# Patient Record
Sex: FEMALE | Race: BLACK OR AFRICAN AMERICAN | NOT HISPANIC OR LATINO
[De-identification: names, ages, dates, MRNs, and addresses within clinical notes are randomized per-mention and may not be internally consistent; named-entity substitution may affect disease eponyms.]

---

## 2017-01-30 ENCOUNTER — APPOINTMENT (OUTPATIENT)
Dept: PODIATRY | Facility: CLINIC | Age: 47
End: 2017-01-30

## 2017-05-30 ENCOUNTER — APPOINTMENT (OUTPATIENT)
Dept: PODIATRY | Facility: CLINIC | Age: 47
End: 2017-05-30

## 2017-05-30 ENCOUNTER — OUTPATIENT (OUTPATIENT)
Dept: OUTPATIENT SERVICES | Facility: HOSPITAL | Age: 47
LOS: 1 days | Discharge: HOME | End: 2017-05-30

## 2017-05-30 VITALS
HEART RATE: 68 BPM | HEIGHT: 65 IN | BODY MASS INDEX: 35.82 KG/M2 | WEIGHT: 215 LBS | DIASTOLIC BLOOD PRESSURE: 82 MMHG | SYSTOLIC BLOOD PRESSURE: 110 MMHG

## 2017-06-28 DIAGNOSIS — M25.579 PAIN IN UNSPECIFIED ANKLE AND JOINTS OF UNSPECIFIED FOOT: ICD-10-CM

## 2017-06-28 DIAGNOSIS — E11.65 TYPE 2 DIABETES MELLITUS WITH HYPERGLYCEMIA: ICD-10-CM

## 2017-06-28 DIAGNOSIS — M25.571 PAIN IN RIGHT ANKLE AND JOINTS OF RIGHT FOOT: ICD-10-CM

## 2017-08-14 ENCOUNTER — APPOINTMENT (OUTPATIENT)
Dept: OPHTHALMOLOGY | Facility: CLINIC | Age: 47
End: 2017-08-14

## 2018-01-30 ENCOUNTER — APPOINTMENT (OUTPATIENT)
Dept: PODIATRY | Facility: CLINIC | Age: 48
End: 2018-01-30

## 2018-02-08 ENCOUNTER — LABORATORY RESULT (OUTPATIENT)
Age: 48
End: 2018-02-08

## 2018-02-08 ENCOUNTER — OUTPATIENT (OUTPATIENT)
Dept: OUTPATIENT SERVICES | Facility: HOSPITAL | Age: 48
LOS: 1 days | Discharge: HOME | End: 2018-02-08

## 2018-02-08 ENCOUNTER — APPOINTMENT (OUTPATIENT)
Dept: INTERNAL MEDICINE | Facility: CLINIC | Age: 48
End: 2018-02-08

## 2018-02-08 VITALS
SYSTOLIC BLOOD PRESSURE: 160 MMHG | HEIGHT: 65 IN | BODY MASS INDEX: 44.15 KG/M2 | DIASTOLIC BLOOD PRESSURE: 90 MMHG | WEIGHT: 265 LBS | HEART RATE: 70 BPM

## 2018-02-08 VITALS
BODY MASS INDEX: 43.32 KG/M2 | HEIGHT: 65 IN | DIASTOLIC BLOOD PRESSURE: 87 MMHG | WEIGHT: 260 LBS | HEART RATE: 93 BPM | SYSTOLIC BLOOD PRESSURE: 137 MMHG

## 2018-02-08 DIAGNOSIS — Z83.1 FAMILY HISTORY OF OTHER INFECTIOUS AND PARASITIC DISEASES: ICD-10-CM

## 2018-02-08 DIAGNOSIS — Z82.49 FAMILY HISTORY OF ISCHEMIC HEART DISEASE AND OTHER DISEASES OF THE CIRCULATORY SYSTEM: ICD-10-CM

## 2018-02-09 DIAGNOSIS — R73.9 HYPERGLYCEMIA, UNSPECIFIED: ICD-10-CM

## 2018-02-09 DIAGNOSIS — Z00.01 ENCOUNTER FOR GENERAL ADULT MEDICAL EXAMINATION WITH ABNORMAL FINDINGS: ICD-10-CM

## 2018-02-09 DIAGNOSIS — M25.571 PAIN IN RIGHT ANKLE AND JOINTS OF RIGHT FOOT: ICD-10-CM

## 2018-02-09 LAB
25(OH)D3 SERPL-MCNC: 14.1 NG/ML
ALBUMIN SERPL ELPH-MCNC: 4 G/DL
ALP BLD-CCNC: 75 U/L
ALT SERPL-CCNC: 18 U/L
ANION GAP SERPL CALC-SCNC: 9 MMOL/L
AST SERPL-CCNC: 19 U/L
BILIRUB SERPL-MCNC: 0.6 MG/DL
BUN SERPL-MCNC: 7 MG/DL
CALCIUM SERPL-MCNC: 9.6 MG/DL
CHLORIDE SERPL-SCNC: 104 MMOL/L
CHOLEST SERPL-MCNC: 172 MG/DL
CHOLEST/HDLC SERPL: 3.9 RATIO
CO2 SERPL-SCNC: 25 MMOL/L
CREAT SERPL-MCNC: 0.7 MG/DL
CREAT SPEC-SCNC: 131 MG/DL
GLUCOSE SERPL-MCNC: 162 MG/DL
HDLC SERPL-MCNC: 44 MG/DL
LDLC SERPL CALC-MCNC: 73 MG/DL
MICROALBUMIN 24H UR DL<=1MG/L-MCNC: 7 MG/DL
MICROALBUMIN/CREAT 24H UR-RTO: 53 MG/G
POTASSIUM SERPL-SCNC: 3.4 MMOL/L
PROT SERPL-MCNC: 7.6 G/DL
SODIUM SERPL-SCNC: 138 MMOL/L
TRIGL SERPL-MCNC: 264 MG/DL

## 2018-02-12 LAB
FOLATE SERPL-MCNC: 13.3 NG/ML
TSH SERPL-ACNC: 0.61 UIU/ML
VIT B12 SERPL-MCNC: 805 PG/ML

## 2018-02-16 ENCOUNTER — APPOINTMENT (OUTPATIENT)
Dept: INTERNAL MEDICINE | Facility: CLINIC | Age: 48
End: 2018-02-16

## 2018-02-21 ENCOUNTER — OTHER (OUTPATIENT)
Age: 48
End: 2018-02-21

## 2018-02-28 ENCOUNTER — APPOINTMENT (OUTPATIENT)
Dept: ORTHOPEDIC SURGERY | Facility: CLINIC | Age: 48
End: 2018-02-28

## 2018-03-01 ENCOUNTER — APPOINTMENT (OUTPATIENT)
Dept: INTERNAL MEDICINE | Facility: CLINIC | Age: 48
End: 2018-03-01

## 2018-03-07 ENCOUNTER — APPOINTMENT (OUTPATIENT)
Dept: INTERNAL MEDICINE | Facility: CLINIC | Age: 48
End: 2018-03-07

## 2018-03-20 ENCOUNTER — OUTPATIENT (OUTPATIENT)
Dept: OUTPATIENT SERVICES | Facility: HOSPITAL | Age: 48
LOS: 1 days | Discharge: HOME | End: 2018-03-20

## 2018-03-20 ENCOUNTER — APPOINTMENT (OUTPATIENT)
Dept: INTERNAL MEDICINE | Facility: CLINIC | Age: 48
End: 2018-03-20

## 2018-03-20 ENCOUNTER — LABORATORY RESULT (OUTPATIENT)
Age: 48
End: 2018-03-20

## 2018-03-20 VITALS
SYSTOLIC BLOOD PRESSURE: 155 MMHG | WEIGHT: 261 LBS | HEIGHT: 65 IN | HEART RATE: 80 BPM | DIASTOLIC BLOOD PRESSURE: 90 MMHG | BODY MASS INDEX: 43.49 KG/M2

## 2018-03-20 DIAGNOSIS — Z80.9 FAMILY HISTORY OF MALIGNANT NEOPLASM, UNSPECIFIED: ICD-10-CM

## 2018-03-20 DIAGNOSIS — Z80.42 FAMILY HISTORY OF MALIGNANT NEOPLASM OF PROSTATE: ICD-10-CM

## 2018-03-21 ENCOUNTER — RESULT REVIEW (OUTPATIENT)
Age: 48
End: 2018-03-21

## 2018-04-10 ENCOUNTER — APPOINTMENT (OUTPATIENT)
Dept: INTERNAL MEDICINE | Facility: CLINIC | Age: 48
End: 2018-04-10

## 2018-04-26 ENCOUNTER — APPOINTMENT (OUTPATIENT)
Dept: UROLOGY | Facility: CLINIC | Age: 48
End: 2018-04-26

## 2018-04-27 ENCOUNTER — APPOINTMENT (OUTPATIENT)
Dept: OBGYN | Facility: CLINIC | Age: 48
End: 2018-04-27

## 2018-05-11 ENCOUNTER — APPOINTMENT (OUTPATIENT)
Dept: ENDOCRINOLOGY | Facility: CLINIC | Age: 48
End: 2018-05-11

## 2018-05-11 ENCOUNTER — OTHER (OUTPATIENT)
Age: 48
End: 2018-05-11

## 2018-06-17 ENCOUNTER — EMERGENCY (EMERGENCY)
Facility: HOSPITAL | Age: 48
LOS: 0 days | Discharge: HOME | End: 2018-06-17
Admitting: EMERGENCY MEDICAL TECHNICIAN, BASIC

## 2018-06-17 VITALS
HEART RATE: 98 BPM | DIASTOLIC BLOOD PRESSURE: 75 MMHG | SYSTOLIC BLOOD PRESSURE: 133 MMHG | OXYGEN SATURATION: 100 % | RESPIRATION RATE: 19 BRPM

## 2018-06-17 VITALS
RESPIRATION RATE: 18 BRPM | SYSTOLIC BLOOD PRESSURE: 173 MMHG | OXYGEN SATURATION: 98 % | DIASTOLIC BLOOD PRESSURE: 101 MMHG | TEMPERATURE: 98 F | HEART RATE: 98 BPM

## 2018-06-17 DIAGNOSIS — M79.603 PAIN IN ARM, UNSPECIFIED: ICD-10-CM
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
## 2018-06-17 DIAGNOSIS — S69.92XA UNSPECIFIED INJURY OF LEFT WRIST, HAND AND FINGER(S), INITIAL ENCOUNTER: ICD-10-CM

## 2018-06-17 RX ORDER — ACETAMINOPHEN 500 MG
975 TABLET ORAL ONCE
Qty: 0 | Refills: 0 | Status: COMPLETED | OUTPATIENT
Start: 2018-06-17 | End: 2018-06-17

## 2018-06-17 RX ORDER — KETOROLAC TROMETHAMINE 30 MG/ML
60 SYRINGE (ML) INJECTION ONCE
Qty: 0 | Refills: 0 | Status: COMPLETED | OUTPATIENT
Start: 2018-06-17 | End: 2018-06-17

## 2018-06-17 RX ADMIN — Medication 975 MILLIGRAM(S): at 14:49

## 2018-06-17 NOTE — ED PROVIDER NOTE - OBJECTIVE STATEMENT
47 y/o y/o F without PMH presents with L wrist pain and swelling x hrs s/p hit by stick by daughter accidentally. Denies CP, palpitations, SOB, back pain, abdominal pain, n/v/d, fevers, fall, cough, recent travel, recent illness, sick contacts, leg pain/swelling, urinary symptoms, rash. denies other injuries, decreased ROM, change in color, warmth, open wounds.

## 2018-06-17 NOTE — ED PROVIDER NOTE - NS ED ROS FT
Review of Systems    Constitutional: (-) fever  Cardiovascular: (-) chest pain, (-) syncope  Respiratory: (-) cough, (-) shortness of breath  Gastrointestinal: (-) vomiting, (-) diarrhea  Musculoskeletal: (-) neck pain, (-) back pain  Integumentary: (-) rash, (-) edema  Neurological: (-) headache

## 2018-06-17 NOTE — ED PROVIDER NOTE - PHYSICAL EXAMINATION
PHYSICAL EXAM:    GENERAL: Alert, appears stated age, well appearing, non-toxic  SKIN: Warm, pink and dry. MMM.   EYE: Normal lids/conjunctiva  ENT: Normal hearing, patent oropharynx  NECK: +supple. No meningismus  Pulm: Bilateral BS, normal resp effort, no wheezes, stridor, or retractions  CV: RRR, no M/R/G, 2+ pulses throughout  Abd: soft, non-tender, non-distended  Mskel: no erythema, cyanosis. no spinal TTP. +L wrist TTP with mild swelling. no other TTP. + decreased ROM of L wrist due to pain. otherwise FROM with each joint isolated.   Neuro: AAOx3, +decreased sensation over L dorsal hand. otherwise sensation intact. 5/5 strength throughout.

## 2018-06-17 NOTE — ED PROVIDER NOTE - PROGRESS NOTE DETAILS
Counseled on red flags and to return for them. Counseled on importance of follow up. Patient repeats back instructions. Patient advised that they or their doctor may call [phone] to follow up on the specific results of the tests performed today in the emergency department.   Patient appears well on discharge.

## 2018-06-17 NOTE — ED PROCEDURE NOTE - CPROC ED POST PROC CARE GUIDE1
Elevate the injured extremity as instructed./Keep the cast/splint/dressing clean and dry./Instructed patient/caregiver regarding signs and symptoms of infection./Verbal/written post procedure instructions were given to patient/caregiver./Instructed patient/caregiver to follow-up with primary care physician.

## 2018-07-03 ENCOUNTER — APPOINTMENT (OUTPATIENT)
Dept: NEUROLOGY | Facility: CLINIC | Age: 48
End: 2018-07-03

## 2018-12-04 ENCOUNTER — APPOINTMENT (OUTPATIENT)
Dept: INTERNAL MEDICINE | Facility: CLINIC | Age: 48
End: 2018-12-04

## 2018-12-19 ENCOUNTER — CLINICAL ADVICE (OUTPATIENT)
Age: 48
End: 2018-12-19

## 2019-01-19 ENCOUNTER — EMERGENCY (EMERGENCY)
Facility: HOSPITAL | Age: 49
LOS: 0 days | Discharge: HOME | End: 2019-01-19
Attending: EMERGENCY MEDICINE | Admitting: EMERGENCY MEDICINE

## 2019-01-19 VITALS
OXYGEN SATURATION: 98 % | RESPIRATION RATE: 16 BRPM | TEMPERATURE: 98 F | DIASTOLIC BLOOD PRESSURE: 112 MMHG | HEART RATE: 98 BPM | SYSTOLIC BLOOD PRESSURE: 178 MMHG

## 2019-01-19 DIAGNOSIS — R07.89 OTHER CHEST PAIN: ICD-10-CM
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
## 2019-01-19 DIAGNOSIS — M65.20 CALCIFIC TENDINITIS, UNSPECIFIED SITE: ICD-10-CM

## 2019-01-19 DIAGNOSIS — R07.9 CHEST PAIN, UNSPECIFIED: ICD-10-CM

## 2019-01-19 DIAGNOSIS — Z79.84 LONG TERM (CURRENT) USE OF ORAL HYPOGLYCEMIC DRUGS: ICD-10-CM
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
## 2019-01-19 DIAGNOSIS — J45.909 UNSPECIFIED ASTHMA, UNCOMPLICATED: ICD-10-CM

## 2019-01-19 DIAGNOSIS — I10 ESSENTIAL (PRIMARY) HYPERTENSION: ICD-10-CM

## 2019-01-19 DIAGNOSIS — E11.9 TYPE 2 DIABETES MELLITUS WITHOUT COMPLICATIONS: ICD-10-CM

## 2019-01-19 DIAGNOSIS — S46.911A STRAIN OF UNSPECIFIED MUSCLE, FASCIA AND TENDON AT SHOULDER AND UPPER ARM LEVEL, RIGHT ARM, INITIAL ENCOUNTER: ICD-10-CM

## 2019-01-19 RX ORDER — METHOCARBAMOL 500 MG/1
1000 TABLET, FILM COATED ORAL ONCE
Qty: 0 | Refills: 0 | Status: COMPLETED | OUTPATIENT
Start: 2019-01-19 | End: 2019-01-19

## 2019-01-19 RX ORDER — AMLODIPINE BESYLATE 2.5 MG/1
1 TABLET ORAL
Qty: 30 | Refills: 0
Start: 2019-01-19 | End: 2019-02-17

## 2019-01-19 RX ORDER — IBUPROFEN 200 MG
3 TABLET ORAL
Qty: 120 | Refills: 0
Start: 2019-01-19 | End: 2019-01-28

## 2019-01-19 RX ORDER — IBUPROFEN 200 MG
600 TABLET ORAL ONCE
Qty: 0 | Refills: 0 | Status: COMPLETED | OUTPATIENT
Start: 2019-01-19 | End: 2019-01-19

## 2019-01-19 RX ORDER — METHOCARBAMOL 500 MG/1
1 TABLET, FILM COATED ORAL
Qty: 10 | Refills: 0
Start: 2019-01-19 | End: 2019-01-28

## 2019-01-19 RX ORDER — OXYCODONE AND ACETAMINOPHEN 5; 325 MG/1; MG/1
1 TABLET ORAL ONCE
Qty: 0 | Refills: 0 | Status: DISCONTINUED | OUTPATIENT
Start: 2019-01-19 | End: 2019-01-19

## 2019-01-19 RX ORDER — METFORMIN HYDROCHLORIDE 850 MG/1
1 TABLET ORAL
Qty: 30 | Refills: 0
Start: 2019-01-19 | End: 2019-02-17

## 2019-01-19 RX ADMIN — OXYCODONE AND ACETAMINOPHEN 1 TABLET(S): 5; 325 TABLET ORAL at 12:04

## 2019-01-19 RX ADMIN — METHOCARBAMOL 1000 MILLIGRAM(S): 500 TABLET, FILM COATED ORAL at 12:04

## 2019-01-19 RX ADMIN — Medication 600 MILLIGRAM(S): at 12:04

## 2019-01-19 NOTE — ED PROVIDER NOTE - OBJECTIVE STATEMENT
49 yo F with hx of HTN, DM, asthma, non compliant, presents for evaluation of right shoulder pain, onset 2 days ago, associated with pain with movement. NO fever, no chills, no headache, no nausea, no vomiting, no chest pain, no back pain, no abdominal pain. Patient states she was bathing her grandson when she felt a sharp pain in her shoulder. Pt states she has been taking advil with mild relief. Patient denies any other symptoms.

## 2019-01-19 NOTE — ED PROVIDER NOTE - NSFOLLOWUPCLINICS_GEN_ALL_ED_FT
[facility]  Medicine  [address], [place]   Phone: [phone]  Fax:   Follow Up Time: 4-6 Days    [facility]  Orthpedic  [address], [place]   Phone: [phone]  Fax:   Follow Up Time: 4-6 Days    [facility]  Rehabilitation  [address] [place] [id number]  Phone: [phone]  Fax:   Follow Up Time: 4-6 Days

## 2019-01-19 NOTE — ED PROVIDER NOTE - ATTENDING CONTRIBUTION TO CARE
48y f h/o htn, dm, asthma p/w R shoulder pain x 2d. Started while she was giving her grandson a bath, went to pick him up and felt a sharp pain in her R shoulder. Radiating around to R chest. Worse w/mvmt. Has been taking advil w/min relief. Denies f/c, sob, nvd, abd pain, diaphoresis, edema, calf erythema or pain. No recent travel/sx/immob. Hypertensive in ED - pt admits to non-compliance w/BP meds & metformin x 1y. PMD @ [facility]. PE:  middle-aged f nad, ncat, neck supple no jvd, rrr nl s1s2 no mrg, ctab no wrr, abd soft ntnd, back non-tender, ext- R shoulder atraumatic, +ttp along deltoid and up R neck, ellicited by ranging shoulder, full rom/strength intact but painful, nvid, remainder of ext exam nl.

## 2019-01-19 NOTE — ED PROVIDER NOTE - CARE PROVIDER_API CALL
Patrick Pacheco), Anesthesiology; Pain Medicine  [address] [place] [address]  Phone: [phone]  Fax: [phone]

## 2019-01-19 NOTE — ED PROVIDER NOTE - MEDICAL DECISION MAKING DETAILS
R shoulder strain - no bony fx or injury on XRs - Rx for pain meds and refills of BP/DM meds given, return precautions discussedm encouraged outpt [facility]/Ortho/Rehab Clinic f/u

## 2019-01-19 NOTE — ED PROVIDER NOTE - PROGRESS NOTE DETAILS
pt pain controlled Discussed with patient need to be compliant with her medications. Discussed need to follow up with ortho, PT, and pain management. Discussed signs and symptoms to return to the ED for. Discussed follow up with PMD.

## 2019-01-19 NOTE — ED PROVIDER NOTE - NSFOLLOWUPINSTRUCTIONS_ED_ALL_ED_FT
Calcific Tendinitis    WHAT YOU NEED TO KNOW:    What is calcific tendinitis? Calcific tendinitis is a condition that occurs when calcium collects in the tendons of the shoulder. Tendons are bands of tissue that connect muscle to bone. The calcium can make the tendons swell and cause severe pain.    What increases my risk for calcific tendinitis? There is no known cause of calcific tendinitis. The following may increase your risk:     Family history of calcific tendinitis      Age between 30 and 50 years      Female gender      Lack of physical activity      Medical conditions, such as diabetes    What are the signs and symptoms of calcific tendinitis? Signs and symptoms may be sudden and severe, lasting several weeks. Symptoms can also be mild and last several months.     Pain in your shoulder that may spread to your neck      Trouble sleeping because of pain       Stiffness or weakness in your arm or shoulder      Decreased arm movement    How is calcific tendinitis diagnosed? Your healthcare provider will check how well you can move your shoulder and arm. He may check the function of your elbow, wrist, and hand. He may compare the movement and strength of your painful shoulder against your healthy shoulder. You may also need the following tests:     An x-ray may show calcium buildup in your shoulder.      An ultrasound uses sound waves to show pictures on a monitor. An ultrasound may be done to show the cause of your pain.      An MRI takes pictures of your shoulder. An MRI may show calcium in your shoulder or another cause of your pain. You may be given dye to help the parts of your shoulder show up better in the pictures. Tell the healthcare provider if you have ever had an allergic reaction to contrast dye. Do not enter the MRI room with anything metal. Metal can cause serious injury. Tell the healthcare provider if you have any metal in or on your body.    How is calcific tendinitis treated? Calcific tendinitis may go away on its own. The body absorbs the calcium, and the tendon heals. You may need any of the following:     Medicines:   NSAIDs may decrease swelling and pain. This medicine can be bought with or without a doctor's order. This medicine can cause stomach bleeding or kidney problems in certain people. If you take blood thinner medicine, always ask your healthcare provider if NSAIDs are safe for you. Always read the medicine label and follow the directions on it before using this medicine.      Steroids help decrease inflammation. You may be given steroids as a pill or a shot in your shoulder.      Needling is a procedure used to break up the calcium or remove it. Your healthcare provider inserts one or more needles through your skin and into your shoulder.       Extracorporeal shockwave therapy (ESWT) uses sound waves aimed at the calcium to help break it up. The pieces of calcium are then absorbed back into the body. ESWT may be done if symptoms last 3 months or longer.      Surgery may be needed to remove the calcium if you have severe pain for several months and other treatments have not helped. Damaged tissue or bone may also be removed.    How can I manage my symptoms?     Go to physical therapy. A physical therapist can teach you exercises to help improve movement and strength, and to decrease pain.      Apply ice on your shoulder for 15 to 20 minutes every hour or as directed. Use an ice pack, or put crushed ice in a plastic bag. Cover it with a towel. Ice helps prevent tissue damage and decreases swelling and pain.      Apply heat on your shoulder for 20 to 30 minutes every 2 hours for as many days as directed. Heat helps decrease pain and muscle spasms.      Rest your arm. Healthcare providers may have you place an item, such as a ball, between your side and elbow while you rest. This may help decrease stiffness and pain.    When should I contact my healthcare provider?     You have worse pain and stiffness in your shoulder.      You have new or more trouble moving your arm.      You have questions or concerns about your condition or care.    When should I seek immediate care or call 911?     You cannot move your arm.      You have severe pain in your arm or shoulder.    CARE AGREEMENT:    You have the right to help plan your care. Learn about your health condition and how it may be treated. Discuss treatment options with your healthcare providers to decide what care you want to receive. You always have the right to refuse treatment.

## 2019-01-23 PROBLEM — E11.9 TYPE 2 DIABETES MELLITUS WITHOUT COMPLICATIONS: Chronic | Status: ACTIVE | Noted: 2019-01-19

## 2019-01-23 PROBLEM — I10 ESSENTIAL (PRIMARY) HYPERTENSION: Chronic | Status: ACTIVE | Noted: 2019-01-19

## 2019-01-31 ENCOUNTER — APPOINTMENT (OUTPATIENT)
Dept: INTERNAL MEDICINE | Facility: CLINIC | Age: 49
End: 2019-01-31

## 2019-02-12 ENCOUNTER — APPOINTMENT (OUTPATIENT)
Dept: INTERNAL MEDICINE | Facility: CLINIC | Age: 49
End: 2019-02-12

## 2019-02-12 ENCOUNTER — OUTPATIENT (OUTPATIENT)
Dept: OUTPATIENT SERVICES | Facility: HOSPITAL | Age: 49
LOS: 1 days | Discharge: HOME | End: 2019-02-12

## 2019-02-12 VITALS
WEIGHT: 265 LBS | HEIGHT: 65 IN | BODY MASS INDEX: 44.15 KG/M2 | SYSTOLIC BLOOD PRESSURE: 148 MMHG | HEART RATE: 99 BPM | DIASTOLIC BLOOD PRESSURE: 95 MMHG

## 2019-02-12 DIAGNOSIS — I10 ESSENTIAL (PRIMARY) HYPERTENSION: ICD-10-CM

## 2019-02-12 DIAGNOSIS — N93.9 ABNORMAL UTERINE AND VAGINAL BLEEDING, UNSPECIFIED: ICD-10-CM

## 2019-02-12 DIAGNOSIS — M25.511 PAIN IN RIGHT SHOULDER: ICD-10-CM

## 2019-02-12 DIAGNOSIS — K76.9 LIVER DISEASE, UNSPECIFIED: ICD-10-CM

## 2019-02-12 DIAGNOSIS — E11.9 TYPE 2 DIABETES MELLITUS WITHOUT COMPLICATIONS: ICD-10-CM

## 2019-02-12 DIAGNOSIS — R41.3 OTHER AMNESIA: ICD-10-CM

## 2019-02-12 RX ORDER — AMLODIPINE BESYLATE 5 MG/1
5 TABLET ORAL
Qty: 30 | Refills: 0 | Status: DISCONTINUED | COMMUNITY
Start: 2019-01-19

## 2019-02-12 RX ORDER — METHOCARBAMOL 500 MG/1
500 TABLET, FILM COATED ORAL
Qty: 10 | Refills: 0 | Status: DISCONTINUED | COMMUNITY
Start: 2019-01-19

## 2019-02-12 NOTE — PHYSICAL EXAM
[Well Nourished] : well nourished [Well Developed] : well developed [Normal Sclera/Conjunctiva] : normal sclera/conjunctiva [Supple] : supple [No Respiratory Distress] : no respiratory distress  [Clear to Auscultation] : lungs were clear to auscultation bilaterally [Regular Rhythm] : with a regular rhythm [Normal S1, S2] : normal S1 and S2 [No Edema] : there was no peripheral edema [Soft] : abdomen soft [Non Tender] : non-tender [Normal Affect] : the affect was normal [Normal Insight/Judgement] : insight and judgment were intact [de-identified] : In distress due to pain [de-identified] : Vision grossly normal.  [de-identified] : Pain on shoulder, right to touch and active and passive movment. Movement restricted due to pain. Sensation decreased to touch and pain in arm and fore arm.  [de-identified] : Walkers with limp. uses cane at home.

## 2019-02-12 NOTE — ASSESSMENT
[FreeTextEntry1] : # Frozen Shoulder\par -Xray showed Hypercalcemia around the tendon.\par -MRI shoulder ordered.\par -Use Meloxicam 15 mg daily for 15 days. \par -Orthopedic refferal provided\par -patient advised to come back if the pain worsens\par \par #Diabetes Mellitus\par -patient non complaint with medications\par -last HB a1c noted to be 9.\par -Repeat labs ordered\par -patient referred to endocrinology\par -Podiatry and opthalmology referral given\par -Patient counseled\par \par #Hypertension\par -patient non complaint.\par -Patient advised to continue with current medications and follow up in 1 month\par \par # Hepatic hemangioma\par -CT scan showed hepatic mass suspicious of hepatic hemangioma.\par -repeat CT abdomen ordered.\par \par # Memory problem\par -Patient has difficulty in memory\par -Neurology refferal given\par \par # HCM\par -Flu shot refused\par -Routine labs ordered\par -Referral to gynae/obs for pap smear and mammogram\par -Follow up in 1 month.\par \par \par

## 2019-02-12 NOTE — REVIEW OF SYSTEMS
[Itching] : Itching [Memory Loss] : memory loss [Negative] : Heme/Lymph [FreeTextEntry3] : See HPI [FreeTextEntry4] : See HPI [FreeTextEntry5] : See HPI [FreeTextEntry6] : See HPI [FreeTextEntry8] : See HPI [FreeTextEntry9] : See HPI [de-identified] : See HPI

## 2019-02-12 NOTE — HISTORY OF PRESENT ILLNESS
[FreeTextEntry1] : Follow Up [de-identified] : 49 year old female with past medical history of Diabetes and HTN comes for a follow up. She complains of right shoulder pain, that started one month ago. She complains of difficulty in movement, and was at ER last month for the similar complains. The patient was found to have calcium deposition on Shoulder xray. Of note the patient says she has been non complaint of her medications and have some blurry vision and urinary frequency. \par

## 2019-02-15 ENCOUNTER — RX RENEWAL (OUTPATIENT)
Age: 49
End: 2019-02-15

## 2019-02-19 ENCOUNTER — MEDICATION RENEWAL (OUTPATIENT)
Age: 49
End: 2019-02-19

## 2019-02-20 ENCOUNTER — APPOINTMENT (OUTPATIENT)
Dept: ORTHOPEDIC SURGERY | Facility: CLINIC | Age: 49
End: 2019-02-20

## 2019-03-13 ENCOUNTER — APPOINTMENT (OUTPATIENT)
Dept: INTERNAL MEDICINE | Facility: CLINIC | Age: 49
End: 2019-03-13

## 2019-03-28 DIAGNOSIS — Z56.0 UNEMPLOYMENT, UNSPECIFIED: ICD-10-CM

## 2019-03-28 DIAGNOSIS — Z60.2 PROBLEMS RELATED TO LIVING ALONE: ICD-10-CM

## 2019-03-28 SDOH — SOCIAL STABILITY - SOCIAL INSECURITY: PROBLEMS RELATED TO LIVING ALONE: Z60.2

## 2019-03-28 SDOH — ECONOMIC STABILITY - INCOME SECURITY: UNEMPLOYMENT, UNSPECIFIED: Z56.0

## 2019-03-29 PROBLEM — Z60.2 LIVING ALONE: Status: ACTIVE | Noted: 2019-03-29

## 2019-03-29 PROBLEM — Z56.0 UNEMPLOYED: Status: ACTIVE | Noted: 2019-03-29

## 2019-05-01 ENCOUNTER — OUTPATIENT (OUTPATIENT)
Dept: OUTPATIENT SERVICES | Facility: HOSPITAL | Age: 49
LOS: 1 days | End: 2019-05-01
Payer: MEDICAID

## 2019-05-01 PROCEDURE — G9001: CPT

## 2019-05-17 DIAGNOSIS — Z71.89 OTHER SPECIFIED COUNSELING: ICD-10-CM

## 2019-06-20 ENCOUNTER — OTHER (OUTPATIENT)
Age: 49
End: 2019-06-20

## 2019-08-15 ENCOUNTER — OTHER (OUTPATIENT)
Age: 49
End: 2019-08-15

## 2019-08-15 LAB
24R-OH-CALCIDIOL SERPL-MCNC: 66.9 PG/ML
ALBUMIN SERPL ELPH-MCNC: 4.2 G/DL
ALP BLD-CCNC: 104 U/L
ALT SERPL-CCNC: 11 U/L
ANION GAP SERPL CALC-SCNC: 15 MMOL/L
APPEARANCE: CLEAR
AST SERPL-CCNC: 11 U/L
BASOPHILS # BLD AUTO: 0.03 K/UL
BASOPHILS NFR BLD AUTO: 0.4 %
BILIRUB SERPL-MCNC: 0.8 MG/DL
BILIRUBIN URINE: NEGATIVE
BLOOD URINE: NEGATIVE
BUN SERPL-MCNC: 13 MG/DL
CALCIUM SERPL-MCNC: 9.7 MG/DL
CHLORIDE SERPL-SCNC: 101 MMOL/L
CHOLEST SERPL-MCNC: 152 MG/DL
CHOLEST/HDLC SERPL: 4.6 RATIO
CO2 SERPL-SCNC: 22 MMOL/L
COLOR: YELLOW
CREAT SERPL-MCNC: 0.8 MG/DL
CREAT SPEC-SCNC: 111 MG/DL
EOSINOPHIL # BLD AUTO: 0.42 K/UL
EOSINOPHIL NFR BLD AUTO: 5.2 %
ESTIMATED AVERAGE GLUCOSE: 255 MG/DL
GLUCOSE QUALITATIVE U: ABNORMAL
GLUCOSE SERPL-MCNC: 236 MG/DL
HBA1C MFR BLD HPLC: 10.5 %
HCT VFR BLD CALC: 41.4 %
HDLC SERPL-MCNC: 33 MG/DL
HGB BLD-MCNC: 13.3 G/DL
IMM GRANULOCYTES NFR BLD AUTO: 0.2 %
KETONES URINE: NEGATIVE
LDLC SERPL CALC-MCNC: 43 MG/DL
LEUKOCYTE ESTERASE URINE: NEGATIVE
LYMPHOCYTES # BLD AUTO: 2.64 K/UL
LYMPHOCYTES NFR BLD AUTO: 32.8 %
MAN DIFF?: NORMAL
MCHC RBC-ENTMCNC: 26.4 PG
MCHC RBC-ENTMCNC: 32.1 G/DL
MCV RBC AUTO: 82.3 FL
MICROALBUMIN 24H UR DL<=1MG/L-MCNC: 1.9 MG/DL
MICROALBUMIN/CREAT 24H UR-RTO: 17 MG/G
MONOCYTES # BLD AUTO: 0.66 K/UL
MONOCYTES NFR BLD AUTO: 8.2 %
NEUTROPHILS # BLD AUTO: 4.27 K/UL
NEUTROPHILS NFR BLD AUTO: 53.2 %
NITRITE URINE: NEGATIVE
PH URINE: 6
PLATELET # BLD AUTO: 300 K/UL
POTASSIUM SERPL-SCNC: 4.8 MMOL/L
PROT SERPL-MCNC: 7.8 G/DL
PROTEIN URINE: NORMAL
RBC # BLD: 5.03 M/UL
RBC # FLD: 12.9 %
SODIUM SERPL-SCNC: 138 MMOL/L
SPECIFIC GRAVITY URINE: 1.03
TRIGL SERPL-MCNC: 417 MG/DL
TSH SERPL-ACNC: 1.21 UIU/ML
UROBILINOGEN URINE: NORMAL
WBC # FLD AUTO: 8.04 K/UL

## 2019-09-05 ENCOUNTER — OTHER (OUTPATIENT)
Age: 49
End: 2019-09-05

## 2019-09-09 ENCOUNTER — APPOINTMENT (OUTPATIENT)
Dept: INTERNAL MEDICINE | Facility: CLINIC | Age: 49
End: 2019-09-09

## 2019-10-10 ENCOUNTER — OUTPATIENT (OUTPATIENT)
Dept: OUTPATIENT SERVICES | Facility: HOSPITAL | Age: 49
LOS: 1 days | Discharge: HOME | End: 2019-10-10

## 2019-10-10 ENCOUNTER — APPOINTMENT (OUTPATIENT)
Dept: INTERNAL MEDICINE | Facility: CLINIC | Age: 49
End: 2019-10-10
Payer: MEDICAID

## 2019-10-10 VITALS
HEIGHT: 65 IN | SYSTOLIC BLOOD PRESSURE: 156 MMHG | HEART RATE: 101 BPM | WEIGHT: 250 LBS | DIASTOLIC BLOOD PRESSURE: 97 MMHG | BODY MASS INDEX: 41.65 KG/M2

## 2019-10-10 PROCEDURE — 99214 OFFICE O/P EST MOD 30 MIN: CPT

## 2019-10-10 RX ORDER — METFORMIN HYDROCHLORIDE 1000 MG/1
1000 TABLET, COATED ORAL
Qty: 60 | Refills: 5 | Status: DISCONTINUED | COMMUNITY
Start: 2018-03-21 | End: 2019-10-10

## 2019-10-10 RX ORDER — HYDROCHLOROTHIAZIDE 25 MG/1
25 TABLET ORAL DAILY
Qty: 30 | Refills: 5 | Status: DISCONTINUED | COMMUNITY
Start: 2018-03-20 | End: 2019-10-10

## 2019-10-10 NOTE — REVIEW OF SYSTEMS
[address]  [id number]  Consent for Procedure/Sedation  Date: 07/02/2024         Time: 0815    I hereby authorize Dr. Donnelly, my physician and his/her assistants (if applicable), which may include medical students, residents, and/or fellows, to perform the following surgical operation/ procedure and administer such anesthesia as may be determined necessary by my physician: Biliary drain insertion on [place]i [place] De Oliveira  2.   I recognize that during the surgical operation/procedure, unforeseen conditions may necessitate additional or different procedures than those listed above.  I, therefore, further authorize and request that the above-named surgeon, assistants, or designees perform such procedures as are, in their judgment, necessary and desirable.    3.   My surgeon/physician has discussed prior to my surgery the potential benefits, risks and side effects of this procedure; the likelihood of achieving goals; and potential problems that might occur during recuperation.  They also discussed reasonable alternatives to the procedure, including risks, benefits, and side effects related to the alternatives and risks related to not receiving this procedure.  I have had all my questions answered and I acknowledge that no guarantee has been made as to the result that may be obtained.    4.   Should the need arise during my operation/procedure, which includes change of level of care prior to discharge, I also consent to the administration of blood and/or blood products.  Further, I understand that despite careful testing and screening of blood or blood products by collecting agencies, I may still be subject to ill effects as a result of receiving a blood transfusion and/or blood products.  The following are some, but not all, of the potential risks that can occur: fever and allergic reactions, hemolytic reactions, transmission of diseases such as Hepatitis, AIDS and Cytomegalovirus  (CMV) and fluid overload.  In the event that I wish to have an autologous transfusion of my own blood, or a directed donor transfusion, I will discuss this with my physician.   Check only if Refusing Blood or Blood Products  I understand refusal of blood or blood products as deemed necessary by my physician may have serious consequences to my condition to include possible death. I hereby assume responsibility for my refusal and release the hospital, its personnel, and my physicians from any responsibility for the consequences of my refusal.         o  Refuse         5.   I authorize the use of any specimen, organs, tissues, body parts or foreign objects that may be removed from my body during the operation/procedure for diagnosis, research or teaching purposes and their subsequent disposal by hospital authorities.  I also authorize the release of specimen test results and/or written reports to my treating physician on the hospital medical staff or other referring or consulting physicians involved in my care, at the discretion of the Pathologist or my treating physician.    6.   I consent to the photographing or videotaping of the operations or procedures to be performed, including appropriate portions of my body for medical, scientific, or educational purposes, provided my identity is not revealed by the pictures or by descriptive texts accompanying them.  If the procedure has been photographed/videotaped, the surgeon will obtain the original picture, image, videotape or CD.  The hospital will not be responsible for storage, release or maintenance of the picture, image, tape or CD.    7.   I consent to the presence of a  or observers in the operating room as deemed necessary by my physician or their designees.    8.   I recognize that in the event my procedure results in extended X-Ray/fluoroscopy time, I may develop a skin reaction.    9. If I have a Do Not Attempt Resuscitation (DNAR) order in  place, that status will be suspended while in the operating room, procedural suite, and during the recovery period unless otherwise explicitly stated by me (or a person authorized to consent on my behalf). The surgeon or my attending physician will determine when the applicable recovery period ends for purposes of reinstating the DNAR order.  10. Patients having a sterilization procedure: I understand that if the procedure is successful the results will be permanent and it will therefore be impossible for me to inseminate, conceive, or bear children.  I also understand that the procedure is intended to result in sterility, although the result has not been guaranteed.   11. I acknowledge that my physician has explained sedation/analgesia administration to me including the risk and benefits I consent to the administration of sedation/analgesia as may be necessary or desirable in the judgment of my physician.    I CERTIFY THAT I HAVE READ AND FULLY UNDERSTAND THE ABOVE CONSENT TO OPERATION and/or OTHER PROCEDURE.        ____________________________________       _________________________________      ______________________________  Signature of Patient         Signature of Responsible Person        Printed Name of Responsible Person        ____________________________________      _________________________________      ______________________________       Signature of Witness          Relationship to Patient                       Date                                       Time  Patient Name: Stephy De Oliveira  : 1981    Reviewed: 2024   Printed: 2024  Medical Record #: [id number] Page 1 of 1            [Negative] : Integumentary [FreeTextEntry6] : SOB and wheezing mostly at nights and while walking [FreeTextEntry8] : polyuria [de-identified] : neuropathic pain

## 2019-10-10 NOTE — PHYSICAL EXAM
[No Acute Distress] : no acute distress [Well Developed] : well developed [Well Nourished] : well nourished [Well-Appearing] : well-appearing [Normal Sclera/Conjunctiva] : normal sclera/conjunctiva [EOMI] : extraocular movements intact [PERRL] : pupils equal round and reactive to light [Normal Outer Ear/Nose] : the outer ears and nose were normal in appearance [Normal Oropharynx] : the oropharynx was normal [No JVD] : no jugular venous distention [No Lymphadenopathy] : no lymphadenopathy [Supple] : supple [No Respiratory Distress] : no respiratory distress  [Thyroid Normal, No Nodules] : the thyroid was normal and there were no nodules present [No Accessory Muscle Use] : no accessory muscle use [Clear to Auscultation] : lungs were clear to auscultation bilaterally [Normal Rate] : normal rate  [Regular Rhythm] : with a regular rhythm [Normal S1, S2] : normal S1 and S2 [No Abdominal Bruit] : a ~M bruit was not heard ~T in the abdomen [No Murmur] : no murmur heard [No Carotid Bruits] : no carotid bruits [No Varicosities] : no varicosities [Pedal Pulses Present] : the pedal pulses are present [No Edema] : there was no peripheral edema [No Palpable Aorta] : no palpable aorta [Soft] : abdomen soft [No Extremity Clubbing/Cyanosis] : no extremity clubbing/cyanosis [Non Tender] : non-tender [Non-distended] : non-distended [No HSM] : no HSM [No Masses] : no abdominal mass palpated [Normal Posterior Cervical Nodes] : no posterior cervical lymphadenopathy [Normal Anterior Cervical Nodes] : no anterior cervical lymphadenopathy [Normal Bowel Sounds] : normal bowel sounds [No Spinal Tenderness] : no spinal tenderness [No CVA Tenderness] : no CVA  tenderness [Grossly Normal Strength/Tone] : grossly normal strength/tone [No Joint Swelling] : no joint swelling [Coordination Grossly Intact] : coordination grossly intact [No Rash] : no rash [No Focal Deficits] : no focal deficits [Normal Gait] : normal gait [Normal Affect] : the affect was normal [Deep Tendon Reflexes (DTR)] : deep tendon reflexes were 2+ and symmetric [Normal Insight/Judgement] : insight and judgment were intact

## 2019-10-10 NOTE — HISTORY OF PRESENT ILLNESS
[FreeTextEntry1] : Follow Up [de-identified] : 49 year old female with past medical history of Diabetes and HTN comes for a follow up. She was complaining of right shoulder pain during her last visit. Xray was done and showed calcifications of the joint. MRI was ordered and she was referred to ortho but she never went. She is still complaining of shoulder pain with decrease range of motion.\par Patient is also complaining of pin and needle pain in bilateral feet mostly at night along with polyuria and polydypsia. Also she is having wheezing and SOB episodes mostly at nights which she is attributing to her asthma. She is taking her daughter's medication ( ventolin) for symptomatic relief.

## 2019-10-11 ENCOUNTER — OTHER (OUTPATIENT)
Age: 49
End: 2019-10-11

## 2019-10-11 ENCOUNTER — OUTPATIENT (OUTPATIENT)
Dept: OUTPATIENT SERVICES | Facility: HOSPITAL | Age: 49
LOS: 1 days | Discharge: HOME | End: 2019-10-11

## 2019-10-11 ENCOUNTER — APPOINTMENT (OUTPATIENT)
Dept: NUTRITION | Facility: CLINIC | Age: 49
End: 2019-10-11

## 2019-10-16 ENCOUNTER — APPOINTMENT (OUTPATIENT)
Dept: ENDOCRINOLOGY | Facility: CLINIC | Age: 49
End: 2019-10-16

## 2019-10-16 ENCOUNTER — APPOINTMENT (OUTPATIENT)
Dept: ORTHOPEDIC SURGERY | Facility: CLINIC | Age: 49
End: 2019-10-16

## 2019-10-17 ENCOUNTER — APPOINTMENT (OUTPATIENT)
Dept: INTERNAL MEDICINE | Facility: CLINIC | Age: 49
End: 2019-10-17

## 2019-10-18 ENCOUNTER — APPOINTMENT (OUTPATIENT)
Dept: ENDOCRINOLOGY | Facility: CLINIC | Age: 49
End: 2019-10-18

## 2019-10-22 DIAGNOSIS — I10 ESSENTIAL (PRIMARY) HYPERTENSION: ICD-10-CM

## 2019-10-22 DIAGNOSIS — M75.00 ADHESIVE CAPSULITIS OF UNSPECIFIED SHOULDER: ICD-10-CM

## 2019-10-22 DIAGNOSIS — E11.9 TYPE 2 DIABETES MELLITUS WITHOUT COMPLICATIONS: ICD-10-CM

## 2019-10-22 DIAGNOSIS — K76.9 LIVER DISEASE, UNSPECIFIED: ICD-10-CM

## 2019-10-23 ENCOUNTER — OTHER (OUTPATIENT)
Age: 49
End: 2019-10-23

## 2019-10-23 ENCOUNTER — APPOINTMENT (OUTPATIENT)
Dept: ENDOCRINOLOGY | Facility: CLINIC | Age: 49
End: 2019-10-23

## 2019-10-23 ENCOUNTER — APPOINTMENT (OUTPATIENT)
Dept: PODIATRY | Facility: CLINIC | Age: 49
End: 2019-10-23

## 2019-11-30 ENCOUNTER — EMERGENCY (EMERGENCY)
Facility: HOSPITAL | Age: 49
LOS: 0 days | Discharge: HOME | End: 2019-11-30
Attending: EMERGENCY MEDICINE | Admitting: EMERGENCY MEDICINE
Payer: MEDICAID

## 2019-11-30 VITALS
RESPIRATION RATE: 18 BRPM | SYSTOLIC BLOOD PRESSURE: 124 MMHG | OXYGEN SATURATION: 99 % | TEMPERATURE: 99 F | HEART RATE: 98 BPM | DIASTOLIC BLOOD PRESSURE: 78 MMHG

## 2019-11-30 DIAGNOSIS — I10 ESSENTIAL (PRIMARY) HYPERTENSION: ICD-10-CM

## 2019-11-30 DIAGNOSIS — R10.9 UNSPECIFIED ABDOMINAL PAIN: ICD-10-CM

## 2019-11-30 DIAGNOSIS — E11.9 TYPE 2 DIABETES MELLITUS WITHOUT COMPLICATIONS: ICD-10-CM

## 2019-11-30 DIAGNOSIS — R11.2 NAUSEA WITH VOMITING, UNSPECIFIED: ICD-10-CM

## 2019-11-30 DIAGNOSIS — Z88.0 ALLERGY STATUS TO PENICILLIN: ICD-10-CM

## 2019-11-30 DIAGNOSIS — K52.9 NONINFECTIVE GASTROENTERITIS AND COLITIS, UNSPECIFIED: ICD-10-CM

## 2019-11-30 LAB
ALBUMIN SERPL ELPH-MCNC: 3.9 G/DL — SIGNIFICANT CHANGE UP (ref 3.5–5.2)
ALP SERPL-CCNC: 82 U/L — SIGNIFICANT CHANGE UP (ref 30–115)
ALT FLD-CCNC: 14 U/L — SIGNIFICANT CHANGE UP (ref 0–41)
ANION GAP SERPL CALC-SCNC: 18 MMOL/L — HIGH (ref 7–14)
AST SERPL-CCNC: 43 U/L — HIGH (ref 0–41)
BASOPHILS # BLD AUTO: 0.05 K/UL — SIGNIFICANT CHANGE UP (ref 0–0.2)
BASOPHILS NFR BLD AUTO: 0.4 % — SIGNIFICANT CHANGE UP (ref 0–1)
BILIRUB SERPL-MCNC: 0.6 MG/DL — SIGNIFICANT CHANGE UP (ref 0.2–1.2)
BUN SERPL-MCNC: 12 MG/DL — SIGNIFICANT CHANGE UP (ref 10–20)
CALCIUM SERPL-MCNC: 9.4 MG/DL — SIGNIFICANT CHANGE UP (ref 8.5–10.1)
CHLORIDE SERPL-SCNC: 100 MMOL/L — SIGNIFICANT CHANGE UP (ref 98–110)
CO2 SERPL-SCNC: 17 MMOL/L — SIGNIFICANT CHANGE UP (ref 17–32)
CREAT SERPL-MCNC: 0.9 MG/DL — SIGNIFICANT CHANGE UP (ref 0.7–1.5)
EOSINOPHIL # BLD AUTO: 0.33 K/UL — SIGNIFICANT CHANGE UP (ref 0–0.7)
EOSINOPHIL NFR BLD AUTO: 2.7 % — SIGNIFICANT CHANGE UP (ref 0–8)
GAS PNL BLDV: SIGNIFICANT CHANGE UP
GLUCOSE SERPL-MCNC: 271 MG/DL — HIGH (ref 70–99)
HCG SERPL QL: NEGATIVE — SIGNIFICANT CHANGE UP
HCT VFR BLD CALC: 51.7 % — HIGH (ref 37–47)
HGB BLD-MCNC: 17 G/DL — HIGH (ref 12–16)
IMM GRANULOCYTES NFR BLD AUTO: 0.3 % — SIGNIFICANT CHANGE UP (ref 0.1–0.3)
LIDOCAIN IGE QN: 43 U/L — SIGNIFICANT CHANGE UP (ref 7–60)
LYMPHOCYTES # BLD AUTO: 2.79 K/UL — SIGNIFICANT CHANGE UP (ref 1.2–3.4)
LYMPHOCYTES # BLD AUTO: 23 % — SIGNIFICANT CHANGE UP (ref 20.5–51.1)
MCHC RBC-ENTMCNC: 26.7 PG — LOW (ref 27–31)
MCHC RBC-ENTMCNC: 32.9 G/DL — SIGNIFICANT CHANGE UP (ref 32–37)
MCV RBC AUTO: 81.2 FL — SIGNIFICANT CHANGE UP (ref 81–99)
MONOCYTES # BLD AUTO: 0.9 K/UL — HIGH (ref 0.1–0.6)
MONOCYTES NFR BLD AUTO: 7.4 % — SIGNIFICANT CHANGE UP (ref 1.7–9.3)
NEUTROPHILS # BLD AUTO: 8 K/UL — HIGH (ref 1.4–6.5)
NEUTROPHILS NFR BLD AUTO: 66.2 % — SIGNIFICANT CHANGE UP (ref 42.2–75.2)
NRBC # BLD: 0 /100 WBCS — SIGNIFICANT CHANGE UP (ref 0–0)
PLATELET # BLD AUTO: 336 K/UL — SIGNIFICANT CHANGE UP (ref 130–400)
POTASSIUM SERPL-MCNC: 5.6 MMOL/L — HIGH (ref 3.5–5)
POTASSIUM SERPL-SCNC: 5.6 MMOL/L — HIGH (ref 3.5–5)
PROT SERPL-MCNC: 7.7 G/DL — SIGNIFICANT CHANGE UP (ref 6–8)
RBC # BLD: 6.37 M/UL — HIGH (ref 4.2–5.4)
RBC # FLD: 12.8 % — SIGNIFICANT CHANGE UP (ref 11.5–14.5)
SODIUM SERPL-SCNC: 135 MMOL/L — SIGNIFICANT CHANGE UP (ref 135–146)
TROPONIN T SERPL-MCNC: <0.01 NG/ML — SIGNIFICANT CHANGE UP
WBC # BLD: 12.11 K/UL — HIGH (ref 4.8–10.8)
WBC # FLD AUTO: 12.11 K/UL — HIGH (ref 4.8–10.8)

## 2019-11-30 PROCEDURE — 93010 ELECTROCARDIOGRAM REPORT: CPT

## 2019-11-30 PROCEDURE — 99285 EMERGENCY DEPT VISIT HI MDM: CPT

## 2019-11-30 PROCEDURE — 74177 CT ABD & PELVIS W/CONTRAST: CPT | Mod: 26

## 2019-11-30 RX ORDER — ONDANSETRON 8 MG/1
1 TABLET, FILM COATED ORAL
Qty: 6 | Refills: 0
Start: 2019-11-30 | End: 2019-12-02

## 2019-11-30 RX ORDER — MORPHINE SULFATE 50 MG/1
4 CAPSULE, EXTENDED RELEASE ORAL ONCE
Refills: 0 | Status: DISCONTINUED | OUTPATIENT
Start: 2019-11-30 | End: 2019-11-30

## 2019-11-30 RX ORDER — DIPHENHYDRAMINE HYDROCHLORIDE AND LIDOCAINE HYDROCHLORIDE AND ALUMINUM HYDROXIDE AND MAGNESIUM HYDRO
30 KIT ONCE
Refills: 0 | Status: COMPLETED | OUTPATIENT
Start: 2019-11-30 | End: 2019-11-30

## 2019-11-30 RX ORDER — SODIUM CHLORIDE 9 MG/ML
1000 INJECTION, SOLUTION INTRAVENOUS ONCE
Refills: 0 | Status: COMPLETED | OUTPATIENT
Start: 2019-11-30 | End: 2019-11-30

## 2019-11-30 RX ORDER — ONDANSETRON 8 MG/1
4 TABLET, FILM COATED ORAL ONCE
Refills: 0 | Status: COMPLETED | OUTPATIENT
Start: 2019-11-30 | End: 2019-11-30

## 2019-11-30 RX ORDER — FAMOTIDINE 10 MG/ML
20 INJECTION INTRAVENOUS ONCE
Refills: 0 | Status: COMPLETED | OUTPATIENT
Start: 2019-11-30 | End: 2019-11-30

## 2019-11-30 RX ADMIN — FAMOTIDINE 104 MILLIGRAM(S): 10 INJECTION INTRAVENOUS at 15:11

## 2019-11-30 RX ADMIN — SODIUM CHLORIDE 1000 MILLILITER(S): 9 INJECTION, SOLUTION INTRAVENOUS at 15:11

## 2019-11-30 RX ADMIN — MORPHINE SULFATE 4 MILLIGRAM(S): 50 CAPSULE, EXTENDED RELEASE ORAL at 13:28

## 2019-11-30 RX ADMIN — MORPHINE SULFATE 4 MILLIGRAM(S): 50 CAPSULE, EXTENDED RELEASE ORAL at 16:19

## 2019-11-30 RX ADMIN — ONDANSETRON 4 MILLIGRAM(S): 8 TABLET, FILM COATED ORAL at 15:11

## 2019-11-30 RX ADMIN — DIPHENHYDRAMINE HYDROCHLORIDE AND LIDOCAINE HYDROCHLORIDE AND ALUMINUM HYDROXIDE AND MAGNESIUM HYDRO 30 MILLILITER(S): KIT at 16:15

## 2019-11-30 NOTE — ED PROVIDER NOTE - ATTENDING CONTRIBUTION TO CARE
48 yo female h/o PUD ( had endoscopy last year, not taking any meds for it now), HTN, DM, asthma, c/o epigastric pain which woke her up from sleep at 5 AM.  Pain sis non-radiating, nothing makes it better or worse, no associated vomiting, diarrhea, black or bloody stools, urinary complaints, cough, CP, SOB , focal weakness or paresthesias.   Middle aged obese female sitting on a stretcher, appears uncomfortable, but in NDA, PERRL, pink conjunctivae, mmm, nml work of breathing, lungs CTA b/l, RRR, no CVA or midline spine ttp, abdomen obese, soft, epigastric ttp without rebound or guarding, no focal neuro deficits.  Will treat pain, check labs, CT scan, reassess. 48 yo female h/o PUD ( had endoscopy last year, not taking any meds for it now), HTN, DM, asthma, c/o epigastric pain which woke her up from sleep at 5 AM and associated with N/V and non-bloody diarrhea.  Pain is non-radiating, nothing makes it better or worse, no associated vomiting, diarrhea, black or bloody stools, urinary complaints, cough, CP, SOB , focal weakness or paresthesias.   Middle aged obese female sitting on a stretcher, appears uncomfortable, but in NDA, PERRL, pink conjunctivae, mmm, nml work of breathing, lungs CTA b/l, RRR, no CVA or midline spine ttp, abdomen obese, soft, epigastric ttp without rebound or guarding, no focal neuro deficits.  Will treat pain, check labs, CT scan, reassess.

## 2019-11-30 NOTE — ED PROVIDER NOTE - NSFOLLOWUPINSTRUCTIONS_ED_ALL_ED_FT
Follow up with PMD and GI in 1-2 days.    Enteritis    WHAT YOU NEED TO KNOW:    Enteritis is inflammation of the small intestine. It may be caused by eating foods or drinking liquids contaminated with a virus, bacteria, or parasites. It may also be caused by certain medicines, damage from radiation, and medical conditions such as Crohn disease.     DISCHARGE INSTRUCTIONS:    Return to the emergency department if:     You cannot stop vomiting.      You have not urinated for 12 hours.     Contact your healthcare provider if:     You have a fever over 101.5.       You have blood or mucus in your bowel movements.       You continue to vomit or have diarrhea for more than 3 days, even after treatment.      You have a dry mouth and eyes, you are urinating less than usual, and you feel dizzy when you stand up.       Your mouth or eyes are dry. You are not urinating as much or as often.      You are losing weight without trying.      You have questions or concerns about your condition or care.    Medicines:     Medicines may be given to fight an infection caused by bacteria or a parasite. You may also need medicines to slow or stop your diarrhea or vomiting. Do not take these medicines unless your healthcare provider say it is okay. Other medicines may be needed to treat medical conditions that are causing enteritis.       Take your medicine as directed. Contact your healthcare provider if you think your medicine is not helping or if you have side effects. Tell him of her if you are allergic to any medicine. Keep a list of the medicines, vitamins, and herbs you take. Include the amounts, and when and why you take them. Bring the list or the pill bottles to follow-up visits. Carry your medicine list with you in case of an emergency.    Manage enteritis:     Eat foods that help to decrease symptoms. Limit or avoid foods and liquids that are high in sugar, fat, and fiber to help relieve diarrhea. It may be helpful to avoid lactose. Lactose is a type of sugar that is found in milk products. You may be able to tolerate soups, broths, well-cooked vegetables, canned fruit, and baked or broiled meats. Ask your dietitian or healthcare provider if you should follow a special diet. You may need to avoid other foods if you have certain medical conditions such as celiac disease.       Drink liquids as directed. Ask how much liquid to drink each day and which liquids are best for you. It is important to prevent or treat dehydration. Even if you have been vomiting, suck on ice chips or take small sips of clear liquids often. Slowly increase the amount of clear liquids you drink. If you become dehydrated, you may need IV liquids.      Drink an oral rehydration solution (ORS) as directed. An ORS contains water, salts, and sugar that are needed to replace lost body fluids. Ask what kind of ORS to use, how much to drink, and where to get it.    Prevent enteritis: Enteritis that is caused by bacteria, parasites, or viruses can be prevented. The following may help to prevent this type of enteritis:    Wash your hands often. Use soap and water. Wash your hands after you use the bathroom, change a child's diapers, or sneeze. Wash your hands before you prepare or eat food. Handwashing           Clean surfaces and do laundry often. Wash your clothes and towels separately from the rest of the laundry. Clean surfaces in your home with antibacterial  or bleach.      Clean food thoroughly and cook safely. Wash raw vegetables before you cook. Cook meat, fish, and eggs fully. Do not use the same dishes for raw meat as you do for other foods. Refrigerate any leftover food immediately.      Be aware when you camp or travel. Drink only clean water. Do not drink from rivers or lakes unless you purify or boil the water first. When you travel, drink bottled water and do not add ice. Do not eat fruit that has not been peeled. Do not eat raw fish or meat that is not fully cooked.     Follow up with your healthcare provider as directed: Write down your questions so you remember to ask them during your visits.

## 2019-11-30 NOTE — ED PROVIDER NOTE - PROGRESS NOTE DETAILS
Still c/o pain, will give a dose of Morphine and reassess. CT scan shows enteritis. Patient to be discharged from ED. Any available test results were discussed with patient and/or family. Verbal instructions given, including instructions to return to ED immediately for any new, worsening, or concerning symptoms. Patient endorsed understanding. Written discharge instructions additionally given, including follow-up plan.  Patient was given opportunity to ask questions.

## 2019-11-30 NOTE — ED PROVIDER NOTE - NS ED ROS FT
Constitutional: (-) fever  Eyes/ENT: (-) blurry vision, (-) epistaxis  Cardiovascular: (-) chest pain, (-) syncope  Respiratory: (-) cough, (-) shortness of breath  Gastrointestinal: (+) vomiting, (+) diarrhea  : (-) dysuria, (-) hematuria  Musculoskeletal: (-) neck pain, (-) back pain, (-) joint pain  Integumentary: (-) rash, (-) edema  Neurological: (-) headache, (-) altered mental status  Allergic/Immunologic: (-) pruritus

## 2019-11-30 NOTE — ED PROVIDER NOTE - CARE PROVIDER_API CALL
Isabela Brown)  Internal Medicine  [address] [place] [address]  Phone: [phone]  Fax: [phone]  Follow Up Time:

## 2019-11-30 NOTE — ED PROVIDER NOTE - CLINICAL SUMMARY MEDICAL DECISION MAKING FREE TEXT BOX
50 yo female with N/V/D and abdominal pain. CT scan positive for enteritis, fluids and analgesia given in ED, stable for d/ c home, strict return precautions given.,

## 2019-11-30 NOTE — ED PROVIDER NOTE - CARE PROVIDERS DIRECT ADDRESSES
[email or web address]

## 2019-11-30 NOTE — ED PROVIDER NOTE - OBJECTIVE STATEMENT
49y F pmh dm, htn, pud presents for eval of abd pain. Pt states she was awoken this morning @0500 with severe cramping generalized abd pain, no aggravating or relieving factors. Associated n/v/d. Denies fever, ha, cp, sob, weakness, numbness, bleeding

## 2019-11-30 NOTE — ED PROVIDER NOTE - PHYSICAL EXAMINATION
CONST: Pt is tearful on exam  EYES: PERRL, EOMI, Sclera and conjunctiva clear.   ENT: No nasal discharge. Oropharynx normal appearing, no erythema or exudates. No abscess or swelling. Uvula midline.   NECK: Non-tender, no meningeal signs. normal ROM. supple   CARD: S1 S2; No jvd  RESP: Equal BS B/L, No wheezes, rhonchi or rales. No distress  GI: Tenderness b/l LQ. no cva tenderness. normal BS  MS: Normal ROM in all extremities. pulses 2 +. no calf tenderness or swelling  SKIN: Warm, dry, no acute rashes. Good turgor  NEURO: A&Ox3, No focal deficits

## 2019-12-02 ENCOUNTER — INBOUND DOCUMENT (OUTPATIENT)
Age: 49
End: 2019-12-02

## 2019-12-12 ENCOUNTER — APPOINTMENT (OUTPATIENT)
Dept: ENDOCRINOLOGY | Facility: CLINIC | Age: 49
End: 2019-12-12

## 2019-12-16 ENCOUNTER — APPOINTMENT (OUTPATIENT)
Dept: GASTROENTEROLOGY | Facility: CLINIC | Age: 49
End: 2019-12-16

## 2020-02-04 ENCOUNTER — RX RENEWAL (OUTPATIENT)
Age: 50
End: 2020-02-04

## 2020-02-12 ENCOUNTER — EMERGENCY (EMERGENCY)
Facility: HOSPITAL | Age: 50
LOS: 0 days | Discharge: HOME | End: 2020-02-12
Admitting: EMERGENCY MEDICINE
Payer: MEDICAID

## 2020-02-12 VITALS
TEMPERATURE: 97 F | RESPIRATION RATE: 17 BRPM | DIASTOLIC BLOOD PRESSURE: 107 MMHG | OXYGEN SATURATION: 98 % | HEART RATE: 85 BPM | SYSTOLIC BLOOD PRESSURE: 180 MMHG

## 2020-02-12 DIAGNOSIS — J02.9 ACUTE PHARYNGITIS, UNSPECIFIED: ICD-10-CM

## 2020-02-12 DIAGNOSIS — H92.03 OTALGIA, BILATERAL: ICD-10-CM

## 2020-02-12 DIAGNOSIS — R59.0 LOCALIZED ENLARGED LYMPH NODES: ICD-10-CM

## 2020-02-12 PROCEDURE — 99283 EMERGENCY DEPT VISIT LOW MDM: CPT

## 2020-02-12 NOTE — ED PROVIDER NOTE - OBJECTIVE STATEMENT
50 y.o. female with a PMH of DM, asthma, and HTN (pt non-compliant w meds) presented to the ER c/o sore throat for the past 5 days.  (+) bilateral nasal congestion (+) bilateral ear ache.  Pt denies fever, chills, cough, nausea, vomiting, diarrhea.

## 2020-02-12 NOTE — ED PROVIDER NOTE - PATIENT PORTAL LINK FT
You can access the FollowMyHealth Patient Portal offered by [facility] by registering at the following website: [email or web address]. By joining [employer]’s FollowMyHealth portal, you will also be able to view your health information using other applications (apps) compatible with our system.

## 2020-02-12 NOTE — ED PROVIDER NOTE - ENMT, MLM
Airway patent, Nasal mucosa clear. Mouth with normal mucosa. Throat has no vesicles, no oropharyngeal exudates and uvula is midline- (+) erythema.  TM's clear bilaterally.  No sinus tenderness.

## 2020-02-12 NOTE — ED ADULT NURSE NOTE - OBJECTIVE STATEMENT
Patient is a 50 year complaining of sore throat and painful swallowing for 4 days. Denies any fever or chills.

## 2020-02-12 NOTE — ED PROVIDER NOTE - CLINICAL SUMMARY MEDICAL DECISION MAKING FREE TEXT BOX
po anbx; medication compliance discussed and understood; pt will follow up with her PCP in 1-2 days for re-check; any new or worsening symptoms, pt will return to ER.

## 2020-02-12 NOTE — ED PROVIDER NOTE - NSFOLLOWUPINSTRUCTIONS_ED_ALL_ED_FT
Sore Throat  When you have a sore throat, your throat may feel:  Tender.Burning.Irritated.Scratchy.Painful when you swallow.Painful when you talk.Many things can cause a sore throat, such as:  An infection.Allergies.Dry air.Smoke or pollution.Radiation treatment.Gastroesophageal reflux disease (GERD).A tumor.A sore throat can be the first sign of another sickness. It can happen with other problems, like:  Coughing.Sneezing.Fever.Swelling in the neck.Most sore throats go away without treatment.  Follow these instructions at home:            Take over-the-counter medicines only as told by your doctor.   If your child has a sore throat, do not give your child aspirin.Drink enough fluids to keep your pee (urine) pale yellow.Rest when you feel you need to.To help with pain:  Sip warm liquids, such as broth, herbal tea, or warm water.Eat or drink cold or frozen liquids, such as frozen ice pops.Gargle with a salt-water mixture 3–4 times a day or as needed. To make a salt-water mixture, add ½–1 tsp (3–6 g) of salt to 1 cup (237 mL) of warm water. Mix it until you cannot see the salt anymore.Suck on hard candy or throat lozenges.Put a cool-mist humidifier in your bedroom at night.Sit in the bathroom with the door closed for 5–10 minutes while you run hot water in the shower.Do not use any products that contain nicotine or tobacco, such as cigarettes, e-cigarettes, and chewing tobacco. If you need help quitting, ask your doctor.Wash your hands well and often with soap and water. If soap and water are not available, use hand .Contact a doctor if:  You have a fever for more than 2–3 days.You keep having symptoms for more than 2–3 days.Your throat does not get better in 7 days.You have a fever and your symptoms suddenly get worse.Your child who is 3 months to 3 years old has a temperature of 102.2°F (39°C) or higher.Get help right away if:  You have trouble breathing.You cannot swallow fluids, soft foods, or your saliva.You have swelling in your throat or neck that gets worse.You keep feeling sick to your stomach (nauseous).You keep throwing up (vomiting).Summary  A sore throat is pain, burning, irritation, or scratchiness in the throat. Many things can cause a sore throat.Take over-the-counter medicines only as told by your doctor. Do not give your child aspirin.Drink plenty of fluids, and rest as needed.Contact a doctor if your symptoms get worse or your sore throat does not get better within 7 days.This information is not intended to replace advice given to you by your health care provider. Make sure you discuss any questions you have with your health care provider.    Document Released: 09/26/2009 Document Revised: 05/20/2019 Document Reviewed: 05/20/2019  [employer] Interactive Patient Education © 2019 [employer]

## 2020-04-09 LAB
BASOPHILS # BLD AUTO: 0.03 K/UL
BASOPHILS NFR BLD AUTO: 0.5 %
EOSINOPHIL # BLD AUTO: 0.3 K/UL
EOSINOPHIL NFR BLD AUTO: 4.6 %
HCT VFR BLD CALC: 42.1 %
HGB BLD-MCNC: 13.6 G/DL
IMM GRANULOCYTES NFR BLD AUTO: 0.2 %
LYMPHOCYTES # BLD AUTO: 2.38 K/UL
LYMPHOCYTES NFR BLD AUTO: 36.3 %
MAN DIFF?: NORMAL
MCHC RBC-ENTMCNC: 26.6 PG
MCHC RBC-ENTMCNC: 32.3 G/DL
MCV RBC AUTO: 82.2 FL
MONOCYTES # BLD AUTO: 0.83 K/UL
MONOCYTES NFR BLD AUTO: 12.7 %
NEUTROPHILS # BLD AUTO: 3 K/UL
NEUTROPHILS NFR BLD AUTO: 45.7 %
PLATELET # BLD AUTO: 269 K/UL
RBC # BLD: 5.12 M/UL
RBC # FLD: 12.6 %
WBC # FLD AUTO: 6.55 K/UL

## 2020-05-04 ENCOUNTER — APPOINTMENT (OUTPATIENT)
Dept: INTERNAL MEDICINE | Facility: CLINIC | Age: 50
End: 2020-05-04

## 2020-06-26 ENCOUNTER — APPOINTMENT (OUTPATIENT)
Dept: PODIATRY | Facility: CLINIC | Age: 50
End: 2020-06-26
Payer: MEDICAID

## 2020-06-26 ENCOUNTER — OUTPATIENT (OUTPATIENT)
Dept: OUTPATIENT SERVICES | Facility: HOSPITAL | Age: 50
LOS: 1 days | Discharge: HOME | End: 2020-06-26

## 2020-06-26 DIAGNOSIS — E11.9 TYPE 2 DIABETES MELLITUS WITHOUT COMPLICATIONS: ICD-10-CM

## 2020-06-26 DIAGNOSIS — E11.43 TYPE 2 DIABETES MELLITUS WITH DIABETIC AUTONOMIC (POLY)NEUROPATHY: ICD-10-CM

## 2020-06-26 DIAGNOSIS — Q15.9 CONGENITAL MALFORMATION OF EYE, UNSPECIFIED: ICD-10-CM

## 2020-06-26 PROCEDURE — 99213 OFFICE O/P EST LOW 20 MIN: CPT

## 2020-06-27 NOTE — END OF VISIT
[] : Resident [FreeTextEntry2] : agree with above note.  \par Was present and instructing resident during visit. \par All Procedure performed under direct supervision.  \par ESTRELLITA Ibarra DPM\par  [Resident] : Resident [FreeTextEntry3] : agree with above note.  \par Was present and instructing resident during visit. \par All Procedure performed under direct supervision.  \par ESTRELLITA Ibarra DPM\par

## 2020-06-27 NOTE — ASSESSMENT
[FreeTextEntry1] : Pt. seen and evaluated\par Discussed treatment and condition w/ patient\par Educated pt on management of diabetes\par Rx 300mg of Gabapentin\par Referral for ophthalmology and endocrinologist\par Pt. to RTC in 1 month

## 2020-06-27 NOTE — PHYSICAL EXAM
[Skin Color & Pigmentation] : normal skin color and pigmentation [Skin Turgor] : normal skin turgor [Skin Lesions] : no skin lesions [Oriented To Time, Place, And Person] : oriented to person, place, and time [Impaired Insight] : insight and judgment were intact [Affect] : the affect was normal [General Appearance - In No Acute Distress] : in no acute distress [General Appearance - Alert] : alert [Abnormal Walk] : normal gait [Nail Clubbing] : no clubbing  or cyanosis of the fingernails [Right Foot Was Examined] : The right foot was examined [Musculoskeletal - Swelling] : no joint swelling seen [Motor Tone] : muscle strength and tone were normal [Normal Appearance] : normal in appearance [Normal in Appearance] : normal in appearance [Swelling] : swollen [Full ROM] : with full range of motion [Normal] : normal [2+] : 2+ in the dorsalis pedis [Vibration Dec.] : diminished vibratory sensation at the level of the toes [Diminished Throughout Right Foot] : diminished tactile sensation with monofilament testing throughout right foot [Position Sense Dec.] : diminished position sense at the level of the toes [Diminished Throughout Left Foot] : diminished tactile sensation with monofilament testing throughout left foot [Tenderness] : not tender [Erythema] : not erythematous [Delayed in the Right Toes] : normal in the toes [Delayed in the Left Toes] : normal in the toes [FreeTextEntry1] : Protective sensation is mildly intact.

## 2020-06-27 NOTE — HISTORY OF PRESENT ILLNESS
[FreeTextEntry1] : Pt. is a 50 year old female who presents to clinic with a chief complaint of pain in her feet, bilateral. Pt. describes her pain as burning, tingling sensation more so at the distal toes and plantar aspect of the forefoot. Pt. denies any trauma to the foot. Pt. denies any recent n/f/v/c/sob. Pt. denies any other pedal complaints at this time.

## 2020-07-24 ENCOUNTER — APPOINTMENT (OUTPATIENT)
Dept: PODIATRY | Facility: CLINIC | Age: 50
End: 2020-07-24

## 2020-08-17 ENCOUNTER — RESULT CHARGE (OUTPATIENT)
Age: 50
End: 2020-08-17

## 2020-08-18 ENCOUNTER — APPOINTMENT (OUTPATIENT)
Dept: NUTRITION | Facility: CLINIC | Age: 50
End: 2020-08-18

## 2020-08-18 ENCOUNTER — APPOINTMENT (OUTPATIENT)
Dept: INTERNAL MEDICINE | Facility: CLINIC | Age: 50
End: 2020-08-18

## 2020-08-31 ENCOUNTER — APPOINTMENT (OUTPATIENT)
Dept: NUTRITION | Facility: CLINIC | Age: 50
End: 2020-08-31

## 2020-08-31 ENCOUNTER — APPOINTMENT (OUTPATIENT)
Dept: PODIATRY | Facility: CLINIC | Age: 50
End: 2020-08-31
Payer: MEDICAID

## 2020-08-31 ENCOUNTER — OUTPATIENT (OUTPATIENT)
Dept: OUTPATIENT SERVICES | Facility: HOSPITAL | Age: 50
LOS: 1 days | Discharge: HOME | End: 2020-08-31

## 2020-08-31 PROCEDURE — 99213 OFFICE O/P EST LOW 20 MIN: CPT

## 2020-08-31 RX ORDER — DICLOFENAC SODIUM 10 MG/G
1 GEL TOPICAL DAILY
Qty: 1 | Refills: 5 | Status: ACTIVE | COMMUNITY
Start: 2020-08-31 | End: 1900-01-01

## 2020-09-03 ENCOUNTER — APPOINTMENT (OUTPATIENT)
Dept: ENDOCRINOLOGY | Facility: CLINIC | Age: 50
End: 2020-09-03

## 2020-09-03 ENCOUNTER — OUTPATIENT (OUTPATIENT)
Dept: OUTPATIENT SERVICES | Facility: HOSPITAL | Age: 50
LOS: 1 days | Discharge: HOME | End: 2020-09-03

## 2020-09-03 DIAGNOSIS — E11.65 TYPE 2 DIABETES MELLITUS WITH HYPERGLYCEMIA: ICD-10-CM

## 2020-09-03 DIAGNOSIS — M75.00 ADHESIVE CAPSULITIS OF UNSPECIFIED SHOULDER: ICD-10-CM

## 2020-09-03 DIAGNOSIS — E66.01 MORBID (SEVERE) OBESITY DUE TO EXCESS CALORIES: ICD-10-CM

## 2020-09-03 RX ORDER — FENOFIBRATE 145 MG/1
145 TABLET, COATED ORAL DAILY
Qty: 30 | Refills: 3 | Status: DISCONTINUED | COMMUNITY
Start: 2019-10-10 | End: 2020-09-03

## 2020-09-03 RX ORDER — LISINOPRIL 5 MG/1
5 TABLET ORAL DAILY
Qty: 30 | Refills: 3 | Status: DISCONTINUED | COMMUNITY
Start: 2019-10-10 | End: 2020-09-03

## 2020-09-03 RX ORDER — BLOOD-GLUCOSE METER
EACH MISCELLANEOUS
Qty: 1 | Refills: 0 | Status: DISCONTINUED | COMMUNITY
Start: 2019-02-15 | End: 2020-09-03

## 2020-09-03 RX ORDER — GLIMEPIRIDE 2 MG/1
2 TABLET ORAL DAILY
Qty: 30 | Refills: 3 | Status: DISCONTINUED | COMMUNITY
Start: 2019-10-10 | End: 2020-09-03

## 2020-09-03 RX ORDER — BLOOD SUGAR DIAGNOSTIC
STRIP MISCELLANEOUS 3 TIMES DAILY
Qty: 1 | Refills: 5 | Status: DISCONTINUED | COMMUNITY
Start: 2019-02-15 | End: 2020-09-03

## 2020-09-03 RX ORDER — GABAPENTIN 100 MG/1
100 CAPSULE ORAL 3 TIMES DAILY
Qty: 90 | Refills: 3 | Status: DISCONTINUED | COMMUNITY
Start: 2019-10-10 | End: 2020-09-03

## 2020-09-03 NOTE — PHYSICAL EXAM
[No Acute Distress] : no acute distress [Well Nourished] : well nourished [Well Developed] : well developed [Normal Sclera/Conjunctiva] : normal sclera/conjunctiva [No JVD] : no jugular venous distention [No Respiratory Distress] : no respiratory distress  [No Accessory Muscle Use] : no accessory muscle use [Supple] : supple [Regular Rhythm] : with a regular rhythm [Normal Rate] : normal rate  [Clear to Auscultation] : lungs were clear to auscultation bilaterally [Normal S1, S2] : normal S1 and S2 [Pedal Pulses Present] : the pedal pulses are present

## 2020-09-03 NOTE — PLAN
[FreeTextEntry1] : 49 year old female with past medical history of Diabetes and HTN comes for initial set up of care.\par \par #DM\par - uncontrolled \par - Hb A1c- 10.5 \par -sugars persistently elevated, 400-450\par - PReviously only taking Glimperide \par - Waiting for authorization for ozempic \par - Starting basal  insulin, and pioglitazone, and ozempic.\par - diabetes education given \par \par #Hypertensive \par - uncontrolled\par - stop lisinopril\par - start Amlodipine- Benazipril \par \par #Shoulder pain\par - referal for PT

## 2020-09-03 NOTE — HISTORY OF PRESENT ILLNESS
[FreeTextEntry1] : new patient with uncontrolled type 2 diabetes, severe obesity, and painful distal peripheral neuropathy. [de-identified] : 49 year old female with past medical history of Diabetes and HTN comes for initial set up of care. Her latest Hba1c 10.5. She currently only takes Gllimperide. She endorses her blood sugar consistently being around 400-40. She endorses pins and needles sensation in her feet. Pain with touch of her feet up to her ankle. As well as episodes of diminished sensation in her feet. She endorses frequently banging her feet and not noticing. She uses as cane to ambulate.

## 2020-09-08 NOTE — PHYSICAL EXAM
[] : no rash [Foot Ulcer] : no foot ulcer [Skin Induration] : no skin induration [General Appearance - Alert] : alert [General Appearance - In No Acute Distress] : in no acute distress [Nail Clubbing] : no clubbing  or cyanosis of the fingernails [Abnormal Walk] : normal gait [Motor Tone] : muscle strength and tone were normal [Musculoskeletal - Swelling] : no joint swelling seen [Skin Color & Pigmentation] : normal skin color and pigmentation [Skin Turgor] : normal skin turgor [Skin Lesions] : no skin lesions [Right Foot Was Examined] : The right foot was examined [Normal Appearance] : normal in appearance [Tenderness] : not tender [Erythema] : not erythematous [Delayed in the Right Toes] : normal in the toes [Normal in Appearance] : normal in appearance [Erythema] : not erythematous [Tenderness] : not tender [Swelling] : swollen [Delayed in the Left Toes] : normal in the toes [Normal] : normal [Full ROM] : with full range of motion [Vibration Dec.] : diminished vibratory sensation at the level of the toes [2+] : 2+ in the dorsalis pedis [Position Sense Dec.] : diminished position sense at the level of the toes [Diminished Throughout Right Foot] : diminished tactile sensation with monofilament testing throughout right foot [Diminished Throughout Left Foot] : diminished tactile sensation with monofilament testing throughout left foot [Oriented To Time, Place, And Person] : oriented to person, place, and time [FreeTextEntry1] : Protective sensation is mildly intact. [Affect] : the affect was normal [Impaired Insight] : insight and judgment were intact

## 2020-09-08 NOTE — END OF VISIT
[] : Fellow [FreeTextEntry3] : agree with above note.  \par Was present and instructing resident during visit. \par All Procedure performed under direct supervision.  \par ESTRELLITA Ibarra DPM\par  [FreeTextEntry2] : agree with above note.  \par Was present and instructing resident during visit. \par All Procedure performed under direct supervision.  \par ESTRELLITA Ibarra DPM\par  [Resident] : Resident

## 2020-09-08 NOTE — ASSESSMENT
[FreeTextEntry1] : Pt. seen and evaluated\par Discussed treatment and condition w/ patient\par Educated pt on management of diabetes\par Rx 300mg of Gabapentin\par Rx Diclofenac topical \par Referral for ophthalmology and endocrinologist\par Pt. to RTC in 1 month

## 2020-09-08 NOTE — PHYSICAL EXAM
[] : no rash [Skin Induration] : no skin induration [Foot Ulcer] : no foot ulcer [General Appearance - In No Acute Distress] : in no acute distress [General Appearance - Alert] : alert [Abnormal Walk] : normal gait [Nail Clubbing] : no clubbing  or cyanosis of the fingernails [Musculoskeletal - Swelling] : no joint swelling seen [Motor Tone] : muscle strength and tone were normal [Skin Turgor] : normal skin turgor [Skin Color & Pigmentation] : normal skin color and pigmentation [Skin Lesions] : no skin lesions [Right Foot Was Examined] : The right foot was examined [Normal Appearance] : normal in appearance [Tenderness] : not tender [Erythema] : not erythematous [Delayed in the Right Toes] : normal in the toes [Normal in Appearance] : normal in appearance [Swelling] : swollen [Erythema] : not erythematous [Tenderness] : not tender [Delayed in the Left Toes] : normal in the toes [Normal] : normal [Full ROM] : with full range of motion [2+] : 2+ in the dorsalis pedis [Vibration Dec.] : diminished vibratory sensation at the level of the toes [Position Sense Dec.] : diminished position sense at the level of the toes [Diminished Throughout Right Foot] : diminished tactile sensation with monofilament testing throughout right foot [Diminished Throughout Left Foot] : diminished tactile sensation with monofilament testing throughout left foot [FreeTextEntry1] : Protective sensation is mildly intact. [Oriented To Time, Place, And Person] : oriented to person, place, and time [Impaired Insight] : insight and judgment were intact [Affect] : the affect was normal

## 2020-09-08 NOTE — END OF VISIT
[] : Fellow [Resident] : Resident [FreeTextEntry2] : agree with above note.  \par Was present and instructing resident during visit. \par All Procedure performed under direct supervision.  \par ESTRELLITA Ibarra DPM\par  [FreeTextEntry3] : agree with above note.  \par Was present and instructing resident during visit. \par All Procedure performed under direct supervision.  \par ESTRELLITA Ibarra DPM\par

## 2020-09-10 ENCOUNTER — APPOINTMENT (OUTPATIENT)
Dept: INTERNAL MEDICINE | Facility: CLINIC | Age: 50
End: 2020-09-10
Payer: MEDICAID

## 2020-09-24 NOTE — ED PROCEDURE NOTE - NS ED ATTENDING NAME FT
Patient Education     Pharyngitis (Sore Throat), Report Pending    Pharyngitis (sore throat) is often due to a virus. It can also be caused by streptococcus (strep), bacteria. This is often called strep throat. Both viral and strep infections can cause throat pain that is worse when swallowing, aching all over, headache, and fever. Both types of infections are contagious. They may be spread by coughing, kissing, or touching others after touching your mouth or nose.  A test has been done to find out if you or your child have strep throat. Call this facility or your healthcare provider if you were not given your test results. If the test is positive for strep infection, you will need to take antibiotic medicines. A prescription can be called into your pharmacy at that time. If the test is negative, you probably have a viral pharyngitis. This does not need to be treated with antibiotics. Until you receive the results of the strep test, you should stay home from work. If your child is being tested, he or she should stay home from school.  Home care  · Rest at home. Drink plenty of fluids so you won't get dehydrated.  · If the test is positive for strep, you or your child should not go to work or school for the first 2 days of taking the antibiotics. After this time, you or your child will not be contagious. You or your child can then return to work or school when feeling better.   · Use the antibiotic medicine for the full 10 days. Do not stop the medicine even if you or your child feel better. This is very important to make sure the infection is fully treated. It is also important to prevent medicine-resistant germs from growing. If you or your child were given an antibiotic shot, no more antibiotics are needed.  · Use throat lozenges or numbing throat sprays to help reduce pain. Gargling with warm salt water will also help reduce throat pain. Dissolve 1/2 teaspoon of salt in 1 glass of warm water. Children can sip on  juice or a popsicle. Children 5 years and older can also suck on a lollipop or hard candy.  · Don't eat salty or spicy foods or give them to your child. These can irritate the throat.  Other medicine for a child: You can give your child acetaminophen for fever, fussiness, or discomfort. In babies over 6 months of age, you may use ibuprofen instead of acetaminophen. If your child has chronic liver or kidney disease or ever had a stomach ulcer or GI bleeding, talk with your child’s healthcare provider before giving these medicines. Aspirin should never be used by any child under 18 years of age who has a fever. It may cause severe liver damage.  Other medicine for an adult: You may use acetaminophen or ibuprofen to control pain or fever, unless another medicine was prescribed for this. If you have chronic liver or kidney disease or ever had a stomach ulcer or GI bleeding, talk with your healthcare provider before using these medicines.  Follow-up care  Follow up with your healthcare provider or our staff if you or your child don't get better over the next week.  When to seek medical advice  Call your healthcare provider right away if any of these occur:  · Fever as directed by your healthcare provider. For children, seek care if:  ? Your child is of any age and has repeated fevers above 104°F (40°C).  ? Your child is younger than 2 years of age and has a fever of 100.4°F (38°C) for more than 1 day.  ? Your child is 2 years old or older and has a fever of 100.4°F (38°C) for more than 3 days.  · New or worsening ear pain, sinus pain, or headache  · Painful lumps in the back of neck  · Stiff neck  · Lymph nodes are getting larger  · •Can’t swallow liquids, a lot of drooling, or can’t open mouth wide due to throat pain  · Signs of dehydration, such as very dark urine or no urine, sunken eyes, dizziness  · Trouble breathing or noisy breathing  · Muffled voice  · New rash  · Other symptoms getting worse  Prevention  Here  are steps you can take to help prevent an infection:  · Keep good hand washing habits.  · Don’t have close contact with people who have sore throats, colds, or other upper respiratory infections.  · Don’t smoke, and stay away from secondhand smoke.  · Stay up to date with of your vaccines.  Date Last Reviewed: 11/1/2017  © 5341-9195 [employer]. [address] [place] [address], [place] [address]. All rights reserved. This information is not intended as a substitute for professional medical care. Always follow your healthcare professional's instructions.            dr. kiran

## 2020-10-15 ENCOUNTER — APPOINTMENT (OUTPATIENT)
Dept: INTERNAL MEDICINE | Facility: CLINIC | Age: 50
End: 2020-10-15
Payer: MEDICAID

## 2020-10-15 ENCOUNTER — OUTPATIENT (OUTPATIENT)
Dept: OUTPATIENT SERVICES | Facility: HOSPITAL | Age: 50
LOS: 1 days | Discharge: HOME | End: 2020-10-15
Payer: MEDICAID

## 2020-10-15 DIAGNOSIS — E78.2 MIXED HYPERLIPIDEMIA: ICD-10-CM

## 2020-10-15 DIAGNOSIS — Q15.9 CONGENITAL MALFORMATION OF EYE, UNSPECIFIED: ICD-10-CM

## 2020-10-15 PROCEDURE — 93010 ELECTROCARDIOGRAM REPORT: CPT | Mod: 76

## 2020-10-15 PROCEDURE — 99214 OFFICE O/P EST MOD 30 MIN: CPT | Mod: GC

## 2020-10-15 NOTE — REVIEW OF SYSTEMS
[Vision Problems] : vision problems [Chest Pain] : chest pain [Constipation] : constipation [Incontinence] : incontinence [Chills] : no chills [Fever] : no fever [Fatigue] : no fatigue [Recent Change In Weight] : ~T no recent weight change [Discharge] : no discharge [Night Sweats] : no night sweats [Pain] : no pain [Dryness] : no dryness  [Redness] : no redness [Itching] : no itching [Earache] : no earache [Hearing Loss] : no hearing loss [Nosebleed] : no nosebleeds [Palpitations] : no palpitations [Sore Throat] : no sore throat [Nausea] : no nausea [Orthopnea] : no orthopnea [Abdominal Pain] : no abdominal pain [Vomiting] : no vomiting [Dysuria] : no dysuria [Heartburn] : no heartburn [Frequency] : no frequency [Hematuria] : no hematuria [Nocturia] : no nocturia [Joint Stiffness] : no joint stiffness [Vaginal Discharge] : no vaginal discharge [Joint Pain] : no joint pain [Joint Swelling] : no joint swelling [Muscle Pain] : no muscle pain [Muscle Weakness] : no muscle weakness [Skin Rash] : no skin rash [Nail Changes] : no nail changes [Back Pain] : no back pain [FreeTextEntry3] : See HPI [FreeTextEntry5] : See HPI

## 2020-10-15 NOTE — PHYSICAL EXAM
[Well-Appearing] : well-appearing [No Acute Distress] : no acute distress [Well Developed] : well developed [PERRL] : pupils equal round and reactive to light [EOMI] : extraocular movements intact [Normal Oropharynx] : the oropharynx was normal [No JVD] : no jugular venous distention [Normal Outer Ear/Nose] : the outer ears and nose were normal in appearance [No Lymphadenopathy] : no lymphadenopathy [Supple] : supple [No Accessory Muscle Use] : no accessory muscle use [Clear to Auscultation] : lungs were clear to auscultation bilaterally [No Respiratory Distress] : no respiratory distress  [No Carotid Bruits] : no carotid bruits [Regular Rhythm] : with a regular rhythm [Normal Rate] : normal rate  [No Abdominal Bruit] : a ~M bruit was not heard ~T in the abdomen [No Varicosities] : no varicosities [Soft] : abdomen soft [Non Tender] : non-tender [Non-distended] : non-distended [Normal] : affect was normal and insight and judgment were intact [de-identified] : injected conjuntivae [de-identified] : Tingling sensation

## 2020-10-15 NOTE — END OF VISIT
[] : Resident [FreeTextEntry3] : Pt. was last seen by Dr. Pinto 10/10/19, non compliant with follow up since then.  Pt. seen Dr. Badillo 9/3/20 and started on Tresiba, Actos and ozempic.  Pt. did not have any blood work done for the past year, will order EKG, blood work.  Follow up visit in 1 month.  Screening mammogram ordered, Ob/Gyn referral given.  Pt. declined flu vaccine

## 2020-10-15 NOTE — HISTORY OF PRESENT ILLNESS
[de-identified] : 49 year old female with past medical history of Diabetes and HTN comes to clinic with a chief complaint of pain in her feet, bilaterally but worse in the right side. Pt. describes her pain as burning, tingling sensation more so at the toes and plantar, medial aspect of the foot. Denies any trauma to the foot.  Patient states she cannot walk a full block. \par \par Taking meds as prescribed for blood sugar, but blood glucose 500s in the morning with fasting. She states her eyes are blurry. never followed up with eye appointment. Cannot recall when she first noticed blurry vision.\par \par She also complains of tightness in her chest with occasional dizziness describes as being feeling intoxicated, though she does not drink or smoke, which she attributes to high blood pressure, though she does not check her blood pressure at home. She states this happens maybe once a week. She takes Aleve which provides some relief. She denies pain, shortness of breath, nausea, sweating, fever, chills. \par \par /88 on my assessment. \par

## 2020-10-15 NOTE — PLAN
[FreeTextEntry1] : #DM\par - uncontrolled \par - Last Hb A1c- 10.5 8/2019\par - sugars remain elevated, 500s\par - Started on basal insulin, pioglitazone, and ozempic last month\par - refer to opthalmology for eye exam\par - Podiatrist following \par - neuropathic pain, gabapentin 100 mg 2 times daily, 300 mg at bedtime \par - Labs reordered\par \par \par #Hypertensive \par - uncontrolled\par - continue on Amlodipine- Benazipril \par \par #Shoulder pain\par # Frozen Shoulder\par -Xray showed Hypercalcemia around the tendon.\par -MRI shoulder reordered\par -Orthopedic referral provided, patient has not followed up\par \par # Asthma\par - Not using any medications\par - PFT previously ordered\par - refer to pulm\par \par # Hepatic hemangioma\par -CT scan showed hepatic mass suspicious of hepatic hemangioma.\par -reordered CT abdomen-pelvis not Obtained\par \par # Hx of Hypertriglyceridemia\par - Triglycerides of 417 on 8/2019\par - Continue atorvastatin \par \par # HCM\par -Flu shot refused\par -Routine labs ordered\par -Referral to Gyn/Ob for pap smear and mammogram\par -Follow up in 1 month and prn

## 2020-10-19 DIAGNOSIS — E78.2 MIXED HYPERLIPIDEMIA: ICD-10-CM

## 2020-10-19 DIAGNOSIS — I10 ESSENTIAL (PRIMARY) HYPERTENSION: ICD-10-CM

## 2020-10-19 DIAGNOSIS — E66.01 MORBID (SEVERE) OBESITY DUE TO EXCESS CALORIES: ICD-10-CM

## 2020-10-19 DIAGNOSIS — Z00.00 ENCOUNTER FOR GENERAL ADULT MEDICAL EXAMINATION WITHOUT ABNORMAL FINDINGS: ICD-10-CM

## 2020-10-19 DIAGNOSIS — Q15.9 CONGENITAL MALFORMATION OF EYE, UNSPECIFIED: ICD-10-CM

## 2020-10-19 DIAGNOSIS — E11.9 TYPE 2 DIABETES MELLITUS WITHOUT COMPLICATIONS: ICD-10-CM

## 2020-11-19 ENCOUNTER — RX RENEWAL (OUTPATIENT)
Age: 50
End: 2020-11-19

## 2020-11-20 ENCOUNTER — OUTPATIENT (OUTPATIENT)
Dept: OUTPATIENT SERVICES | Facility: HOSPITAL | Age: 50
LOS: 1 days | Discharge: HOME | End: 2020-11-20
Payer: MEDICAID

## 2020-11-20 ENCOUNTER — EMERGENCY (EMERGENCY)
Facility: HOSPITAL | Age: 50
LOS: 0 days | Discharge: LEFT BEFORE DISCHARGE | End: 2020-11-20
Attending: STUDENT IN AN ORGANIZED HEALTH CARE EDUCATION/TRAINING PROGRAM | Admitting: STUDENT IN AN ORGANIZED HEALTH CARE EDUCATION/TRAINING PROGRAM
Payer: MEDICAID

## 2020-11-20 ENCOUNTER — LABORATORY RESULT (OUTPATIENT)
Age: 50
End: 2020-11-20

## 2020-11-20 ENCOUNTER — RESULT REVIEW (OUTPATIENT)
Age: 50
End: 2020-11-20

## 2020-11-20 VITALS
TEMPERATURE: 98 F | HEART RATE: 101 BPM | OXYGEN SATURATION: 99 % | SYSTOLIC BLOOD PRESSURE: 141 MMHG | DIASTOLIC BLOOD PRESSURE: 101 MMHG | RESPIRATION RATE: 16 BRPM | WEIGHT: 250 LBS

## 2020-11-20 DIAGNOSIS — R20.0 ANESTHESIA OF SKIN: ICD-10-CM

## 2020-11-20 DIAGNOSIS — Z79.899 OTHER LONG TERM (CURRENT) DRUG THERAPY: ICD-10-CM

## 2020-11-20 DIAGNOSIS — E11.9 TYPE 2 DIABETES MELLITUS WITHOUT COMPLICATIONS: ICD-10-CM

## 2020-11-20 DIAGNOSIS — Z20.828 CONTACT WITH AND (SUSPECTED) EXPOSURE TO OTHER VIRAL COMMUNICABLE DISEASES: ICD-10-CM

## 2020-11-20 DIAGNOSIS — Z12.31 ENCOUNTER FOR SCREENING MAMMOGRAM FOR MALIGNANT NEOPLASM OF BREAST: ICD-10-CM

## 2020-11-20 DIAGNOSIS — Z88.0 ALLERGY STATUS TO PENICILLIN: ICD-10-CM

## 2020-11-20 DIAGNOSIS — I10 ESSENTIAL (PRIMARY) HYPERTENSION: ICD-10-CM

## 2020-11-20 DIAGNOSIS — H53.8 OTHER VISUAL DISTURBANCES: ICD-10-CM

## 2020-11-20 DIAGNOSIS — R26.81 UNSTEADINESS ON FEET: ICD-10-CM

## 2020-11-20 DIAGNOSIS — R42 DIZZINESS AND GIDDINESS: ICD-10-CM

## 2020-11-20 LAB
ALBUMIN SERPL ELPH-MCNC: 4.4 G/DL
ALP BLD-CCNC: 115 U/L
ALT SERPL-CCNC: 11 U/L
ANION GAP SERPL CALC-SCNC: 21 MMOL/L
AST SERPL-CCNC: 12 U/L
BASOPHILS # BLD AUTO: 0.02 K/UL
BASOPHILS NFR BLD AUTO: 0.3 %
BILIRUB SERPL-MCNC: 0.6 MG/DL
BUN SERPL-MCNC: 9 MG/DL
CALCIUM SERPL-MCNC: 9.7 MG/DL
CHLORIDE SERPL-SCNC: 98 MMOL/L
CO2 SERPL-SCNC: 17 MMOL/L
CREAT SERPL-MCNC: 0.7 MG/DL
EOSINOPHIL # BLD AUTO: 0.28 K/UL
EOSINOPHIL NFR BLD AUTO: 3.9 %
GLUCOSE SERPL-MCNC: 348 MG/DL
HCT VFR BLD CALC: 45.4 %
HGB BLD-MCNC: 14.7 G/DL
IMM GRANULOCYTES NFR BLD AUTO: 0.1 %
LYMPHOCYTES # BLD AUTO: 1.81 K/UL
LYMPHOCYTES NFR BLD AUTO: 25.3 %
MAN DIFF?: NORMAL
MCHC RBC-ENTMCNC: 26.8 PG
MCHC RBC-ENTMCNC: 32.4 G/DL
MCV RBC AUTO: 82.7 FL
MONOCYTES # BLD AUTO: 0.71 K/UL
MONOCYTES NFR BLD AUTO: 9.9 %
NEUTROPHILS # BLD AUTO: 4.33 K/UL
NEUTROPHILS NFR BLD AUTO: 60.5 %
PLATELET # BLD AUTO: 197 K/UL
POTASSIUM SERPL-SCNC: 4.4 MMOL/L
PROT SERPL-MCNC: 7.8 G/DL
RBC # BLD: 5.49 M/UL
RBC # FLD: 12.8 %
SODIUM SERPL-SCNC: 136 MMOL/L
WBC # FLD AUTO: 7.16 K/UL

## 2020-11-20 PROCEDURE — 70450 CT HEAD/BRAIN W/O DYE: CPT | Mod: 26

## 2020-11-20 PROCEDURE — 99285 EMERGENCY DEPT VISIT HI MDM: CPT | Mod: 25

## 2020-11-20 PROCEDURE — 76512 OPH US DX B-SCAN: CPT | Mod: 26,LT,RT

## 2020-11-20 PROCEDURE — 77067 SCR MAMMO BI INCL CAD: CPT | Mod: 26

## 2020-11-20 PROCEDURE — 77063 BREAST TOMOSYNTHESIS BI: CPT | Mod: 26

## 2020-11-20 RX ORDER — MECLIZINE HCL 12.5 MG
25 TABLET ORAL ONCE
Refills: 0 | Status: COMPLETED | OUTPATIENT
Start: 2020-11-20 | End: 2020-11-20

## 2020-11-20 RX ADMIN — Medication 25 MILLIGRAM(S): at 09:50

## 2020-11-20 RX ADMIN — Medication 25 MILLIGRAM(S): at 11:02

## 2020-11-20 NOTE — ED PROVIDER NOTE - OBJECTIVE STATEMENT
50 Y F h/o HTN, DM comes to ED for gait instability a/w progressive ascending BL upper extremity paresthesias and blurry vision for 2 days. Pt states she feels unbalanced when walking and feels like occasionally the "room is spinning".    Pt denies any LOC, weakness, trauma, eye pain, N/V/D, CP, SOB.

## 2020-11-20 NOTE — ED PROVIDER NOTE - ATTENDING CONTRIBUTION TO CARE
51 y/o F pmh DM, HTN, p/w room spinning, blurry vision, excessive lacrimation and rhinorrhea x 2d. Feels unsteady with walking. reports diplopia.    CONSTITUTIONAL: NAD  SKIN: Warm dry  HEAD: NCAT  EYES: + bl conjunctival injection, lacrimation; states vision blurry binocular/ monocular, with glasses can see fingers clearly at 1 foot.  ENT: MMM  NECK: Supple; non tender.  CARD: RRR  RESP: No resp distress  ABD: S/NT no R/G  EXT: no pedal edema  NEURO: CN grossly NL, No nystagmus on my exam, motor and sensory nL; + tremor on R hand noted (old), finger to nose on L: pt tried to touch my finger, first went to the right of my finger, then to the left, then touched my finger. She said at that moment she saw 3 fingers. No other cerebellar signs  PSYCH: Cooperative, appropriate.    IMP: vertigo, visual complaints,, consider CVA vs primary HA syndrome?  P: CTH, CTA, ivf, labs, analgesia, reassess.   Pt outside window for tPA.

## 2020-11-20 NOTE — ED PROVIDER NOTE - PROGRESS NOTE DETAILS
Preattending SC: 50F with pmh of HTN, DM, peripheral neuropathy presents with dizziness described as vertiginous sx beginning 2 days ago, associated with excessive lacrimation but no cough, ear pain, CP, SOB, n/v/d, abd pain, slurring of speech, falls. + b/l UE numbness, LE numbness is chronic. Exam: RRR, CTABL, excessive lacrimation seen, EOMs intact with b/l nystagmus, CN II-XII otherwise intact. +ambulation. Nl finger to nose. Plan: CTH, FS, bedside US (negative for ONSD widening or papilledema), meclizine, reassess Preattending SC: 50F with pmh of HTN, DM, peripheral neuropathy presents with dizziness described as vertiginous sx beginning 2 days ago, associated with gait instability and excessive lacrimation but no cough, ear pain, CP, SOB, n/v/d, abd pain, slurring of speech, falls. + b/l UE numbness, LE numbness is chronic. Exam: RRR, CTABL, excessive lacrimation seen, EOMs intact with b/l nystagmus, CN II-XII otherwise intact. +ambulation. Nl finger to nose. Plan: CTH, FS, bedside US (negative for ONSD widening or papilledema), meclizine, reassess Unable to find patient despite multiple attempts. Possible elopement.

## 2020-11-21 ENCOUNTER — NON-APPOINTMENT (OUTPATIENT)
Age: 50
End: 2020-11-21

## 2020-11-21 LAB
CREAT SPEC-SCNC: 95 MG/DL
MICROALBUMIN 24H UR DL<=1MG/L-MCNC: 10.5 MG/DL
MICROALBUMIN/CREAT 24H UR-RTO: 111 MG/G

## 2020-11-23 RX ORDER — ALBUTEROL SULFATE 90 UG/1
108 (90 BASE) AEROSOL, METERED RESPIRATORY (INHALATION)
Qty: 1 | Refills: 3 | Status: DISCONTINUED | COMMUNITY
Start: 2019-10-10 | End: 2020-11-23

## 2020-12-08 ENCOUNTER — APPOINTMENT (OUTPATIENT)
Dept: PODIATRY | Facility: CLINIC | Age: 50
End: 2020-12-08

## 2020-12-10 ENCOUNTER — APPOINTMENT (OUTPATIENT)
Dept: ENDOCRINOLOGY | Facility: CLINIC | Age: 50
End: 2020-12-10

## 2020-12-10 ENCOUNTER — OUTPATIENT (OUTPATIENT)
Dept: OUTPATIENT SERVICES | Facility: HOSPITAL | Age: 50
LOS: 1 days | Discharge: HOME | End: 2020-12-10

## 2020-12-10 VITALS
HEIGHT: 65 IN | SYSTOLIC BLOOD PRESSURE: 160 MMHG | HEART RATE: 98 BPM | WEIGHT: 250 LBS | TEMPERATURE: 96.5 F | BODY MASS INDEX: 41.65 KG/M2 | DIASTOLIC BLOOD PRESSURE: 79 MMHG

## 2020-12-10 LAB — GLUCOSE BLDC GLUCOMTR-MCNC: 330 MG/DL — HIGH (ref 70–99)

## 2020-12-10 NOTE — ASSESSMENT
[FreeTextEntry1] : MOST LIKELY NOT TAKING HER INJECTIONS OR TABLETS. badly needs diabetes education. [Diabetes Foot Care] : diabetes foot care [Long Term Vascular Complications] : long term vascular complications of diabetes [Carbohydrate Consistent Diet] : carbohydrate consistent diet [Importance of Diet and Exercise] : importance of diet and exercise to improve glycemic control, achieve weight loss and improve cardiovascular health [Retinopathy Screening] : Patient was referred to ophthalmology for retinopathy screening

## 2020-12-10 NOTE — HISTORY OF PRESENT ILLNESS
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 she does not know the dosage of her tresiba.

## 2020-12-11 ENCOUNTER — NON-APPOINTMENT (OUTPATIENT)
Age: 50
End: 2020-12-11

## 2020-12-15 ENCOUNTER — APPOINTMENT (OUTPATIENT)
Dept: NUTRITION | Facility: CLINIC | Age: 50
End: 2020-12-15

## 2020-12-15 DIAGNOSIS — E66.01 MORBID (SEVERE) OBESITY DUE TO EXCESS CALORIES: ICD-10-CM

## 2020-12-15 DIAGNOSIS — E78.00 PURE HYPERCHOLESTEROLEMIA, UNSPECIFIED: ICD-10-CM

## 2020-12-15 DIAGNOSIS — E11.65 TYPE 2 DIABETES MELLITUS WITH HYPERGLYCEMIA: ICD-10-CM

## 2020-12-18 ENCOUNTER — NON-APPOINTMENT (OUTPATIENT)
Age: 50
End: 2020-12-18

## 2020-12-18 ENCOUNTER — APPOINTMENT (OUTPATIENT)
Dept: PODIATRY | Facility: CLINIC | Age: 50
End: 2020-12-18
Payer: MEDICAID

## 2020-12-18 ENCOUNTER — OUTPATIENT (OUTPATIENT)
Dept: OUTPATIENT SERVICES | Facility: HOSPITAL | Age: 50
LOS: 1 days | Discharge: HOME | End: 2020-12-18

## 2020-12-18 DIAGNOSIS — E11.9 TYPE 2 DIABETES MELLITUS W/OUT COMPLICATIONS: ICD-10-CM

## 2020-12-18 PROCEDURE — 99213 OFFICE O/P EST LOW 20 MIN: CPT

## 2020-12-21 ENCOUNTER — APPOINTMENT (OUTPATIENT)
Dept: NUTRITION | Facility: CLINIC | Age: 50
End: 2020-12-21

## 2020-12-30 PROBLEM — E11.9 ENCOUNTER FOR DIABETIC FOOT EXAM: Status: ACTIVE | Noted: 2020-06-27

## 2020-12-30 NOTE — END OF VISIT
[] : Fellow [Resident] : Resident [FreeTextEntry3] : agree with above note.  \par Was present and instructing resident during visit. \par All Procedure performed under direct supervision.  \par ESTRELLITA Ibarra DPM\par  [FreeTextEntry2] : agree with above note.  \par Was present and instructing resident during visit. \par All Procedure performed under direct supervision.  \par ESTRELLITA Ibarra DPM\par

## 2020-12-30 NOTE — PHYSICAL EXAM
[] : no rash [General Appearance - Alert] : alert [General Appearance - In No Acute Distress] : in no acute distress [Abnormal Walk] : normal gait [Nail Clubbing] : no clubbing  or cyanosis of the fingernails [Musculoskeletal - Swelling] : no joint swelling seen [Motor Tone] : muscle strength and tone were normal [Skin Color & Pigmentation] : normal skin color and pigmentation [Skin Turgor] : normal skin turgor [Skin Lesions] : no skin lesions [Right Foot Was Examined] : The right foot was examined [Normal Appearance] : normal in appearance [Normal in Appearance] : normal in appearance [Swelling] : swollen [Normal] : normal [Full ROM] : with full range of motion [2+] : 2+ in the dorsalis pedis [Vibration Dec.] : diminished vibratory sensation at the level of the toes [Position Sense Dec.] : diminished position sense at the level of the toes [Diminished Throughout Right Foot] : diminished tactile sensation with monofilament testing throughout right foot [Diminished Throughout Left Foot] : diminished tactile sensation with monofilament testing throughout left foot [Oriented To Time, Place, And Person] : oriented to person, place, and time [Impaired Insight] : insight and judgment were intact [Affect] : the affect was normal [Foot Ulcer] : no foot ulcer [Skin Induration] : no skin induration [Tenderness] : not tender [Erythema] : not erythematous [Delayed in the Right Toes] : normal in the toes [Delayed in the Left Toes] : normal in the toes [FreeTextEntry1] : Protective sensation is mildly intact.

## 2021-01-11 ENCOUNTER — APPOINTMENT (OUTPATIENT)
Dept: OBGYN | Facility: CLINIC | Age: 51
End: 2021-01-11

## 2021-01-25 ENCOUNTER — OUTPATIENT (OUTPATIENT)
Dept: OUTPATIENT SERVICES | Facility: HOSPITAL | Age: 51
LOS: 1 days | Discharge: HOME | End: 2021-01-25

## 2021-01-25 ENCOUNTER — APPOINTMENT (OUTPATIENT)
Dept: INTERNAL MEDICINE | Facility: CLINIC | Age: 51
End: 2021-01-25
Payer: MEDICAID

## 2021-01-25 VITALS
HEART RATE: 108 BPM | DIASTOLIC BLOOD PRESSURE: 96 MMHG | TEMPERATURE: 98.3 F | OXYGEN SATURATION: 97 % | HEIGHT: 65 IN | WEIGHT: 249 LBS | SYSTOLIC BLOOD PRESSURE: 150 MMHG | BODY MASS INDEX: 41.48 KG/M2

## 2021-01-25 PROCEDURE — 99214 OFFICE O/P EST MOD 30 MIN: CPT | Mod: GC

## 2021-01-25 RX ORDER — GABAPENTIN 300 MG/1
300 CAPSULE ORAL 3 TIMES DAILY
Qty: 100 | Refills: 3 | Status: DISCONTINUED | COMMUNITY
Start: 2020-06-26 | End: 2021-01-25

## 2021-01-25 RX ORDER — MELOXICAM 15 MG/1
15 TABLET ORAL DAILY
Qty: 15 | Refills: 0 | Status: DISCONTINUED | COMMUNITY
Start: 2019-02-12 | End: 2021-01-25

## 2021-01-25 NOTE — HISTORY OF PRESENT ILLNESS
[FreeTextEntry1] : follow up [de-identified] : 49 year old female with past medical history of hyperlipidemia, Diabetes and HTN comes to clinic for a follow up appointment. Patient denies any active complaints at this time. Patient states she is compliant with her medication and she takes all of her medication everyday. however her HgA1C today is 12, up from 10 last visit. also her triglyceride is 717. patient is suspected to be non-compliant. extensive patient education was given.

## 2021-01-25 NOTE — ASSESSMENT
[FreeTextEntry1] : 49 year old female with past medical history of Diabetes and HTN comes to clinic for follow up\par \par #DM/neuropathy\par - uncontrolled \par - Last Hb A1c- 12.2 in November, up from 10 in august\par - sugars remain elevated, 300\par - c/w basal insulin, pioglitazone, and ezetimibe. follows up with Dr Badillo.\par - refer to opthalmology for eye exam\par - Podiatrist following \par - neuropathic pain, gabapentin 100 mg 2 times daily, 300 mg at bedtime \par - Labs reordered\par \par #Hypertensive \par - uncontrolled\par -150/96 today\par - continue on Amlodipine- Benazipril \par -educated on diet and exercise\par \par \par # Asthma\par - Not using any medications\par - PFT previously ordered\par - refer to pulm\par \par # Hepatic hemangioma\par -CT scan showed hepatic mass suspicious of hepatic hemangioma.\par -reordered CT abdomen-pelvis not Obtained\par \par # Hx of Hypertriglyceridemia\par - Triglycerides of 717\par - Continue atorvastatin \par -add fenofibrate\par -ACS risk 35% in the next 10 years\par patient was educated on importance of adherent to medication\par \par # HCM\par -Flu shot refused\par -Routine labs ordered\par -mammogram normal\par -Follow up in 1 month and prn.

## 2021-01-25 NOTE — END OF VISIT
[] : Resident [FreeTextEntry3] : Pt. reports that she has been compliant with her medication treatment.  Pt. did not follow orthopedic for her right hip pain, and Ob/Gyn.  Had blood work done 11/20/20, A1C 12.2, LDL 55, triglyceride 717 on atorvastatin and zetia.  Pt. seen endo, currently on ozempic, actos, tresiba.  On neurontin for diabetic neuropathy.  Advised pt. to reschedule ortho and Ob/Gyn appointment.  Encourage pt. to lose weight, and comply with diet (low salt, low fat/chol. high fiber, ADA weight reduction diet).  Follow endo.  RTC 3 months with blood work 1 week priro to next visit

## 2021-01-28 DIAGNOSIS — E78.1 PURE HYPERGLYCERIDEMIA: ICD-10-CM

## 2021-01-28 DIAGNOSIS — I10 ESSENTIAL (PRIMARY) HYPERTENSION: ICD-10-CM

## 2021-01-28 DIAGNOSIS — Z00.00 ENCOUNTER FOR GENERAL ADULT MEDICAL EXAMINATION WITHOUT ABNORMAL FINDINGS: ICD-10-CM

## 2021-01-28 DIAGNOSIS — E11.9 TYPE 2 DIABETES MELLITUS WITHOUT COMPLICATIONS: ICD-10-CM

## 2021-01-28 DIAGNOSIS — E66.01 MORBID (SEVERE) OBESITY DUE TO EXCESS CALORIES: ICD-10-CM

## 2021-01-28 DIAGNOSIS — E78.00 PURE HYPERCHOLESTEROLEMIA, UNSPECIFIED: ICD-10-CM

## 2021-04-26 ENCOUNTER — APPOINTMENT (OUTPATIENT)
Dept: INTERNAL MEDICINE | Facility: CLINIC | Age: 51
End: 2021-04-26

## 2021-04-26 ENCOUNTER — APPOINTMENT (OUTPATIENT)
Dept: NUTRITION | Facility: CLINIC | Age: 51
End: 2021-04-26

## 2021-05-19 ENCOUNTER — NON-APPOINTMENT (OUTPATIENT)
Age: 51
End: 2021-05-19

## 2021-06-09 ENCOUNTER — INPATIENT (INPATIENT)
Facility: HOSPITAL | Age: 51
LOS: 1 days | Discharge: HOME | End: 2021-06-11
Attending: INTERNAL MEDICINE | Admitting: INTERNAL MEDICINE
Payer: MEDICAID

## 2021-06-09 VITALS
TEMPERATURE: 98 F | OXYGEN SATURATION: 98 % | SYSTOLIC BLOOD PRESSURE: 177 MMHG | RESPIRATION RATE: 12 BRPM | HEART RATE: 130 BPM | DIASTOLIC BLOOD PRESSURE: 80 MMHG

## 2021-06-09 DIAGNOSIS — Z88.0 ALLERGY STATUS TO PENICILLIN: ICD-10-CM

## 2021-06-09 DIAGNOSIS — R19.6 HALITOSIS: ICD-10-CM

## 2021-06-09 DIAGNOSIS — E11.42 TYPE 2 DIABETES MELLITUS WITH DIABETIC POLYNEUROPATHY: ICD-10-CM

## 2021-06-09 DIAGNOSIS — R10.9 UNSPECIFIED ABDOMINAL PAIN: ICD-10-CM

## 2021-06-09 DIAGNOSIS — E11.10 TYPE 2 DIABETES MELLITUS WITH KETOACIDOSIS WITHOUT COMA: ICD-10-CM

## 2021-06-09 DIAGNOSIS — Z79.84 LONG TERM (CURRENT) USE OF ORAL HYPOGLYCEMIC DRUGS: ICD-10-CM

## 2021-06-09 DIAGNOSIS — K05.30 CHRONIC PERIODONTITIS, UNSPECIFIED: ICD-10-CM

## 2021-06-09 DIAGNOSIS — R41.82 ALTERED MENTAL STATUS, UNSPECIFIED: ICD-10-CM

## 2021-06-09 DIAGNOSIS — Z91.14 PATIENT'S OTHER NONCOMPLIANCE WITH MEDICATION REGIMEN: ICD-10-CM

## 2021-06-09 DIAGNOSIS — E78.1 PURE HYPERGLYCERIDEMIA: ICD-10-CM

## 2021-06-09 DIAGNOSIS — G93.41 METABOLIC ENCEPHALOPATHY: ICD-10-CM

## 2021-06-09 DIAGNOSIS — I10 ESSENTIAL (PRIMARY) HYPERTENSION: ICD-10-CM

## 2021-06-09 LAB
ALBUMIN SERPL ELPH-MCNC: 4.6 G/DL — SIGNIFICANT CHANGE UP (ref 3.5–5.2)
ALP SERPL-CCNC: 111 U/L — SIGNIFICANT CHANGE UP (ref 30–115)
ALT FLD-CCNC: 17 U/L — SIGNIFICANT CHANGE UP (ref 0–41)
AMMONIA BLD-MCNC: 20 UMOL/L — SIGNIFICANT CHANGE UP (ref 11–55)
ANION GAP SERPL CALC-SCNC: 17 MMOL/L — HIGH (ref 7–14)
APAP SERPL-MCNC: <5 UG/ML — LOW (ref 10–30)
APPEARANCE UR: CLEAR — SIGNIFICANT CHANGE UP
AST SERPL-CCNC: 25 U/L — SIGNIFICANT CHANGE UP (ref 0–41)
B-OH-BUTYR SERPL-SCNC: 0.5 MMOL/L — HIGH
BACTERIA # UR AUTO: NEGATIVE — SIGNIFICANT CHANGE UP
BASE EXCESS BLDV CALC-SCNC: -1 MMOL/L — SIGNIFICANT CHANGE UP (ref -2–2)
BASOPHILS # BLD AUTO: 0.04 K/UL — SIGNIFICANT CHANGE UP (ref 0–0.2)
BASOPHILS NFR BLD AUTO: 0.5 % — SIGNIFICANT CHANGE UP (ref 0–1)
BILIRUB SERPL-MCNC: 0.7 MG/DL — SIGNIFICANT CHANGE UP (ref 0.2–1.2)
BILIRUB UR-MCNC: NEGATIVE — SIGNIFICANT CHANGE UP
BUN SERPL-MCNC: 7 MG/DL — LOW (ref 10–20)
CA-I SERPL-SCNC: 1.13 MMOL/L — SIGNIFICANT CHANGE UP (ref 1.12–1.3)
CALCIUM SERPL-MCNC: 9.6 MG/DL — SIGNIFICANT CHANGE UP (ref 8.5–10.1)
CHLORIDE SERPL-SCNC: 97 MMOL/L — LOW (ref 98–110)
CO2 SERPL-SCNC: 20 MMOL/L — SIGNIFICANT CHANGE UP (ref 17–32)
COLOR SPEC: SIGNIFICANT CHANGE UP
CREAT SERPL-MCNC: 0.8 MG/DL — SIGNIFICANT CHANGE UP (ref 0.7–1.5)
DIFF PNL FLD: NEGATIVE — SIGNIFICANT CHANGE UP
EOSINOPHIL # BLD AUTO: 0.37 K/UL — SIGNIFICANT CHANGE UP (ref 0–0.7)
EOSINOPHIL NFR BLD AUTO: 4.4 % — SIGNIFICANT CHANGE UP (ref 0–8)
EPI CELLS # UR: 0 /HPF — SIGNIFICANT CHANGE UP (ref 0–5)
ETHANOL SERPL-MCNC: <10 MG/DL — SIGNIFICANT CHANGE UP
GAS PNL BLDV: 137 MMOL/L — SIGNIFICANT CHANGE UP (ref 136–145)
GAS PNL BLDV: SIGNIFICANT CHANGE UP
GLUCOSE BLDC GLUCOMTR-MCNC: 283 MG/DL — HIGH (ref 70–99)
GLUCOSE SERPL-MCNC: 347 MG/DL — HIGH (ref 70–99)
GLUCOSE UR QL: ABNORMAL
HCO3 BLDV-SCNC: 22 MMOL/L — SIGNIFICANT CHANGE UP (ref 22–29)
HCT VFR BLD CALC: 44.2 % — SIGNIFICANT CHANGE UP (ref 37–47)
HCT VFR BLDA CALC: 46.4 % — HIGH (ref 34–44)
HGB BLD CALC-MCNC: 15.2 G/DL — SIGNIFICANT CHANGE UP (ref 14–18)
HGB BLD-MCNC: 14.5 G/DL — SIGNIFICANT CHANGE UP (ref 12–16)
HYALINE CASTS # UR AUTO: 0 /LPF — SIGNIFICANT CHANGE UP (ref 0–7)
IMM GRANULOCYTES NFR BLD AUTO: 0.2 % — SIGNIFICANT CHANGE UP (ref 0.1–0.3)
KETONES UR-MCNC: SIGNIFICANT CHANGE UP
LACTATE BLDV-MCNC: 3.8 MMOL/L — HIGH (ref 0.5–1.6)
LEUKOCYTE ESTERASE UR-ACNC: NEGATIVE — SIGNIFICANT CHANGE UP
LYMPHOCYTES # BLD AUTO: 3.2 K/UL — SIGNIFICANT CHANGE UP (ref 1.2–3.4)
LYMPHOCYTES # BLD AUTO: 38.1 % — SIGNIFICANT CHANGE UP (ref 20.5–51.1)
MCHC RBC-ENTMCNC: 25.9 PG — LOW (ref 27–31)
MCHC RBC-ENTMCNC: 32.8 G/DL — SIGNIFICANT CHANGE UP (ref 32–37)
MCV RBC AUTO: 79.1 FL — LOW (ref 81–99)
MONOCYTES # BLD AUTO: 1.09 K/UL — HIGH (ref 0.1–0.6)
MONOCYTES NFR BLD AUTO: 13 % — HIGH (ref 1.7–9.3)
NEUTROPHILS # BLD AUTO: 3.68 K/UL — SIGNIFICANT CHANGE UP (ref 1.4–6.5)
NEUTROPHILS NFR BLD AUTO: 43.8 % — SIGNIFICANT CHANGE UP (ref 42.2–75.2)
NITRITE UR-MCNC: NEGATIVE — SIGNIFICANT CHANGE UP
NRBC # BLD: 0 /100 WBCS — SIGNIFICANT CHANGE UP (ref 0–0)
PCO2 BLDV: 33 MMHG — LOW (ref 41–51)
PH BLDV: 7.44 — HIGH (ref 7.26–7.43)
PH UR: 6.5 — SIGNIFICANT CHANGE UP (ref 5–8)
PLATELET # BLD AUTO: 283 K/UL — SIGNIFICANT CHANGE UP (ref 130–400)
PO2 BLDV: 52 MMHG — HIGH (ref 20–40)
POTASSIUM BLDV-SCNC: 3.9 MMOL/L — SIGNIFICANT CHANGE UP (ref 3.3–5.6)
POTASSIUM SERPL-MCNC: 4 MMOL/L — SIGNIFICANT CHANGE UP (ref 3.5–5)
POTASSIUM SERPL-SCNC: 4 MMOL/L — SIGNIFICANT CHANGE UP (ref 3.5–5)
PROT SERPL-MCNC: 8 G/DL — SIGNIFICANT CHANGE UP (ref 6–8)
PROT UR-MCNC: ABNORMAL
RBC # BLD: 5.59 M/UL — HIGH (ref 4.2–5.4)
RBC # FLD: 12.3 % — SIGNIFICANT CHANGE UP (ref 11.5–14.5)
RBC CASTS # UR COMP ASSIST: 2 /HPF — SIGNIFICANT CHANGE UP (ref 0–4)
SALICYLATES SERPL-MCNC: <0.3 MG/DL — LOW (ref 4–30)
SAO2 % BLDV: 89 % — SIGNIFICANT CHANGE UP
SARS-COV-2 RNA SPEC QL NAA+PROBE: SIGNIFICANT CHANGE UP
SODIUM SERPL-SCNC: 134 MMOL/L — LOW (ref 135–146)
SP GR SPEC: 1.05 — HIGH (ref 1.01–1.03)
TROPONIN T SERPL-MCNC: <0.01 NG/ML — SIGNIFICANT CHANGE UP
UROBILINOGEN FLD QL: SIGNIFICANT CHANGE UP
WBC # BLD: 8.4 K/UL — SIGNIFICANT CHANGE UP (ref 4.8–10.8)
WBC # FLD AUTO: 8.4 K/UL — SIGNIFICANT CHANGE UP (ref 4.8–10.8)
WBC UR QL: 1 /HPF — SIGNIFICANT CHANGE UP (ref 0–5)

## 2021-06-09 PROCEDURE — 70498 CT ANGIOGRAPHY NECK: CPT | Mod: 26,MA

## 2021-06-09 PROCEDURE — 0042T: CPT

## 2021-06-09 PROCEDURE — 99285 EMERGENCY DEPT VISIT HI MDM: CPT

## 2021-06-09 PROCEDURE — 71045 X-RAY EXAM CHEST 1 VIEW: CPT | Mod: 26

## 2021-06-09 PROCEDURE — 70450 CT HEAD/BRAIN W/O DYE: CPT | Mod: 26,59,MA

## 2021-06-09 PROCEDURE — 93010 ELECTROCARDIOGRAM REPORT: CPT

## 2021-06-09 PROCEDURE — 70496 CT ANGIOGRAPHY HEAD: CPT | Mod: 26,MA

## 2021-06-09 RX ORDER — INSULIN LISPRO 100/ML
VIAL (ML) SUBCUTANEOUS
Refills: 0 | Status: DISCONTINUED | OUTPATIENT
Start: 2021-06-09 | End: 2021-06-11

## 2021-06-09 RX ORDER — LISINOPRIL 2.5 MG/1
40 TABLET ORAL DAILY
Refills: 0 | Status: DISCONTINUED | OUTPATIENT
Start: 2021-06-09 | End: 2021-06-11

## 2021-06-09 RX ORDER — DEXTROSE 50 % IN WATER 50 %
15 SYRINGE (ML) INTRAVENOUS ONCE
Refills: 0 | Status: DISCONTINUED | OUTPATIENT
Start: 2021-06-09 | End: 2021-06-11

## 2021-06-09 RX ORDER — ENOXAPARIN SODIUM 100 MG/ML
40 INJECTION SUBCUTANEOUS DAILY
Refills: 0 | Status: DISCONTINUED | OUTPATIENT
Start: 2021-06-09 | End: 2021-06-11

## 2021-06-09 RX ORDER — SODIUM CHLORIDE 9 MG/ML
1000 INJECTION INTRAMUSCULAR; INTRAVENOUS; SUBCUTANEOUS ONCE
Refills: 0 | Status: COMPLETED | OUTPATIENT
Start: 2021-06-09 | End: 2021-06-09

## 2021-06-09 RX ORDER — INSULIN LISPRO 100/ML
7 VIAL (ML) SUBCUTANEOUS
Refills: 0 | Status: DISCONTINUED | OUTPATIENT
Start: 2021-06-09 | End: 2021-06-11

## 2021-06-09 RX ORDER — DEXTROSE 50 % IN WATER 50 %
25 SYRINGE (ML) INTRAVENOUS ONCE
Refills: 0 | Status: DISCONTINUED | OUTPATIENT
Start: 2021-06-09 | End: 2021-06-11

## 2021-06-09 RX ORDER — SODIUM CHLORIDE 9 MG/ML
1000 INJECTION, SOLUTION INTRAVENOUS ONCE
Refills: 0 | Status: COMPLETED | OUTPATIENT
Start: 2021-06-09 | End: 2021-06-09

## 2021-06-09 RX ORDER — SODIUM CHLORIDE 9 MG/ML
1000 INJECTION, SOLUTION INTRAVENOUS
Refills: 0 | Status: DISCONTINUED | OUTPATIENT
Start: 2021-06-09 | End: 2021-06-11

## 2021-06-09 RX ORDER — ALBUTEROL 90 UG/1
2 AEROSOL, METERED ORAL EVERY 6 HOURS
Refills: 0 | Status: DISCONTINUED | OUTPATIENT
Start: 2021-06-09 | End: 2021-06-11

## 2021-06-09 RX ORDER — INSULIN GLARGINE 100 [IU]/ML
20 INJECTION, SOLUTION SUBCUTANEOUS AT BEDTIME
Refills: 0 | Status: DISCONTINUED | OUTPATIENT
Start: 2021-06-09 | End: 2021-06-11

## 2021-06-09 RX ORDER — AMLODIPINE BESYLATE 2.5 MG/1
10 TABLET ORAL DAILY
Refills: 0 | Status: DISCONTINUED | OUTPATIENT
Start: 2021-06-09 | End: 2021-06-11

## 2021-06-09 RX ORDER — PANTOPRAZOLE SODIUM 20 MG/1
40 TABLET, DELAYED RELEASE ORAL
Refills: 0 | Status: DISCONTINUED | OUTPATIENT
Start: 2021-06-09 | End: 2021-06-11

## 2021-06-09 RX ORDER — FAMOTIDINE 10 MG/ML
20 INJECTION INTRAVENOUS ONCE
Refills: 0 | Status: COMPLETED | OUTPATIENT
Start: 2021-06-09 | End: 2021-06-09

## 2021-06-09 RX ORDER — DEXTROSE 50 % IN WATER 50 %
12.5 SYRINGE (ML) INTRAVENOUS ONCE
Refills: 0 | Status: DISCONTINUED | OUTPATIENT
Start: 2021-06-09 | End: 2021-06-11

## 2021-06-09 RX ORDER — ATORVASTATIN CALCIUM 80 MG/1
40 TABLET, FILM COATED ORAL AT BEDTIME
Refills: 0 | Status: DISCONTINUED | OUTPATIENT
Start: 2021-06-09 | End: 2021-06-11

## 2021-06-09 RX ORDER — ACETAMINOPHEN 500 MG
650 TABLET ORAL EVERY 6 HOURS
Refills: 0 | Status: DISCONTINUED | OUTPATIENT
Start: 2021-06-09 | End: 2021-06-11

## 2021-06-09 RX ORDER — GLUCAGON INJECTION, SOLUTION 0.5 MG/.1ML
1 INJECTION, SOLUTION SUBCUTANEOUS ONCE
Refills: 0 | Status: DISCONTINUED | OUTPATIENT
Start: 2021-06-09 | End: 2021-06-11

## 2021-06-09 RX ADMIN — FAMOTIDINE 20 MILLIGRAM(S): 10 INJECTION INTRAVENOUS at 21:02

## 2021-06-09 RX ADMIN — SODIUM CHLORIDE 1000 MILLILITER(S): 9 INJECTION, SOLUTION INTRAVENOUS at 21:03

## 2021-06-09 RX ADMIN — SODIUM CHLORIDE 1000 MILLILITER(S): 9 INJECTION INTRAMUSCULAR; INTRAVENOUS; SUBCUTANEOUS at 18:16

## 2021-06-09 RX ADMIN — INSULIN GLARGINE 20 UNIT(S): 100 INJECTION, SOLUTION SUBCUTANEOUS at 23:36

## 2021-06-09 NOTE — H&P ADULT - ATTENDING COMMENTS
HPI:  51 year old woman with a Past Medical History of HTN, DM (Last HbA1c 12), neuropathy of the b/l lower extremities, ?Hx of throat cancer s/p chemo (now in remission), and asthma non compliant with medications presents for evaluation of AMS. During my interview she is slightly more awake but still moderately lethargic and is slow to answer questions, she is not a particularly reliable historian at this time. She reports waking in her normal states of health other than experiencing left lower tooth pain, and b/l feet pain. Her sister reported her has in her usual state of health at 1pm.   The patient reports that she was feeling excessive neuropathic pain and took medications (doesn't recall which) and passed out in the bathroom. Her sister states she took 2-3 pills but doesn't know what the medications was. It was picked up by her  at [facility] today.   Her sister does recall that she frequently gets tired/lethargic when she takes gabapentin and often needs to sleep shortly after taking it. She also says the patient never takes any illicit substances, drinks alcohol (doesn't even like it near her), nor smokes.   CTH and CTA was negative, neuro following. Requesting stroke workup.       REVIEW OF SYSTEMS: unable to fully review with the patient but she does endorse abdominal pain, tooth pain, and headache.   The majority of ROS she responds as "I don't remember"    PHYSICAL EXAM:  GENERAL: mildly distressed, morbidly obese,  Non-toxic, stated age   HEAD:  Atraumatic, Normocephalic  EYES: EOMI, conjunctival injections    NECK/MOUTH: Supple, no nuchal rigidity or tenderness, no mastoid tenderness. halitosis noted, tenderness on left lower jaw, brief dental exam shows tenderness to the Left lower molars and some build up on the last molar. No clear abscess, drainage, or excessive erythema.   CHEST/LUNG: Clear to auscultation bilaterally; No wheezing, rhonchi, or crackles  HEART: Regular rate and rhythm; s1, s2, No murmurs, rubs, or gallops  ABDOMEN: Soft, abdominal pain, predominantly in the RUQ, flinches even with light touch, Nondistended; Bowel sounds present, No rebound or guarding noted   EXTREMITIES:  No lower extremity edema or calf tenderness to palpation.  No clubbing or cyanosis  PSYCH: AAOx1 (name and birthday correct, date, and location were wrong), slow to answer, cooperative after coaxing, not anxious but seems withdrawn  NEUROLOGY: non-focal other than hyperalgesia on b/l feet.  SKIN: No rashes or lesions      ASSESSMENT AND PLAN:  Metabolic Encephalopathy: etiology unclear (toxic from medications vs possible infection vs concussion vs psychogenic)  -Improving as per sister at bedside.   -Neuro following, MRI H non-contrast,  ASA,  high dose statin, check 2D Echo with Bubble study, PT eval, Speech and swallow eval.   -Follow up blood and urine cultures, Utox and serum tox.   -Check REEG  -hold gabapentin  -Further clarify medications with [facility] and her .   -Check orthostatics     Abdominal pain, hx of PUD and she reports frequent heartburn: symptoms are unclear, may be gastritis   -history of hemangioma (on 2017 CT a/p, not reported on 2019 Ct a/p)   -Check CT A/P to further clarify given her poor history and nebulous symptoms.   -Start protonix     Tooth pain + halitosis:   -May have an underlying dental infection  -Dental eval, she doesn't recall the last time she has dental work   -Tylenol for the pain. Avoid NSAIDS or sedating meds    Diabetes: Basal bolus insulin, keep fingerstick glucose <180.     ?history of throat cancer: No clear documentation of it in [facility] PMD notes  -Sister reported she underwent chemotherapy and was in remission but no recent follow up  -Oncologist is unknown.   -Slight dysphagia reported, speech and swallow eval    Neuropathy: hold off on gabapentin for now  -unclear if that is the trigger for her symptoms.   -Tylenol for pain control     HTN: con home medications     DVT ppx: Lovenox/Heparin  GI ppx: protonix   GOC: Full code.    My note supersedes the residents in the event of a discrepancy.

## 2021-06-09 NOTE — H&P ADULT - NSHPLABSRESULTS_GEN_ALL_CORE
14.5   8.40  )-----------( 283      ( 2021 17:25 )             44.2       -    134<L>  |  97<L>  |  7<L>  ----------------------------<  347<H>  4.0   |  20  |  0.8    Ca    9.6      2021 17:25    TPro  8.0  /  Alb  4.6  /  TBili  0.7  /  DBili  x   /  AST  25  /  ALT  17  /  AlkPhos  111  06-09              Urinalysis Basic - ( 2021 19:58 )    Color: Light Yellow / Appearance: Clear / S.046 / pH: x  Gluc: x / Ketone: Trace  / Bili: Negative / Urobili: <2 mg/dL   Blood: x / Protein: 30 mg/dL / Nitrite: Negative   Leuk Esterase: Negative / RBC: 2 /HPF / WBC 1 /HPF   Sq Epi: x / Non Sq Epi: 0 /HPF / Bacteria: Negative            CARDIAC MARKERS ( 2021 17:25 )  x     / <0.01 ng/mL / x     / x     / x            CAPILLARY BLOOD GLUCOSE  345 (2021 18:30)      POCT Blood Glucose.: 328 mg/dL (2021 17:18)

## 2021-06-09 NOTE — ED PROVIDER NOTE - OBJECTIVE STATEMENT
52 y/o female with a PMH of HTN, DM, neuropathy of the b/l lower extremities, and asthma presents to the ED for evaluation of AMS. as per pt family, pt was last seen normal aroudn 2PM today. pt was found to be altered and lying down by sister. pt is not responding to questions or commands. 50 y/o female with a PMH of HTN, DM, neuropathy of the b/l lower extremities, and asthma presents to the ED for evaluation of AMS. as per pt family, pt was last seen normal dhiraj 2PM today. as per family pt was newly started on gabapentin. pt was found to be altered and lying down by sister. pt is not responding to questions or commands.

## 2021-06-09 NOTE — CONSULT NOTE ADULT - SUBJECTIVE AND OBJECTIVE BOX
Neurology Consult    Patient is a 51y old  Female who presents with a chief complaint of altered mental status    HPI:  51 year old woman past medical history of diabetes and HTN.   Stroke code on arrival for altered mental status. Last known well not clear, also non localized symptoms therefor not a candidate for IV thrombolytics. On exam patient moves all extremities strong and localizes.        PAST MEDICAL & SURGICAL HISTORY:  Diabetes    Hypertension        FAMILY HISTORY:      Social History: (-) x 3    Allergies    penicillin (Rash)    Intolerances        MEDICATIONS  (STANDING):    MEDICATIONS  (PRN):      Vital Signs Last 24 Hrs  T(C): 36.7 (09 Jun 2021 18:06), Max: 36.7 (09 Jun 2021 17:05)  T(F): 98 (09 Jun 2021 18:06), Max: 98 (09 Jun 2021 17:05)  HR: 111 (09 Jun 2021 18:14) (111 - 130)  BP: 188/82 (09 Jun 2021 18:14) (177/80 - 188/82)  BP(mean): --  RR: 17 (09 Jun 2021 18:14) (12 - 17)  SpO2: 99% (09 Jun 2021 18:14) (98% - 99%)    Examination:  Awake crying but not speaking.   When given central noxious stimuli patient awakens  and localizes with moving all four extremities strongly.   Reacts in all four extremities to peripheral noxious stimuli.   Not following commands        Labs:   CBC Full  -  ( 09 Jun 2021 17:25 )  WBC Count : 8.40 K/uL  RBC Count : 5.59 M/uL  Hemoglobin : 14.5 g/dL  Hematocrit : 44.2 %  Platelet Count - Automated : 283 K/uL  Mean Cell Volume : 79.1 fL  Mean Cell Hemoglobin : 25.9 pg  Mean Cell Hemoglobin Concentration : 32.8 g/dL  Auto Neutrophil # : 3.68 K/uL  Auto Lymphocyte # : 3.20 K/uL  Auto Monocyte # : 1.09 K/uL  Auto Eosinophil # : 0.37 K/uL  Auto Basophil # : 0.04 K/uL  Auto Neutrophil % : 43.8 %  Auto Lymphocyte % : 38.1 %  Auto Monocyte % : 13.0 %  Auto Eosinophil % : 4.4 %  Auto Basophil % : 0.5 %    06-09    134<L>  |  97<L>  |  7<L>  ----------------------------<  347<H>  4.0   |  20  |  0.8    Ca    9.6      09 Jun 2021 17:25    TPro  8.0  /  Alb  4.6  /  TBili  0.7  /  DBili  x   /  AST  25  /  ALT  17  /  AlkPhos  111  06-09    LIVER FUNCTIONS - ( 09 Jun 2021 17:25 )  Alb: 4.6 g/dL / Pro: 8.0 g/dL / ALK PHOS: 111 U/L / ALT: 17 U/L / AST: 25 U/L / GGT: x

## 2021-06-09 NOTE — ED ADULT NURSE NOTE - NS TRANSFER PATIENT BELONGINGS
Necklace/bracelet/Jewelry/Money (specify)/Clothing Necklace/bracelet given to family/Jewelry/Money (specify)/Clothing

## 2021-06-09 NOTE — H&P ADULT - NSHPPHYSICALEXAM_GEN_ALL_CORE
GENERAL: NAD, lying in bed comfortably  HEAD:  Atraumatic, Normocephalic  CHEST/LUNG: Clear to auscultation bilaterally  HEART: Regular rate and rhythm  ABDOMEN: Bowel sounds present; Soft, generalized tenderness to palpation  EXTREMITIES: No peripheral edema  NERVOUS SYSTEM:  Alert & Oriented X1 to name but not to place or person. confused, no recall,    MSK: FROM all 4 extremities, full and equal strength  SKIN: No rashes or lesions

## 2021-06-09 NOTE — H&P ADULT - ASSESSMENT
51 year old female with a Past Medical History of HTN, DM (Last HbA1c 12), neuropathy of the b/l lower extremities, and asthma non compliant with medications presents for evaluation of AMS.    Patient alert to name but not to place or time. Her affect suspicious for possible underlying psychiatric issue. Will get MR Head to rule out any neurologic event and can get a Psych consult in case no organic cause of the altered status is found.    # AMS likely Delirium vs rule out Stroke  - Possible neurologic vs psychiatric vs drug induced  - Since evening altered consciousness, poor memory, multiple somatic complaints.  - CT Head and CTA negative for any acute stroke  - UA negative, WBC 8k, Glucose 347 with beta hydroxy 0.5  - Will get MR Head non contrast  - Will hold all sedatives  - Can get Psych eval in the AM if no organic cause  - Follow with Neurology    # DM II - uncontrolled   # Peripheral neuropathy  - Poor compliance  - Last Hb A1c- 12.2  - sugars remain elevated in patient  - Follows with Dr. Badillo outpatient  - On statin and Ezetimibe. Will continue with statin  - TG were 700 in the past  - Hold Gabapentin    # HTN  - uncontrolled  - Likely poor compliance  - continue Amlodipine 10 and Lisinopril 40    # Asthma  - Not using any medications    # H/O Hepatic hemangioma  # Severe Abdominal pain  # H/O Peptic ulcer disease  - In the past CT scan showed hepatic mass suspicious of hepatic hemangioma.  - Will order CT Abdomen with IV contrast due to severe abdominal pain complaint  - Will give Protonix    # Hx of Hypertriglyceridemia  - Triglycerides of 717 on last blood work  - Continue atorvastatin  - Can add fibrate on discharge    DVT: Lovenox  GI: Protonix  Dispo: Pending work up 51 year old female with a Past Medical History of HTN, DM (Last HbA1c 12), neuropathy of the b/l lower extremities, and asthma non compliant with medications presents for evaluation of AMS.    Patient alert to name but not to place or time. Her affect suspicious for possible underlying psychiatric issue. Will get MR Head to rule out any neurologic event and can get a Psych consult in case no organic cause of the altered status is found.    # AMS likely Delirium vs rule out Stroke  - Possible neurologic vs psychiatric vs drug induced  - Since evening altered consciousness, poor memory, multiple somatic complaints.  - CT Head and CTA negative for any acute stroke  - UA negative, WBC 8k, Glucose 347 with beta hydroxy 0.5  - Will get MR Head non contrast  - Will hold all sedatives  - Can get Psych eval in the AM if no organic cause  - Follow with Neurology    # DM II - uncontrolled   # Peripheral neuropathy  - Poor compliance  - Last Hb A1c- 12.2  - sugars remain elevated in patient  - Will give Insulin 20 at bedtime and 7 with meals  - Follows with Dr. Badillo outpatient  - On statin and Ezetimibe. Will continue with statin  - TG were 700 in the past  - Hold Gabapentin    # HTN  - uncontrolled  - Likely poor compliance  - continue Amlodipine 10 and Lisinopril 40    # Asthma  - Not using any medications    # H/O Hepatic hemangioma  # Severe Abdominal pain  # H/O Peptic ulcer disease  - In the past CT scan showed hepatic mass suspicious of hepatic hemangioma.  - Will order CT Abdomen with IV contrast due to severe abdominal pain complaint  - Will give Protonix    # Hx of Hypertriglyceridemia  - Triglycerides of 717 on last blood work  - Continue atorvastatin  - Can add fibrate on discharge    DVT: Lovenox  GI: Protonix  Dispo: Pending work up 51 year old female with a Past Medical History of HTN, DM (Last HbA1c 12), neuropathy of the b/l lower extremities, and asthma non compliant with medications presents for evaluation of AMS.    Patient alert to name but not to place or time. Her affect suspicious for possible underlying psychiatric issue. Will get MR Head to rule out any neurologic event and can get a Psych consult in case no organic cause of the altered status is found.    # AMS likely Delirium vs rule out Stroke  - Possible neurologic vs psychiatric vs drug induced (less likely)  - Since evening altered consciousness, poor memory, multiple somatic complaints.  - CT Head and CTA negative for any acute stroke  - UA negative, WBC 8k, Glucose 347 with beta hydroxy 0.5  - Will get MR Head non contrast  - Will hold all sedatives  - Follow up Utox  - Can get Psych eval in the AM if no organic cause  - Follow with Neurology    # DM II - uncontrolled   # Peripheral neuropathy  - Poor compliance  - Last Hb A1c- 12.2  - sugars remain elevated in patient  - Will give Insulin 20 at bedtime and 7 with meals  - Follows with Dr. Badillo outpatient  - On statin and Ezetimibe. Will continue with statin  - TG were 700 in the past  - Hold Gabapentin    # HTN  - uncontrolled  - Likely poor compliance  - continue Amlodipine 10 and Lisinopril 40    # Asthma  - Not using any medications    # H/O Hepatic hemangioma  # Severe Abdominal pain  # H/O Peptic ulcer disease  - In the past CT scan showed hepatic mass suspicious of hepatic hemangioma.  - Will order CT Abdomen with IV contrast due to severe abdominal pain complaint  - Will give Protonix    # Hx of Hypertriglyceridemia  - Triglycerides of 717 on last blood work  - Continue atorvastatin  - Can add fibrate on discharge    DVT: Lovenox  GI: Protonix  Dispo: Pending work up 51 year old female with a Past Medical History of HTN, DM (Last HbA1c 12), neuropathy of the b/l lower extremities, and asthma non compliant with medications presents for evaluation of AMS.    Patient alert to name but not to place or time. Her affect suspicious for possible underlying psychiatric issue. Will get MR Head to rule out any neurologic event and can get a Psych consult in case no organic cause of the altered status is found.    # AMS likely Delirium vs rule out Stroke  - Possible neurologic vs psychiatric vs drug induced (less likely)  - Since evening altered consciousness, poor memory, multiple somatic complaints.  - CT Head and CTA negative for any acute stroke  - UA negative, WBC 8k, Glucose 347 with beta hydroxy 0.5  - Will get MR Head non contrast  - Will hold all sedatives  - Will get EEG  - Follow up Utox  - Can get Psych eval in the AM if no organic cause  - Follow with Neurology    # DM II - uncontrolled   # Peripheral neuropathy  - Poor compliance  - Last Hb A1c- 12.2  - sugars remain elevated in patient  - Will give Insulin 20 at bedtime and 7 with meals  - Follows with Dr. Badillo outpatient  - On statin and Ezetimibe. Will continue with statin  - TG were 700 in the past  - Hold Gabapentin    # HTN  - uncontrolled  - Likely poor compliance  - continue Amlodipine 10 and Lisinopril 40    # Asthma  - Not using any medications    # H/O Hepatic hemangioma  # Severe Abdominal pain  # H/O Peptic ulcer disease  - In the past CT scan showed hepatic mass suspicious of hepatic hemangioma.  - Will order CT Abdomen with IV contrast due to severe abdominal pain complaint  - Will give Protonix    # Hx of Hypertriglyceridemia  - Triglycerides of 717 on last blood work  - Continue atorvastatin  - Can add fibrate on discharge    DVT: Lovenox  GI: Protonix  Dispo: Pending work up 51 year old female with a Past Medical History of HTN, DM (Last HbA1c 12), neuropathy of the b/l lower extremities, and asthma non compliant with medications presents for evaluation of AMS.    Patient alert to name but not to place or time. Her affect suspicious for possible underlying psychiatric issue. Will get MR Head to rule out any neurologic event and can get a Psych consult in case no organic cause of the altered status is found.    # AMS likely Delirium vs rule out Stroke  - Possible neurologic vs psychiatric vs drug induced (less likely)  - Since evening altered consciousness, poor memory, multiple somatic complaints.  - CT Head and CTA negative for any acute stroke  - UA negative, WBC 8k, Glucose 347 with beta hydroxy 0.5  - Will get MR Head non contrast  - Will hold all sedatives  - Will get EEG  - Follow up Utox  - Can get Psych eval in the AM if no organic cause  - Follow with Neurology    # H/O memory loss  - Documentation from PCP EMR  - Was referred to Neurologist but never followed    # DM II - uncontrolled   # Peripheral neuropathy  - Poor compliance  - Last Hb A1c- 12.2  - sugars remain elevated in patient  - Will give Insulin 20 at bedtime and 7 with meals  - Follows with Dr. Badillo outpatient  - On statin and Ezetimibe. Will continue with statin  - TG were 700 in the past  - Hold Gabapentin    # HTN  - uncontrolled  - Likely poor compliance  - continue Amlodipine 10 and Lisinopril 40    # Asthma  - Not using any medications    # H/O Hepatic hemangioma  # Severe Abdominal pain  # H/O Peptic ulcer disease  - In the past CT scan showed hepatic mass suspicious of hepatic hemangioma.  - Will order CT Abdomen with IV contrast due to severe abdominal pain complaint  - Will give Protonix    # Hx of Hypertriglyceridemia  - Triglycerides of 717 on last blood work  - Continue atorvastatin  - Can add fibrate on discharge    DVT: Lovenox  GI: Protonix  Dispo: Pending work up

## 2021-06-09 NOTE — ED ADULT NURSE NOTE - OBJECTIVE STATEMENT
Name band; Pt presented to ED c/o AMS. Pt BIBA from home c/o AMS and lethargy. Pt nonverbal, able to CHENG. Airway intact. Pt reactive to pain. No s/s of n/v/d/fevers/chills. As per pt family, pt denies recent fall/trauma/drug use. As per pt family, sudden onset ~3hrs PTA. Pt presented to ED c/o AMS. Pt BIBA from home c/o AMS and lethargy. Pt nonverbal, but able to CHENG. Airway intact. Pt reactive to pain. No s/s of n/v/d/fevers/chills. As per pt family,  denies any recent fall/trauma/drug use. As per pt family, sudden onset ~3hrs PTA. Stroke Code activated. Pt presented to ED c/o AMS. Pt BIBA from home c/o AMS and lethargy. Pt nonverbal, but able to CHENG. Airway intact. Pt reactive to pain. No s/s of n/v/d/fevers/chills. As per pt family,  denies any recent fall/trauma/drug use. As per pt family, sudden onset ~3hrs PTA.     Stroke Code activated in ED. Fall precautions maintained, BA in place. Pt on Tele/O2 monitor.

## 2021-06-09 NOTE — ED ADULT NURSE NOTE - NS ED NURSE RECORD ANOTHER VITAL SIGN
[facility], [place], Emergency Department    [address]    [place] [facility] [address]    Phone:  [phone]    Fax:  [phone]                                       Isaiah Hurst   MRN: [id number]    Department:  [facility], Emergency Department   Date of Visit:  9/7/2018           After Visit Summary Signature Page     I have received my discharge instructions, and my questions have been answered. I have discussed any challenges I see with this plan with the nurse or doctor.    ..........................................................................................................................................  Patient/Patient Representative Signature      ..........................................................................................................................................  Patient Representative Print Name and Relationship to Patient    ..................................................               ................................................  Date                                            Time    ..........................................................................................................................................  Reviewed by Signature/Title    ...................................................              ..............................................  Date                                                            Time          22EPIC Rev 08/18         Yes

## 2021-06-09 NOTE — ED ADULT NURSE NOTE - CHIEF COMPLAINT QUOTE
patient brought in from home for AMS and not responsive, family denies recent fall, trauma, use of drugs, patient not responsive to sternal rub, but is responsive to foot stimuli, GCs of 6 at this time.

## 2021-06-09 NOTE — ED ADULT TRIAGE NOTE - CHIEF COMPLAINT QUOTE
patient brought in from home for AMS and not responsive, family denies recent fall, trauma, use of drugs, patient not responsive to sternal rub, but is responsive to foot stimuli, GCs of 6 at this time. Ulcer of sacral region, stage 4

## 2021-06-09 NOTE — ED PROVIDER NOTE - PROGRESS NOTE DETAILS
stroke code called pt taken to ct ATTENDING NOTE: 50 y/o F PMH HTN, DM, and neuropathy in the legs here for AMS. Pt’s sister last saw the Pt normal at 1400 today. PTA, she found the Pt to be altered and lying down. Stroke code called upon arrival. Here, Pt is poorly responsive. Responds minimally to sternal rub and pressing on her nose as she starts to move her extremities. Pt is tearful but non-verbal. Pupils are active b/l. S1S2. CTAB. ABD soft NTND. Impression is AMS. Stroke code called. CT imaging, labs, UA and reevaluate AMS. pt back from ct, spoke with neurology will observe pt crying at bedside, sister states this is how the patient acts when she gets in pain. pt not verbalizing anything here Progress, Authored by Dr. Taveras: Pt appears to be listening and understanding what is going on around her. She is still not verbalizing. Pt was seen by neurology. Pending completion of work up. Anticipate admission to med floor for further evaluation. FF: pt is awake trying to open her eyes with her own hands? FF: pt is fully awake and alert her eyes are opening and she is talking to family stating she wants to go home and doesn't want to be here Dr. Ward Taveras: Pt is now talking, complains of ABD pain. Further evaluation to be done by admission team. Dr. Ward Taveras: Pt here with acute AMS, afebrile. Stroke code called upon arrival. Symptoms are non-localizable. Pt moves all extremities to stimuli. Pt observed in the ED, is now grabbing at her eyes trying to open them, listening but non-verbal. Case discussed with neurology. Will admit for AMS work up. Dr. Ward Taveras wrap up note: Pt here with acute AMS, afebrile. Stroke code called upon arrival. Symptoms are non-localizable. Pt moves all extremities to stimuli. Pt observed in the ED, is now grabbing at her eyes trying to open them, listening but non-verbal. Case discussed with neurology. Will admit for AMS work up.

## 2021-06-09 NOTE — CONSULT NOTE ADULT - ATTENDING COMMENTS
I have personally seen and examined this patient.  I have fully participated in the care of this patient.  I have reviewed all pertinent clinical information, including history, physical exam, plan and note. I visited the patient on 6/10. She is alert and oriented and exam shows no focal deficit. Brain MRI showed no acute infarct. CTA showed mild ICA stenosis but no LVO. Possible AMS due to hyperglycemia. Recommend to continue medical work up. Start the patient on ASA 81 mg and continue Lipitor 40. Please call neurology for any further questions.  have reviewed all pertinent clinical information and reviewed all relevant imaging and diagnostic studies personally.  Recommendations as above.  Agree with above assessment except as noted.

## 2021-06-09 NOTE — H&P ADULT - HISTORY OF PRESENT ILLNESS
51 year old female with a Past Medical History of HTN, DM (Last HbA1c 12), neuropathy of the b/l lower extremities, and asthma non compliant with medications presents for evaluation of AMS.    As per family at the bedside patient was last seen normal around 1-2pm when she was cleaning the house. Later prior to admission patient was found altered and laying down. Patient was brought to ED, no fever, chills, rigors, recent infection, drug use, alcohol intake reported by the family.    As per ED patient was recently started on Gabapentin but documentation from EMR shows prescribed gabapentin from past. Patient has been in ER with complaints of abdominal pain, blurry vision, paresthesia and unbalanced gait.    In ED patient hypertensive to 180 systolic and tachycardic to 130, poorly alert on exam and when pressed on the nose moved her extremities, was tearful but refused to speak. Stroke code called and CT Head and CTA were negative for any acute stroke. At time of interview patient verbal but repeats that she wants to go home as she lives in [place] (lives in ) and her mother is waiting for her (mother  6 years ago). She also endorses significant abdominal pain and headache.

## 2021-06-09 NOTE — H&P ADULT - NSHPSOCIALHISTORY_GEN_ALL_CORE
Family Denies smoking, alcohol or illicit drug use Family Denies smoking, alcohol or illicit drug use. Has several social stressors, has an autistic kid that she has to take care at home

## 2021-06-09 NOTE — ED PROVIDER NOTE - PHYSICAL EXAMINATION
Physical Exam    Vital Signs: I have reviewed the initial vital signs.  Constitutional: pt is obese, appears stated age, no acute distress  Eyes: Conjunctiva pink, Sclera clear, PERRLA. pupils are 4mm b/l.   Cardiovascular: S1 and S2, regular rate, regular rhythm, well-perfused extremities, radial pulses equal and 2+ b/l.   Respiratory: unlabored respiratory effort, clear to auscultation bilaterally no wheezing, rales and rhonchi. no accessory muscle use.   Gastrointestinal: soft, non-tender, nondistended abdomen, no pulsatile mass, normal bowl sounds, no rebound, no guarding, no organomegaly.   Musculoskeletal: supple neck, no lower extremity edema, no calf tenderness, no midline tenderness, no palpable spinal step offs  Integumentary: warm, dry, no rash  Neurologic: pt is poorly responsive. minimally responsive to sternal rub. when attempting to place nasal trumpet in pt nose she begins to thrash her head, arms, and legs b/l. pt is tearful but nonverbal. when palpable pt lowering extremities she kicks her legs away.   Psychiatric: appropriate mood, appropriate affect

## 2021-06-09 NOTE — ED ADULT NURSE NOTE - NSIMPLEMENTINTERV_GEN_ALL_ED
Implemented All Fall with Harm Risk Interventions:  [place] to call system. Call bell, personal items and telephone within reach. Instruct patient to call for assistance. Room bathroom lighting operational. Non-slip footwear when patient is off stretcher. Physically safe environment: no spills, clutter or unnecessary equipment. Stretcher in lowest position, wheels locked, appropriate side rails in place. Provide visual cue, wrist band, yellow gown, etc. Monitor gait and stability. Monitor for mental status changes and reorient to person, place, and time. Review medications for side effects contributing to fall risk. Reinforce activity limits and safety measures with patient and family. Provide visual clues: red socks.

## 2021-06-09 NOTE — CONSULT NOTE ADULT - ASSESSMENT
Impression;  51 year old woman past medical history of diabetes and HTN.   Stroke code on arrival for altered mental status. Last known well not clear, also non localized symptoms therefor not a candidate for IV thrombolytics. On exam patient moves all extremities strong and localizes to noxious stimuli.   CTH without acute intracranial pathology.     Suggestion:  follow up CTA CTP  MRI brain without claudine.   Routine EEG  Medical workup for encephalopathy.       Shankar Walton NP  [phone]

## 2021-06-09 NOTE — ED ADULT TRIAGE NOTE - ESI TRIAGE ACUITY LEVEL, MLM
Pt. of Dr. Fulton.     I spoke with Promise - pts' dtr.  Pt is a resident at [facility].  Pt. is having swelling and pain in left leg for past 2 days. She is able to bear weight and walk, no SOB, she is able to get a shoe on.  Hx: intermittent left leg swelling, has had US leg in the past and this has been negative for DVT.     Promise is requesting medication for swelling & pain.  Medication orders need to be specific and faxed to [facility] at Fax:  [phone]. [facility] is only open until 2:00pm today.  Please address this ASAP.   2

## 2021-06-09 NOTE — ED ADULT NURSE REASSESSMENT NOTE - NS ED NURSE REASSESS COMMENT FT1
Pt able to be aroused, able to follow commands. Family at bedside. No s/s of emergent distress noted. Will f/u.

## 2021-06-10 ENCOUNTER — NON-APPOINTMENT (OUTPATIENT)
Age: 51
End: 2021-06-10

## 2021-06-10 LAB
ALBUMIN SERPL ELPH-MCNC: 3.9 G/DL — SIGNIFICANT CHANGE UP (ref 3.5–5.2)
ALBUMIN SERPL ELPH-MCNC: 3.9 G/DL — SIGNIFICANT CHANGE UP (ref 3.5–5.2)
ALP SERPL-CCNC: 102 U/L — SIGNIFICANT CHANGE UP (ref 30–115)
ALP SERPL-CCNC: 102 U/L — SIGNIFICANT CHANGE UP (ref 30–115)
ALT FLD-CCNC: 12 U/L — SIGNIFICANT CHANGE UP (ref 0–41)
ALT FLD-CCNC: 12 U/L — SIGNIFICANT CHANGE UP (ref 0–41)
AMPHET UR-MCNC: NEGATIVE — SIGNIFICANT CHANGE UP
ANION GAP SERPL CALC-SCNC: 12 MMOL/L — SIGNIFICANT CHANGE UP (ref 7–14)
ANION GAP SERPL CALC-SCNC: 9 MMOL/L — SIGNIFICANT CHANGE UP (ref 7–14)
AST SERPL-CCNC: 11 U/L — SIGNIFICANT CHANGE UP (ref 0–41)
AST SERPL-CCNC: 12 U/L — SIGNIFICANT CHANGE UP (ref 0–41)
BARBITURATES UR SCN-MCNC: NEGATIVE — SIGNIFICANT CHANGE UP
BASOPHILS # BLD AUTO: 0.02 K/UL — SIGNIFICANT CHANGE UP (ref 0–0.2)
BASOPHILS NFR BLD AUTO: 0.3 % — SIGNIFICANT CHANGE UP (ref 0–1)
BENZODIAZ UR-MCNC: NEGATIVE — SIGNIFICANT CHANGE UP
BILIRUB SERPL-MCNC: 0.6 MG/DL — SIGNIFICANT CHANGE UP (ref 0.2–1.2)
BILIRUB SERPL-MCNC: 0.8 MG/DL — SIGNIFICANT CHANGE UP (ref 0.2–1.2)
BUN SERPL-MCNC: 5 MG/DL — LOW (ref 10–20)
BUN SERPL-MCNC: 6 MG/DL — LOW (ref 10–20)
CALCIUM SERPL-MCNC: 9.2 MG/DL — SIGNIFICANT CHANGE UP (ref 8.5–10.1)
CALCIUM SERPL-MCNC: 9.2 MG/DL — SIGNIFICANT CHANGE UP (ref 8.5–10.1)
CHLORIDE SERPL-SCNC: 104 MMOL/L — SIGNIFICANT CHANGE UP (ref 98–110)
CHLORIDE SERPL-SCNC: 104 MMOL/L — SIGNIFICANT CHANGE UP (ref 98–110)
CO2 SERPL-SCNC: 23 MMOL/L — SIGNIFICANT CHANGE UP (ref 17–32)
CO2 SERPL-SCNC: 26 MMOL/L — SIGNIFICANT CHANGE UP (ref 17–32)
COCAINE METAB.OTHER UR-MCNC: NEGATIVE — SIGNIFICANT CHANGE UP
CREAT SERPL-MCNC: 0.6 MG/DL — LOW (ref 0.7–1.5)
CREAT SERPL-MCNC: 0.7 MG/DL — SIGNIFICANT CHANGE UP (ref 0.7–1.5)
DRUG SCREEN 1, URINE RESULT: SIGNIFICANT CHANGE UP
EOSINOPHIL # BLD AUTO: 0.29 K/UL — SIGNIFICANT CHANGE UP (ref 0–0.7)
EOSINOPHIL NFR BLD AUTO: 3.8 % — SIGNIFICANT CHANGE UP (ref 0–8)
GLUCOSE BLDC GLUCOMTR-MCNC: 217 MG/DL — HIGH (ref 70–99)
GLUCOSE BLDC GLUCOMTR-MCNC: 258 MG/DL — HIGH (ref 70–99)
GLUCOSE BLDC GLUCOMTR-MCNC: 292 MG/DL — HIGH (ref 70–99)
GLUCOSE BLDC GLUCOMTR-MCNC: 306 MG/DL — HIGH (ref 70–99)
GLUCOSE BLDC GLUCOMTR-MCNC: 372 MG/DL — HIGH (ref 70–99)
GLUCOSE BLDC GLUCOMTR-MCNC: <10 MG/DL — CRITICAL LOW (ref 70–99)
GLUCOSE SERPL-MCNC: 243 MG/DL — HIGH (ref 70–99)
GLUCOSE SERPL-MCNC: 310 MG/DL — HIGH (ref 70–99)
HCG UR QL: NEGATIVE — SIGNIFICANT CHANGE UP
HCT VFR BLD CALC: 41.7 % — SIGNIFICANT CHANGE UP (ref 37–47)
HGB BLD-MCNC: 13.7 G/DL — SIGNIFICANT CHANGE UP (ref 12–16)
IMM GRANULOCYTES NFR BLD AUTO: 0.3 % — SIGNIFICANT CHANGE UP (ref 0.1–0.3)
LIDOCAIN IGE QN: 34 U/L — SIGNIFICANT CHANGE UP (ref 7–60)
LYMPHOCYTES # BLD AUTO: 2.17 K/UL — SIGNIFICANT CHANGE UP (ref 1.2–3.4)
LYMPHOCYTES # BLD AUTO: 28.6 % — SIGNIFICANT CHANGE UP (ref 20.5–51.1)
MAGNESIUM SERPL-MCNC: 1.7 MG/DL — LOW (ref 1.8–2.4)
MCHC RBC-ENTMCNC: 26.1 PG — LOW (ref 27–31)
MCHC RBC-ENTMCNC: 32.9 G/DL — SIGNIFICANT CHANGE UP (ref 32–37)
MCV RBC AUTO: 79.4 FL — LOW (ref 81–99)
METHADONE UR-MCNC: NEGATIVE — SIGNIFICANT CHANGE UP
MONOCYTES # BLD AUTO: 0.72 K/UL — HIGH (ref 0.1–0.6)
MONOCYTES NFR BLD AUTO: 9.5 % — HIGH (ref 1.7–9.3)
NEUTROPHILS # BLD AUTO: 4.38 K/UL — SIGNIFICANT CHANGE UP (ref 1.4–6.5)
NEUTROPHILS NFR BLD AUTO: 57.5 % — SIGNIFICANT CHANGE UP (ref 42.2–75.2)
NRBC # BLD: 0 /100 WBCS — SIGNIFICANT CHANGE UP (ref 0–0)
OPIATES UR-MCNC: NEGATIVE — SIGNIFICANT CHANGE UP
PCP UR-MCNC: NEGATIVE — SIGNIFICANT CHANGE UP
PLATELET # BLD AUTO: 234 K/UL — SIGNIFICANT CHANGE UP (ref 130–400)
POTASSIUM SERPL-MCNC: 4.1 MMOL/L — SIGNIFICANT CHANGE UP (ref 3.5–5)
POTASSIUM SERPL-MCNC: 4.2 MMOL/L — SIGNIFICANT CHANGE UP (ref 3.5–5)
POTASSIUM SERPL-SCNC: 4.1 MMOL/L — SIGNIFICANT CHANGE UP (ref 3.5–5)
POTASSIUM SERPL-SCNC: 4.2 MMOL/L — SIGNIFICANT CHANGE UP (ref 3.5–5)
PROPOXYPHENE QUALITATIVE URINE RESULT: NEGATIVE — SIGNIFICANT CHANGE UP
PROT SERPL-MCNC: 6.8 G/DL — SIGNIFICANT CHANGE UP (ref 6–8)
PROT SERPL-MCNC: 7.1 G/DL — SIGNIFICANT CHANGE UP (ref 6–8)
RBC # BLD: 5.25 M/UL — SIGNIFICANT CHANGE UP (ref 4.2–5.4)
RBC # FLD: 12.3 % — SIGNIFICANT CHANGE UP (ref 11.5–14.5)
SODIUM SERPL-SCNC: 139 MMOL/L — SIGNIFICANT CHANGE UP (ref 135–146)
SODIUM SERPL-SCNC: 139 MMOL/L — SIGNIFICANT CHANGE UP (ref 135–146)
THC UR QL: NEGATIVE — SIGNIFICANT CHANGE UP
WBC # BLD: 7.6 K/UL — SIGNIFICANT CHANGE UP (ref 4.8–10.8)
WBC # FLD AUTO: 7.6 K/UL — SIGNIFICANT CHANGE UP (ref 4.8–10.8)

## 2021-06-10 PROCEDURE — 70551 MRI BRAIN STEM W/O DYE: CPT | Mod: 26

## 2021-06-10 PROCEDURE — 99223 1ST HOSP IP/OBS HIGH 75: CPT

## 2021-06-10 PROCEDURE — 74177 CT ABD & PELVIS W/CONTRAST: CPT | Mod: 26

## 2021-06-10 PROCEDURE — 99221 1ST HOSP IP/OBS SF/LOW 40: CPT

## 2021-06-10 RX ORDER — MAGNESIUM SULFATE 500 MG/ML
2 VIAL (ML) INJECTION ONCE
Refills: 0 | Status: COMPLETED | OUTPATIENT
Start: 2021-06-10 | End: 2021-06-11

## 2021-06-10 RX ORDER — KETOROLAC TROMETHAMINE 30 MG/ML
30 SYRINGE (ML) INJECTION ONCE
Refills: 0 | Status: DISCONTINUED | OUTPATIENT
Start: 2021-06-10 | End: 2021-06-10

## 2021-06-10 RX ORDER — GABAPENTIN 400 MG/1
300 CAPSULE ORAL ONCE
Refills: 0 | Status: COMPLETED | OUTPATIENT
Start: 2021-06-10 | End: 2021-06-10

## 2021-06-10 RX ADMIN — ENOXAPARIN SODIUM 40 MILLIGRAM(S): 100 INJECTION SUBCUTANEOUS at 11:52

## 2021-06-10 RX ADMIN — Medication 2: at 16:40

## 2021-06-10 RX ADMIN — Medication 5: at 07:53

## 2021-06-10 RX ADMIN — LISINOPRIL 40 MILLIGRAM(S): 2.5 TABLET ORAL at 05:09

## 2021-06-10 RX ADMIN — PANTOPRAZOLE SODIUM 40 MILLIGRAM(S): 20 TABLET, DELAYED RELEASE ORAL at 05:09

## 2021-06-10 RX ADMIN — Medication 7 UNIT(S): at 16:40

## 2021-06-10 RX ADMIN — Medication 650 MILLIGRAM(S): at 02:25

## 2021-06-10 RX ADMIN — Medication 150 MILLIGRAM(S): at 21:44

## 2021-06-10 RX ADMIN — AMLODIPINE BESYLATE 10 MILLIGRAM(S): 2.5 TABLET ORAL at 05:09

## 2021-06-10 RX ADMIN — Medication 30 MILLIGRAM(S): at 13:51

## 2021-06-10 RX ADMIN — GABAPENTIN 300 MILLIGRAM(S): 400 CAPSULE ORAL at 21:44

## 2021-06-10 RX ADMIN — ATORVASTATIN CALCIUM 40 MILLIGRAM(S): 80 TABLET, FILM COATED ORAL at 21:44

## 2021-06-10 RX ADMIN — INSULIN GLARGINE 20 UNIT(S): 100 INJECTION, SOLUTION SUBCUTANEOUS at 21:44

## 2021-06-10 RX ADMIN — Medication 7 UNIT(S): at 07:53

## 2021-06-10 RX ADMIN — Medication 3: at 11:50

## 2021-06-10 RX ADMIN — Medication 7 UNIT(S): at 11:51

## 2021-06-10 NOTE — CHART NOTE - NSCHARTNOTEFT_GEN_A_CORE
Patient was nonresponsive at home-- MRI was negative  EEG  is pending results.  Patient was talking today-- in ER she was tearful but had no neuro deficits.   Patient behaves like this when she has pain as per her sister.  PT eval AM and Dc planning.

## 2021-06-10 NOTE — CONSULT NOTE ADULT - SUBJECTIVE AND OBJECTIVE BOX
Patient is a 51y old  Female who presents with a chief complaint of Altered Mental status (10 Hayden 2021 10:46)    HPI:  51 year old female with a Past Medical History of HTN, DM (Last HbA1c 12), neuropathy of the b/l lower extremities, and asthma non compliant with medications presents for evaluation of AMS.    As per family at the bedside patient was last seen normal around 1-2pm when she was cleaning the house. Later prior to admission patient was found altered and laying down. Patient was brought to ED, no fever, chills, rigors, recent infection, drug use, alcohol intake reported by the family.    As per ED patient was recently started on Gabapentin but documentation from EMR shows prescribed gabapentin from past. Patient has been in ER with complaints of abdominal pain, blurry vision, paresthesia and unbalanced gait.    In ED patient hypertensive to 180 systolic and tachycardic to 130, poorly alert on exam and when pressed on the nose moved her extremities, was tearful but refused to speak. Stroke code called and CT Head and CTA were negative for any acute stroke. At time of interview patient verbal but repeats that she wants to go home as she lives in [place] (lives in ) and her mother is waiting for her (mother  6 years ago). She also endorses significant abdominal pain and headache. (2021 22:03)    ***DENTAL CONSULT for lower left molar pain, concern for infection.     PAST MEDICAL & SURGICAL HISTORY:  Diabetes  Hypertension    MEDICATIONS  (STANDING):  amLODIPine   Tablet 10 milliGRAM(s) Oral daily  atorvastatin 40 milliGRAM(s) Oral at bedtime  dextrose 40% Gel 15 Gram(s) Oral once  dextrose 5%. 1000 milliLiter(s) (50 mL/Hr) IV Continuous <Continuous>  dextrose 5%. 1000 milliLiter(s) (100 mL/Hr) IV Continuous <Continuous>  dextrose 50% Injectable 25 Gram(s) IV Push once  dextrose 50% Injectable 12.5 Gram(s) IV Push once  dextrose 50% Injectable 25 Gram(s) IV Push once  enoxaparin Injectable 40 milliGRAM(s) SubCutaneous daily  glucagon  Injectable 1 milliGRAM(s) IntraMuscular once  insulin glargine Injectable (LANTUS) 20 Unit(s) SubCutaneous at bedtime  insulin lispro (ADMELOG) corrective regimen sliding scale   SubCutaneous three times a day before meals  insulin lispro Injectable (ADMELOG) 7 Unit(s) SubCutaneous three times a day before meals  lisinopril 40 milliGRAM(s) Oral daily  pantoprazole    Tablet 40 milliGRAM(s) Oral before breakfast    MEDICATIONS  (PRN):  acetaminophen   Tablet .. 650 milliGRAM(s) Oral every 6 hours PRN Mild Pain (1 - 3)  ALBUTerol    90 MICROgram(s) HFA Inhaler 2 Puff(s) Inhalation every 6 hours PRN Shortness of Breath and/or Wheezing    Allergies  penicillin (Rash)    Vital Signs Last 24 Hrs  T(C): 36.3 (10 Hyaden 2021 16:12), Max: 36.8 (2021 23:40)  T(F): 97.3 (10 Hayden 2021 16:12), Max: 98.3 (2021 23:40)  HR: 91 (10 Hayden 2021 16:12) (90 - 130)  BP: 146/78 (10 Hayden 2021 16:12) (132/65 - 188/82)  BP(mean): --  RR: 18 (10 Hayden 2021 16:12) (12 - 20)  SpO2: 99% (2021 20:59) (98% - 99%)    LABS:                        13.7   7.60  )-----------( 234      ( 10 Hayden 2021 09:55 )             41.7     06-10    139  |  104  |  5<L>  ----------------------------<  243<H>  4.1   |  26  |  0.7    Ca    9.2      10 Hayden 2021 09:55  Mg     1.7     06-10    TPro  7.1  /  Alb  3.9  /  TBili  0.8  /  DBili  x   /  AST  12  /  ALT  12  /  AlkPhos  102  10    WBC Count: 7.60 K/uL [4.80 - 10.80] (06-10 @ 09:55)  Platelet Count - Automated: 234 K/uL [130 - 400] (06-10 @ 09:55)  Platelet Count - Automated: 283 K/uL [130 - 400] ( @ 17:25)  WBC Count: 8.40 K/uL [4.80 - 10.80] ( @ 17:25)    Urinalysis Basic - ( 2021 19:58 )    Color: Light Yellow / Appearance: Clear / S.046 / pH: x  Gluc: x / Ketone: Trace  / Bili: Negative / Urobili: <2 mg/dL   Blood: x / Protein: 30 mg/dL / Nitrite: Negative   Leuk Esterase: Negative / RBC: 2 /HPF / WBC 1 /HPF   Sq Epi: x / Non Sq Epi: 0 /HPF / Bacteria: Negative    EOE:  TMJ ( -  ) clicks                     ( -  ) pops                     ( -  ) crepitus             Mandible <<FROM>>             Facial bones and MOM <<grossly intact>>             ( -  ) trismus             ( -  ) lymphadenopathy             ( -  ) swelling             ( -  ) asymmetry             ( -  ) palpation             ( -  ) dyspnea             ( -  ) dysphagia             ( -  ) loss of consciousness    IOE:  <<permanent>> dentition: <<multiple missing teeth>>           hard/soft palate:  ( -  ) palatal torus, <<No pathology noted>>           tongue/FOM <<No pathology noted>>           labial/buccal mucosa <<No pathology noted>>           ( +  ) percussion #19            ( +  ) palpation #19            ( -  ) swelling            ( -  ) abscess           ( -  ) sinus tract    *DENTAL RADIOGRAPHS: 1 periapical     *ASSESSMENT: #19, chronic severe periodontosis, severe bone loss, radiographic calculus, periapical pathology, grade 2 mobility - non restorable     *PLAN: Treatment risks, benefits and alternative options discussed with patient. Recommended extraction #19. Patient says she will be discharged on Friday and wants extraction after discharge, does not want to proceed with treatment today.     RECOMMENDATIONS:  1) << Clindamycin 150mg Disp 28 Sig 1 tab q6hrs x 7 days, Ibuprofen 600mg Disp 20 Sig 1 tab q6hrs >>  2) Dental F/U with outpatient dentist for comprehensive dental care, extraction #19.     Resident Name, pager #  Jerry Sandoval, DDS   [phone]  Patient is a 51y old  Female who presents with a chief complaint of Altered Mental status (10 Hayden 2021 10:46)    HPI:  51 year old female with a Past Medical History of HTN, DM (Last HbA1c 12), neuropathy of the b/l lower extremities, and asthma non compliant with medications presents for evaluation of AMS.    As per family at the bedside patient was last seen normal around 1-2pm when she was cleaning the house. Later prior to admission patient was found altered and laying down. Patient was brought to ED, no fever, chills, rigors, recent infection, drug use, alcohol intake reported by the family.    As per ED patient was recently started on Gabapentin but documentation from EMR shows prescribed gabapentin from past. Patient has been in ER with complaints of abdominal pain, blurry vision, paresthesia and unbalanced gait.    In ED patient hypertensive to 180 systolic and tachycardic to 130, poorly alert on exam and when pressed on the nose moved her extremities, was tearful but refused to speak. Stroke code called and CT Head and CTA were negative for any acute stroke. At time of interview patient verbal but repeats that she wants to go home as she lives in [place] (lives in ) and her mother is waiting for her (mother  6 years ago). She also endorses significant abdominal pain and headache. (2021 22:03)    ***DENTAL CONSULT for lower left molar pain, concern for infection.     PAST MEDICAL & SURGICAL HISTORY:  Diabetes  Hypertension    MEDICATIONS  (STANDING):  amLODIPine   Tablet 10 milliGRAM(s) Oral daily  atorvastatin 40 milliGRAM(s) Oral at bedtime  dextrose 40% Gel 15 Gram(s) Oral once  dextrose 5%. 1000 milliLiter(s) (50 mL/Hr) IV Continuous <Continuous>  dextrose 5%. 1000 milliLiter(s) (100 mL/Hr) IV Continuous <Continuous>  dextrose 50% Injectable 25 Gram(s) IV Push once  dextrose 50% Injectable 12.5 Gram(s) IV Push once  dextrose 50% Injectable 25 Gram(s) IV Push once  enoxaparin Injectable 40 milliGRAM(s) SubCutaneous daily  glucagon  Injectable 1 milliGRAM(s) IntraMuscular once  insulin glargine Injectable (LANTUS) 20 Unit(s) SubCutaneous at bedtime  insulin lispro (ADMELOG) corrective regimen sliding scale   SubCutaneous three times a day before meals  insulin lispro Injectable (ADMELOG) 7 Unit(s) SubCutaneous three times a day before meals  lisinopril 40 milliGRAM(s) Oral daily  pantoprazole    Tablet 40 milliGRAM(s) Oral before breakfast    MEDICATIONS  (PRN):  acetaminophen   Tablet .. 650 milliGRAM(s) Oral every 6 hours PRN Mild Pain (1 - 3)  ALBUTerol    90 MICROgram(s) HFA Inhaler 2 Puff(s) Inhalation every 6 hours PRN Shortness of Breath and/or Wheezing    Allergies  penicillin (Rash)    Vital Signs Last 24 Hrs  T(C): 36.3 (10 Hayden 2021 16:12), Max: 36.8 (2021 23:40)  T(F): 97.3 (10 Hayden 2021 16:12), Max: 98.3 (2021 23:40)  HR: 91 (10 Hayden 2021 16:12) (90 - 130)  BP: 146/78 (10 Hayden 2021 16:12) (132/65 - 188/82)  BP(mean): --  RR: 18 (10 Hayden 2021 16:12) (12 - 20)  SpO2: 99% (2021 20:59) (98% - 99%)    LABS:                        13.7   7.60  )-----------( 234      ( 10 Hayden 2021 09:55 )             41.7     06-10    139  |  104  |  5<L>  ----------------------------<  243<H>  4.1   |  26  |  0.7    Ca    9.2      10 Hayden 2021 09:55  Mg     1.7     06-10    TPro  7.1  /  Alb  3.9  /  TBili  0.8  /  DBili  x   /  AST  12  /  ALT  12  /  AlkPhos  102  10    WBC Count: 7.60 K/uL [4.80 - 10.80] (06-10 @ 09:55)  Platelet Count - Automated: 234 K/uL [130 - 400] (06-10 @ 09:55)  Platelet Count - Automated: 283 K/uL [130 - 400] ( @ 17:25)  WBC Count: 8.40 K/uL [4.80 - 10.80] ( @ 17:25)    Urinalysis Basic - ( 2021 19:58 )    Color: Light Yellow / Appearance: Clear / S.046 / pH: x  Gluc: x / Ketone: Trace  / Bili: Negative / Urobili: <2 mg/dL   Blood: x / Protein: 30 mg/dL / Nitrite: Negative   Leuk Esterase: Negative / RBC: 2 /HPF / WBC 1 /HPF   Sq Epi: x / Non Sq Epi: 0 /HPF / Bacteria: Negative    EOE:  TMJ ( -  ) clicks                     ( -  ) pops                     ( -  ) crepitus             Mandible <<FROM>>             Facial bones and MOM <<grossly intact>>             ( -  ) trismus             ( -  ) lymphadenopathy             ( -  ) swelling             ( -  ) asymmetry             ( -  ) palpation             ( -  ) dyspnea             ( -  ) dysphagia             ( -  ) loss of consciousness    IOE:  <<permanent>> dentition: <<multiple missing teeth>>           hard/soft palate:  ( -  ) palatal torus, <<No pathology noted>>           tongue/FOM <<No pathology noted>>           labial/buccal mucosa <<No pathology noted>>           ( +  ) percussion #19            ( +  ) palpation #19            ( -  ) swelling            ( -  ) abscess           ( -  ) sinus tract    *DENTAL RADIOGRAPHS: 1 periapical     *ASSESSMENT: #19, chronic severe periodontitis, severe bone loss, radiographic calculus, periapical pathology, grade 2 mobility - non restorable     *PLAN: Treatment risks, benefits and alternative options discussed with patient. Recommended extraction #19. Patient says she will be discharged on Friday and wants extraction after discharge, does not want to proceed with treatment today.     RECOMMENDATIONS:  1) << Clindamycin 150mg Disp 28 Sig 1 tab q6hrs x 7 days, Ibuprofen 600mg Disp 20 Sig 1 tab q6hrs >>  2) Dental F/U with outpatient dentist for comprehensive dental care, extraction #19.     Resident Name, pager #  Jerry Sandoval, DDS   [phone]

## 2021-06-11 ENCOUNTER — TRANSCRIPTION ENCOUNTER (OUTPATIENT)
Age: 51
End: 2021-06-11

## 2021-06-11 VITALS
SYSTOLIC BLOOD PRESSURE: 136 MMHG | HEART RATE: 82 BPM | DIASTOLIC BLOOD PRESSURE: 77 MMHG | RESPIRATION RATE: 18 BRPM | TEMPERATURE: 98 F

## 2021-06-11 LAB
ALBUMIN SERPL ELPH-MCNC: 3.6 G/DL — SIGNIFICANT CHANGE UP (ref 3.5–5.2)
ALP SERPL-CCNC: 94 U/L — SIGNIFICANT CHANGE UP (ref 30–115)
ALT FLD-CCNC: 11 U/L — SIGNIFICANT CHANGE UP (ref 0–41)
ANION GAP SERPL CALC-SCNC: 9 MMOL/L — SIGNIFICANT CHANGE UP (ref 7–14)
AST SERPL-CCNC: 11 U/L — SIGNIFICANT CHANGE UP (ref 0–41)
BASOPHILS # BLD AUTO: 0.01 K/UL — SIGNIFICANT CHANGE UP (ref 0–0.2)
BASOPHILS NFR BLD AUTO: 0.2 % — SIGNIFICANT CHANGE UP (ref 0–1)
BILIRUB SERPL-MCNC: 0.9 MG/DL — SIGNIFICANT CHANGE UP (ref 0.2–1.2)
BUN SERPL-MCNC: 7 MG/DL — LOW (ref 10–20)
CALCIUM SERPL-MCNC: 9.1 MG/DL — SIGNIFICANT CHANGE UP (ref 8.5–10.1)
CHLORIDE SERPL-SCNC: 103 MMOL/L — SIGNIFICANT CHANGE UP (ref 98–110)
CO2 SERPL-SCNC: 24 MMOL/L — SIGNIFICANT CHANGE UP (ref 17–32)
CREAT SERPL-MCNC: 0.7 MG/DL — SIGNIFICANT CHANGE UP (ref 0.7–1.5)
EOSINOPHIL # BLD AUTO: 0.43 K/UL — SIGNIFICANT CHANGE UP (ref 0–0.7)
EOSINOPHIL NFR BLD AUTO: 7.8 % — SIGNIFICANT CHANGE UP (ref 0–8)
GLUCOSE BLDC GLUCOMTR-MCNC: 248 MG/DL — HIGH (ref 70–99)
GLUCOSE BLDC GLUCOMTR-MCNC: 310 MG/DL — HIGH (ref 70–99)
GLUCOSE BLDC GLUCOMTR-MCNC: 396 MG/DL — HIGH (ref 70–99)
GLUCOSE SERPL-MCNC: 308 MG/DL — HIGH (ref 70–99)
HCT VFR BLD CALC: 41.4 % — SIGNIFICANT CHANGE UP (ref 37–47)
HGB BLD-MCNC: 13.5 G/DL — SIGNIFICANT CHANGE UP (ref 12–16)
IMM GRANULOCYTES NFR BLD AUTO: 0 % — LOW (ref 0.1–0.3)
LYMPHOCYTES # BLD AUTO: 2.02 K/UL — SIGNIFICANT CHANGE UP (ref 1.2–3.4)
LYMPHOCYTES # BLD AUTO: 36.8 % — SIGNIFICANT CHANGE UP (ref 20.5–51.1)
MCHC RBC-ENTMCNC: 26 PG — LOW (ref 27–31)
MCHC RBC-ENTMCNC: 32.6 G/DL — SIGNIFICANT CHANGE UP (ref 32–37)
MCV RBC AUTO: 79.6 FL — LOW (ref 81–99)
MONOCYTES # BLD AUTO: 0.6 K/UL — SIGNIFICANT CHANGE UP (ref 0.1–0.6)
MONOCYTES NFR BLD AUTO: 10.9 % — HIGH (ref 1.7–9.3)
NEUTROPHILS # BLD AUTO: 2.43 K/UL — SIGNIFICANT CHANGE UP (ref 1.4–6.5)
NEUTROPHILS NFR BLD AUTO: 44.3 % — SIGNIFICANT CHANGE UP (ref 42.2–75.2)
NRBC # BLD: 0 /100 WBCS — SIGNIFICANT CHANGE UP (ref 0–0)
PLATELET # BLD AUTO: 234 K/UL — SIGNIFICANT CHANGE UP (ref 130–400)
POTASSIUM SERPL-MCNC: 4.1 MMOL/L — SIGNIFICANT CHANGE UP (ref 3.5–5)
POTASSIUM SERPL-SCNC: 4.1 MMOL/L — SIGNIFICANT CHANGE UP (ref 3.5–5)
PROT SERPL-MCNC: 6.4 G/DL — SIGNIFICANT CHANGE UP (ref 6–8)
RBC # BLD: 5.2 M/UL — SIGNIFICANT CHANGE UP (ref 4.2–5.4)
RBC # FLD: 12.1 % — SIGNIFICANT CHANGE UP (ref 11.5–14.5)
SODIUM SERPL-SCNC: 136 MMOL/L — SIGNIFICANT CHANGE UP (ref 135–146)
WBC # BLD: 5.49 K/UL — SIGNIFICANT CHANGE UP (ref 4.8–10.8)
WBC # FLD AUTO: 5.49 K/UL — SIGNIFICANT CHANGE UP (ref 4.8–10.8)

## 2021-06-11 PROCEDURE — 99239 HOSP IP/OBS DSCHRG MGMT >30: CPT

## 2021-06-11 PROCEDURE — 95816 EEG AWAKE AND DROWSY: CPT | Mod: 26

## 2021-06-11 RX ORDER — GABAPENTIN 400 MG/1
300 CAPSULE ORAL EVERY 8 HOURS
Refills: 0 | Status: DISCONTINUED | OUTPATIENT
Start: 2021-06-11 | End: 2021-06-11

## 2021-06-11 RX ADMIN — Medication 50 GRAM(S): at 05:41

## 2021-06-11 RX ADMIN — Medication 2: at 16:46

## 2021-06-11 RX ADMIN — PANTOPRAZOLE SODIUM 40 MILLIGRAM(S): 20 TABLET, DELAYED RELEASE ORAL at 05:44

## 2021-06-11 RX ADMIN — AMLODIPINE BESYLATE 10 MILLIGRAM(S): 2.5 TABLET ORAL at 05:44

## 2021-06-11 RX ADMIN — Medication 5: at 11:56

## 2021-06-11 RX ADMIN — Medication 150 MILLIGRAM(S): at 03:51

## 2021-06-11 RX ADMIN — GABAPENTIN 300 MILLIGRAM(S): 400 CAPSULE ORAL at 13:07

## 2021-06-11 RX ADMIN — Medication 150 MILLIGRAM(S): at 07:59

## 2021-06-11 RX ADMIN — Medication 7 UNIT(S): at 11:56

## 2021-06-11 RX ADMIN — Medication 7 UNIT(S): at 07:58

## 2021-06-11 RX ADMIN — LISINOPRIL 40 MILLIGRAM(S): 2.5 TABLET ORAL at 05:44

## 2021-06-11 RX ADMIN — Medication 150 MILLIGRAM(S): at 14:56

## 2021-06-11 RX ADMIN — Medication 7 UNIT(S): at 16:45

## 2021-06-11 RX ADMIN — Medication 4: at 07:59

## 2021-06-11 RX ADMIN — ENOXAPARIN SODIUM 40 MILLIGRAM(S): 100 INJECTION SUBCUTANEOUS at 11:55

## 2021-06-11 NOTE — DISCHARGE NOTE PROVIDER - CARE PROVIDER_API CALL
Mira Rolle)  Geriatric Medicine; Internal Medicine  [address], [place] [id number]  Phone: [phone]  Fax: [phone]  Follow Up Time: 2 weeks    Kirsty Badillo  INTERNAL MEDICINE  [address] [place] [address]  Phone: [phone]  Fax: [phone]  Follow Up Time: 1 week    Lester Bajwa)  Neurology  [address] [place] [address]  Phone: [phone]  Fax: [phone]  Follow Up Time: 2 weeks

## 2021-06-11 NOTE — DISCHARGE NOTE PROVIDER - NSDCCPCAREPLAN_GEN_ALL_CORE_FT
PRINCIPAL DISCHARGE DIAGNOSIS  Diagnosis: Altered mental status  Assessment and Plan of Treatment: Delirium is a serious disturbance in mental abilities that results in confused thinking and reduced awareness of the environment. The start of delirium is usually rapid — within hours or a few days.   Delirium can often be traced to one or more contributing factors, such as a severe or chronic illness, changes in metabolic balance (such as low sodium), medication, infection, surgery, or alcohol or drug intoxication or withdrawal.   Signs and symptoms of delirium usually begin over a few hours or a few days. They often fluctuate throughout the day, and there may be periods of no symptoms. Symptoms tend to be worse during the night when it's dark and things look less familiar. Primary signs and symptoms include those below:  Confusion  Being withdrawn, with little or no activity or little response to the environment  Poor memory, particularly of recent events  Disorientation — for example, not knowing where you are or who you are  Difficulty speaking or recalling words  Delirium may have a single cause or more than one cause. Sometimes no cause can be identified. Possible causes include:  Certain medications or drug toxicity  Alcohol or drug intoxication or withdrawal  A medical condition, such as a stroke, heart attack, worsening lung or liver disease, or an injury from a fall  Metabolic imbalances, such as low sodium or low calcium  Severe, chronic or terminal illness  Fever and acute infection, particularly in children  Urinary tract infection, pneumonia or the flu, especially in older adults   Your delirium was likely secondary to high blood sugar. You were treated with insulin and your symptoms improved.   Call 911 and return to the emergency room if you have chest pain, difficulty breathing, high fevers, worsening of your symptoms, feel unwell or have nausea and vomiting.      SECONDARY DISCHARGE DIAGNOSES  Diagnosis: Diabetes mellitus  Assessment and Plan of Treatment: Diabetes is a chronic condition caused by high blood sugar levels. Your blood sugar levels become high because your body does not have enough insulin. Insulin helps move sugar out of the blood so it can be used for energy. Increased sugar in your blood can cause problems in several organs of your body including but not limited to your blood vessels, your kidneys, your brain, and your eyes. The lack of insulin forces your body to use fat instead of sugar for energy. This can be dangerous if not controlled.  Seek Medical Attention If:  You have a seizure.  You begin to breathe fast, or are short of breath.  You become weak and confused.  You are more drowsy than usual.  You have fruity, sweet breath.  You have severe, new stomach pain and are vomiting.  Your blood sugar level is lower or higher than your healthcare provider says it should be.  You have ketones in your blood or urine.  You have a fever or chills.  You are more thirsty than usual.  You are urinating more often than usual.  You have questions or concerns about your condition or care.  Insulin and diabetes medicine decreases the amount of sugar in your blood.  The best way to prevent problems from Diabetes is to control your blood sugars.  Monitor your blood sugar levels closely. Administer Insulin as directed by your physician.   Speak with your doctor and/or a nutritionist about the best way to change your lifestyle and dietary habits to avoid any problems from Diabetes in the future.

## 2021-06-11 NOTE — DISCHARGE NOTE PROVIDER - HOSPITAL COURSE
51 year old female with a Past Medical History of HTN, DM (Last HbA1c 12), neuropathy of the b/l lower extremities, and asthma non compliant with medications presents for evaluation of AMS.    As per family at the bedside patient was last seen normal around 1-2pm when she was cleaning the house. Later prior to admission patient was found altered and laying down. Patient was brought to ED, no fever, chills, rigors, recent infection, drug use, alcohol intake reported by the family.    As per ED patient was recently started on Gabapentin but documentation from EMR shows prescribed gabapentin from past. Patient has been in ER with complaints of abdominal pain, blurry vision, paresthesia and unbalanced gait.    In ED patient hypertensive to 180 systolic and tachycardic to 130, poorly alert on exam and when pressed on the nose moved her extremities, was tearful but refused to speak. Stroke code called and CT Head and CTA were negative for any acute stroke. At time of interview patient verbal but repeats that she wants to go home as she lives in [place] (lives in ) and her mother is waiting for her (mother  6 years ago). She also endorses significant abdominal pain and headache.    Pt was admitted for delirium vs stroke rule out.  CT head, MRI, and EEG were all normal.  Blood glucose was noted to be elevated > 300 and beta hydroxybutyrate was elevated at 0.5. Pt is known noncomplaint with diabetic medications and poorly follows up with Endocrinologist. She was started on Lantus 20U and Lispro 7 with meals.    Abdominal pain was evaluated with CT abd/pel, which was normal.    Pt's mental status returned to baseline and she is stable for discharge with follow up with Endocrinology.      AMS likely Delirium vs rule out Stroke (less likely)  - Delirium likely secondary to hyperglycemia described below  - On admission had altered consciousness, poor memory, multiple somatic complaints.  - CT Head and CTA negative for any acute stroke  - UA negative, WBC 8k, Glucose 347 with beta hydroxy 0.5  - MR Head non contrast negative for acute intracranial abnormality  - EEG normal  - Utox negative  - Follow with Neurology      DM II - uncontrolled / Possible DKA / Peripheral neuropathy  - Poor compliance  - Last Hb A1c - 12.2  - Beta hydroxy 0.5 on admission  - Sugars remain elevated > 300  - Pt received Lantus 20 at bedtime and 7 with meals  - Follows with Dr. Badillo outpatient  - On statin and Ezetimibe. Will continue with statin  - TG were 700 in the past  - Hold Gabapentin      H/O memory loss  - Documentation from PCP EMR  - Was referred to Neurologist but never followed      HTN  - uncontrolled  - Likely poor compliance  - continue Amlodipine 10 and Lisinopril 40      Asthma  - Not using any medications      Hx of Hepatic hemangioma / Severe Abdominal pain / Hx of Peptic ulcer disease  - In the past CT scan showed hepatic mass suspicious of hepatic hemangioma.  - F/u CT Abdomen with IV contrast due to severe abdominal pain complaint  - Will give Protonix      Hx of Hypertriglyceridemia  - Triglycerides of 717 on last blood work  - Continue atorvastatin  - Can add fibrate on discharge      Misc  - DVT: Lovenox  - GI: Protonix  - Dispo: Pending work up

## 2021-06-11 NOTE — DISCHARGE NOTE PROVIDER - PROVIDER TOKENS
PROVIDER:[TOKEN:[[id number]],FOLLOWUP:[2 weeks]],PROVIDER:[TOKEN:[[id number]],FOLLOWUP:[1 week]],PROVIDER:[TOKEN:[[id number]],FOLLOWUP:[2 weeks]]

## 2021-06-11 NOTE — PHYSICAL THERAPY INITIAL EVALUATION ADULT - GAIT DEVIATIONS NOTED, PT EVAL
decr feet clearance, NBOS/decreased cora/increased time in double stance/decreased velocity of limb motion/decreased step length/decreased stride length/decreased weight-shifting ability

## 2021-06-11 NOTE — PHYSICAL THERAPY INITIAL EVALUATION ADULT - ADDITIONAL COMMENTS
As per pt, she lives with sister and 4 sons. 4 steps with BHR (too wide), reports she has been experiencing worsening ambulation capacity lately, needing to reach for walls/objects for balance due to b/l feet pain and increased sensitivity.

## 2021-06-11 NOTE — SWALLOW BEDSIDE ASSESSMENT ADULT - SWALLOW EVAL: DIAGNOSIS
+toleration for thin liquids and regular solids w/o overt s/s penetration/aspiration. Pt c/o esophageal discomfort when swallowing.

## 2021-06-11 NOTE — PHYSICAL THERAPY INITIAL EVALUATION ADULT - RANGE OF MOTION EXAMINATION, REHAB EVAL
b/l UE & LE WFL's except b/l ankles 0 AROM, unable to test PROM due to incr. sensitivity to touch./deficits as listed below

## 2021-06-11 NOTE — PROGRESS NOTE ADULT - ASSESSMENT
51 year old female with a Past Medical History of HTN, DM (Last HbA1c 12), neuropathy of the b/l lower extremities, and asthma non compliant with medications presents for evaluation of AMS.      AMS likely Delirium vs rule out Stroke  - Possible neurologic vs psychiatric vs drug induced (less likely)  - On admission had altered consciousness, poor memory, multiple somatic complaints.  - CT Head and CTA negative for any acute stroke  - UA negative, WBC 8k, Glucose 347 with beta hydroxy 0.5  - F/u MR Head non contrast  - Will hold all sedatives  - F/u EEG  - Follow up Utox  - Can get Psych eval in the AM if no organic cause  - Follow with Neurology      H/O memory loss  - Documentation from PCP EMR  - Was referred to Neurologist but never followed      DM II - uncontrolled / Possible DKA / Peripheral neuropathy  - Poor compliance  - Last Hb A1c- 12.2  - Beta hydroxy 0.5 on admission  - Sugars remain elevated > 300  - Pt received Lantus 20 at bedtime and 7 with meals  - Follows with Dr. Badillo outpatient  - On statin and Ezetimibe. Will continue with statin  - TG were 700 in the past  - Hold Gabapentin      HTN  - uncontrolled  - Likely poor compliance  - continue Amlodipine 10 and Lisinopril 40      Asthma  - Not using any medications      Hx of Hepatic hemangioma / Severe Abdominal pain / Hx of Peptic ulcer disease  - In the past CT scan showed hepatic mass suspicious of hepatic hemangioma.  - F/u CT Abdomen with IV contrast due to severe abdominal pain complaint  - Will give Protonix  - F/u Lipase      Hx of Hypertriglyceridemia  - Triglycerides of 717 on last blood work  - Continue atorvastatin  - Can add fibrate on discharge      Misc  - DVT: Lovenox  - GI: Protonix  - Dispo: Pending work up  
51 year old female with a Past Medical History of HTN, DM (Last HbA1c 12), neuropathy of the b/l lower extremities, and asthma non compliant with medications presents for evaluation of AMS.      AMS likely Delirium vs rule out Stroke  - Possible neurologic vs psychiatric vs drug induced (less likely)  - On admission had altered consciousness, poor memory, multiple somatic complaints.  - CT Head and CTA negative for any acute stroke  - UA negative, WBC 8k, Glucose 347 with beta hydroxy 0.5  - MR Head non contrast negative for acute intracranial abnormality  - EEG normal  - Utox negative  - Follow with Neurology      H/O memory loss  - Documentation from PCP EMR  - Was referred to Neurologist but never followed      DM II - uncontrolled / Possible DKA / Peripheral neuropathy  - Poor compliance  - Last Hb A1c - 12.2  - Beta hydroxy 0.5 on admission  - Sugars remain elevated > 300  - Pt received Lantus 20 at bedtime and 7 with meals  - Follows with Dr. Badillo outpatient  - On statin and Ezetimibe. Will continue with statin  - TG were 700 in the past  - Hold Gabapentin      HTN  - uncontrolled  - Likely poor compliance  - continue Amlodipine 10 and Lisinopril 40      Asthma  - Not using any medications      Hx of Hepatic hemangioma / Severe Abdominal pain / Hx of Peptic ulcer disease  - In the past CT scan showed hepatic mass suspicious of hepatic hemangioma.  - F/u CT Abdomen with IV contrast due to severe abdominal pain complaint  - Will give Protonix      Hx of Hypertriglyceridemia  - Triglycerides of 717 on last blood work  - Continue atorvastatin  - Can add fibrate on discharge      Misc  - DVT: Lovenox  - GI: Protonix  - Dispo: Pending work up  
51 year old woman with a Past Medical History of HTN, DM (Last HbA1c 12), neuropathy of the b/l lower extremities, ?Hx of throat cancer s/p chemo (now in remission), and asthma non compliant with medications presents for evaluation of AMS. During my interview she is slightly more awake but still moderately lethargic and is slow to answer questions, she is not a particularly reliable historian at this time. She reports waking in her normal states of health other than experiencing left lower tooth pain, and b/l feet pain. Her sister reported her has in her usual state of health at 1pm.   The patient reports that she was feeling excessive neuropathic pain and took medications (doesn't recall which) and passed out in the bathroom. Her sister states she took 2-3 pills but doesn't know what the medications was. It was picked up by her  at [facility] today.   Her sister does recall that she frequently gets tired/lethargic when she takes gabapentin and often needs to sleep shortly after taking it. She also says the patient never takes any illicit substances, drinks alcohol (doesn't even like it near her), nor smokes.   CTH and CTA was negative, neuro following. Requesting stroke workup.     # R/o stroke--mr< from: MR Head No Cont (06.10.21 @ 10:34) >  No acute intracranial pathology.    Mild chronic microvascular ischemic changes.      # Metabolic Encephalopathy: etiology unclear likely psychogenic)  -Improved completely   -Neuro following, ASA,  high dose statin,  PT eval, Speech and swallow eval.   -- REEG normal   gabapentin to continue  -   #Abdominal pain, hx of PUD and she reports frequent heartburn: symptoms are unclear, may be gastritis   -history of hemangioma (on 2017 CT a/p, not reported on 2019 Ct a/p)   - CT Abd and pelvis--< from: CT Abdomen and Pelvis w/ IV Cont (06.10.21 @ 11:20) >  Since 11/30/2019:    No evidence of acute abdominopelvic pathology.      -protonix to continue    #Tooth pain + halitosis:   -Dental eval, clindamycin po  and follow up for tooth extraction  -Tylenol for the pain. Avoid NSAIDS or sedating meds    #Diabetes:uncontrolled-- takes poiglitazone. tresiba 40units and ozempic.  hba1c was 12    ?history of throat cancer: No clear documentation of it in [facility] PMD notes  -Sister reported she underwent chemotherapy and was in remission but no recent follow up  -Oncologist is unknown.   - speech and swallow eval is normal     Diabetic neuropathy--continue gabapentin for now  -unclear if that is the trigger for her symptoms.   -Tylenol for pain control     HTN: con home medications     dc home today--spent more than 30mins.

## 2021-06-11 NOTE — DISCHARGE NOTE PROVIDER - NSDCMRMEDTOKEN_GEN_ALL_CORE_FT
albuterol 90 mcg/inh inhalation aerosol with adapter:   amlodipine-benazepril 10 mg-40 mg oral capsule: 1 cap(s) orally once a day  atorvastatin 40 mg oral tablet: 1 tab(s) orally once a day  ezetimibe 10 mg oral tablet: 1 tab(s) orally once a day  gabapentin 300 mg oral capsule: 1 cap(s) orally 3 times a day  Ozempic (1 mg dose) 4 mg/3 mL subcutaneous solution:   pioglitazone 30 mg oral tablet: 1 tab(s) orally once a day  Tresiba 100 units/mL subcutaneous solution: 40 unit(s) subcutaneous once a day (at bedtime)   albuterol 90 mcg/inh inhalation aerosol with adapter:   amlodipine-benazepril 10 mg-40 mg oral capsule: 1 cap(s) orally once a day  atorvastatin 40 mg oral tablet: 1 tab(s) orally once a day  clindamycin 150 mg oral capsule: 1 cap(s) orally every 6 hours  ezetimibe 10 mg oral tablet: 1 tab(s) orally once a day  gabapentin 300 mg oral capsule: 1 cap(s) orally 3 times a day  Ozempic (1 mg dose) 4 mg/3 mL subcutaneous solution:   pioglitazone 30 mg oral tablet: 1 tab(s) orally once a day  Tresiba 100 units/mL subcutaneous solution: 40 unit(s) subcutaneous once a day (at bedtime)

## 2021-06-11 NOTE — PROGRESS NOTE ADULT - SUBJECTIVE AND OBJECTIVE BOX
Patient is a 51y old Female who presents with a chief complaint of Altered Mental status (2021 22:03)    No acute events overnight. Patient has no complaints this morning. Denies chest pain, SOB, N/V/D,    PAST MEDICAL & SURGICAL HISTORY:  Diabetes    Hypertension        PHYSICAL EXAM  Vital Signs Last 24 Hrs  T(C): 36.3 (10 Hayden 2021 08:04), Max: 36.8 (2021 23:40)  T(F): 97.4 (10 Hayden 2021 08:04), Max: 98.3 (2021 23:40)  HR: 95 (10 Hayden 2021 08:04) (90 - 130)  BP: 132/65 (10 Hayden 2021 08:04) (132/65 - 188/82)  BP(mean): --  RR: 20 (10 Hayden 2021 08:04) (12 - 20)  SpO2: 99% (2021 20:59) (98% - 99%)    I&O's Summary    2021 07:01  -  10 Hayden 2021 07:00  --------------------------------------------------------  IN: 0 mL / OUT: 2000 mL / NET: -2000 mL      GENERAL: NAD, lying in bed comfortably  HEAD:  Atraumatic, Normocephalic  CHEST/LUNG: Clear to auscultation bilaterally  HEART: Regular rate and rhythm  ABDOMEN: Bowel sounds present; Soft, generalized tenderness to palpation  EXTREMITIES: No peripheral edema  NERVOUS SYSTEM:  Alert & Oriented x3  MSK: FROM all 4 extremities, full and equal strength  SKIN: No rashes or lesions      LABS                        13.7   7.60  )-----------( 234      ( 10 Hayden 2021 09:55 )             41.7     Hemoglobin: 13.7 g/dL (06-10 @ 09:55)  Hemoglobin: 14.5 g/dL ( @ 17:25)    CBC Full  -  ( 10 Hayden 2021 09:55 )  WBC Count : 7.60 K/uL  RBC Count : 5.25 M/uL  Hemoglobin : 13.7 g/dL  Hematocrit : 41.7 %  Platelet Count - Automated : 234 K/uL  Mean Cell Volume : 79.4 fL  Mean Cell Hemoglobin : 26.1 pg  Mean Cell Hemoglobin Concentration : 32.9 g/dL  Auto Neutrophil # : 4.38 K/uL  Auto Lymphocyte # : 2.17 K/uL  Auto Monocyte # : 0.72 K/uL  Auto Eosinophil # : 0.29 K/uL  Auto Basophil # : 0.02 K/uL  Auto Neutrophil % : 57.5 %  Auto Lymphocyte % : 28.6 %  Auto Monocyte % : 9.5 %  Auto Eosinophil % : 3.8 %  Auto Basophil % : 0.3 %    06-10    139  |  104  |  5<L>  ----------------------------<  243<H>  4.1   |  26  |  0.7    Ca    9.2      10 Hayden 2021 09:55  Mg     1.7     06-10    TPro  7.1  /  Alb  3.9  /  TBili  0.8  /  DBili  x   /  AST  12  /  ALT  12  /  AlkPhos  102  06-10    Creatinine Trend: 0.7<--, 0.6<--, 0.8<--  LIVER FUNCTIONS - ( 10 Hayden 2021 09:55 )  Alb: 3.9 g/dL / Pro: 7.1 g/dL / ALK PHOS: 102 U/L / ALT: 12 U/L / AST: 12 U/L / GGT: x             CARDIAC MARKERS ( 2021 17:25 )  x     / <0.01 ng/mL / x     / x     / x              17:41 - VBG - pH: 7.44  | pCO2: 33    | pO2: 52    | Lactate: 3.8        Urinalysis Basic - ( 2021 19:58 )    Color: Light Yellow / Appearance: Clear / S.046 / pH: x  Gluc: x / Ketone: Trace  / Bili: Negative / Urobili: <2 mg/dL   Blood: x / Protein: 30 mg/dL / Nitrite: Negative   Leuk Esterase: Negative / RBC: 2 /HPF / WBC 1 /HPF   Sq Epi: x / Non Sq Epi: 0 /HPF / Bacteria: Negative  
Patient is a 51y old Female who presents with a chief complaint of Altered Mental status (10 Hayden 2021 16:53)    No acute events overnight. Patient has no complaints this morning. Denies chest pain, SOB, N/V/D,    PAST MEDICAL & SURGICAL HISTORY:  Diabetes    Hypertension        PHYSICAL EXAM  Vital Signs Last 24 Hrs  T(C): 36.6 (2021 07:39), Max: 36.6 (2021 07:39)  T(F): 97.8 (2021 07:39), Max: 97.8 (2021 07:39)  HR: 98 (2021 07:39) (87 - 98)  BP: 143/73 (2021 07:39) (143/73 - 176/79)  BP(mean): --  RR: 18 (2021 07:39) (16 - 18)  SpO2: --    I&O's Summary    10 Hayden 2021 07:01  -  2021 07:00  --------------------------------------------------------  IN: 0 mL / OUT: 700 mL / NET: -700 mL      GENERAL: NAD, lying in bed comfortably  HEAD:  Atraumatic, Normocephalic  CHEST/LUNG: Clear to auscultation bilaterally  HEART: Regular rate and rhythm  ABDOMEN: Bowel sounds present; Soft, generalized tenderness to palpation  EXTREMITIES: No peripheral edema  NERVOUS SYSTEM:  Alert & Oriented x3  MSK: FROM all 4 extremities, full and equal strength  SKIN: No rashes or lesions      LABS                        13.5   5.49  )-----------( 234      ( 2021 05:48 )             41.4     Hemoglobin: 13.5 g/dL ( @ 05:48)  Hemoglobin: 13.7 g/dL (06-10 @ 09:55)  Hemoglobin: 14.5 g/dL ( @ 17:25)    CBC Full  -  ( 2021 05:48 )  WBC Count : 5.49 K/uL  RBC Count : 5.20 M/uL  Hemoglobin : 13.5 g/dL  Hematocrit : 41.4 %  Platelet Count - Automated : 234 K/uL  Mean Cell Volume : 79.6 fL  Mean Cell Hemoglobin : 26.0 pg  Mean Cell Hemoglobin Concentration : 32.6 g/dL  Auto Neutrophil # : 2.43 K/uL  Auto Lymphocyte # : 2.02 K/uL  Auto Monocyte # : 0.60 K/uL  Auto Eosinophil # : 0.43 K/uL  Auto Basophil # : 0.01 K/uL  Auto Neutrophil % : 44.3 %  Auto Lymphocyte % : 36.8 %  Auto Monocyte % : 10.9 %  Auto Eosinophil % : 7.8 %  Auto Basophil % : 0.2 %        136  |  103  |  7<L>  ----------------------------<  308<H>  4.1   |  24  |  0.7    Ca    9.1      2021 05:48  Mg     1.7     06-10    TPro  6.4  /  Alb  3.6  /  TBili  0.9  /  DBili  x   /  AST  11  /  ALT  11  /  AlkPhos  94  0611    Creatinine Trend: 0.7<--, 0.7<--, 0.6<--, 0.8<--  LIVER FUNCTIONS - ( 2021 05:48 )  Alb: 3.6 g/dL / Pro: 6.4 g/dL / ALK PHOS: 94 U/L / ALT: 11 U/L / AST: 11 U/L / GGT: x             CARDIAC MARKERS ( 2021 17:25 )  x     / <0.01 ng/mL / x     / x     / x                  Urinalysis Basic - ( 2021 19:58 )    Color: Light Yellow / Appearance: Clear / S.046 / pH: x  Gluc: x / Ketone: Trace  / Bili: Negative / Urobili: <2 mg/dL   Blood: x / Protein: 30 mg/dL / Nitrite: Negative   Leuk Esterase: Negative / RBC: 2 /HPF / WBC 1 /HPF   Sq Epi: x / Non Sq Epi: 0 /HPF / Bacteria: Negative    
SUBJECTIVE:    Patient is a 51y old Female who presents with a chief complaint of Altered Mental status (2021 10:36)    Currently admitted to medicine with the primary diagnosis of Altered mental status       Today is hospital day 2d.     PAST MEDICAL & SURGICAL HISTORY  Diabetes    Hypertension      ALLERGIES:  penicillin (Rash)    MEDICATIONS:  STANDING MEDICATIONS  amLODIPine   Tablet 10 milliGRAM(s) Oral daily  atorvastatin 40 milliGRAM(s) Oral at bedtime  clindamycin   Capsule 150 milliGRAM(s) Oral every 6 hours  dextrose 40% Gel 15 Gram(s) Oral once  dextrose 5%. 1000 milliLiter(s) IV Continuous <Continuous>  dextrose 5%. 1000 milliLiter(s) IV Continuous <Continuous>  dextrose 50% Injectable 25 Gram(s) IV Push once  dextrose 50% Injectable 12.5 Gram(s) IV Push once  dextrose 50% Injectable 25 Gram(s) IV Push once  enoxaparin Injectable 40 milliGRAM(s) SubCutaneous daily  gabapentin 300 milliGRAM(s) Oral every 8 hours  glucagon  Injectable 1 milliGRAM(s) IntraMuscular once  insulin glargine Injectable (LANTUS) 20 Unit(s) SubCutaneous at bedtime  insulin lispro (ADMELOG) corrective regimen sliding scale   SubCutaneous three times a day before meals  insulin lispro Injectable (ADMELOG) 7 Unit(s) SubCutaneous three times a day before meals  lisinopril 40 milliGRAM(s) Oral daily  pantoprazole    Tablet 40 milliGRAM(s) Oral before breakfast    PRN MEDICATIONS  acetaminophen   Tablet .. 650 milliGRAM(s) Oral every 6 hours PRN  ALBUTerol    90 MICROgram(s) HFA Inhaler 2 Puff(s) Inhalation every 6 hours PRN    VITALS:   T(F): 97.8  HR: 98  BP: 143/73  RR: 18  SpO2: --    LABS:                        13.5   5.49  )-----------( 234      ( 2021 05:48 )             41.4     06-11    136  |  103  |  7<L>  ----------------------------<  308<H>  4.1   |  24  |  0.7    Ca    9.1      2021 05:48  Mg     1.7     06-10    TPro  6.4  /  Alb  3.6  /  TBili  0.9  /  DBili  x   /  AST  11  /  ALT  11  /  AlkPhos  94  06-11      Urinalysis Basic - ( 2021 19:58 )    Color: Light Yellow / Appearance: Clear / S.046 / pH: x  Gluc: x / Ketone: Trace  / Bili: Negative / Urobili: <2 mg/dL   Blood: x / Protein: 30 mg/dL / Nitrite: Negative   Leuk Esterase: Negative / RBC: 2 /HPF / WBC 1 /HPF   Sq Epi: x / Non Sq Epi: 0 /HPF / Bacteria: Negative            CARDIAC MARKERS ( 2021 17:25 )  x     / <0.01 ng/mL / x     / x     / x          RADIOLOGY:    PHYSICAL EXAM:  GEN: No acute distress  LUNGS: Clear to auscultation bilaterally   HEART: S1/S2 present. RRR.   ABD/ GI: Soft, non-tender, non-distended. Bowel sounds present  EXT: NC/NC/NE/2+PP/CHENG  NEURO: AAOX3

## 2021-06-11 NOTE — SWALLOW BEDSIDE ASSESSMENT ADULT - SLP PERTINENT HISTORY OF CURRENT PROBLEM
52 y/o F presented w/ AMS, hypertensive in ED. Stroke code, NIHSS: 18. No TPA, pt out of window. CXR (-), CTH (-), MRI (-). ?underlying psych issue. PMHx: diabetes, HTN, h/o hepatic hemangioma. Pt reporting history of EGD 1 year ago, but cannot remember what the results were but "they put me on medication," which she no longer takes.

## 2021-06-11 NOTE — DISCHARGE NOTE NURSING/CASE MANAGEMENT/SOCIAL WORK - PATIENT PORTAL LINK FT
You can access the FollowMyHealth Patient Portal offered by [facility] by registering at the following website: [email or web address]. By joining [facility]’s FollowMyHealth portal, you will also be able to view your health information using other applications (apps) compatible with our system.

## 2021-06-11 NOTE — PHYSICAL THERAPY INITIAL EVALUATION ADULT - PERTINENT HX OF CURRENT PROBLEM, REHAB EVAL
51 year old female with a Past Medical History of HTN, DM (Last HbA1c 12), neuropathy of the b/l lower extremities, and asthma non compliant with medications presents for evaluation of AMS.  CT Head and CTA negative for any acute stroke

## 2021-06-11 NOTE — DISCHARGE NOTE PROVIDER - CARE PROVIDERS DIRECT ADDRESSES
[email or web address]

## 2021-06-11 NOTE — SWALLOW BEDSIDE ASSESSMENT ADULT - SWALLOW EVAL: RECOMMENDED FEEDING/EATING TECHNIQUES
reflux precautions/alternate food with liquid/oral hygiene/position upright (90 degrees)/small sips/bites

## 2021-06-28 ENCOUNTER — APPOINTMENT (OUTPATIENT)
Dept: INTERNAL MEDICINE | Facility: CLINIC | Age: 51
End: 2021-06-28

## 2021-07-06 ENCOUNTER — NON-APPOINTMENT (OUTPATIENT)
Age: 51
End: 2021-07-06

## 2021-09-01 ENCOUNTER — OUTPATIENT (OUTPATIENT)
Dept: OUTPATIENT SERVICES | Facility: HOSPITAL | Age: 51
LOS: 1 days | End: 2021-09-01
Payer: MEDICAID

## 2021-09-22 NOTE — STROKE CODE NOTE - CT READING
[facility] -   Infectious diseases evaluation  admission date 9/14/2021    reason for consultation:   Hand infection    Impression :   Current:  · DKA  · DM on insulin  · SLE  · Depressed  · Right 2nd finger infected soft tissues  ·  and open ulcer w drain age x 2 weeks pTA  · Right hand cellultiis    Other:  ·   Discussion / summary of stay / plan of care   ·   Recommendations   · Avoid antibiotics that would affect the kidneys and the antibiotic was DKA  · Stop zosyn -amoxicillin, keep 21 days at DC  · fluconazole 9/16 - 9/17  · 9/17 switch to Vfend and ask for preauth  · 9/20 back to fluconazole 400 high dose daily 21 days for tendinitis R hand and soft tissue infection  · FU LFT 2 x per week  · Finger at risk for amputation - disc w pt    reconsiled   FU office 3 weeks    Infection Control Recommendations   · [place] Precautions  · Contact Isolation       Antimicrobial Stewardship Recommendations   · Simplification of therapy  · Targeted therapy  · Per Kg dosing      Coordination ofOutpatient Care:   · Estimated Length of IV antimicrobials:  · Patient will need Midline / picc Catheter Insertion:   · Patient will need SNF:  · Patient will need outpatient wound care:     History of Present Illness:   Initial history:  Melissa Ga is a 48y.o.-year-old female   Presented with right second finger pain and progressive discoloration found to have an abscess over the right second finger associated with some redness over the palm in the same hand, increased pain, patient presented with DKA, area was lanced and packed, patient seen for evaluation of antibiotics  She is emotional, depressed, lost her son her daughter and her  to the short period of time.                 Interval changes  9/22/2021   Patient Vitals for the past 8 hrs:   BP Temp Temp src Pulse Resp SpO2   09/22/21 0800 124/76 97.6 °F (36.4 °C) Oral 71 18 --   09/22/21 0400 (!) 131/91 97.9 °F (36.6 °C) Axillary 86 20 94 %     No fever - no chills  Right hand pain ++ and pt re considering possible early surg to thew right index  Cx w candida glabrata -from the finger    Chest x-ray negative, WBC 8  Drug screen positive for cocaine and benzodiazepine  Creatinine 1.09    9/22 - cx from the hand now  Normal randi and C glabrata  DC in  the plans        Summary of relevant labs:  Labs:  WBC 7  Creat normal    Micro:  BC  Wound cx GPR normal randi  OR cx  canddia glabrata  Imaging:  R hand xray 9/14/21  Marked soft tissue swelling involving the 2nd finger and dorsum of the hand. No acute osseous abnormality evident       I have personally reviewed the past medical history, past surgical history, medications, social history, and family history, and I haveupdated the database accordingly. Allergies:   Bactrim [sulfamethoxazole-trimethoprim] and Adhesive tape     Review of Systems:     Review of Systems   Constitutional: Negative for activity change, fatigue and fever. HENT: Negative for congestion. Eyes: Negative for photophobia, pain, redness and visual disturbance. Respiratory: Negative for apnea, cough and shortness of breath. Cardiovascular: Negative for chest pain. Gastrointestinal: Negative for abdominal distention. Endocrine: Negative for polyphagia and polyuria. Genitourinary: Negative for dysuria, flank pain, frequency and urgency. Musculoskeletal: Positive for arthralgias. Skin: Positive for color change and wound. Allergic/Immunologic: Negative for immunocompromised state. Neurological: Negative for dizziness, tremors, seizures, syncope, light-headedness and numbness. Hematological: Negative for adenopathy. Psychiatric/Behavioral: Negative for agitation. Physical Examination :       Physical Exam  Constitutional:       General: She is not in acute distress. Appearance: Normal appearance. HENT:      Head: Normocephalic and atraumatic.       Nose: Nose normal. Mouth/Throat:      Mouth: Mucous membranes are moist.   Eyes:      General: No scleral icterus. Conjunctiva/sclera: Conjunctivae normal.      Pupils: Pupils are equal, round, and reactive to light. Cardiovascular:      Rate and Rhythm: Normal rate and regular rhythm. Heart sounds: Normal heart sounds. No murmur heard. Pulmonary:      Effort: No respiratory distress. Breath sounds: Normal breath sounds. No rhonchi. Abdominal:      General: There is no distension. Palpations: Abdomen is soft. Tenderness: There is no abdominal tenderness. Genitourinary:     Comments: No cullen  Musculoskeletal:         General: Deformity present. No swelling, tenderness or signs of injury. Cervical back: Neck supple. No rigidity or tenderness. Right lower leg: No edema. Left lower leg: No edema. Skin:     General: Skin is dry. Coloration: Skin is not jaundiced. Findings: Erythema and lesion present. Neurological:      Mental Status: She is alert and oriented to person, place, and time. Cranial Nerves: No cranial nerve deficit. Psychiatric:         Mood and Affect: Mood normal.         Thought Content:  Thought content normal.         Past Medical History:     Past Medical History:   Diagnosis Date    Acid reflux 5/29/2017    Acute cystitis with hematuria 10/11/2016    Acute non-recurrent maxillary sinusitis 11/28/2017    Asthma     Bipolar 1 disorder ([address] 1/4/2018    Bipolar disorder, mixed ([address]     BMI 34.0-34.9,adult 11/28/2017    Cannabis use disorder, severe, dependence ([address] 12/19/2017    Cerebrovascular accident (CVA) ([address] 6/14/2017    Chest pain 11/5/2016    Chronic renal insufficiency     Cocaine abuse ([address] 1/5/2018    COVID-19 virus RNA test result unknown     DDD (degenerative disc disease), cervical     Diabetes mellitus ([address]     Dizziness 6/13/2017    Fibromyalgia     History of stroke 9/9/2017    Homicidal ideation 11/6/2017    Hyperglycemia     Hypertension     Hypotension 1/18/2019    IDDM (insulin dependent diabetes mellitus) 12/21/2015    Lupus ([address]     Migraine     Neuropathy     Neuropathy     Polysubstance abuse ([address] 9/17/2017    Post traumatic stress disorder (PTSD)     Posttraumatic stress disorder 5/29/2017    Recurrent depression ([address] 5/29/2017    Recurrent major depressive disorder, in partial remission ([address] 11/28/2017    Screening mammogram, encounter for 11/28/2017    Severe recurrent major depression with psychotic features ([address] 12/19/2017    Severe recurrent major depression without psychotic features ([address] 12/19/2017    Stroke (cerebrum) ([address] 6/14/2017    Stroke [facility]     per chart notes    Suicidal ideation     Suicidal intent 3/10/2017    Vitamin D deficiency 11/28/2017    White matter changes 6/13/2017       Past Surgical  History:     Past Surgical History:   Procedure Laterality Date    ABDOMEN SURGERY      drain tube    ABSCESS DRAINAGE      right buttock    CATARACT REMOVAL WITH IMPLANT Bilateral     CHEST TUBE INSERTION      FINGER AMPUTATION Left 03/13/2021    LEFT RING FINER AMPUTATION performed by Meir Marin MD at [address] Right 09/15/2021     I&D INDEX FINGER     HAND SURGERY Right 09/14/2021    I&D INDEX FINGER performed by Meir Marin MD at [address]  Right 9/15/2021    I&D INDEX FINGER performed by Meir Marin MD at [facility] LAS Bilateral        Medications:      baclofen  10 mg Oral TID    fluconazole  400 mg Oral Daily    insulin glargine  20 Units SubCUTAneous BID    enoxaparin  40 mg SubCUTAneous Daily    amoxicillin  500 mg Oral 3 times per day    insulin lispro  0-12 Units SubCUTAneous TID WC    insulin lispro  0-6 Units SubCUTAneous Nightly    sodium chloride  1,000 mL IntraVENous Once    budesonide-formoterol  2 puff Inhalation BID    pantoprazole  20 mg Oral BID AC    traZODone  150 mg Oral Nightly  pregabalin  75 mg Oral BID    venlafaxine  150 mg Oral Daily with breakfast    sodium chloride flush  5-40 mL IntraVENous 2 times per day       Social History:     Social History     Socioeconomic History    Marital status: Legally      Spouse name: Not on file    Number of children: Not on file    Years of education: Not on file    Highest education level: Not on file   Occupational History    Not on file   Tobacco Use    Smoking status: Never Smoker    Smokeless tobacco: Never Used   Vaping Use    Vaping Use: Never used   Substance and Sexual Activity    Alcohol use: Not Currently    Drug use: Yes     Types: Marijuana, Cocaine     Comment: + Cocaine 2/2021 also, see Care Everywhere UDS    Sexual activity: Not Currently     Partners: Male   Other Topics Concern    Not on file   Social History Narrative    Not on file     Social Determinants of Health     Financial Resource Strain: High Risk    Difficulty of Paying Living Expenses: Hard   Food Insecurity: Food Insecurity Present    Worried About Running Out of Food in the Last Year: Often true    Kelin of Food in the Last Year: Often true   Transportation Needs: Unmet Transportation Needs    Lack of Transportation (Medical):  Yes    Lack of Transportation (Non-Medical): Yes   Physical Activity: Inactive    Days of Exercise per Week: 0 days    Minutes of Exercise per Session: 0 min   Stress: Stress Concern Present    Feeling of Stress : Rather much   Social Connections:     Frequency of Communication with Friends and Family:     Frequency of Social Gatherings with Friends and Family:     Attends Baptism Services:     Active Member of Clubs or Organizations:     Attends Club or Organization Meetings:     Marital Status:    Intimate Partner Violence:     Fear of Current or Ex-Partner:     Emotionally Abused:     Physically Abused:     Sexually Abused:        Family History:     Family History   Problem Relation Age of Onset    Diabetes Mother     Stroke Mother     Diabetes Father     Diabetes Sister     Diabetes Brother     Other Son         GSW    No Known Problems Sister       Medical Decision Making:   I have independently reviewed/ordered the following labs:    CBC with Differential:   Recent Labs     09/21/21  1344 09/22/21  0630   WBC 7.3 8.0   HGB 8.1* 8.7*   HCT 27.2* 30.6*    478*   LYMPHOPCT 30 42   MONOPCT 11 12     BMP:  Recent Labs     09/21/21  1344 09/22/21  0630    143   K 4.1 4.1    105   CO2 25 24   BUN 8 6   CREATININE 0.88 0.79     Hepatic Function Panel:   Recent Labs     09/20/21  1029   PROT 6.8   LABALBU 2.7*   BILIDIR <0.08   IBILI CANNOT BE CALCULATED   BILITOT 0.18*   ALKPHOS 103   ALT 5   AST 12     No results for input(s): RPR in the last 72 hours. No results for input(s): HIV in the last 72 hours. No results for input(s): BC in the last 72 hours. Lab Results   Component Value Date    CREATININE 0.79 09/22/2021    GLUCOSE 83 09/22/2021       Detailed results: Thank you for allowing us to participate in the care of this patient. Please call with questions. This note is created with the assistance of a speech recognition program.  While intending to generate adocument that actually reflects the content of the visit, the document can still have some errors including those of syntax and sound a like substitutions which may escape proof reading. It such instances, actual meaningcan be extrapolated by contextual diversion.     Meli Yao MD  Office: [phone]  Perfect serve / office [phone] No acute findings

## 2021-09-24 ENCOUNTER — APPOINTMENT (OUTPATIENT)
Dept: INTERNAL MEDICINE | Facility: CLINIC | Age: 51
End: 2021-09-24
Payer: MEDICAID

## 2021-09-24 ENCOUNTER — OUTPATIENT (OUTPATIENT)
Dept: OUTPATIENT SERVICES | Facility: HOSPITAL | Age: 51
LOS: 1 days | Discharge: HOME | End: 2021-09-24

## 2021-09-24 VITALS
SYSTOLIC BLOOD PRESSURE: 153 MMHG | BODY MASS INDEX: 40.48 KG/M2 | HEIGHT: 65 IN | WEIGHT: 243 LBS | DIASTOLIC BLOOD PRESSURE: 90 MMHG | OXYGEN SATURATION: 98 % | HEART RATE: 95 BPM | TEMPERATURE: 98.1 F

## 2021-09-24 DIAGNOSIS — Z00.00 ENCOUNTER FOR GENERAL ADULT MEDICAL EXAMINATION WITHOUT ABNORMAL FINDINGS: ICD-10-CM

## 2021-09-24 DIAGNOSIS — I10 ESSENTIAL (PRIMARY) HYPERTENSION: ICD-10-CM

## 2021-09-24 DIAGNOSIS — E78.1 PURE HYPERGLYCERIDEMIA: ICD-10-CM

## 2021-09-24 DIAGNOSIS — E11.9 TYPE 2 DIABETES MELLITUS WITHOUT COMPLICATIONS: ICD-10-CM

## 2021-09-24 DIAGNOSIS — E66.01 MORBID (SEVERE) OBESITY DUE TO EXCESS CALORIES: ICD-10-CM

## 2021-09-24 DIAGNOSIS — E11.43 TYPE 2 DIABETES MELLITUS WITH DIABETIC AUTONOMIC (POLY)NEUROPATHY: ICD-10-CM

## 2021-09-24 PROCEDURE — 99213 OFFICE O/P EST LOW 20 MIN: CPT | Mod: GC

## 2021-09-24 RX ORDER — ISOPROPYL ALCOHOL 70 ML/100ML
SWAB TOPICAL
Qty: 1 | Refills: 5 | Status: ACTIVE | COMMUNITY
Start: 2019-02-15 | End: 1900-01-01

## 2021-09-24 RX ORDER — LANCETS 33 GAUGE
EACH MISCELLANEOUS
Qty: 1 | Refills: 5 | Status: ACTIVE | COMMUNITY
Start: 2019-02-15 | End: 1900-01-01

## 2021-09-24 NOTE — PHYSICAL EXAM
[No Acute Distress] : no acute distress [Well Nourished] : well nourished [Normal Sclera/Conjunctiva] : normal sclera/conjunctiva [Normal Outer Ear/Nose] : the outer ears and nose were normal in appearance [No Edema] : there was no peripheral edema [Soft] : abdomen soft [Non Tender] : non-tender [Non-distended] : non-distended [Normal] : no rash [No Focal Deficits] : no focal deficits [Normal Affect] : the affect was normal [de-identified] : obese

## 2021-09-24 NOTE — HISTORY OF PRESENT ILLNESS
[FreeTextEntry1] : post hospitalization visit.  [de-identified] : 51 year old woman with a Past Medical History of HTN, morbid obesity, hypertriglyceridemia, DM 2 (Last HbA1c 12),\par neuropathy of the b/l lower extremities, hx of PUD on protonix, ?Hx of throat cancer s/p chemo (now in\par remission), and asthma non compliant with medications , recently admitted for AMS likely psychogenic, stroke was ruled out. Patient was treated with clindamycin for toothache pending dental evaluation. Patient presented to clinic for evaluation and also mentions she was tested positive for COVID two weeks ago and would like the vaccine now

## 2021-09-24 NOTE — ASSESSMENT
[FreeTextEntry1] : 51 year old woman with a Past Medical History of HTN, morbid obesity, hypertriglyceridemia, DM (Last HbA1c 12),\par neuropathy of the b/l lower extremities, hx of PUD on protonix, ?Hx of throat cancer s/p chemo (now in\par remission), and asthma non compliant with medications , recently admitted for AMS likely psychogenic, stroke was ruled out. Patient was treated with clindamycin for toothache pending dental evaluation. Patient presented to clinic \par \par #Recent hospitalization for AMS\par - stroke ruled out, likely psychogenic\par - neuro workup was negative\par noncompliant\par #recent COVID 19\par - asymptomatic\par \par #Tooth pain + halitosis:\par - s/p Dental eval, clindamycin po and follow up for tooth extraction\par \par #Diabetes:uncontrolled, patient non compliant \par - mentions she has been seeing spots\par - takes poiglitazone. tresiba 40units and ozempic, renewed.\par - hba1c was 12\par - follows , did not follow up with him since 1 year, has appointment next month\par - optho and podiatry follow was last year, optho referral given\par \par # Diabetic neuropathy\par - continue gabapentin for now, renewed\par \par ?history of throat cancer: No clear documentation of it in [facility] PMD notes\par -Sister reported she underwent chemotherapy and was in remission but no recent\par follow up\par -Oncologist is unknown.\par - speech and swallow eval is normal\par \par #hx of pud\par -protonix to continue\par \par \par #HTN: patient ran out of medications although she has refills, no compliant\par - uncontrolled\par -150/96 today\par - continue on Amlodipine- Benazipril \par - educated on diet and exercise\par \par # Asthma\par - Not using any medications\par - PFT previously ordered\par - refer to pulm\par \par # Hepatic hemangioma\par - CT scan showed hepatic mass suspicious of hepatic hemangioma.\par - reordered CT abdomen-pelvis not Obtained\par \par # Hx of Hypertriglyceridemia\par - Triglycerides of 717\par - Continue atorvastatin \par - on ezemtimibe 10mg \par -ACS risk 35% in the next 10 years\par patient was educated on importance of adherent to medication\par \par # HCM\par -Flu shot refused\par -Routine labs ordered\par -mammogram ,220: BIRADS2, \par - GI referral for colonoscopy\par - mentions she had PAP in a different clinic, mentioned to bring records\par - DEXA scan\par -Follow up in 1 month and prn.

## 2021-10-01 ENCOUNTER — APPOINTMENT (OUTPATIENT)
Dept: GASTROENTEROLOGY | Facility: CLINIC | Age: 51
End: 2021-10-01

## 2021-10-04 DIAGNOSIS — Z71.89 OTHER SPECIFIED COUNSELING: ICD-10-CM

## 2021-11-03 ENCOUNTER — APPOINTMENT (OUTPATIENT)
Dept: INTERNAL MEDICINE | Facility: CLINIC | Age: 51
End: 2021-11-03

## 2021-11-11 ENCOUNTER — INPATIENT (INPATIENT)
Facility: HOSPITAL | Age: 51
LOS: 10 days | Discharge: HOME | End: 2021-11-22
Attending: INTERNAL MEDICINE | Admitting: INTERNAL MEDICINE
Payer: MEDICAID

## 2021-11-11 VITALS
HEART RATE: 122 BPM | RESPIRATION RATE: 20 BRPM | SYSTOLIC BLOOD PRESSURE: 144 MMHG | DIASTOLIC BLOOD PRESSURE: 98 MMHG | OXYGEN SATURATION: 99 %

## 2021-11-11 LAB
ALBUMIN SERPL ELPH-MCNC: 4.4 G/DL — SIGNIFICANT CHANGE UP (ref 3.5–5.2)
ALP SERPL-CCNC: 113 U/L — SIGNIFICANT CHANGE UP (ref 30–115)
ALT FLD-CCNC: 15 U/L — SIGNIFICANT CHANGE UP (ref 0–41)
ANION GAP SERPL CALC-SCNC: 20 MMOL/L — HIGH (ref 7–14)
APPEARANCE UR: CLEAR — SIGNIFICANT CHANGE UP
AST SERPL-CCNC: 35 U/L — SIGNIFICANT CHANGE UP (ref 0–41)
B-OH-BUTYR SERPL-SCNC: 0.3 MMOL/L — SIGNIFICANT CHANGE UP
B-OH-BUTYR SERPL-SCNC: 0.4 MMOL/L — SIGNIFICANT CHANGE UP
BACTERIA # UR AUTO: NEGATIVE — SIGNIFICANT CHANGE UP
BASE EXCESS BLDV CALC-SCNC: 2.2 MMOL/L — SIGNIFICANT CHANGE UP (ref -2–3)
BASOPHILS # BLD AUTO: 0.03 K/UL — SIGNIFICANT CHANGE UP (ref 0–0.2)
BASOPHILS NFR BLD AUTO: 0.4 % — SIGNIFICANT CHANGE UP (ref 0–1)
BILIRUB SERPL-MCNC: 0.6 MG/DL — SIGNIFICANT CHANGE UP (ref 0.2–1.2)
BILIRUB UR-MCNC: NEGATIVE — SIGNIFICANT CHANGE UP
BUN SERPL-MCNC: 13 MG/DL — SIGNIFICANT CHANGE UP (ref 10–20)
CA-I SERPL-SCNC: 1.24 MMOL/L — SIGNIFICANT CHANGE UP (ref 1.15–1.33)
CALCIUM SERPL-MCNC: 9.4 MG/DL — SIGNIFICANT CHANGE UP (ref 8.5–10.1)
CHLORIDE SERPL-SCNC: 100 MMOL/L — SIGNIFICANT CHANGE UP (ref 98–110)
CO2 SERPL-SCNC: 16 MMOL/L — LOW (ref 17–32)
COLOR SPEC: SIGNIFICANT CHANGE UP
CREAT SERPL-MCNC: 0.8 MG/DL — SIGNIFICANT CHANGE UP (ref 0.7–1.5)
DIFF PNL FLD: NEGATIVE — SIGNIFICANT CHANGE UP
EOSINOPHIL # BLD AUTO: 0.45 K/UL — SIGNIFICANT CHANGE UP (ref 0–0.7)
EOSINOPHIL NFR BLD AUTO: 6.1 % — SIGNIFICANT CHANGE UP (ref 0–8)
EPI CELLS # UR: 1 /HPF — SIGNIFICANT CHANGE UP (ref 0–5)
GAS PNL BLDV: 135 MMOL/L — LOW (ref 136–145)
GAS PNL BLDV: SIGNIFICANT CHANGE UP
GLUCOSE SERPL-MCNC: 233 MG/DL — HIGH (ref 70–99)
GLUCOSE UR QL: ABNORMAL
HCO3 BLDV-SCNC: 27 MMOL/L — SIGNIFICANT CHANGE UP (ref 22–29)
HCT VFR BLD CALC: 44.7 % — SIGNIFICANT CHANGE UP (ref 37–47)
HCT VFR BLDA CALC: 42 % — SIGNIFICANT CHANGE UP (ref 34.5–46.5)
HGB BLD CALC-MCNC: 13.9 G/DL — SIGNIFICANT CHANGE UP (ref 11.7–16.1)
HGB BLD-MCNC: 14.6 G/DL — SIGNIFICANT CHANGE UP (ref 12–16)
HYALINE CASTS # UR AUTO: 0 /LPF — SIGNIFICANT CHANGE UP (ref 0–7)
IMM GRANULOCYTES NFR BLD AUTO: 0.3 % — SIGNIFICANT CHANGE UP (ref 0.1–0.3)
KETONES UR-MCNC: NEGATIVE — SIGNIFICANT CHANGE UP
LACTATE BLDV-MCNC: 1.7 MMOL/L — SIGNIFICANT CHANGE UP (ref 0.5–2)
LACTATE SERPL-SCNC: 1.7 MMOL/L — SIGNIFICANT CHANGE UP (ref 0.7–2)
LEUKOCYTE ESTERASE UR-ACNC: NEGATIVE — SIGNIFICANT CHANGE UP
LYMPHOCYTES # BLD AUTO: 2.37 K/UL — SIGNIFICANT CHANGE UP (ref 1.2–3.4)
LYMPHOCYTES # BLD AUTO: 32.3 % — SIGNIFICANT CHANGE UP (ref 20.5–51.1)
MAGNESIUM SERPL-MCNC: 1.8 MG/DL — SIGNIFICANT CHANGE UP (ref 1.8–2.4)
MCHC RBC-ENTMCNC: 25.5 PG — LOW (ref 27–31)
MCHC RBC-ENTMCNC: 32.7 G/DL — SIGNIFICANT CHANGE UP (ref 32–37)
MCV RBC AUTO: 78.1 FL — LOW (ref 81–99)
MONOCYTES # BLD AUTO: 0.82 K/UL — HIGH (ref 0.1–0.6)
MONOCYTES NFR BLD AUTO: 11.2 % — HIGH (ref 1.7–9.3)
NEUTROPHILS # BLD AUTO: 3.64 K/UL — SIGNIFICANT CHANGE UP (ref 1.4–6.5)
NEUTROPHILS NFR BLD AUTO: 49.7 % — SIGNIFICANT CHANGE UP (ref 42.2–75.2)
NITRITE UR-MCNC: NEGATIVE — SIGNIFICANT CHANGE UP
NRBC # BLD: 0 /100 WBCS — SIGNIFICANT CHANGE UP (ref 0–0)
PCO2 BLDV: 43 MMHG — HIGH (ref 39–42)
PH BLDV: 7.41 — SIGNIFICANT CHANGE UP (ref 7.32–7.43)
PH UR: 6.5 — SIGNIFICANT CHANGE UP (ref 5–8)
PHOSPHATE SERPL-MCNC: 3.6 MG/DL — SIGNIFICANT CHANGE UP (ref 2.1–4.9)
PLATELET # BLD AUTO: 309 K/UL — SIGNIFICANT CHANGE UP (ref 130–400)
PO2 BLDV: 39 MMHG — SIGNIFICANT CHANGE UP
POTASSIUM BLDV-SCNC: 4.2 MMOL/L — SIGNIFICANT CHANGE UP (ref 3.5–5.1)
POTASSIUM SERPL-MCNC: 6.2 MMOL/L — CRITICAL HIGH (ref 3.5–5)
POTASSIUM SERPL-SCNC: 6.2 MMOL/L — CRITICAL HIGH (ref 3.5–5)
PROT SERPL-MCNC: 8.4 G/DL — HIGH (ref 6–8)
PROT UR-MCNC: ABNORMAL
RBC # BLD: 5.72 M/UL — HIGH (ref 4.2–5.4)
RBC # FLD: 12.6 % — SIGNIFICANT CHANGE UP (ref 11.5–14.5)
RBC CASTS # UR COMP ASSIST: 1 /HPF — SIGNIFICANT CHANGE UP (ref 0–4)
SAO2 % BLDV: 68.4 % — SIGNIFICANT CHANGE UP
SARS-COV-2 RNA SPEC QL NAA+PROBE: SIGNIFICANT CHANGE UP
SODIUM SERPL-SCNC: 136 MMOL/L — SIGNIFICANT CHANGE UP (ref 135–146)
SP GR SPEC: 1.02 — SIGNIFICANT CHANGE UP (ref 1.01–1.03)
TROPONIN T SERPL-MCNC: <0.01 NG/ML — SIGNIFICANT CHANGE UP
UROBILINOGEN FLD QL: SIGNIFICANT CHANGE UP
WBC # BLD: 7.33 K/UL — SIGNIFICANT CHANGE UP (ref 4.8–10.8)
WBC # FLD AUTO: 7.33 K/UL — SIGNIFICANT CHANGE UP (ref 4.8–10.8)
WBC UR QL: 1 /HPF — SIGNIFICANT CHANGE UP (ref 0–5)

## 2021-11-11 PROCEDURE — 71045 X-RAY EXAM CHEST 1 VIEW: CPT | Mod: 26

## 2021-11-11 PROCEDURE — 93010 ELECTROCARDIOGRAM REPORT: CPT

## 2021-11-11 PROCEDURE — 70450 CT HEAD/BRAIN W/O DYE: CPT | Mod: 26,MA

## 2021-11-11 PROCEDURE — 99285 EMERGENCY DEPT VISIT HI MDM: CPT

## 2021-11-11 PROCEDURE — 99223 1ST HOSP IP/OBS HIGH 75: CPT

## 2021-11-11 RX ORDER — INSULIN HUMAN 100 [IU]/ML
10 INJECTION, SOLUTION SUBCUTANEOUS ONCE
Refills: 0 | Status: COMPLETED | OUTPATIENT
Start: 2021-11-11 | End: 2021-11-11

## 2021-11-11 RX ORDER — INSULIN LISPRO 100/ML
8 VIAL (ML) SUBCUTANEOUS
Refills: 0 | Status: DISCONTINUED | OUTPATIENT
Start: 2021-11-11 | End: 2021-11-22

## 2021-11-11 RX ORDER — INSULIN LISPRO 100/ML
VIAL (ML) SUBCUTANEOUS
Refills: 0 | Status: DISCONTINUED | OUTPATIENT
Start: 2021-11-11 | End: 2021-11-22

## 2021-11-11 RX ORDER — DEXTROSE 50 % IN WATER 50 %
25 SYRINGE (ML) INTRAVENOUS ONCE
Refills: 0 | Status: DISCONTINUED | OUTPATIENT
Start: 2021-11-11 | End: 2021-11-17

## 2021-11-11 RX ORDER — SODIUM CHLORIDE 9 MG/ML
1000 INJECTION, SOLUTION INTRAVENOUS ONCE
Refills: 0 | Status: COMPLETED | OUTPATIENT
Start: 2021-11-11 | End: 2021-11-11

## 2021-11-11 RX ORDER — SODIUM ZIRCONIUM CYCLOSILICATE 10 G/10G
10 POWDER, FOR SUSPENSION ORAL ONCE
Refills: 0 | Status: COMPLETED | OUTPATIENT
Start: 2021-11-11 | End: 2021-11-11

## 2021-11-11 RX ORDER — ALBUTEROL 90 UG/1
2 AEROSOL, METERED ORAL EVERY 6 HOURS
Refills: 0 | Status: DISCONTINUED | OUTPATIENT
Start: 2021-11-11 | End: 2021-11-22

## 2021-11-11 RX ORDER — DEXTROSE 50 % IN WATER 50 %
15 SYRINGE (ML) INTRAVENOUS ONCE
Refills: 0 | Status: DISCONTINUED | OUTPATIENT
Start: 2021-11-11 | End: 2021-11-17

## 2021-11-11 RX ORDER — AMLODIPINE BESYLATE 2.5 MG/1
10 TABLET ORAL DAILY
Refills: 0 | Status: DISCONTINUED | OUTPATIENT
Start: 2021-11-11 | End: 2021-11-22

## 2021-11-11 RX ORDER — SODIUM CHLORIDE 9 MG/ML
1000 INJECTION, SOLUTION INTRAVENOUS
Refills: 0 | Status: DISCONTINUED | OUTPATIENT
Start: 2021-11-11 | End: 2021-11-17

## 2021-11-11 RX ORDER — ENOXAPARIN SODIUM 100 MG/ML
40 INJECTION SUBCUTANEOUS DAILY
Refills: 0 | Status: DISCONTINUED | OUTPATIENT
Start: 2021-11-11 | End: 2021-11-22

## 2021-11-11 RX ORDER — MIDAZOLAM HYDROCHLORIDE 1 MG/ML
1 INJECTION, SOLUTION INTRAMUSCULAR; INTRAVENOUS ONCE
Refills: 0 | Status: DISCONTINUED | OUTPATIENT
Start: 2021-11-11 | End: 2021-11-11

## 2021-11-11 RX ORDER — DEXTROSE 50 % IN WATER 50 %
12.5 SYRINGE (ML) INTRAVENOUS ONCE
Refills: 0 | Status: DISCONTINUED | OUTPATIENT
Start: 2021-11-11 | End: 2021-11-17

## 2021-11-11 RX ORDER — LISINOPRIL 2.5 MG/1
40 TABLET ORAL DAILY
Refills: 0 | Status: DISCONTINUED | OUTPATIENT
Start: 2021-11-11 | End: 2021-11-22

## 2021-11-11 RX ORDER — GLUCAGON INJECTION, SOLUTION 0.5 MG/.1ML
1 INJECTION, SOLUTION SUBCUTANEOUS ONCE
Refills: 0 | Status: DISCONTINUED | OUTPATIENT
Start: 2021-11-11 | End: 2021-11-17

## 2021-11-11 RX ORDER — INSULIN GLARGINE 100 [IU]/ML
25 INJECTION, SOLUTION SUBCUTANEOUS AT BEDTIME
Refills: 0 | Status: DISCONTINUED | OUTPATIENT
Start: 2021-11-11 | End: 2021-11-22

## 2021-11-11 RX ADMIN — Medication 2 MILLIGRAM(S): at 13:50

## 2021-11-11 RX ADMIN — INSULIN GLARGINE 25 UNIT(S): 100 INJECTION, SOLUTION SUBCUTANEOUS at 23:33

## 2021-11-11 RX ADMIN — SODIUM CHLORIDE 1000 MILLILITER(S): 9 INJECTION, SOLUTION INTRAVENOUS at 13:50

## 2021-11-11 RX ADMIN — AMLODIPINE BESYLATE 10 MILLIGRAM(S): 2.5 TABLET ORAL at 21:00

## 2021-11-11 RX ADMIN — MIDAZOLAM HYDROCHLORIDE 1 MILLIGRAM(S): 1 INJECTION, SOLUTION INTRAMUSCULAR; INTRAVENOUS at 15:12

## 2021-11-11 RX ADMIN — SODIUM CHLORIDE 1000 MILLILITER(S): 9 INJECTION, SOLUTION INTRAVENOUS at 15:30

## 2021-11-11 NOTE — H&P ADULT - NSHPLABSRESULTS_GEN_ALL_CORE
Labs:  CAPILLARY BLOOD GLUCOSE      POCT Blood Glucose.: 199 mg/dL (2021 17:13)  POCT Blood Glucose.: 263 mg/dL (2021 12:39)                          14.6   7.33  )-----------( 309      ( 2021 14:10 )             44.7       Auto Neutrophil %: 49.7 % (21 @ 14:10)  Auto Immature Granulocyte %: 0.3 % (21 @ 14:10)        136  |  100  |  13  ----------------------------<  233<H>  6.2<HH>   |  16<L>  |  0.8      Calcium, Total Serum: 9.4 mg/dL (21 @ 14:10)      LFTs:             8.4  | 0.6  | 35       ------------------[113     ( 2021 14:10 )  4.4  | x    | 15          Lipase:x      Amylase:x         Lactate, Blood: 1.7 mmol/L (21 @ 17:58)  Blood Gas Venous - Lactate: 1.70 mmol/L (21 @ 15:33)      Coags:    CARDIAC MARKERS ( 2021 14:10 )  x     / <0.01 ng/mL / x     / x     / x              Urinalysis Basic - ( 2021 17:15 )    Color: Light Yellow / Appearance: Clear / S.017 / pH: x  Gluc: x / Ketone: Negative  / Bili: Negative / Urobili: <2 mg/dL   Blood: x / Protein: 30 mg/dL / Nitrite: Negative   Leuk Esterase: Negative / RBC: 1 /HPF / WBC 1 /HPF   Sq Epi: x / Non Sq Epi: 1 /HPF / Bacteria: Negative

## 2021-11-11 NOTE — ED PROVIDER NOTE - PHYSICAL EXAMINATION
CONSTITUTIONAL:  anxious appearing,   SKIN:  warm, dry, intact, no icterus or pallor  HEAD:  NCAT  EYES:  NL inspection, PERRLA  ENT:  MMM  NECK:  supple; non tender  CARD:  tachycardic, regular rhythm  RESP:  CTAB, intermittently tachypneic when anxious appearing, otherwise breathing comfortably  ABD:  S/NT, no R/G  MSK:  no pedal edema  NEURO:  Not verbally responding generally when in anxious state, moving all 4 extremities, CN2-12 grossly intact  PSYCH:  anxious affect

## 2021-11-11 NOTE — ED ADULT NURSE NOTE - CHIEF COMPLAINT QUOTE
pts family states she came in from work and was lying on the couch when she suddenly began shaking and crying but not verbalizing reason - pt lethargic at this time.  no hx of seizures - hx of htn,dm uncontrolled - was previously on insulin now taking po meds for DM ( in field)

## 2021-11-11 NOTE — CONSULT NOTE ADULT - ATTENDING COMMENTS
Patient examined in ED in presence of family members.  Patient has hesitating speech and recognizes son at bedside.  Family notes she has been under a lot of stress lately.  I went over MRI results unchanged microvascular disease, no acute stroke, no tumor.  When I explained that I thought patient's symptoms were likely related to psychological stressors and anxiety they said they were thinking that too.  I strongly encouraged followup with psychologist or psychiatrist to help with management of anxiety and any depression.  They verbalized understanding.  If speech continues to be limited she may benefit from speech therapy.

## 2021-11-11 NOTE — ED ADULT NURSE NOTE - INTERVENTIONS DEFINITIONS
Call bell, personal items and telephone within reach/Instruct patient to call for assistance/Room bathroom lighting operational/Non-slip footwear when patient is off stretcher/Provide visual clues: red socks

## 2021-11-11 NOTE — ED PROVIDER NOTE - CLINICAL SUMMARY MEDICAL DECISION MAKING FREE TEXT BOX
Patient presents with episode of altered mental status. labs, xray, ct done. Patient improved with benzos in the ED. Neurology consulted. Patient admitted for further management.

## 2021-11-11 NOTE — H&P ADULT - ATTENDING COMMENTS
**Hx and physical limited due to AMS. Supplemental information obtained from family, house staff, and EMR.     52 YO F with a PMH of HTN, obese, and DM2 who was BIBEMS for eval of AMS that occurred suddenly this afternoon. As per family, pt was in normal state of health and was found unresponsive and agitated on the couch this PM after coming home. As per family, this happened in the past and self-resolved. The work-up was negative at that time. Family denies any drug use, EtOH abuse, but she is on new DM medications. Unable to obtain ROS.     In the ED, CTH was negative for acute process. Chest X-Ray was negative for acute process. UA negative. beta-hydroxy WNLs. Neuro consulted and asked for EEG, MRI, echo, Nh4, Utox, and TSH/vitamin levels.     FMHx: Reviewed, not relevant    Physical exam shows obese, obtunded pt. VSS, afebrile, not hypoxic on RA. A&Ox0. Does not follow commands, will open eyes to painful stimuli, PERRL, no nuchal rigidity. CTA B/L with no W/C/R. RRR, no M/G/R. ABD is soft, obese, and non-tender, normoactive BSs. LEs without swelling. No rashes. Labs and radiology as above.     Acute encephalopathy, metabolic vs seizure vs infectious? (less likely). Neuro is following. MRI. EEG. Echo. TSH. B12/Folate. ABG. UTox. NH4. Send urine electrolyte levels and calculate urine AG. S&S eval. Fall precautions.     Diabetes mellitus with hyperglycemia. A1c. FSs. Insulin standing/correctional dosing    HAGMA (High Anion Gap Metabolic Acidosis) from unknown etiology. Send EtOH, ASA/APAP, and lactate. IVFs (LR). Repeat BMP in the AM.     Hx of HTN and obese. Restart home meds, except as stated above. DVT PPX. Inform PCP of pt's admission to hospital. My note supersedes the residents note.    Date seen by [email or web address]

## 2021-11-11 NOTE — H&P ADULT - HISTORY OF PRESENT ILLNESS
Pt is a 51F with a pmhx of HTN and DM2 presenting with AMS. patient was non verbal non responsive at the time of my examine, collateral information obtained from ED chart and sister. Per sister at bedside, pt was in usual state of health, A&Ox3, walking/talking this morning then went back to bed. Her sister briefly left the house and when she returned, pt was found not responding to verbal stimuli or moving but in no distress, which lasted until EMS arrived when she became anxious appearing, flapping hands and breathing quickly, crying out but still not speaking. No history of seizures. Those symptoms have continued until arrival in ED. Sister denies drug or alcohol use, change in medications, any trauma, or any hx of psychiatric disease. She does note that pt had an episode in which she appeared exactly the same which self resolved approximately 1d into an admission.    at the time of the examination, patient is obtunded, not withdrawing to pain, not following commands, no nuchal rigidity. patient is protecting her airway.      Pt is a 51F with a pmhx of HTN and DM2 presenting with AMS. patient was non verbal non responsive at the time of my examine, collateral information obtained from ED chart and sister. Per sister at bedside, pt was in usual state of health, A&Ox3, walking/talking this morning then went back to bed. Her sister briefly left the house and when she returned, pt was found not responding to verbal stimuli or moving but in no distress, which lasted until EMS arrived when she became anxious appearing, flapping hands and breathing quickly, crying out but still not speaking. No history of seizures. Those symptoms have continued until arrival in ED. Sister denies drug or alcohol use, change in medications, any trauma, or any hx of psychiatric disease. She does note that pt had an episode in which she appeared exactly the same which self resolved approximately 1d into an admission.    at the time of the examination, patient is obtunded, not withdrawing to pain, not following commands, no nuchal rigidity.

## 2021-11-11 NOTE — CONSULT NOTE ADULT - ASSESSMENT
52 YO  Female w/ pmhx of HTN and DM2 , is compliant with medication regimen, fully functional at baseline, works as a  p/w acute AMS. As per sister at bedside who lives with the patient ; patient woke up at her baseline, walked her dog and was doing routine activities and then around 11 am she was found to be unresponsive to repeated verbal stimulus but in no distress. On arrival to ed patient was obtunded. She continues to have altered mentation at this time but able to move all extremities follows few simple commands, still not speaking in words or sentences. Family Reports similar episode in the past and was asked by her PCP to get an MRI of the brain and see a neurologist but patient did not do that. Sister denies drug or alcohol use, change in medications, any trauma, fever chills, sick contacts or any hx of psychiatric disease. Patient is currently Afebrile, no obvious focality or CN deficits, CTH is negative for acute findings    #Acute Onset AMS    DDX:  -Stroke  -Seizure with prolonged postictal state  -Toxic metabolic encephalopathy  -Drug induced  -Psychosis (if all the above is ruled out)    Plan  -N/C MRI BRAIN  -REEG  -Lipid profile, hbaic, TSH, ESR, vitamin b12 levels  -Check ABGs  -REEG  -ECHO  -Check ammonia levels  -Urine drug screen  -Aspiration precautions  -Speech and swallow evaluation  -Neurology attending note will follow

## 2021-11-11 NOTE — ED ADULT TRIAGE NOTE - CHIEF COMPLAINT QUOTE
pts family states she came in from work and was lying on the couch when she suddenly began shaking and crying but not verbalizing reason - no hx of seizures - hx of htn,dm uncontrolled - was previously on insulin now taking po meds for DM ( in field) pts family states she came in from work and was lying on the couch when she suddenly began shaking and crying but not verbalizing reason - pt lethargic at this time.  no hx of seizures - hx of htn,dm uncontrolled - was previously on insulin now taking po meds for DM ( in field)

## 2021-11-11 NOTE — ED PROVIDER NOTE - OBJECTIVE STATEMENT
Pt is a 51F with a pmhx of HTN and DM2 on oral meds with previous episodes of DKA presenting with AMS. Per sister at bedside, pt was in usual state of health, A&Ox3, walking/talking this morning then went back to bed. Her sister briefly left the house and when she returned, pt was found not responding to verbal stimuli or moving but in no distress, which lasted until EMS arrived when she became anxious appearing, flapping hands and breathing quickly, crying out but still not speaking. Those symptoms have continued until arrival in ED. Sister denies drug or alcohol use, change in medications, any trauma, or any hx of psychiatric disease. She does note that pt had an episode in which she appeared exactly the same which self resolved approx. 1d into an admission. On that admission she additionally had DKA.

## 2021-11-11 NOTE — CONSULT NOTE ADULT - SUBJECTIVE AND OBJECTIVE BOX
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 As per sister at bedside who lives with the patient ; patient woke up at her baseline, walked her dog and was doing routine activities and then around 11 am she was found to be unresponsive to repeated verbal stimulus but in no distress. The unresponsiveness lasted until EMS arrived when she became somewhat responsive anxious appearing, flapping hands and breathing quickly, crying out but still not speaking. Reports similar episode in the past and was asked by her PCP to get an MRI of the brain and see a neurologist but patient did not do that. Symptoms of AMS continued while in ed. Sister denies drug or alcohol use, change in medications, any trauma, fever chills, sick contacts or any hx of psychiatric disease.           Home Medications:  Albuterol 90 mcg/inh inhalation aerosol with adapter:  (09 Jun 2021 22:13)  Amlodipine-benazepril 10 mg-40 mg oral capsule: 1 cap(s) orally once a day (09 Jun 2021 22:13)  Atorvastatin 40 mg oral tablet: 1 tab(s) orally once a day (09 Jun 2021 22:13)  Ezetimibe 10 mg oral tablet: 1 tab(s) orally once a day (09 Jun 2021 22:13)  Gabapentin 300 mg oral capsule: 1 cap(s) orally 3 times a day (09 Jun 2021 22:13)  Ozempic (1 mg dose) 4 mg/3 mL subcutaneous solution:  (09 Jun 2021 22:13)  Pioglitazone 30 mg oral tablet: 1 tab(s) orally once a day (09 Jun 2021 22:13)  Tresiba 100 units/mL subcutaneous solution: 40 unit(s) subcutaneous once a day (at bedtime) (09 Jun 2021 22:13)      Social History  Denies smoking alcohol or drug use      Neuro Exam:  Orientation: Lethargic, needs repeated verbal stimulus to arouse, unable to answer specific questions, follows few simple commands. Frequently moans.  Cranial Nerves: Pupils 2mm sluggish, eyes midline at primary position, responds to visual threat, no obvious facial asymmetry, mumbles not speaking words or sentences at this time.   Motor: Moving all 4 extremities minimally, in extreme pain when passively tried moving her legs             She has generalized pain more so in the legs  Sensory exam: intact (hyperesthesia in the lower extremities)  Coordination:. SID  Gait: unable        NIHSS:     Allergies    penicillin (Rash)    Intolerances      MEDICATIONS  (STANDING):  amLODIPine   Tablet 10 milliGRAM(s) Oral daily  dextrose 40% Gel 15 Gram(s) Oral once  dextrose 5%. 1000 milliLiter(s) (50 mL/Hr) IV Continuous <Continuous>  dextrose 5%. 1000 milliLiter(s) (100 mL/Hr) IV Continuous <Continuous>  dextrose 50% Injectable 25 Gram(s) IV Push once  dextrose 50% Injectable 12.5 Gram(s) IV Push once  dextrose 50% Injectable 25 Gram(s) IV Push once  enoxaparin Injectable 40 milliGRAM(s) SubCutaneous daily  glucagon  Injectable 1 milliGRAM(s) IntraMuscular once  insulin glargine Injectable (LANTUS) 25 Unit(s) SubCutaneous at bedtime  insulin lispro (ADMELOG) corrective regimen sliding scale   SubCutaneous three times a day before meals  insulin lispro Injectable (ADMELOG) 8 Unit(s) SubCutaneous three times a day before meals  lisinopril 40 milliGRAM(s) Oral daily        MEDICATIONS  (PRN):  ALBUTerol    90 MICROgram(s) HFA Inhaler 2 Puff(s) Inhalation every 6 hours PRN Bronchospasm      LABS:                        14.6   7.33  )-----------( 309      ( 11 Nov 2021 14:10 )             44.7     11-11    136  |  100  |  13  ----------------------------<  233<H>  6.2<HH>   |  16<L>  |  0.8    Ca    9.4      11 Nov 2021 14:10  Phos  3.6     11-11  Mg     1.8     11-11    TPro  8.4<H>  /  Alb  4.4  /  TBili  0.6  /  DBili  x   /  AST  35  /  ALT  15  /  AlkPhos  113  11-11    Urine Microscopic-Add On (NC) (11.11.21 @ 17:15)   Epithelial Cells: 1 /HPF   Red Blood Cell - Urine: 1 /HPF   White Blood Cell - Urine: 1 /HPF   Hyaline Casts: 0 /LPF   Bacteria: Negative     Urinalysis (11.11.21 @ 17:15)   pH Urine: 6.5   Glucose Qualitative, Urine: 500 mg/dL   Blood, Urine: Negative   Color: Light Yellow   Urine Appearance: Clear   Bilirubin: Negative   Ketone - Urine: Negative   Specific Gravity: 1.017   Protein, Urine: 30 mg/dL   Urobilinogen: <2 mg/dL   Nitrite: Negative   Leukocyte Esterase Concentration: Negative       COVID-19 PCR (11.11.21 @ 16:00)   COVID-19 PCR: NotDetec: Methodology:         Neuro Imaging:  < from: CT Head No Cont (11.11.21 @ 16:14) >  FINDINGS:    There is no evidence of acute territorial infarct or intracranial hemorrhage. There is no space-occupying lesion or midline shift.    There is no evidence of hydrocephalus. There are no extra-axial fluid collections.    The visualized intraorbital contents are normal. There are mild scattered inflammatory mucosal thickening in the bilateral ethmoid and maxillary sinuses.The mastoid air cells are aerated. The visualized soft tissues and osseous structures appear normal. Partially visualized parotid glands are normal.    IMPRESSION:    No acute intracranial pathology, stable exam since 6/9/2021.    --- End of Report ---        SUKUMAR GARCIA MD; Attending Radiologist  This document has been electronically signed. Nov 11 2021  4:18PM    < end of copied text >    EEG:     Echo:   Carotid Doppler: N/A  Telemetry:

## 2021-11-11 NOTE — H&P ADULT - ASSESSMENT
Pt is a 51F with a pmhx of HTN and DM2 presenting with AMS.    #AMS  - Possible neurologic vs psychiatric vs drug induced (less likely)  -patient is obtunded, not withdrawing to pain, not following commands, no nuchal rigidity  -CT head: No acute intracranial pathology, stable exam since 6/9/2021.  -UA negative  -check routine EEG  -MR Head non contrast  -Check routine labs, LFTs,  ammonia level, TSH, B12, Magnesium  -check Utox  -Speech and swallow eval  -Neurology eval    # DM II - uncontrolled   # Peripheral neuropathy  - Will give Insulin weight based per protocol, check hb a1c  - Follows with Dr. Badillo outpatient  - On statin and Ezetimibe. Will continue with statin  - Hold Gabapentin    # HTN  - continue Amlodipine 10 and Lisinopril 40    # Asthma  - albuterol inhaler prn    DVT: Lovenox  GI: Protonix  Diet: advance as tolerated   Dispo: Pending work up

## 2021-11-11 NOTE — H&P ADULT - ATTENDING SUPERVISION STATEMENT
MONITOR BP/HR DAILY BRING LOG TO OFFICE    START ATORVASTATIN DAILY    REPEAT LIPIDS IN 6 WEEKS AFTER STARTING MEDICATION    MONITOR WILL BE MAILED TO YOUR HOME, CALL NUMBER ON BOX TO ACTIVATE IT    FOLLOW UP WITH NEUROLOGY - IF RECURRENT SYMPTOMS PRIOR TO FOLLOW UP PLEASE RETURN TO ER FOR MORE URGENT EVALUATION        What Is a TIA?  A TIA (transient ischemic attack) is an early warning that a stroke (also called a brain attack) is coming. A TIA is a temporary stroke. It causes no lasting damage. But the effects of a stroke, if it happens, can be very serious and lasting. If you think you are having symptoms of a TIA or stroke--even if they don’t last--get medical help right away.    Symptoms of TIA and stroke  Symptoms may come on suddenly and last for a few seconds or a few hours. You may have symptoms only once. Or they may come and go for days. If you notice any of the following symptoms, don’t wait. Call 911 or emergency services right away.  · Weakness, numbness, tingling, or loss of feeling in your face, arm, or leg  · Trouble seeing in one or both eyes; double vision  · Slurred speech, trouble talking, or problems understanding others when they speak  · Sudden, severe headache  · Dizziness or a feeling of spinning  · Loss of balance or falling  · Blackouts  F.A.S.T. is an easy way to remember the signs of a stroke. When you see the signs, you will know what you need to call 911 fast.   F.A.S.T. stands for:   · F is for face drooping. One side of the face is drooping or numb. When the person smiles, the smile is uneven.  · A is for arm weakness. One arm is weak or numb. When the person lifts both arms and the same time, one arm may drift downward.  · S is for speech difficulty. You may notice slurred speech or trouble speaking. The person can't repeat a simple sentence correctly when asked.  · T is for time to call 911. If someone shows any of these symptoms, even if they go away, call 911 right away. Make  note of the time the symptoms first appeared.  [employer] last reviewed this educational content on 3/1/2020    © 4846-4054 [employer]. All rights reserved. This information is not intended as a substitute for professional medical care. Always follow your healthcare professional's instructions.        Patient Education       What Is a TIA?  A TIA (transient ischemic attack) is an early warning that a stroke (also called a brain attack) is coming. A TIA is a temporary stroke. It causes no lasting damage. But the effects of a stroke, if it happens, can be very serious and lasting. If you think you are having symptoms of a TIA or stroke--even if they don’t last--get medical help right away.    Symptoms of TIA and stroke  Symptoms may come on suddenly and last for a few seconds or a few hours. You may have symptoms only once. Or they may come and go for days. If you notice any of the following symptoms, don’t wait. Call 911 or emergency services right away.  · Weakness, numbness, tingling, or loss of feeling in your face, arm, or leg  · Trouble seeing in one or both eyes; double vision  · Slurred speech, trouble talking, or problems understanding others when they speak  · Sudden, severe headache  · Dizziness or a feeling of spinning  · Loss of balance or falling  · Blackouts  F.A.S.T. is an easy way to remember the signs of a stroke. When you see the signs, you will know what you need to call 911 fast.   F.A.S.T. stands for:   · F is for face drooping. One side of the face is drooping or numb. When the person smiles, the smile is uneven.  · A is for arm weakness. One arm is weak or numb. When the person lifts both arms and the same time, one arm may drift downward.  · S is for speech difficulty. You may notice slurred speech or trouble speaking. The person can't repeat a simple sentence correctly when asked.  · T is for time to call 911. If someone shows any of these symptoms, even if they go away, call 911  right away. Make note of the time the symptoms first appeared.  [employer] last reviewed this educational content on 3/1/2020    © 0053-1052 [employer]. All rights reserved. This information is not intended as a substitute for professional medical care. Always follow your healthcare professional's instructions.         Your Event Monitor will be mailed to your home via [employer] within 7-10 business days after we obtain authorization from your insurance company, if prior-authorization is required.   You will have detailed instructions on how to set up your Event Monitor and a telephone number will be included in the box if you have questions.                                                After completing your Event Monitor you will mail monitor back to the company with the enclosed return label.              We will contact you with results; if you don't hear from us regarding your test results within 10-14 days after completion please call our office at [phone].   Resident

## 2021-11-11 NOTE — H&P ADULT - NSHPPHYSICALEXAM_GEN_ALL_CORE
GENERAL: obtuned  HEAD:  Atraumatic, Normocephalic  EYES: EOMI, PERRLA, conjunctiva and sclera clear  ENT: Moist mucous membranes  NECK: Supple, No JVD  CHEST/LUNG: Clear to auscultation bilaterally; No rales, rhonchi, wheezing, or rubs. Unlabored respirations  HEART: Regular rate and rhythm; No murmurs, rubs, or gallops  ABDOMEN: Bowel sounds present; Soft, Nontender, Nondistended.   EXTREMITIES:  2+ Peripheral Pulses, brisk capillary refill.   NERVOUS SYSTEM:  obtunded, not withdrawing to pain, not following commands, no nuchal rigidity

## 2021-11-11 NOTE — ED PROVIDER NOTE - PROGRESS NOTE DETAILS
Received sign out from Dr. Ireland pending urine and neurology. TD: CT head negative, routine consult placed for neurology to follow pt as inpatient. Pt endorsed to JOSE C Solano for admission. When calm, pt now resting comfortably in bed in sinus rhythm at normal rate. Pt becomes intermittently agitated and tachypneic/sinus tachycardic when stimulated, especially by medical personel. Speech clear when pt does speak, commenting on  relatives.

## 2021-11-11 NOTE — ED PROVIDER NOTE - ATTENDING CONTRIBUTION TO CARE
patient evaluated for ams, found by sister sleeping, but not waking up. prior to this was at baseline. ems was called. when ems arrived patient had non rhythmic jerking movements of upper ext and shaking. she continues to touch her lips, patient can not provide history otherwise. on exam she will withdraw all ext to pain and move spontensously, her eyes are open. however she just makes moaning sounds. abd soft, lungs cta, mm dry. Plan is to give benzo for movements, check labs, evaluate for dka and head ct.

## 2021-11-12 LAB
-  STREPTOCOCCUS SP. (NOT GRP A, B OR S PNEUMONIAE): SIGNIFICANT CHANGE UP
A1C WITH ESTIMATED AVERAGE GLUCOSE RESULT: 9.9 % — HIGH (ref 4–5.6)
ALBUMIN SERPL ELPH-MCNC: 4.2 G/DL — SIGNIFICANT CHANGE UP (ref 3.5–5.2)
ALP SERPL-CCNC: 112 U/L — SIGNIFICANT CHANGE UP (ref 30–115)
ALT FLD-CCNC: 11 U/L — SIGNIFICANT CHANGE UP (ref 0–41)
AMMONIA BLD-MCNC: 41 UMOL/L — SIGNIFICANT CHANGE UP (ref 11–55)
ANION GAP SERPL CALC-SCNC: 18 MMOL/L — HIGH (ref 7–14)
AST SERPL-CCNC: 14 U/L — SIGNIFICANT CHANGE UP (ref 0–41)
BASOPHILS # BLD AUTO: 0.02 K/UL — SIGNIFICANT CHANGE UP (ref 0–0.2)
BASOPHILS NFR BLD AUTO: 0.3 % — SIGNIFICANT CHANGE UP (ref 0–1)
BILIRUB SERPL-MCNC: 0.8 MG/DL — SIGNIFICANT CHANGE UP (ref 0.2–1.2)
BUN SERPL-MCNC: 7 MG/DL — LOW (ref 10–20)
CALCIUM SERPL-MCNC: 9.6 MG/DL — SIGNIFICANT CHANGE UP (ref 8.5–10.1)
CHLORIDE SERPL-SCNC: 101 MMOL/L — SIGNIFICANT CHANGE UP (ref 98–110)
CO2 SERPL-SCNC: 17 MMOL/L — SIGNIFICANT CHANGE UP (ref 17–32)
CREAT SERPL-MCNC: 0.6 MG/DL — LOW (ref 0.7–1.5)
CULTURE RESULTS: NO GROWTH — SIGNIFICANT CHANGE UP
EOSINOPHIL # BLD AUTO: 0.26 K/UL — SIGNIFICANT CHANGE UP (ref 0–0.7)
EOSINOPHIL NFR BLD AUTO: 3.9 % — SIGNIFICANT CHANGE UP (ref 0–8)
ESTIMATED AVERAGE GLUCOSE: 237 MG/DL — HIGH (ref 68–114)
GLUCOSE SERPL-MCNC: 203 MG/DL — HIGH (ref 70–99)
GRAM STN FLD: SIGNIFICANT CHANGE UP
HCT VFR BLD CALC: 42.8 % — SIGNIFICANT CHANGE UP (ref 37–47)
HGB BLD-MCNC: 14 G/DL — SIGNIFICANT CHANGE UP (ref 12–16)
IMM GRANULOCYTES NFR BLD AUTO: 0.1 % — SIGNIFICANT CHANGE UP (ref 0.1–0.3)
LYMPHOCYTES # BLD AUTO: 1.88 K/UL — SIGNIFICANT CHANGE UP (ref 1.2–3.4)
LYMPHOCYTES # BLD AUTO: 28.1 % — SIGNIFICANT CHANGE UP (ref 20.5–51.1)
MAGNESIUM SERPL-MCNC: 1.7 MG/DL — LOW (ref 1.8–2.4)
MCHC RBC-ENTMCNC: 26 PG — LOW (ref 27–31)
MCHC RBC-ENTMCNC: 32.7 G/DL — SIGNIFICANT CHANGE UP (ref 32–37)
MCV RBC AUTO: 79.4 FL — LOW (ref 81–99)
METHOD TYPE: SIGNIFICANT CHANGE UP
MONOCYTES # BLD AUTO: 0.64 K/UL — HIGH (ref 0.1–0.6)
MONOCYTES NFR BLD AUTO: 9.6 % — HIGH (ref 1.7–9.3)
NEUTROPHILS # BLD AUTO: 3.88 K/UL — SIGNIFICANT CHANGE UP (ref 1.4–6.5)
NEUTROPHILS NFR BLD AUTO: 58 % — SIGNIFICANT CHANGE UP (ref 42.2–75.2)
NRBC # BLD: 0 /100 WBCS — SIGNIFICANT CHANGE UP (ref 0–0)
PCP SPEC-MCNC: SIGNIFICANT CHANGE UP
PHOSPHATE SERPL-MCNC: 3.5 MG/DL — SIGNIFICANT CHANGE UP (ref 2.1–4.9)
PLATELET # BLD AUTO: 284 K/UL — SIGNIFICANT CHANGE UP (ref 130–400)
POTASSIUM SERPL-MCNC: 4.2 MMOL/L — SIGNIFICANT CHANGE UP (ref 3.5–5)
POTASSIUM SERPL-SCNC: 4.2 MMOL/L — SIGNIFICANT CHANGE UP (ref 3.5–5)
PROT SERPL-MCNC: 7.7 G/DL — SIGNIFICANT CHANGE UP (ref 6–8)
RBC # BLD: 5.39 M/UL — SIGNIFICANT CHANGE UP (ref 4.2–5.4)
RBC # FLD: 12.7 % — SIGNIFICANT CHANGE UP (ref 11.5–14.5)
SODIUM SERPL-SCNC: 136 MMOL/L — SIGNIFICANT CHANGE UP (ref 135–146)
SPECIMEN SOURCE: SIGNIFICANT CHANGE UP
SPECIMEN SOURCE: SIGNIFICANT CHANGE UP
TSH SERPL-MCNC: <0 UIU/ML — LOW (ref 0.27–4.2)
WBC # BLD: 6.69 K/UL — SIGNIFICANT CHANGE UP (ref 4.8–10.8)
WBC # FLD AUTO: 6.69 K/UL — SIGNIFICANT CHANGE UP (ref 4.8–10.8)

## 2021-11-12 PROCEDURE — 95819 EEG AWAKE AND ASLEEP: CPT | Mod: 26

## 2021-11-12 PROCEDURE — 99233 SBSQ HOSP IP/OBS HIGH 50: CPT | Mod: 25

## 2021-11-12 PROCEDURE — 70551 MRI BRAIN STEM W/O DYE: CPT | Mod: 26

## 2021-11-12 PROCEDURE — 93010 ELECTROCARDIOGRAM REPORT: CPT

## 2021-11-12 PROCEDURE — 99222 1ST HOSP IP/OBS MODERATE 55: CPT

## 2021-11-12 RX ORDER — VANCOMYCIN HCL 1 G
1500 VIAL (EA) INTRAVENOUS EVERY 12 HOURS
Refills: 0 | Status: DISCONTINUED | OUTPATIENT
Start: 2021-11-12 | End: 2021-11-13

## 2021-11-12 RX ORDER — MAGNESIUM SULFATE 500 MG/ML
2 VIAL (ML) INJECTION ONCE
Refills: 0 | Status: COMPLETED | OUTPATIENT
Start: 2021-11-12 | End: 2021-11-12

## 2021-11-12 RX ADMIN — Medication 300 MILLIGRAM(S): at 18:33

## 2021-11-12 RX ADMIN — INSULIN GLARGINE 25 UNIT(S): 100 INJECTION, SOLUTION SUBCUTANEOUS at 22:50

## 2021-11-12 RX ADMIN — Medication 25 GRAM(S): at 18:33

## 2021-11-12 RX ADMIN — LISINOPRIL 40 MILLIGRAM(S): 2.5 TABLET ORAL at 06:59

## 2021-11-12 RX ADMIN — ENOXAPARIN SODIUM 40 MILLIGRAM(S): 100 INJECTION SUBCUTANEOUS at 12:29

## 2021-11-12 RX ADMIN — Medication 1: at 12:29

## 2021-11-12 RX ADMIN — AMLODIPINE BESYLATE 10 MILLIGRAM(S): 2.5 TABLET ORAL at 06:59

## 2021-11-12 RX ADMIN — Medication 8 UNIT(S): at 12:28

## 2021-11-12 NOTE — PROGRESS NOTE ADULT - ASSESSMENT
Pt is a 51F with a pmhx of HTN and DM2 presenting with AMS.    #AMS  - Possible neurologic vs psychiatric vs drug induced  - patient is stuporous and withdrawing to pain, not following commands, no nuchal rigidity  - CT head: No acute intracranial pathology, stable exam since 6/9/2021.  - UA negative  - EEG done. Generalized slowing. But no epileptiform activity.   - MR Head non contrast:   1.  No acute intracranial abnormality. Stable exam since 6/10/2021.  2.  Stable mild chronic microvascular changes.  3.  Prominence of the posterior nasopharyngeal soft tissues, recommend correlation with direct visualization.  - Check routine labs, LFTs,  ammonia level, TSH, B12, Magnesium  - Urine tox only positive for benzodiazepine. She received ativan 2mg IV once.   - Speech and swallow eval  - Neurology eval    # DM II - uncontrolled   # Peripheral neuropathy  - Will give Insulin weight based per protocol, check hb a1c  - Follows with Dr. Badillo outpatient  - On statin and Ezetimibe. Will continue with statin  - Hold Gabapentin    #HTN  - continue Amlodipine 10 and Lisinopril 40    #Asthma  - albuterol inhaler prn    DVT: Lovenox  GI: Protonix  Diet: advance as tolerated   Dispo: Pending work up   Pt is a 51F with a pmhx of HTN and DM2 presenting with AMS.    #AMS  - Possible neurologic vs psychiatric vs drug induced  - patient is stuporous and withdrawing to pain, not following commands, no nuchal rigidity  - CT head: No acute intracranial pathology, stable exam since 6/9/2021.  - UA negative  - EEG done. Generalized slowing. But no epileptiform activity.   - MR Head non contrast:   1.  No acute intracranial abnormality. Stable exam since 6/10/2021.  2.  Stable mild chronic microvascular changes.  3.  Prominence of the posterior nasopharyngeal soft tissues, recommend correlation with direct visualization.  - TSH and B12 specimen received, pending.   - Ammonia: 41. Normal.   - Urine tox only positive for benzodiazepine. She received ativan 2mg IV once.   - Speech and swallow eval  - Neurology eval    # DM II - uncontrolled   # Peripheral neuropathy  - Will give Insulin weight based per protocol, check hb a1c  - Follows with Dr. Badillo outpatient  - On statin and Ezetimibe. Will continue with statin  - Hold Gabapentin    #HTN  - continue Amlodipine 10 and Lisinopril 40    #Asthma  - albuterol inhaler prn    #Hypomagnesemia:   - Mg++: 1.7   - Replete given. Follow up levels.     DVT: Lovenox  GI: Protonix  Diet: advance as tolerated   Dispo: Pending work up   Pt is a 51F with a pmhx of HTN and DM2 presenting with AMS.    #Metabolic encephalopathy   - Possible neurologic vs psychiatric vs drug induced  - patient is stuporous and withdrawing to pain, not following commands, no nuchal rigidity  - CT head: No acute intracranial pathology, stable exam since 6/9/2021.  - UA negative  - EEG done. Generalized slowing. But no epileptiform activity.   - MR Head non contrast:   1.  No acute intracranial abnormality. Stable exam since 6/10/2021.  2.  Stable mild chronic microvascular changes.  3.  Prominence of the posterior nasopharyngeal soft tissues, recommend correlation with direct visualization.  - TSH and B12 specimen received, pending.   - Ammonia: 41. Normal.   - Urine tox only positive for benzodiazepine. She received ativan 2mg IV once.   - Speech and swallow eval  - Neurology eval  - If above work up is neg, would get psych evaluation     # DM II - uncontrolled   # Peripheral neuropathy  - Will give Insulin weight based per protocol, check hb a1c  - Follows with Dr. Badillo outpatient  - On statin and Ezetimibe. Will continue with statin  - Hold Gabapentin    #HTN  - continue Amlodipine 10 and Lisinopril 40    #Asthma  - albuterol inhaler prn    #Hypomagnesemia:   - Mg++: 1.7   - Replete given. Follow up levels.     DVT: Lovenox  GI: Protonix  Diet: advance as tolerated   Dispo: Pending work up

## 2021-11-12 NOTE — PROGRESS NOTE ADULT - SUBJECTIVE AND OBJECTIVE BOX
SUBJECTIVE:    Patient is a 51y old Female who presents with a chief complaint of ams (2021 21:39). Currently admitted to medicine with the primary diagnosis of Altered mental status.  Today is hospital day 1d.       PAST MEDICAL & SURGICAL HISTORY  Diabetes    Hypertension    No significant past surgical history      SOCIAL HISTORY:  Negative for smoking/alcohol/drug use.     ALLERGIES:  penicillin (Rash)    MEDICATIONS:  STANDING MEDICATIONS  amLODIPine   Tablet 10 milliGRAM(s) Oral daily  dextrose 40% Gel 15 Gram(s) Oral once  dextrose 5%. 1000 milliLiter(s) IV Continuous <Continuous>  dextrose 5%. 1000 milliLiter(s) IV Continuous <Continuous>  dextrose 50% Injectable 25 Gram(s) IV Push once  dextrose 50% Injectable 12.5 Gram(s) IV Push once  dextrose 50% Injectable 25 Gram(s) IV Push once  enoxaparin Injectable 40 milliGRAM(s) SubCutaneous daily  glucagon  Injectable 1 milliGRAM(s) IntraMuscular once  insulin glargine Injectable (LANTUS) 25 Unit(s) SubCutaneous at bedtime  insulin lispro (ADMELOG) corrective regimen sliding scale   SubCutaneous three times a day before meals  insulin lispro Injectable (ADMELOG) 8 Unit(s) SubCutaneous three times a day before meals  lisinopril 40 milliGRAM(s) Oral daily    PRN MEDICATIONS  ALBUTerol    90 MICROgram(s) HFA Inhaler 2 Puff(s) Inhalation every 6 hours PRN    VITALS:   T(F): 98  HR: 103  BP: 131/80  RR: 18  SpO2: 99%    LABS:                        14.6   7.33  )-----------( 309      ( 2021 14:10 )             44.7         136  |  100  |  13  ----------------------------<  233<H>  6.2<HH>   |  16<L>  |  0.8    Ca    9.4      2021 14:10  Phos  3.6     -  Mg     1.8         TPro  8.4<H>  /  Alb  4.4  /  TBili  0.6  /  DBili  x   /  AST  35  /  ALT  15  /  AlkPhos  113  11-11      Urinalysis Basic - ( 2021 17:15 )    Color: Light Yellow / Appearance: Clear / S.017 / pH: x  Gluc: x / Ketone: Negative  / Bili: Negative / Urobili: <2 mg/dL   Blood: x / Protein: 30 mg/dL / Nitrite: Negative   Leuk Esterase: Negative / RBC: 1 /HPF / WBC 1 /HPF   Sq Epi: x / Non Sq Epi: 1 /HPF / Bacteria: Negative    Lactate, Blood: 1.7 mmol/L (21 @ 17:58)  Troponin T, Serum: <0.01 ng/mL (21 @ 14:10)    CARDIAC MARKERS ( 2021 14:10 )  x     / <0.01 ng/mL / x     / x     / x        RADIOLOGY:    < from: Xray Chest 1 View- PORTABLE-Urgent (21 @ 14:53) >  Impression:    No radiographic evidence of acute cardiopulmonary disease.    < end of copied text >    M< from: MR Head No Cont (21 @ 08:56) >  IMPRESSION:    1.  No acute intracranial abnormality. Stable exam since 6/10/2021.    2.  Stable mild chronic microvascular changes.    3.  Prominence of the posterior nasopharyngeal soft tissues, recommend correlation with direct visualization.    < end of copied text >    < from: EEG (21 @ 22:30) >    IMPRESSION:  This is a borderline abnormal EEG due to the lack of optimal organization and borderline generalized slowing      CLINICAL CORRELATION  Consistent with borderline diffuse cerebral electrophysiological dysfunction perhaps secondary to toxic metabolic causes clinical correlation is recommended    < end of copied text >        PHYSICAL EXAM:  GEN: Stuporous  LUNGS: Clear to auscultation bilaterally   HEART: S1/S2 present. RRR.   ABD: Soft, non-tender, non-distended. Bowel sounds present  EXT: NC/NC/NE/2+PP/CHENG/Skin Intact.   NEURO: Stuporous           SUBJECTIVE:    Patient is a 51y old Female who presents with a chief complaint of ams (2021 21:39). Currently admitted to medicine with the primary diagnosis of Altered mental status.  Today is hospital day 1d.       Attending note: Pt seen and examined at bedside. Pt unable to participate in ROS as pt is stuporous     PAST MEDICAL & SURGICAL HISTORY  Diabetes    Hypertension    No significant past surgical history      SOCIAL HISTORY:  Negative for smoking/alcohol/drug use.     ALLERGIES:  penicillin (Rash)    MEDICATIONS:  STANDING MEDICATIONS  amLODIPine   Tablet 10 milliGRAM(s) Oral daily  dextrose 40% Gel 15 Gram(s) Oral once  dextrose 5%. 1000 milliLiter(s) IV Continuous <Continuous>  dextrose 5%. 1000 milliLiter(s) IV Continuous <Continuous>  dextrose 50% Injectable 25 Gram(s) IV Push once  dextrose 50% Injectable 12.5 Gram(s) IV Push once  dextrose 50% Injectable 25 Gram(s) IV Push once  enoxaparin Injectable 40 milliGRAM(s) SubCutaneous daily  glucagon  Injectable 1 milliGRAM(s) IntraMuscular once  insulin glargine Injectable (LANTUS) 25 Unit(s) SubCutaneous at bedtime  insulin lispro (ADMELOG) corrective regimen sliding scale   SubCutaneous three times a day before meals  insulin lispro Injectable (ADMELOG) 8 Unit(s) SubCutaneous three times a day before meals  lisinopril 40 milliGRAM(s) Oral daily    PRN MEDICATIONS  ALBUTerol    90 MICROgram(s) HFA Inhaler 2 Puff(s) Inhalation every 6 hours PRN    VITALS:   T(F): 98  HR: 103  BP: 131/80  RR: 18  SpO2: 99%    LABS:                        14.6   7.33  )-----------( 309      ( 2021 14:10 )             44.7         136  |  100  |  13  ----------------------------<  233<H>  6.2<HH>   |  16<L>  |  0.8    Ca    9.4      2021 14:10  Phos  3.6       Mg     1.8         TPro  8.4<H>  /  Alb  4.4  /  TBili  0.6  /  DBili  x   /  AST  35  /  ALT  15  /  AlkPhos  113  -      Urinalysis Basic - ( 2021 17:15 )    Color: Light Yellow / Appearance: Clear / S.017 / pH: x  Gluc: x / Ketone: Negative  / Bili: Negative / Urobili: <2 mg/dL   Blood: x / Protein: 30 mg/dL / Nitrite: Negative   Leuk Esterase: Negative / RBC: 1 /HPF / WBC 1 /HPF   Sq Epi: x / Non Sq Epi: 1 /HPF / Bacteria: Negative    Lactate, Blood: 1.7 mmol/L (21 @ 17:58)  Troponin T, Serum: <0.01 ng/mL (21 @ 14:10)    CARDIAC MARKERS ( 2021 14:10 )  x     / <0.01 ng/mL / x     / x     / x        RADIOLOGY:    < from: Xray Chest 1 View- PORTABLE-Urgent (21 @ 14:53) >  Impression:    No radiographic evidence of acute cardiopulmonary disease.    < end of copied text >    M< from: MR Head No Cont (21 @ 08:56) >  IMPRESSION:    1.  No acute intracranial abnormality. Stable exam since 6/10/2021.    2.  Stable mild chronic microvascular changes.    3.  Prominence of the posterior nasopharyngeal soft tissues, recommend correlation with direct visualization.    < end of copied text >    < from: EEG (21 @ 22:30) >    IMPRESSION:  This is a borderline abnormal EEG due to the lack of optimal organization and borderline generalized slowing      CLINICAL CORRELATION  Consistent with borderline diffuse cerebral electrophysiological dysfunction perhaps secondary to toxic metabolic causes clinical correlation is recommended    < end of copied text >        PHYSICAL EXAM:  GEN: Stuporous  LUNGS: Clear to auscultation bilaterally   HEART: S1/S2 present. RRR.   ABD: Soft, non-tender, non-distended. Bowel sounds present  EXT: NC/NC/NE/2+PP/CHENG/Skin Intact.   NEURO: Stuporous

## 2021-11-12 NOTE — SWALLOW BEDSIDE ASSESSMENT ADULT - SLP GENERAL OBSERVATIONS
Pt awake, eyes remained closed. Pt with no verbalizations despite max cues, not following commands despite max cues

## 2021-11-12 NOTE — SWALLOW BEDSIDE ASSESSMENT ADULT - SWALLOW EVAL: DIAGNOSIS
Pt refusing all PO trials, refusing/unable to open eyes. Pt not following commands despite max encouragement. Pts sister at bedside

## 2021-11-12 NOTE — ED ADULT NURSE REASSESSMENT NOTE - NS ED NURSE REASSESS COMMENT FT1
transported pt to 3 C accompanied by ER RN and transport , pt still unable to follow commands , no labored breathing noted , no grimaced face noted , no vomiting noted ,  no facial droop , no seizure , no syncopal episode this time , Neurology on consult, IVL intact , belongings sent , pt transported to 3C , 16 B

## 2021-11-12 NOTE — SWALLOW BEDSIDE ASSESSMENT ADULT - SLP PERTINENT HISTORY OF CURRENT PROBLEM
Pt admitted with AMS, possible neurologic cause vs psychiatric per chart. CTH and CXR (-). Drug induced psychosis?

## 2021-11-12 NOTE — ED ADULT NURSE REASSESSMENT NOTE - NS ED NURSE REASSESS COMMENT FT1
Assumed care from day shift RN c/ o tele admit , change in mental status , pt drowsy , non verbal , unable to follow commands , MD aware , no SOB , family at bedside

## 2021-11-13 LAB
ANION GAP SERPL CALC-SCNC: 16 MMOL/L — HIGH (ref 7–14)
BASOPHILS # BLD AUTO: 0.02 K/UL — SIGNIFICANT CHANGE UP (ref 0–0.2)
BASOPHILS NFR BLD AUTO: 0.3 % — SIGNIFICANT CHANGE UP (ref 0–1)
BUN SERPL-MCNC: 9 MG/DL — LOW (ref 10–20)
CALCIUM SERPL-MCNC: 9.3 MG/DL — SIGNIFICANT CHANGE UP (ref 8.5–10.1)
CHLORIDE SERPL-SCNC: 104 MMOL/L — SIGNIFICANT CHANGE UP (ref 98–110)
CO2 SERPL-SCNC: 18 MMOL/L — SIGNIFICANT CHANGE UP (ref 17–32)
COVID-19 NUCLEOCAPSID GAM AB INTERP: POSITIVE
COVID-19 NUCLEOCAPSID TOTAL GAM ANTIBODY RESULT: 222 INDEX — HIGH
COVID-19 SPIKE DOMAIN AB INTERP: POSITIVE
COVID-19 SPIKE DOMAIN ANTIBODY RESULT: >250 U/ML — HIGH
CREAT SERPL-MCNC: 0.6 MG/DL — LOW (ref 0.7–1.5)
EOSINOPHIL # BLD AUTO: 0.37 K/UL — SIGNIFICANT CHANGE UP (ref 0–0.7)
EOSINOPHIL NFR BLD AUTO: 6.4 % — SIGNIFICANT CHANGE UP (ref 0–8)
GLUCOSE SERPL-MCNC: 190 MG/DL — HIGH (ref 70–99)
HCT VFR BLD CALC: 41.5 % — SIGNIFICANT CHANGE UP (ref 37–47)
HGB BLD-MCNC: 13.5 G/DL — SIGNIFICANT CHANGE UP (ref 12–16)
IMM GRANULOCYTES NFR BLD AUTO: 0.3 % — SIGNIFICANT CHANGE UP (ref 0.1–0.3)
LYMPHOCYTES # BLD AUTO: 1.79 K/UL — SIGNIFICANT CHANGE UP (ref 1.2–3.4)
LYMPHOCYTES # BLD AUTO: 30.9 % — SIGNIFICANT CHANGE UP (ref 20.5–51.1)
MAGNESIUM SERPL-MCNC: 1.6 MG/DL — LOW (ref 1.8–2.4)
MCHC RBC-ENTMCNC: 25.8 PG — LOW (ref 27–31)
MCHC RBC-ENTMCNC: 32.5 G/DL — SIGNIFICANT CHANGE UP (ref 32–37)
MCV RBC AUTO: 79.2 FL — LOW (ref 81–99)
MONOCYTES # BLD AUTO: 0.62 K/UL — HIGH (ref 0.1–0.6)
MONOCYTES NFR BLD AUTO: 10.7 % — HIGH (ref 1.7–9.3)
NEUTROPHILS # BLD AUTO: 2.97 K/UL — SIGNIFICANT CHANGE UP (ref 1.4–6.5)
NEUTROPHILS NFR BLD AUTO: 51.4 % — SIGNIFICANT CHANGE UP (ref 42.2–75.2)
NRBC # BLD: 0 /100 WBCS — SIGNIFICANT CHANGE UP (ref 0–0)
PLATELET # BLD AUTO: 278 K/UL — SIGNIFICANT CHANGE UP (ref 130–400)
POTASSIUM SERPL-MCNC: 4.2 MMOL/L — SIGNIFICANT CHANGE UP (ref 3.5–5)
POTASSIUM SERPL-SCNC: 4.2 MMOL/L — SIGNIFICANT CHANGE UP (ref 3.5–5)
RBC # BLD: 5.24 M/UL — SIGNIFICANT CHANGE UP (ref 4.2–5.4)
RBC # FLD: 12.8 % — SIGNIFICANT CHANGE UP (ref 11.5–14.5)
SARS-COV-2 IGG+IGM SERPL QL IA: 222 INDEX — HIGH
SARS-COV-2 IGG+IGM SERPL QL IA: >250 U/ML — HIGH
SARS-COV-2 IGG+IGM SERPL QL IA: POSITIVE
SARS-COV-2 IGG+IGM SERPL QL IA: POSITIVE
SODIUM SERPL-SCNC: 138 MMOL/L — SIGNIFICANT CHANGE UP (ref 135–146)
VIT B12 SERPL-MCNC: 770 PG/ML — SIGNIFICANT CHANGE UP (ref 232–1245)
WBC # BLD: 5.79 K/UL — SIGNIFICANT CHANGE UP (ref 4.8–10.8)
WBC # FLD AUTO: 5.79 K/UL — SIGNIFICANT CHANGE UP (ref 4.8–10.8)

## 2021-11-13 PROCEDURE — 99233 SBSQ HOSP IP/OBS HIGH 50: CPT

## 2021-11-13 RX ORDER — ACYCLOVIR SODIUM 500 MG
550 VIAL (EA) INTRAVENOUS ONCE
Refills: 0 | Status: COMPLETED | OUTPATIENT
Start: 2021-11-13 | End: 2021-11-13

## 2021-11-13 RX ORDER — CEFTRIAXONE 500 MG/1
1000 INJECTION, POWDER, FOR SOLUTION INTRAMUSCULAR; INTRAVENOUS ONCE
Refills: 0 | Status: COMPLETED | OUTPATIENT
Start: 2021-11-13 | End: 2021-11-13

## 2021-11-13 RX ORDER — CEFTRIAXONE 500 MG/1
INJECTION, POWDER, FOR SOLUTION INTRAMUSCULAR; INTRAVENOUS
Refills: 0 | Status: DISCONTINUED | OUTPATIENT
Start: 2021-11-13 | End: 2021-11-15

## 2021-11-13 RX ORDER — ACYCLOVIR SODIUM 500 MG
VIAL (EA) INTRAVENOUS
Refills: 0 | Status: DISCONTINUED | OUTPATIENT
Start: 2021-11-13 | End: 2021-11-15

## 2021-11-13 RX ORDER — MAGNESIUM SULFATE 500 MG/ML
2 VIAL (ML) INJECTION ONCE
Refills: 0 | Status: COMPLETED | OUTPATIENT
Start: 2021-11-13 | End: 2021-11-13

## 2021-11-13 RX ORDER — CEFTRIAXONE 500 MG/1
1000 INJECTION, POWDER, FOR SOLUTION INTRAMUSCULAR; INTRAVENOUS EVERY 24 HOURS
Refills: 0 | Status: DISCONTINUED | OUTPATIENT
Start: 2021-11-14 | End: 2021-11-15

## 2021-11-13 RX ORDER — MORPHINE SULFATE 50 MG/1
4 CAPSULE, EXTENDED RELEASE ORAL ONCE
Refills: 0 | Status: DISCONTINUED | OUTPATIENT
Start: 2021-11-13 | End: 2021-11-13

## 2021-11-13 RX ORDER — ACYCLOVIR SODIUM 500 MG
550 VIAL (EA) INTRAVENOUS EVERY 8 HOURS
Refills: 0 | Status: DISCONTINUED | OUTPATIENT
Start: 2021-11-13 | End: 2021-11-15

## 2021-11-13 RX ADMIN — Medication 111 MILLIGRAM(S): at 21:30

## 2021-11-13 RX ADMIN — Medication 4 MILLIGRAM(S): at 13:03

## 2021-11-13 RX ADMIN — Medication 16.67 GRAM(S): at 14:12

## 2021-11-13 RX ADMIN — Medication 2: at 08:44

## 2021-11-13 RX ADMIN — Medication 300 MILLIGRAM(S): at 05:10

## 2021-11-13 RX ADMIN — INSULIN GLARGINE 25 UNIT(S): 100 INJECTION, SOLUTION SUBCUTANEOUS at 21:50

## 2021-11-13 RX ADMIN — Medication 111 MILLIGRAM(S): at 19:25

## 2021-11-13 RX ADMIN — CEFTRIAXONE 100 MILLIGRAM(S): 500 INJECTION, POWDER, FOR SOLUTION INTRAMUSCULAR; INTRAVENOUS at 18:40

## 2021-11-13 RX ADMIN — Medication 1: at 12:59

## 2021-11-13 RX ADMIN — Medication 2 MILLIGRAM(S): at 15:40

## 2021-11-13 RX ADMIN — MORPHINE SULFATE 4 MILLIGRAM(S): 50 CAPSULE, EXTENDED RELEASE ORAL at 13:20

## 2021-11-13 RX ADMIN — MORPHINE SULFATE 4 MILLIGRAM(S): 50 CAPSULE, EXTENDED RELEASE ORAL at 12:58

## 2021-11-13 RX ADMIN — ENOXAPARIN SODIUM 40 MILLIGRAM(S): 100 INJECTION SUBCUTANEOUS at 12:58

## 2021-11-13 NOTE — PROGRESS NOTE ADULT - ASSESSMENT
Pt is a 51F with a pmhx of HTN and DM2 presenting with AMS.    #Metabolic encephalopathy - infectious vs neurologic vs drug-induced vs psychiatric - SIRS+ on admission (HR>90, RR>20)  - patient is stuporous and withdrawing to pain, minimally following commands; no nuchal rigidity  - Head imaging (CT/MRI) w no acute intracranial pathology (stable since 6/2021)  - UA negative; EEG w Generalized slowing but no epileptiform activity  - no leukocytosis, ammonia & b12 wnl  - UTox + for benzo - s/p ativan x1  - BCx + for Strep species - f/u final result  - cont vanc for now - f/u ID eval & repeat Cx - may need LP  - f/u Neuro    #Nasopharyngeal Soft Tissue prominence on MRI  - correlate w physical exam    #DMII (A1c 9.9)  w neuropathy  - cont basal bolus insulin - monitor fs, adjust prn  - holding gabapentin given stuporous appearance  - Follows with Dr. Badillo outpatient    #HTN  - cont home amlodipine and lisinopril    #HLD  - cont statin    #Asthma  - albuterol inhaler prn    #Obesity (BMI 40.1)  - outpatient nutrition eval    #Hypomagnesemia  - replete prn    DVT: Lovenox  GI: ppi  Diet: AAT  Activity: IAT

## 2021-11-13 NOTE — CHART NOTE - NSCHARTNOTEFT_GEN_A_CORE
Patient states her name, says "I don't smile" when asked to smile, refuses to hold cup or open mouth for pills  Due for amlodipine PO, will not open mouth for medication  /71, HR 90  May need to change meds to IV until cause of mental status change is figured out

## 2021-11-13 NOTE — PROGRESS NOTE ADULT - SUBJECTIVE AND OBJECTIVE BOX
SUBJECTIVE:    Patient is a 51y old Female who presents with a chief complaint of ams (2021 21:39). Currently admitted to medicine with the primary diagnosis of Altered mental status.    Today is hospital day 2d.         PAST MEDICAL & SURGICAL HISTORY  Diabetes    Hypertension    No significant past surgical history      SOCIAL HISTORY:  Negative for smoking/alcohol/drug use.     ALLERGIES:  penicillin (Rash)    MEDICATIONS:  STANDING MEDICATIONS  amLODIPine   Tablet 10 milliGRAM(s) Oral daily  dextrose 40% Gel 15 Gram(s) Oral once  dextrose 5%. 1000 milliLiter(s) IV Continuous <Continuous>  dextrose 5%. 1000 milliLiter(s) IV Continuous <Continuous>  dextrose 50% Injectable 25 Gram(s) IV Push once  dextrose 50% Injectable 12.5 Gram(s) IV Push once  dextrose 50% Injectable 25 Gram(s) IV Push once  enoxaparin Injectable 40 milliGRAM(s) SubCutaneous daily  glucagon  Injectable 1 milliGRAM(s) IntraMuscular once  insulin glargine Injectable (LANTUS) 25 Unit(s) SubCutaneous at bedtime  insulin lispro (ADMELOG) corrective regimen sliding scale   SubCutaneous three times a day before meals  insulin lispro Injectable (ADMELOG) 8 Unit(s) SubCutaneous three times a day before meals  lisinopril 40 milliGRAM(s) Oral daily    PRN MEDICATIONS  ALBUTerol    90 MICROgram(s) HFA Inhaler 2 Puff(s) Inhalation every 6 hours PRN    VITALS:  T(C): 36.1 (2021 05:58), Max: 36.7 (2021 19:10)  T(F): 97 (2021 05:58), Max: 98 (2021 19:10)  HR: 95 (2021 05:58) (90 - 95)  BP: 160/71 (2021 05:58) (120/58 - 160/71)  BP(mean): 83 (2021 22:15) (83 - 83)  RR: 18 (2021 05:58) (18 - 18)  SpO2: 96% (2021 05:17) (96% - 100%)    LABS:               13.5   5.79  )-----------( 278      ( 2021 05:41 )             41.5     138  |  104  |  9<L>  ----------------------------<  190<H>  4.2   |  18  |  0.6<L>    Ca    9.3      2021 05:41  Phos  3.5       Mg     1.6         TPro  7.7  /  Alb  4.2  /  TBili  0.8  /  DBili  x   /  AST  14  /  ALT  11  /  AlkPhos  112       CARDIAC MARKERS ( 2021 14:10 )  x     / <0.01 ng/mL / x     / x     / x                               Urinalysis Basic - ( 2021 17:15 )  Color: Light Yellow / Appearance: Clear / S.017 / pH: x  Gluc: x / Ketone: Negative  / Bili: Negative / Urobili: <2 mg/dL   Blood: x / Protein: 30 mg/dL / Nitrite: Negative   Leuk Esterase: Negative / RBC: 1 /HPF / WBC 1 /HPF   Sq Epi: x / Non Sq Epi: 1 /HPF / Bacteria: Negative    Culture - Blood (collected 21 @ 17:58)  Source: .Blood Blood-Peripheral  Preliminary Report (21 @ 23:01):    No growth to date.    Culture - Blood (collected 21 @ 17:56)  Source: .Blood Blood-Peripheral  Gram Stain (21 @ 13:03):    Growth in aerobic bottle: Gram Positive Cocci in Clusters    and Gram Positive Cocci in Pairs and Chains  Preliminary Report (21 @ 13:03):    Growth in aerobic bottle: Gram Positive Cocci in Clusters    and Gram Positive Cocci in Pairs and Chains    ***Blood Panel PCR results on this specimen are available    approximately 3 hours after the Gram stain result.***    Gram stain, PCR, and/or culture results may not always    correspond due to difference in methodologies.    ************************************************************    This PCR assay was performed by multiplex PCR. This    Assay tests for 66 bacterial and resistance gene targets.    Please refer to the [facility] test directory    at [email or web address] for details.  Organism: Blood Culture PCR (21 @ 15:42)  Organism: Blood Culture PCR (21 @ 15:42)      -  Streptococcus sp. (Not Grp A, B or S pneumoniae): Detec      Method Type: PCR    Culture - Urine (collected 21 @ 17:15)  Source: Clean Catch Clean Catch (Midstream)  Final Report (21 @ 18:30):    No growth    Lactate, Blood: 1.7 mmol/L (21 @ 17:58)     RADIOLOGY:    < from: Xray Chest 1 View- PORTABLE-Urgent (21 @ 14:53) >  Impression:    No radiographic evidence of acute cardiopulmonary disease.    < end of copied text >    M< from: MR Head No Cont (21 @ 08:56) >  IMPRESSION:    1.  No acute intracranial abnormality. Stable exam since 6/10/2021.    2.  Stable mild chronic microvascular changes.    3.  Prominence of the posterior nasopharyngeal soft tissues, recommend correlation with direct visualization.    < end of copied text >    < from: EEG (21 @ 22:30) >    IMPRESSION:  This is a borderline abnormal EEG due to the lack of optimal organization and borderline generalized slowing      CLINICAL CORRELATION  Consistent with borderline diffuse cerebral electrophysiological dysfunction perhaps secondary to toxic metabolic causes clinical correlation is recommended    < end of copied text >        PHYSICAL EXAM:  GEN: Stuporous; obese  LUNGS: Clear to auscultation bilaterally   HEART: S1/S2 present. RRR.   ABD: Soft, non-tender, non-distended. Bowel sounds present  EXT: NC/NC/NE/2+PP/CHENG/Skin Intact.   SKIN: intact

## 2021-11-13 NOTE — PROGRESS NOTE ADULT - ASSESSMENT
Pt is a 51F with PMH of HTN and DM2 presenting with AMS.    A/P:   Acute Metabolic encephalopathy: infectious vs neurologic vs drug-induced vs psychiatric - SIRS+ on admission (HR>90, RR>20)  to rule Acute Bacterial Meningitis vs Encephalitis.   Neuro exam patient is awake but confused, moves all extremities, shaking her hands and lips, noted with nuchal rigidity.   Head CT showed   Brain MRI No acute intracranial abnormality. Stable mild chronic microvascular changes.  Attempt for LP today failed, patient is morbidly obese, unable to reach IR for procedure.    Start on Rocephin IV, Acyclovir.   ID consult. Neurology follow up.     Nasopharyngeal Soft Tissue prominence on MRI    DMII (A1c 9.9)  w neuropathy  Continue Lantus and lispro sliding scale.     HTN: Continue Amlodipine and lisinopril.  Asthma albuterol inhaler prn    Morbid Obesity (BMI 40.1)  outpatient nutrition eval    DVT: Lovenox  #Progress Note Handoff:  Pending (specify):  improving mental status.   Family discussion:  Disposition: Home.

## 2021-11-13 NOTE — CHART NOTE - NSCHARTNOTEFT_GEN_A_CORE
Unsuccessful attempt was made at an LP. Unfortunately the needle size was not adequate for the patient's body habitus. Will broaden antibiotic coverage and add acyclovir in hopes of clinical improvement. If no improvement, will contact IR for LP on Monday. Unsuccessful attempt was made at an LP. Unfortunately the needle size was not adequate for the patient's body habitus. Will broaden antibiotic coverage and add acyclovir in hopes of clinical improvement. If no improvement, will contact IR for LP on Monday.    **Was able to obtain a longer needle, however, still unsuccessful. Will proceed w the plan above.**

## 2021-11-13 NOTE — PROGRESS NOTE ADULT - SUBJECTIVE AND OBJECTIVE BOX
RENÉ REIS  51y  Female      Patient is a 51y old  Female who presents with a chief complaint of ams (13 Nov 2021 11:35)      INTERVAL HPI/OVERNIGHT EVENTS:  She is still altered, lethargic.   Vital Signs Last 24 Hrs  T(C): 37.4 (13 Nov 2021 12:33), Max: 37.4 (13 Nov 2021 12:33)  T(F): 99.3 (13 Nov 2021 12:33), Max: 99.3 (13 Nov 2021 12:33)  HR: 122 (13 Nov 2021 12:33) (90 - 122)  BP: 137/92 (13 Nov 2021 12:33) (120/58 - 160/71)  BP(mean): 83 (12 Nov 2021 22:15) (83 - 83)  RR: 18 (13 Nov 2021 12:33) (18 - 18)  SpO2: 96% (13 Nov 2021 05:17) (96% - 100%)            Consultant(s) Notes Reviewed:  [x ] YES  [ ] NO          MEDICATIONS  (STANDING):  acyclovir IVPB      acyclovir IVPB 550 milliGRAM(s) IV Intermittent once  acyclovir IVPB 550 milliGRAM(s) IV Intermittent every 8 hours  amLODIPine   Tablet 10 milliGRAM(s) Oral daily  cefTRIAXone   IVPB      dextrose 40% Gel 15 Gram(s) Oral once  dextrose 5%. 1000 milliLiter(s) (50 mL/Hr) IV Continuous <Continuous>  dextrose 5%. 1000 milliLiter(s) (100 mL/Hr) IV Continuous <Continuous>  dextrose 50% Injectable 25 Gram(s) IV Push once  dextrose 50% Injectable 12.5 Gram(s) IV Push once  dextrose 50% Injectable 25 Gram(s) IV Push once  enoxaparin Injectable 40 milliGRAM(s) SubCutaneous daily  glucagon  Injectable 1 milliGRAM(s) IntraMuscular once  insulin glargine Injectable (LANTUS) 25 Unit(s) SubCutaneous at bedtime  insulin lispro (ADMELOG) corrective regimen sliding scale   SubCutaneous three times a day before meals  insulin lispro Injectable (ADMELOG) 8 Unit(s) SubCutaneous three times a day before meals  lisinopril 40 milliGRAM(s) Oral daily    MEDICATIONS  (PRN):  ALBUTerol    90 MICROgram(s) HFA Inhaler 2 Puff(s) Inhalation every 6 hours PRN Bronchospasm      LABS                          13.5   5.79  )-----------( 278      ( 13 Nov 2021 05:41 )             41.5     11-13    138  |  104  |  9<L>  ----------------------------<  190<H>  4.2   |  18  |  0.6<L>    Ca    9.3      13 Nov 2021 05:41  Phos  3.5     11-12  Mg     1.6     11-13    TPro  7.7  /  Alb  4.2  /  TBili  0.8  /  DBili  x   /  AST  14  /  ALT  11  /  AlkPhos  112  11-12          Lactate Trend  11-11 @ 17:58 Lactate:1.7         CAPILLARY BLOOD GLUCOSE      POCT Blood Glucose.: 187 mg/dL (13 Nov 2021 12:26)      Culture - Blood (collected 11-11-21 @ 17:58)  Source: .Blood Blood-Peripheral  Preliminary Report (11-12-21 @ 23:01):    No growth to date.    Culture - Blood (collected 11-11-21 @ 17:56)  Source: .Blood Blood-Peripheral  Gram Stain (11-12-21 @ 13:03):    Growth in aerobic bottle: Gram Positive Cocci in Clusters    and Gram Positive Cocci in Pairs and Chains  Preliminary Report (11-13-21 @ 14:37):    Growth in aerobic bottle: Gram Positive Cocci in Clusters    Growth in aerobic bottle: Streptococcus mitis/oralis group    Alpha hemolytic strep    (not Strep. pneumoniae or Enterococcus)    Single set isolate, possible contaminant. Contact    Microbiology if susceptibility testing clinically    indicated.    ***Blood Panel PCR results on this specimen are available    approximately 3 hours after the Gram stain result.***    Gram stain, PCR, and/or culture results may not always    correspond due to difference in methodologies.    ************************************************************    This PCR assay was performed by multiplex PCR. This    Assay tests for 66 bacterial and resistance gene targets.    Please refer to the [facility] test directory    at [email or web address] for details.  Organism: Blood Culture PCR (11-12-21 @ 15:42)  Organism: Blood Culture PCR (11-12-21 @ 15:42)      -  Streptococcus sp. (Not Grp A, B or S pneumoniae): Detec      Method Type: PCR    Culture - Urine (collected 11-11-21 @ 17:15)  Source: Clean Catch Clean Catch (Midstream)  Final Report (11-12-21 @ 18:30):    No growth        RADIOLOGY & ADDITIONAL TESTS:    Imaging Personally Reviewed:  [ ] YES  [ ] NO    HEALTH ISSUES - PROBLEM Dx:          PHYSICAL EXAM:  GENERAL: NAD, well-developed.  HEAD:  Atraumatic, Normocephalic.  EYES: EOMI, PERRLA, conjunctiva and sclera clear.  NECK: Supple, No JVD.  CHEST/LUNG: Clear to auscultation bilaterally; No wheeze.  HEART: Regular rate and rhythm; S1 S2.   ABDOMEN: Soft, Nontender, Nondistended; Bowel sounds present.  EXTREMITIES:  2+ Peripheral Pulses, No clubbing, cyanosis, or edema.  PSYCH: AAOx3.  NEUROLOGY: non-focal.  SKIN: No rashes or lesions.

## 2021-11-14 LAB
ALBUMIN SERPL ELPH-MCNC: 4 G/DL — SIGNIFICANT CHANGE UP (ref 3.5–5.2)
ALP SERPL-CCNC: 106 U/L — SIGNIFICANT CHANGE UP (ref 30–115)
ALT FLD-CCNC: 12 U/L — SIGNIFICANT CHANGE UP (ref 0–41)
ANION GAP SERPL CALC-SCNC: 17 MMOL/L — HIGH (ref 7–14)
AST SERPL-CCNC: 14 U/L — SIGNIFICANT CHANGE UP (ref 0–41)
BILIRUB SERPL-MCNC: 1 MG/DL — SIGNIFICANT CHANGE UP (ref 0.2–1.2)
BUN SERPL-MCNC: 12 MG/DL — SIGNIFICANT CHANGE UP (ref 10–20)
CALCIUM SERPL-MCNC: 9.7 MG/DL — SIGNIFICANT CHANGE UP (ref 8.5–10.1)
CHLORIDE SERPL-SCNC: 106 MMOL/L — SIGNIFICANT CHANGE UP (ref 98–110)
CO2 SERPL-SCNC: 18 MMOL/L — SIGNIFICANT CHANGE UP (ref 17–32)
CREAT SERPL-MCNC: 0.7 MG/DL — SIGNIFICANT CHANGE UP (ref 0.7–1.5)
CULTURE RESULTS: SIGNIFICANT CHANGE UP
GLUCOSE SERPL-MCNC: 136 MG/DL — HIGH (ref 70–99)
HAV IGM SER-ACNC: SIGNIFICANT CHANGE UP
HBV CORE IGM SER-ACNC: SIGNIFICANT CHANGE UP
HBV SURFACE AG SER-ACNC: SIGNIFICANT CHANGE UP
HCT VFR BLD CALC: 43.3 % — SIGNIFICANT CHANGE UP (ref 37–47)
HCV AB S/CO SERPL IA: 0.11 S/CO — SIGNIFICANT CHANGE UP (ref 0–0.99)
HCV AB SERPL-IMP: SIGNIFICANT CHANGE UP
HGB BLD-MCNC: 14 G/DL — SIGNIFICANT CHANGE UP (ref 12–16)
HIV 1+2 AB+HIV1 P24 AG SERPL QL IA: SIGNIFICANT CHANGE UP
MCHC RBC-ENTMCNC: 25.5 PG — LOW (ref 27–31)
MCHC RBC-ENTMCNC: 32.3 G/DL — SIGNIFICANT CHANGE UP (ref 32–37)
MCV RBC AUTO: 79 FL — LOW (ref 81–99)
NRBC # BLD: 0 /100 WBCS — SIGNIFICANT CHANGE UP (ref 0–0)
ORGANISM # SPEC MICROSCOPIC CNT: SIGNIFICANT CHANGE UP
ORGANISM # SPEC MICROSCOPIC CNT: SIGNIFICANT CHANGE UP
PLATELET # BLD AUTO: 276 K/UL — SIGNIFICANT CHANGE UP (ref 130–400)
POTASSIUM SERPL-MCNC: 4.3 MMOL/L — SIGNIFICANT CHANGE UP (ref 3.5–5)
POTASSIUM SERPL-SCNC: 4.3 MMOL/L — SIGNIFICANT CHANGE UP (ref 3.5–5)
PROCALCITONIN SERPL-MCNC: 0.05 NG/ML — SIGNIFICANT CHANGE UP (ref 0.02–0.1)
PROT SERPL-MCNC: 7.3 G/DL — SIGNIFICANT CHANGE UP (ref 6–8)
RBC # BLD: 5.48 M/UL — HIGH (ref 4.2–5.4)
RBC # FLD: 12.6 % — SIGNIFICANT CHANGE UP (ref 11.5–14.5)
SODIUM SERPL-SCNC: 141 MMOL/L — SIGNIFICANT CHANGE UP (ref 135–146)
SPECIMEN SOURCE: SIGNIFICANT CHANGE UP
T PALLIDUM AB TITR SER: NEGATIVE — SIGNIFICANT CHANGE UP
TROPONIN T SERPL-MCNC: <0.01 NG/ML — SIGNIFICANT CHANGE UP
WBC # BLD: 7.2 K/UL — SIGNIFICANT CHANGE UP (ref 4.8–10.8)
WBC # FLD AUTO: 7.2 K/UL — SIGNIFICANT CHANGE UP (ref 4.8–10.8)

## 2021-11-14 PROCEDURE — 93010 ELECTROCARDIOGRAM REPORT: CPT | Mod: 76

## 2021-11-14 PROCEDURE — 99232 SBSQ HOSP IP/OBS MODERATE 35: CPT

## 2021-11-14 RX ORDER — MORPHINE SULFATE 50 MG/1
4 CAPSULE, EXTENDED RELEASE ORAL ONCE
Refills: 0 | Status: DISCONTINUED | OUTPATIENT
Start: 2021-11-14 | End: 2021-11-14

## 2021-11-14 RX ADMIN — CEFTRIAXONE 100 MILLIGRAM(S): 500 INJECTION, POWDER, FOR SOLUTION INTRAMUSCULAR; INTRAVENOUS at 17:30

## 2021-11-14 RX ADMIN — Medication 111 MILLIGRAM(S): at 13:26

## 2021-11-14 RX ADMIN — MORPHINE SULFATE 4 MILLIGRAM(S): 50 CAPSULE, EXTENDED RELEASE ORAL at 07:30

## 2021-11-14 RX ADMIN — MORPHINE SULFATE 4 MILLIGRAM(S): 50 CAPSULE, EXTENDED RELEASE ORAL at 07:15

## 2021-11-14 RX ADMIN — ENOXAPARIN SODIUM 40 MILLIGRAM(S): 100 INJECTION SUBCUTANEOUS at 13:14

## 2021-11-14 RX ADMIN — Medication 111 MILLIGRAM(S): at 21:57

## 2021-11-14 RX ADMIN — MORPHINE SULFATE 4 MILLIGRAM(S): 50 CAPSULE, EXTENDED RELEASE ORAL at 13:30

## 2021-11-14 RX ADMIN — Medication 111 MILLIGRAM(S): at 05:50

## 2021-11-14 RX ADMIN — MORPHINE SULFATE 4 MILLIGRAM(S): 50 CAPSULE, EXTENDED RELEASE ORAL at 13:14

## 2021-11-14 RX ADMIN — Medication 1: at 08:23

## 2021-11-14 RX ADMIN — INSULIN GLARGINE 25 UNIT(S): 100 INJECTION, SOLUTION SUBCUTANEOUS at 21:56

## 2021-11-14 NOTE — CONSULT NOTE ADULT - ASSESSMENT
ASSESSMENT  51F with a pmhx of HTN and DM2 presenting with AMS.    IMPRESSION  #AMS  #Strep Mitis Bacteremia  - Blood Cx 11/11 + in 1 of 2 sets  #DM II  #Obesity BMI (kg/m2): 40.1  #Abx allergy: penicillin (Rash)    RECOMMENDATIONS  This is a preliminary incomplete pended note, all final recommendations to follow after interview and examination of the patient.    Please call or message on Microsoft Teams if with any questions.  Spectra [phone]       ASSESSMENT  51F with a pmhx of HTN and DM2 presenting with AMS.    IMPRESSION  #AMS  - MR Head No Cont (11.12.21 @ 08:56): No acute intracranial abnormality. Stable exam since 6/10/2021.  Stable mild chronic microvascular changes. Prominence of the posterior nasopharyngeal soft tissues, recommend correlation with direct visualization.  - Syphillis negative  - HIV negative    #Strep Mitis Bacteremia  - Blood Cx 11/11 + in 1 of 2 sets  - Procalcitonin, Serum: 0.05 (11.13.21 @ 19:18)    #DM II  #Obesity BMI (kg/m2): 40.1  #Abx allergy: penicillin (Rash)    RECOMMENDATIONS  - suspect Strep mitis bacteremia is contaminant as without leukocytosis or fevers- negative procalcitonin   This is a preliminary incomplete pended note, all final recommendations to follow after interview and examination of the patient.    Please call or message on Microsoft Teams if with any questions.  Spectra [phone]       ASSESSMENT  51F with a pmhx of HTN and DM2 presenting with AMS.    IMPRESSION  #AMS  - MR Head No Cont (11.12.21 @ 08:56): No acute intracranial abnormality. Stable exam since 6/10/2021.  Stable mild chronic microvascular changes. Prominence of the posterior nasopharyngeal soft tissues, recommend correlation with direct visualization.  - Syphillis negative  - HIV negative    #Strep Mitis Bacteremia  - Blood Cx 11/11 + in 1 of 2 sets  - Procalcitonin, Serum: 0.05 (11.13.21 @ 19:18)    #DM II  #Obesity BMI (kg/m2): 40.1  #Abx allergy: penicillin (Rash)    RECOMMENDATIONS  - suspect Strep mitis bacteremia is contaminant as without leukocytosis or fevers- negative procalcitonin   - check ESR/CRP  - check CK   - would expect at least fevers/WBC if bacterial meningitis, but currently on antibiotics since admission  - can continue ceftriaxone and ACV for now while awaiting for LP   - if having further fevers -- add vancomycin and change ceftriaxone to meropenem 2g q 8 hours for bacterial meningitis coverage (including Listeria)    Please call or message on Microsoft Teams if with any questions.  Spectra [phone]       ASSESSMENT  51F with a pmhx of HTN and DM2 presenting with AMS.    IMPRESSION  #AMS  - MR Head No Cont (11.12.21 @ 08:56): No acute intracranial abnormality. Stable exam since 6/10/2021.  Stable mild chronic microvascular changes. Prominence of the posterior nasopharyngeal soft tissues, recommend correlation with direct visualization.  - Syphillis negative  - HIV negative    #Strep Mitis Bacteremia  - Blood Cx 11/11 + in 1 of 2 sets  - Procalcitonin, Serum: 0.05 (11.13.21 @ 19:18)    #DM II  #Obesity BMI (kg/m2): 40.1  #Abx allergy: penicillin (Rash)    RECOMMENDATIONS  - suspect Strep mitis bacteremia is contaminant as without leukocytosis or fevers- negative procalcitonin   - check ESR/CRP  - check CK   - Check TSH   - would expect at least fevers/WBC if bacterial meningitis, but currently on antibiotics since admission  - can continue ceftriaxone and ACV for now while awaiting for LP   - if having further fevers -- add vancomycin and change ceftriaxone to meropenem 2g q 8 hours for bacterial meningitis coverage (including Listeria)    Please call or message on Microsoft Teams if with any questions.  Spectra [phone]

## 2021-11-14 NOTE — PROGRESS NOTE ADULT - ASSESSMENT
51 year old female with PMH of HTN and DM2 was brought to ED for sudden change in her mental status.     A/P:   Altered mental Status: suspect psych disorder.   Possible Acute Metabolic encephalopathy vs Psychosis.   Encephalopathy could be infectious, meningitis.   Less likely Acute Bacterial Meningitis vs Encephalitis. No fever, leukocytosis, no improvement despite IV antibiotics.   Neuro exam patient is awake but confused, moves all extremities, shaking her hands and lips, noted with nuchal rigidity.   Head CT showed no acute pathology.   Brain MRI No acute intracranial abnormality. Stable mild chronic microvascular changes.  One set of blood cx was growing Streptococcus mitis, possibly contaminated.   Attempt for LP 11/13 failed, patient is morbidly obese, unable to reach IR for procedure.    Continue on Rocephin IV, Acyclovir.   ID consult appreciated. Neurology follow up.   Possible LP by Neuroradiology tomorrow if she stayed confused.   Psych evaluation if no medical reason for AMS.     Nasopharyngeal Soft Tissue prominence on MRI    DMII (A1c 9.9)  w neuropathy  Continue Lantus and lispro sliding scale.     HTN: Continue Amlodipine and lisinopril.  Asthma albuterol inhaler prn    Morbid Obesity (BMI 40.1)  outpatient nutrition eval    DVT: Lovenox  #Progress Note Handoff:  Pending (specify):  improving mental status. Possibly LP, neurology follow up, repeat blood cx,  Family discussion:  Disposition: Home.

## 2021-11-14 NOTE — CONSULT NOTE ADULT - SUBJECTIVE AND OBJECTIVE BOX
RENÉ REIS  51y, Female  Allergy: penicillin (Rash)      CHIEF COMPLAINT: ams (13 Nov 2021 17:10)      LOS  3d    HPI:  Pt is a 51F with a pmhx of HTN and DM2 presenting with AMS. patient was non verbal non responsive at the time of my examine, collateral information obtained from ED chart and sister. Per sister at bedside, pt was in usual state of health, A&Ox3, walking/talking this morning then went back to bed. Her sister briefly left the house and when she returned, pt was found not responding to verbal stimuli or moving but in no distress, which lasted until EMS arrived when she became anxious appearing, flapping hands and breathing quickly, crying out but still not speaking. No history of seizures. Those symptoms have continued until arrival in ED. Sister denies drug or alcohol use, change in medications, any trauma, or any hx of psychiatric disease. She does note that pt had an episode in which she appeared exactly the same which self resolved approximately 1d into an admission.    at the time of the examination, patient is obtunded, not withdrawing to pain, not following commands, no nuchal rigidity.     (11 Nov 2021 20:04)      INFECTIOUS DISEASE HISTORY:  History as above.   Presents for AMS  WBC Count: 7.33 K/uL (11.11.21 @ 14:10) on admissions.   No fevers.   LP Attemted but unsuccessfull       PAST MEDICAL & SURGICAL HISTORY:  Diabetes    Hypertension    No significant past surgical history        FAMILY HISTORY  No pertinent family history in first degree relatives        SOCIAL HISTORY  Social History:  Family Denies smoking, alcohol or illicit drug use. Has several social stressors, has an autistic kid that she has to take care at home (09 Jun 2021 22:03)        ROS  General: Denies rigors, nightsweats  HEENT: Denies headache, rhinorrhea, sore throat, eye pain  CV: Denies CP, palpitations  PULM: Denies wheezing, hemoptysis  GI: Denies hematemesis, hematochezia, melena  : Denies discharge, hematuria  MSK: Denies arthralgias, myalgias  SKIN: Denies rash, lesions  NEURO: Denies paresthesias, weakness  PSYCH: Denies depression, anxiety    VITALS:  T(F): 98.4, Max: 99.3 (11-13-21 @ 12:33)  HR: 103  BP: 126/78  RR: 18Vital Signs Last 24 Hrs  T(C): 36.9 (14 Nov 2021 04:50), Max: 37.4 (13 Nov 2021 12:33)  T(F): 98.4 (14 Nov 2021 04:50), Max: 99.3 (13 Nov 2021 12:33)  HR: 103 (14 Nov 2021 06:10) (103 - 122)  BP: 126/78 (14 Nov 2021 06:10) (104/50 - 137/92)  BP(mean): --  RR: 18 (14 Nov 2021 04:50) (18 - 18)  SpO2: --    PHYSICAL EXAM:  Gen: NAD, resting in bed  HEENT: Normocephalic, atraumatic  Neck: supple, no lymphadenopathy  CV: Regular rate & regular rhythm  Lungs: decreased BS at bases, no fremitus  Abdomen: Soft, BS present  Ext: Warm, well perfused  Neuro: non focal, awake  Skin: no rash, no erythema  Lines: no phlebitis    TESTS & MEASUREMENTS:                        13.5   5.79  )-----------( 278      ( 13 Nov 2021 05:41 )             41.5     11-13    138  |  104  |  9<L>  ----------------------------<  190<H>  4.2   |  18  |  0.6<L>    Ca    9.3      13 Nov 2021 05:41  Phos  3.5     11-12  Mg     1.6     11-13    TPro  7.7  /  Alb  4.2  /  TBili  0.8  /  DBili  x   /  AST  14  /  ALT  11  /  AlkPhos  112  11-12      LIVER FUNCTIONS - ( 12 Nov 2021 11:27 )  Alb: 4.2 g/dL / Pro: 7.7 g/dL / ALK PHOS: 112 U/L / ALT: 11 U/L / AST: 14 U/L / GGT: x               Culture - Blood (collected 11-11-21 @ 17:58)  Source: .Blood Blood-Peripheral  Preliminary Report (11-12-21 @ 23:01):    No growth to date.    Culture - Blood (collected 11-11-21 @ 17:56)  Source: .Blood Blood-Peripheral  Gram Stain (11-12-21 @ 13:03):    Growth in aerobic bottle: Gram Positive Cocci in Clusters    and Gram Positive Cocci in Pairs and Chains  Preliminary Report (11-13-21 @ 14:37):    Growth in aerobic bottle: Gram Positive Cocci in Clusters    Growth in aerobic bottle: Streptococcus mitis/oralis group    Alpha hemolytic strep    (not Strep. pneumoniae or Enterococcus)    Single set isolate, possible contaminant. Contact    Microbiology if susceptibility testing clinically    indicated.    ***Blood Panel PCR results on this specimen are available    approximately 3 hours after the Gram stain result.***    Gram stain, PCR, and/or culture results may not always    correspond due to difference in methodologies.    ************************************************************    This PCR assay was performed by multiplex PCR. This    Assay tests for 66 bacterial and resistance gene targets.    Please refer to the [facility] test directory    at [email or web address] for details.  Organism: Blood Culture PCR (11-12-21 @ 15:42)  Organism: Blood Culture PCR (11-12-21 @ 15:42)      -  Streptococcus sp. (Not Grp A, B or S pneumoniae): Detec      Method Type: PCR    Culture - Urine (collected 11-11-21 @ 17:15)  Source: Clean Catch Clean Catch (Midstream)  Final Report (11-12-21 @ 18:30):    No growth        Lactate, Blood: 1.7 mmol/L (11-11-21 @ 17:58)  Blood Gas Venous - Lactate: 1.70 mmol/L (11-11-21 @ 15:33)      INFECTIOUS DISEASES TESTING  Procalcitonin, Serum: 0.05 ng/mL (11-13-21 @ 19:18)  HIV-1/2 Combo Result: Nonreact (11-13-21 @ 19:18)  Hepatitis B Surface Antigen: Nonreact (11-13-21 @ 19:18)  Hepatitis C Virus Interpretation: Nonreact (11-13-21 @ 19:18)  COVID-19 PCR: NotDetec (11-11-21 @ 16:00)  COVID-19 PCR: NotDetec (06-09-21 @ 19:18)      RADIOLOGY & ADDITIONAL TESTS:  I have personally reviewed the last Chest xray  CXR  Xray Chest 1 View- PORTABLE-Urgent:   EXAM:  XR CHEST PORTABLE URGENT 1V            PROCEDURE DATE:  11/11/2021            INTERPRETATION:  Clinical History/Reason for Exam:  DKA, eval provoking infection    Comparison: Chest x-ray 6/9/2021.      Findings:    Technique/Positioning:  Frontal view the chest.    Support devices:  none    Cardiac/mediastinum/hilum: Unremarkable    Lung parenchyma/ Pleura: No focal parenchymal opacities, effusion or pneumothorax is present.      Skeleton/soft tissues: No focal skeletal lesions are identified.      Impression:    No radiographic evidence of acute cardiopulmonary disease.    --- End of Report ---              ANIYA KNOX MD; Attending Radiologist  This document has been electronically signed. Nov 11 2021  3:37PM (11-11-21 @ 14:53)      CT      CARDIOLOGY TESTING  12 Lead ECG:   Ventricular Rate 108 BPM    Atrial Rate 108 BPM    P-R Interval 166 ms    QRS Duration 74 ms    Q-T Interval 370 ms    QTC Calculation(Bazett) 495 ms    P Axis 51 degrees    R Axis 22 degrees    T Axis 33 degrees    Diagnosis Line Sinus tachycardia  Otherwise normal ECG    Confirmed by Iris Azar MD ([id number]) on 11/13/2021 1:57:26 PM (11-12-21 @ 13:59)  12 Lead ECG:   Ventricular Rate 115 BPM    Atrial Rate 115 BPM    P-R Interval 156 ms    QRS Duration 76 ms    Q-T Interval 324 ms    QTC Calculation(Bazett) 448 ms    P Axis 48 degrees    R Axis -4 degrees    T Axis 45 degrees    Diagnosis Line Sinus tachycardia  Otherwise normal ECG    Confirmed by Abilio Connor ([id number]) on 11/11/2021 6:03:15 PM (11-11-21 @ 14:55)      MEDICATIONS  acyclovir IVPB     acyclovir IVPB 550 IV Intermittent every 8 hours  amLODIPine   Tablet 10 Oral daily  cefTRIAXone   IVPB 1000 IV Intermittent every 24 hours  cefTRIAXone   IVPB     dextrose 40% Gel 15 Oral once  dextrose 5%. 1000 IV Continuous <Continuous>  dextrose 5%. 1000 IV Continuous <Continuous>  dextrose 50% Injectable 25 IV Push once  dextrose 50% Injectable 12.5 IV Push once  dextrose 50% Injectable 25 IV Push once  enoxaparin Injectable 40 SubCutaneous daily  glucagon  Injectable 1 IntraMuscular once  insulin glargine Injectable (LANTUS) 25 SubCutaneous at bedtime  insulin lispro (ADMELOG) corrective regimen sliding scale  SubCutaneous three times a day before meals  insulin lispro Injectable (ADMELOG) 8 SubCutaneous three times a day before meals  lisinopril 40 Oral daily      Weight  Weight (kg): 109.316 (11-11-21 @ 18:59)    ANTIBIOTICS:  acyclovir IVPB      acyclovir IVPB 550 milliGRAM(s) IV Intermittent every 8 hours  cefTRIAXone   IVPB 1000 milliGRAM(s) IV Intermittent every 24 hours  cefTRIAXone   IVPB          ALLERGIES:  penicillin (Rash)     RENÉ REIS  51y, Female  Allergy: penicillin (Rash)      CHIEF COMPLAINT: ams (13 Nov 2021 17:10)      LOS  3d    HPI:  Pt is a 51F with a pmhx of HTN and DM2 presenting with AMS. patient was non verbal non responsive at the time of my examine, collateral information obtained from ED chart and sister. Per sister at bedside, pt was in usual state of health, A&Ox3, walking/talking this morning then went back to bed. Her sister briefly left the house and when she returned, pt was found not responding to verbal stimuli or moving but in no distress, which lasted until EMS arrived when she became anxious appearing, flapping hands and breathing quickly, crying out but still not speaking. No history of seizures. Those symptoms have continued until arrival in ED. Sister denies drug or alcohol use, change in medications, any trauma, or any hx of psychiatric disease. She does note that pt had an episode in which she appeared exactly the same which self resolved approximately 1d into an admission.    at the time of the examination, patient is obtunded, not withdrawing to pain, not following commands, no nuchal rigidity.     (11 Nov 2021 20:04)      INFECTIOUS DISEASE HISTORY:  History as above.   Presents for AMS  WBC Count: 7.33 K/uL (11.11.21 @ 14:10) on admissions.   No fevers.   LP Attemted but unsuccessful.  She was sleeping at initial encounter; When awoken, started to have hand/finger trembling.     PAST MEDICAL & SURGICAL HISTORY:  Diabetes    Hypertension    No significant past surgical history        FAMILY HISTORY  No pertinent family history in first degree relatives        SOCIAL HISTORY  Social History:  Family Denies smoking, alcohol or illicit drug use. Has several social stressors, has an autistic kid that she has to take care at home (09 Jun 2021 22:03)        ROS  unable to obtain history secondary to patient's mental status    VITALS:  T(F): 98.4, Max: 99.3 (11-13-21 @ 12:33)  HR: 103  BP: 126/78  RR: 18Vital Signs Last 24 Hrs  T(C): 36.9 (14 Nov 2021 04:50), Max: 37.4 (13 Nov 2021 12:33)  T(F): 98.4 (14 Nov 2021 04:50), Max: 99.3 (13 Nov 2021 12:33)  HR: 103 (14 Nov 2021 06:10) (103 - 122)  BP: 126/78 (14 Nov 2021 06:10) (104/50 - 137/92)  BP(mean): --  RR: 18 (14 Nov 2021 04:50) (18 - 18)  SpO2: --    PHYSICAL EXAM:  Gen: NAD  HEENT: Normocephalic, atraumatic  Neck: supple, no lymphadenopathy  CV: Regular rate & regular rhythm  Lungs: decreased BS at bases, no fremitus  Abdomen: Soft, BS present  Ext: Warm, well perfused  Neuro: hand tremor - difficult to assess for meningeal signs   Skin: no rash, no erythema  Lines: no phlebitis    TESTS & MEASUREMENTS:                        13.5   5.79  )-----------( 278      ( 13 Nov 2021 05:41 )             41.5     11-13    138  |  104  |  9<L>  ----------------------------<  190<H>  4.2   |  18  |  0.6<L>    Ca    9.3      13 Nov 2021 05:41  Phos  3.5     11-12  Mg     1.6     11-13    TPro  7.7  /  Alb  4.2  /  TBili  0.8  /  DBili  x   /  AST  14  /  ALT  11  /  AlkPhos  112  11-12      LIVER FUNCTIONS - ( 12 Nov 2021 11:27 )  Alb: 4.2 g/dL / Pro: 7.7 g/dL / ALK PHOS: 112 U/L / ALT: 11 U/L / AST: 14 U/L / GGT: x               Culture - Blood (collected 11-11-21 @ 17:58)  Source: .Blood Blood-Peripheral  Preliminary Report (11-12-21 @ 23:01):    No growth to date.    Culture - Blood (collected 11-11-21 @ 17:56)  Source: .Blood Blood-Peripheral  Gram Stain (11-12-21 @ 13:03):    Growth in aerobic bottle: Gram Positive Cocci in Clusters    and Gram Positive Cocci in Pairs and Chains  Preliminary Report (11-13-21 @ 14:37):    Growth in aerobic bottle: Gram Positive Cocci in Clusters    Growth in aerobic bottle: Streptococcus mitis/oralis group    Alpha hemolytic strep    (not Strep. pneumoniae or Enterococcus)    Single set isolate, possible contaminant. Contact    Microbiology if susceptibility testing clinically    indicated.    ***Blood Panel PCR results on this specimen are available    approximately 3 hours after the Gram stain result.***    Gram stain, PCR, and/or culture results may not always    correspond due to difference in methodologies.    ************************************************************    This PCR assay was performed by multiplex PCR. This    Assay tests for 66 bacterial and resistance gene targets.    Please refer to the [facility] test directory    at [email or web address] for details.  Organism: Blood Culture PCR (11-12-21 @ 15:42)  Organism: Blood Culture PCR (11-12-21 @ 15:42)      -  Streptococcus sp. (Not Grp A, B or S pneumoniae): Detec      Method Type: PCR    Culture - Urine (collected 11-11-21 @ 17:15)  Source: Clean Catch Clean Catch (Midstream)  Final Report (11-12-21 @ 18:30):    No growth        Lactate, Blood: 1.7 mmol/L (11-11-21 @ 17:58)  Blood Gas Venous - Lactate: 1.70 mmol/L (11-11-21 @ 15:33)      INFECTIOUS DISEASES TESTING  Procalcitonin, Serum: 0.05 ng/mL (11-13-21 @ 19:18)  HIV-1/2 Combo Result: Nonreact (11-13-21 @ 19:18)  Hepatitis B Surface Antigen: Nonreact (11-13-21 @ 19:18)  Hepatitis C Virus Interpretation: Nonreact (11-13-21 @ 19:18)  COVID-19 PCR: NotDetec (11-11-21 @ 16:00)  COVID-19 PCR: NotDetec (06-09-21 @ 19:18)      RADIOLOGY & ADDITIONAL TESTS:  I have personally reviewed the last Chest xray  CXR  Xray Chest 1 View- PORTABLE-Urgent:   EXAM:  XR CHEST PORTABLE URGENT 1V            PROCEDURE DATE:  11/11/2021            INTERPRETATION:  Clinical History/Reason for Exam:  DKA, eval provoking infection    Comparison: Chest x-ray 6/9/2021.      Findings:    Technique/Positioning:  Frontal view the chest.    Support devices:  none    Cardiac/mediastinum/hilum: Unremarkable    Lung parenchyma/ Pleura: No focal parenchymal opacities, effusion or pneumothorax is present.      Skeleton/soft tissues: No focal skeletal lesions are identified.      Impression:    No radiographic evidence of acute cardiopulmonary disease.    --- End of Report ---              ANIYA KNOX MD; Attending Radiologist  This document has been electronically signed. Nov 11 2021  3:37PM (11-11-21 @ 14:53)      CT      CARDIOLOGY TESTING  12 Lead ECG:   Ventricular Rate 108 BPM    Atrial Rate 108 BPM    P-R Interval 166 ms    QRS Duration 74 ms    Q-T Interval 370 ms    QTC Calculation(Bazett) 495 ms    P Axis 51 degrees    R Axis 22 degrees    T Axis 33 degrees    Diagnosis Line Sinus tachycardia  Otherwise normal ECG    Confirmed by Iris Azar MD ([id number]) on 11/13/2021 1:57:26 PM (11-12-21 @ 13:59)  12 Lead ECG:   Ventricular Rate 115 BPM    Atrial Rate 115 BPM    P-R Interval 156 ms    QRS Duration 76 ms    Q-T Interval 324 ms    QTC Calculation(Bazett) 448 ms    P Axis 48 degrees    R Axis -4 degrees    T Axis 45 degrees    Diagnosis Line Sinus tachycardia  Otherwise normal ECG    Confirmed by Abilio Connor ([id number]) on 11/11/2021 6:03:15 PM (11-11-21 @ 14:55)      MEDICATIONS  acyclovir IVPB     acyclovir IVPB 550 IV Intermittent every 8 hours  amLODIPine   Tablet 10 Oral daily  cefTRIAXone   IVPB 1000 IV Intermittent every 24 hours  cefTRIAXone   IVPB     dextrose 40% Gel 15 Oral once  dextrose 5%. 1000 IV Continuous <Continuous>  dextrose 5%. 1000 IV Continuous <Continuous>  dextrose 50% Injectable 25 IV Push once  dextrose 50% Injectable 12.5 IV Push once  dextrose 50% Injectable 25 IV Push once  enoxaparin Injectable 40 SubCutaneous daily  glucagon  Injectable 1 IntraMuscular once  insulin glargine Injectable (LANTUS) 25 SubCutaneous at bedtime  insulin lispro (ADMELOG) corrective regimen sliding scale  SubCutaneous three times a day before meals  insulin lispro Injectable (ADMELOG) 8 SubCutaneous three times a day before meals  lisinopril 40 Oral daily      Weight  Weight (kg): 109.316 (11-11-21 @ 18:59)    ANTIBIOTICS:  acyclovir IVPB      acyclovir IVPB 550 milliGRAM(s) IV Intermittent every 8 hours  cefTRIAXone   IVPB 1000 milliGRAM(s) IV Intermittent every 24 hours  cefTRIAXone   IVPB          ALLERGIES:  penicillin (Rash)

## 2021-11-14 NOTE — PROGRESS NOTE ADULT - SUBJECTIVE AND OBJECTIVE BOX
RENÉ REIS  51y  Female      Patient is a 51y old  Female who presents with a chief complaint of ams (14 Nov 2021 09:37)      INTERVAL HPI/OVERNIGHT EVENTS:  She is still altered, open her eyes but doesn't react, flat mood, eyes with constant tears.   Vital Signs Last 24 Hrs  T(C): 37.4 (14 Nov 2021 14:05), Max: 37.4 (14 Nov 2021 14:05)  T(F): 99.4 (14 Nov 2021 14:05), Max: 99.4 (14 Nov 2021 14:05)  HR: 115 (14 Nov 2021 14:05) (103 - 115)  BP: 101/55 (14 Nov 2021 14:05) (101/55 - 130/71)  BP(mean): --  RR: 18 (14 Nov 2021 14:05) (18 - 18)  SpO2: --      11-13-21 @ 07:01  -  11-14-21 @ 07:00  --------------------------------------------------------  IN: 200 mL / OUT: 650 mL / NET: -450 mL            Consultant(s) Notes Reviewed:  [x ] YES  [ ] NO          MEDICATIONS  (STANDING):  acyclovir IVPB      acyclovir IVPB 550 milliGRAM(s) IV Intermittent every 8 hours  amLODIPine   Tablet 10 milliGRAM(s) Oral daily  cefTRIAXone   IVPB 1000 milliGRAM(s) IV Intermittent every 24 hours  cefTRIAXone   IVPB      dextrose 40% Gel 15 Gram(s) Oral once  dextrose 5%. 1000 milliLiter(s) (50 mL/Hr) IV Continuous <Continuous>  dextrose 5%. 1000 milliLiter(s) (100 mL/Hr) IV Continuous <Continuous>  dextrose 50% Injectable 25 Gram(s) IV Push once  dextrose 50% Injectable 12.5 Gram(s) IV Push once  dextrose 50% Injectable 25 Gram(s) IV Push once  enoxaparin Injectable 40 milliGRAM(s) SubCutaneous daily  glucagon  Injectable 1 milliGRAM(s) IntraMuscular once  insulin glargine Injectable (LANTUS) 25 Unit(s) SubCutaneous at bedtime  insulin lispro (ADMELOG) corrective regimen sliding scale   SubCutaneous three times a day before meals  insulin lispro Injectable (ADMELOG) 8 Unit(s) SubCutaneous three times a day before meals  lisinopril 40 milliGRAM(s) Oral daily    MEDICATIONS  (PRN):  ALBUTerol    90 MICROgram(s) HFA Inhaler 2 Puff(s) Inhalation every 6 hours PRN Bronchospasm      LABS                          14.0   7.20  )-----------( 276      ( 14 Nov 2021 12:15 )             43.3     11-14    141  |  106  |  12  ----------------------------<  136<H>  4.3   |  18  |  0.7    Ca    9.7      14 Nov 2021 12:15  Mg     1.6     11-13    TPro  7.3  /  Alb  4.0  /  TBili  1.0  /  DBili  x   /  AST  14  /  ALT  12  /  AlkPhos  106  11-14          Lactate Trend  11-11 @ 17:58 Lactate:1.7         CAPILLARY BLOOD GLUCOSE      POCT Blood Glucose.: 145 mg/dL (14 Nov 2021 16:43)      Culture - Blood (collected 11-11-21 @ 17:58)  Source: .Blood Blood-Peripheral  Preliminary Report (11-12-21 @ 23:01):    No growth to date.    Culture - Blood (collected 11-11-21 @ 17:56)  Source: .Blood Blood-Peripheral  Gram Stain (11-12-21 @ 13:03):    Growth in aerobic bottle: Gram Positive Cocci in Clusters    and Gram Positive Cocci in Pairs and Chains  Final Report (11-14-21 @ 16:48):    Growth in aerobic bottle: Rothia mucilaginosa "Susceptibilities not    performed"    Growth in aerobic bottle: Streptococcus mitis/oralis group    Alpha hemolytic strep    (not Strep. pneumoniae or Enterococcus)    Single set isolate, possible contaminant. Contact    Microbiology if susceptibility testing clinically    indicated.    ***Blood Panel PCR results on this specimen are available    approximately 3 hours after the Gram stain result.***    Gram stain, PCR, and/or culture results may not always    corresponddue to difference in methodologies.    ************************************************************    This PCR assay was performed by multiplex PCR. This    Assay tests for 66 bacterial and resistance gene targets.    Please refer to the [facility] test directory    at [email or web address] for details.  Organism: Blood Culture PCR (11-14-21 @ 16:48)  Organism: Blood Culture PCR (11-14-21 @ 16:48)      -  Streptococcus sp. (Not Grp A, B or S pneumoniae): Detec      Method Type: PCR    Culture - Urine (collected 11-11-21 @ 17:15)  Source: Clean Catch Clean Catch (Midstream)  Final Report (11-12-21 @ 18:30):    No growth        RADIOLOGY & ADDITIONAL TESTS:    Imaging Personally Reviewed:  [ ] YES  [ ] NO    HEALTH ISSUES - PROBLEM Dx:          PHYSICAL EXAM:  GENERAL: Sleepy, open her eyes, no eye contact,   HEAD:  Atraumatic, Normocephalic.  EYES: EOMI, PERRLA, conjunctiva and sclera clear.  NECK: Supple, No JVD.  CHEST/LUNG: Clear to auscultation bilaterally; No wheeze.  HEART: Regular rate and rhythm; S1 S2.   ABDOMEN: Soft, Nontender, Nondistended; Bowel sounds present.  EXTREMITIES:  2+ Peripheral Pulses, No clubbing, cyanosis, or edema.  PSYCH: AAOx3.  NEUROLOGY: non-focal.  SKIN: No rashes or lesions.

## 2021-11-15 LAB
ALBUMIN SERPL ELPH-MCNC: 4.1 G/DL — SIGNIFICANT CHANGE UP (ref 3.5–5.2)
ALP SERPL-CCNC: 109 U/L — SIGNIFICANT CHANGE UP (ref 30–115)
ALT FLD-CCNC: 12 U/L — SIGNIFICANT CHANGE UP (ref 0–41)
ANION GAP SERPL CALC-SCNC: 18 MMOL/L — HIGH (ref 7–14)
AST SERPL-CCNC: 13 U/L — SIGNIFICANT CHANGE UP (ref 0–41)
BASOPHILS # BLD AUTO: 0.03 K/UL — SIGNIFICANT CHANGE UP (ref 0–0.2)
BASOPHILS NFR BLD AUTO: 0.5 % — SIGNIFICANT CHANGE UP (ref 0–1)
BILIRUB SERPL-MCNC: 0.9 MG/DL — SIGNIFICANT CHANGE UP (ref 0.2–1.2)
BUN SERPL-MCNC: 10 MG/DL — SIGNIFICANT CHANGE UP (ref 10–20)
CALCIUM SERPL-MCNC: 9.8 MG/DL — SIGNIFICANT CHANGE UP (ref 8.5–10.1)
CHLORIDE SERPL-SCNC: 103 MMOL/L — SIGNIFICANT CHANGE UP (ref 98–110)
CO2 SERPL-SCNC: 17 MMOL/L — SIGNIFICANT CHANGE UP (ref 17–32)
CREAT SERPL-MCNC: 0.6 MG/DL — LOW (ref 0.7–1.5)
EOSINOPHIL # BLD AUTO: 0.3 K/UL — SIGNIFICANT CHANGE UP (ref 0–0.7)
EOSINOPHIL NFR BLD AUTO: 5.3 % — SIGNIFICANT CHANGE UP (ref 0–8)
GLUCOSE SERPL-MCNC: 222 MG/DL — HIGH (ref 70–99)
HCT VFR BLD CALC: 40.9 % — SIGNIFICANT CHANGE UP (ref 37–47)
HGB BLD-MCNC: 13.6 G/DL — SIGNIFICANT CHANGE UP (ref 12–16)
IMM GRANULOCYTES NFR BLD AUTO: 0.2 % — SIGNIFICANT CHANGE UP (ref 0.1–0.3)
LYMPHOCYTES # BLD AUTO: 1.92 K/UL — SIGNIFICANT CHANGE UP (ref 1.2–3.4)
LYMPHOCYTES # BLD AUTO: 34.2 % — SIGNIFICANT CHANGE UP (ref 20.5–51.1)
MAGNESIUM SERPL-MCNC: 1.7 MG/DL — LOW (ref 1.8–2.4)
MCHC RBC-ENTMCNC: 26.1 PG — LOW (ref 27–31)
MCHC RBC-ENTMCNC: 33.3 G/DL — SIGNIFICANT CHANGE UP (ref 32–37)
MCV RBC AUTO: 78.5 FL — LOW (ref 81–99)
MONOCYTES # BLD AUTO: 0.93 K/UL — HIGH (ref 0.1–0.6)
MONOCYTES NFR BLD AUTO: 16.6 % — HIGH (ref 1.7–9.3)
NEUTROPHILS # BLD AUTO: 2.42 K/UL — SIGNIFICANT CHANGE UP (ref 1.4–6.5)
NEUTROPHILS NFR BLD AUTO: 43.2 % — SIGNIFICANT CHANGE UP (ref 42.2–75.2)
NRBC # BLD: 0 /100 WBCS — SIGNIFICANT CHANGE UP (ref 0–0)
PLATELET # BLD AUTO: 263 K/UL — SIGNIFICANT CHANGE UP (ref 130–400)
POTASSIUM SERPL-MCNC: 4.2 MMOL/L — SIGNIFICANT CHANGE UP (ref 3.5–5)
POTASSIUM SERPL-SCNC: 4.2 MMOL/L — SIGNIFICANT CHANGE UP (ref 3.5–5)
PROT SERPL-MCNC: 7.4 G/DL — SIGNIFICANT CHANGE UP (ref 6–8)
RBC # BLD: 5.21 M/UL — SIGNIFICANT CHANGE UP (ref 4.2–5.4)
RBC # FLD: 12.5 % — SIGNIFICANT CHANGE UP (ref 11.5–14.5)
SODIUM SERPL-SCNC: 138 MMOL/L — SIGNIFICANT CHANGE UP (ref 135–146)
WBC # BLD: 5.61 K/UL — SIGNIFICANT CHANGE UP (ref 4.8–10.8)
WBC # FLD AUTO: 5.61 K/UL — SIGNIFICANT CHANGE UP (ref 4.8–10.8)

## 2021-11-15 PROCEDURE — 99221 1ST HOSP IP/OBS SF/LOW 40: CPT

## 2021-11-15 PROCEDURE — 99253 IP/OBS CNSLTJ NEW/EST LOW 45: CPT

## 2021-11-15 PROCEDURE — 99232 SBSQ HOSP IP/OBS MODERATE 35: CPT

## 2021-11-15 RX ADMIN — Medication 1 MILLIGRAM(S): at 20:53

## 2021-11-15 RX ADMIN — Medication 1: at 17:29

## 2021-11-15 RX ADMIN — ENOXAPARIN SODIUM 40 MILLIGRAM(S): 100 INJECTION SUBCUTANEOUS at 11:39

## 2021-11-15 RX ADMIN — INSULIN GLARGINE 25 UNIT(S): 100 INJECTION, SOLUTION SUBCUTANEOUS at 21:57

## 2021-11-15 RX ADMIN — Medication 1 MILLIGRAM(S): at 15:17

## 2021-11-15 RX ADMIN — Medication 111 MILLIGRAM(S): at 05:42

## 2021-11-15 NOTE — PROGRESS NOTE ADULT - SUBJECTIVE AND OBJECTIVE BOX
SUBJECTIVE:    Patient is a 51y old Female who presents with a chief complaint of ams (11 Nov 2021 21:39). Currently admitted to medicine with the primary diagnosis of Altered mental status.    Today is hospital day 4d. Patient more awake and alert today.      PAST MEDICAL & SURGICAL HISTORY  Diabetes    Hypertension    No significant past surgical history      SOCIAL HISTORY:  Negative for smoking/alcohol/drug use.     ALLERGIES:  penicillin (Rash)    MEDICATIONS:  STANDING MEDICATIONS  amLODIPine   Tablet 10 milliGRAM(s) Oral daily  dextrose 40% Gel 15 Gram(s) Oral once  dextrose 5%. 1000 milliLiter(s) IV Continuous <Continuous>  dextrose 5%. 1000 milliLiter(s) IV Continuous <Continuous>  dextrose 50% Injectable 25 Gram(s) IV Push once  dextrose 50% Injectable 12.5 Gram(s) IV Push once  dextrose 50% Injectable 25 Gram(s) IV Push once  enoxaparin Injectable 40 milliGRAM(s) SubCutaneous daily  glucagon  Injectable 1 milliGRAM(s) IntraMuscular once  insulin glargine Injectable (LANTUS) 25 Unit(s) SubCutaneous at bedtime  insulin lispro (ADMELOG) corrective regimen sliding scale   SubCutaneous three times a day before meals  insulin lispro Injectable (ADMELOG) 8 Unit(s) SubCutaneous three times a day before meals  lisinopril 40 milliGRAM(s) Oral daily    PRN MEDICATIONS  ALBUTerol    90 MICROgram(s) HFA Inhaler 2 Puff(s) Inhalation every 6 hours PRN    VITALS:  T(C): 37.1 (15 Nov 2021 04:17), Max: 37.4 (14 Nov 2021 14:05)  T(F): 98.8 (15 Nov 2021 04:17), Max: 99.4 (14 Nov 2021 14:05)  HR: 100 (15 Nov 2021 04:17) (100 - 115)  BP: 122/70 (15 Nov 2021 04:17) (101/55 - 128/67)  RR: 18 (15 Nov 2021 04:17) (18 - 18)  SpO2: 95% (14 Nov 2021 20:52) (95% - 95%)    PHYSICAL EXAM:  GEN: NAD, obese  Neuro: mental status improving  LUNGS: Clear to auscultation bilaterally   HEART: S1/S2 present. RRR.   ABD: Soft, non-tender, non-distended. Bowel sounds present  EXT: NC/NC/NE/2+PP/CHENG/Skin Intact.   SKIN: intact    LABS:             14.0   7.20  )-----------( 276      ( 14 Nov 2021 12:15 )             43.3     141  |  106  |  12  ----------------------------<  136<H>  4.3   |  18  |  0.7    Ca    9.7      14 Nov 2021 12:15    TPro  7.3  /  Alb  4.0  /  TBili  1.0  /  DBili  x   /  AST  14  /  ALT  12  /  AlkPhos  106  11-14    CARDIAC MARKERS ( 14 Nov 2021 22:57 )  x     / <0.01 ng/mL / x     / x     / x        Culture - Blood (collected 11-13-21 @ 19:18)  Source: .Blood None  Preliminary Report (11-15-21 @ 01:01):    No growth to date.    Culture - Blood (collected 11-11-21 @ 17:58)  Source: .Blood Blood-Peripheral  Preliminary Report (11-12-21 @ 23:01):    No growth to date.    Culture - Blood (collected 11-11-21 @ 17:56)  Source: .Blood Blood-Peripheral  Gram Stain (11-12-21 @ 13:03):    Growth in aerobic bottle: Gram Positive Cocci in Clusters    and Gram Positive Cocci in Pairs and Chains  Final Report (11-14-21 @ 16:48):    Growth in aerobic bottle: Rothia mucilaginosa "Susceptibilities not    performed"    Growth in aerobic bottle: Streptococcus mitis/oralis group    Alpha hemolytic strep    (not Strep. pneumoniae or Enterococcus)    Single set isolate, possible contaminant. Contact    Microbiology if susceptibility testing clinically    indicated.    ***Blood Panel PCR results on this specimen are available    approximately 3 hours after the Gram stain result.***    Gram stain, PCR, and/or culture results may not always    corresponddue to difference in methodologies.    ************************************************************    This PCR assay was performed by multiplex PCR. This    Assay tests for 66 bacterial and resistance gene targets.    Please refer to the [facility] test directory    at [email or web address] for details.  Organism: Blood Culture PCR (11-14-21 @ 16:48)  Organism: Blood Culture PCR (11-14-21 @ 16:48)      -  Streptococcus sp. (Not Grp A, B or S pneumoniae): Detec      Method Type: PCR    Culture - Urine (collected 11-11-21 @ 17:15)  Source: Clean Catch Clean Catch (Midstream)  Final Report (11-12-21 @ 18:30):    No growth    Lactate, Blood: 1.7 mmol/L (11-11-21 @ 17:58)      RADIOLOGY:    < from: Xray Chest 1 View- PORTABLE-Urgent (11.11.21 @ 14:53) >  Impression:    No radiographic evidence of acute cardiopulmonary disease.    < end of copied text >    M< from: MR Head No Cont (11.12.21 @ 08:56) >  IMPRESSION:    1.  No acute intracranial abnormality. Stable exam since 6/10/2021.    2.  Stable mild chronic microvascular changes.    3.  Prominence of the posterior nasopharyngeal soft tissues, recommend correlation with direct visualization.    < end of copied text >    < from: EEG (11.11.21 @ 22:30) >    IMPRESSION:  This is a borderline abnormal EEG due to the lack of optimal organization and borderline generalized slowing      CLINICAL CORRELATION  Consistent with borderline diffuse cerebral electrophysiological dysfunction perhaps secondary to toxic metabolic causes clinical correlation is recommended    < end of copied text >   SUBJECTIVE:    Patient is a 51y old Female who presents with a chief complaint of ams (11 Nov 2021 21:39). Currently admitted to medicine with the primary diagnosis of Altered mental status.    Today is hospital day 4d. Patient more awake and alert today; seen at bedside nodding her head to questions w tears running down her eyes.      PAST MEDICAL & SURGICAL HISTORY  Diabetes    Hypertension    No significant past surgical history      SOCIAL HISTORY:  Negative for smoking/alcohol/drug use.     ALLERGIES:  penicillin (Rash)    MEDICATIONS:  STANDING MEDICATIONS  amLODIPine   Tablet 10 milliGRAM(s) Oral daily  dextrose 40% Gel 15 Gram(s) Oral once  dextrose 5%. 1000 milliLiter(s) IV Continuous <Continuous>  dextrose 5%. 1000 milliLiter(s) IV Continuous <Continuous>  dextrose 50% Injectable 25 Gram(s) IV Push once  dextrose 50% Injectable 12.5 Gram(s) IV Push once  dextrose 50% Injectable 25 Gram(s) IV Push once  enoxaparin Injectable 40 milliGRAM(s) SubCutaneous daily  glucagon  Injectable 1 milliGRAM(s) IntraMuscular once  insulin glargine Injectable (LANTUS) 25 Unit(s) SubCutaneous at bedtime  insulin lispro (ADMELOG) corrective regimen sliding scale   SubCutaneous three times a day before meals  insulin lispro Injectable (ADMELOG) 8 Unit(s) SubCutaneous three times a day before meals  lisinopril 40 milliGRAM(s) Oral daily    PRN MEDICATIONS  ALBUTerol    90 MICROgram(s) HFA Inhaler 2 Puff(s) Inhalation every 6 hours PRN    VITALS:  T(C): 37.1 (15 Nov 2021 04:17), Max: 37.4 (14 Nov 2021 14:05)  T(F): 98.8 (15 Nov 2021 04:17), Max: 99.4 (14 Nov 2021 14:05)  HR: 100 (15 Nov 2021 04:17) (100 - 115)  BP: 122/70 (15 Nov 2021 04:17) (101/55 - 128/67)  RR: 18 (15 Nov 2021 04:17) (18 - 18)  SpO2: 95% (14 Nov 2021 20:52) (95% - 95%)    PHYSICAL EXAM:  GEN: NAD, obese  Neuro: mental status improving  LUNGS: Clear to auscultation bilaterally   HEART: S1/S2 present. RRR.   ABD: Soft, non-tender, non-distended. Bowel sounds present  EXT: NC/NC/NE/2+PP/CHENG/Skin Intact.   SKIN: intact    LABS:             14.0   7.20  )-----------( 276      ( 14 Nov 2021 12:15 )             43.3     141  |  106  |  12  ----------------------------<  136<H>  4.3   |  18  |  0.7    Ca    9.7      14 Nov 2021 12:15    TPro  7.3  /  Alb  4.0  /  TBili  1.0  /  DBili  x   /  AST  14  /  ALT  12  /  AlkPhos  106  11-14    CARDIAC MARKERS ( 14 Nov 2021 22:57 )  x     / <0.01 ng/mL / x     / x     / x        Culture - Blood (collected 11-13-21 @ 19:18)  Source: .Blood None  Preliminary Report (11-15-21 @ 01:01):    No growth to date.    Culture - Blood (collected 11-11-21 @ 17:58)  Source: .Blood Blood-Peripheral  Preliminary Report (11-12-21 @ 23:01):    No growth to date.    Culture - Blood (collected 11-11-21 @ 17:56)  Source: .Blood Blood-Peripheral  Gram Stain (11-12-21 @ 13:03):    Growth in aerobic bottle: Gram Positive Cocci in Clusters    and Gram Positive Cocci in Pairs and Chains  Final Report (11-14-21 @ 16:48):    Growth in aerobic bottle: Rothia mucilaginosa "Susceptibilities not    performed"    Growth in aerobic bottle: Streptococcus mitis/oralis group    Alpha hemolytic strep    (not Strep. pneumoniae or Enterococcus)    Single set isolate, possible contaminant. Contact    Microbiology if susceptibility testing clinically    indicated.    ***Blood Panel PCR results on this specimen are available    approximately 3 hours after the Gram stain result.***    Gram stain, PCR, and/or culture results may not always    corresponddue to difference in methodologies.    ************************************************************    This PCR assay was performed by multiplex PCR. This    Assay tests for 66 bacterial and resistance gene targets.    Please refer to the [facility] test directory    at [email or web address] for details.  Organism: Blood Culture PCR (11-14-21 @ 16:48)  Organism: Blood Culture PCR (11-14-21 @ 16:48)      -  Streptococcus sp. (Not Grp A, B or S pneumoniae): Detec      Method Type: PCR    Culture - Urine (collected 11-11-21 @ 17:15)  Source: Clean Catch Clean Catch (Midstream)  Final Report (11-12-21 @ 18:30):    No growth    Lactate, Blood: 1.7 mmol/L (11-11-21 @ 17:58)      RADIOLOGY:    < from: Xray Chest 1 View- PORTABLE-Urgent (11.11.21 @ 14:53) >  Impression:    No radiographic evidence of acute cardiopulmonary disease.    < end of copied text >    M< from: MR Head No Cont (11.12.21 @ 08:56) >  IMPRESSION:    1.  No acute intracranial abnormality. Stable exam since 6/10/2021.    2.  Stable mild chronic microvascular changes.    3.  Prominence of the posterior nasopharyngeal soft tissues, recommend correlation with direct visualization.    < end of copied text >    < from: EEG (11.11.21 @ 22:30) >    IMPRESSION:  This is a borderline abnormal EEG due to the lack of optimal organization and borderline generalized slowing      CLINICAL CORRELATION  Consistent with borderline diffuse cerebral electrophysiological dysfunction perhaps secondary to toxic metabolic causes clinical correlation is recommended    < end of copied text >

## 2021-11-15 NOTE — PROGRESS NOTE ADULT - SUBJECTIVE AND OBJECTIVE BOX
RENÉ REIS  51y  Female      Patient is a 51y old  Female who presents with a chief complaint of ams (15 Nov 2021 12:44)      INTERVAL HPI/OVERNIGHT EVENTS:  She is still with flat mood, open her eyes, eyes with tears,   Vital Signs Last 24 Hrs  T(C): 36.7 (15 Nov 2021 12:38), Max: 37.1 (14 Nov 2021 20:05)  T(F): 98 (15 Nov 2021 12:38), Max: 98.8 (14 Nov 2021 20:05)  HR: 106 (15 Nov 2021 12:38) (100 - 110)  BP: 127/68 (15 Nov 2021 12:38) (117/70 - 128/67)  BP(mean): --  RR: 18 (15 Nov 2021 12:38) (18 - 18)  SpO2: 95% (14 Nov 2021 20:52) (95% - 95%)      11-14-21 @ 07:01  -  11-15-21 @ 07:00  --------------------------------------------------------  IN: 868 mL / OUT: 500 mL / NET: 368 mL    11-15-21 @ 07:01  -  11-15-21 @ 19:24  --------------------------------------------------------  IN: 0 mL / OUT: 700 mL / NET: -700 mL            Consultant(s) Notes Reviewed:  [x ] YES  [ ] NO          MEDICATIONS  (STANDING):  amLODIPine   Tablet 10 milliGRAM(s) Oral daily  dextrose 40% Gel 15 Gram(s) Oral once  dextrose 5%. 1000 milliLiter(s) (50 mL/Hr) IV Continuous <Continuous>  dextrose 5%. 1000 milliLiter(s) (100 mL/Hr) IV Continuous <Continuous>  dextrose 50% Injectable 25 Gram(s) IV Push once  dextrose 50% Injectable 12.5 Gram(s) IV Push once  dextrose 50% Injectable 25 Gram(s) IV Push once  enoxaparin Injectable 40 milliGRAM(s) SubCutaneous daily  glucagon  Injectable 1 milliGRAM(s) IntraMuscular once  insulin glargine Injectable (LANTUS) 25 Unit(s) SubCutaneous at bedtime  insulin lispro (ADMELOG) corrective regimen sliding scale   SubCutaneous three times a day before meals  insulin lispro Injectable (ADMELOG) 8 Unit(s) SubCutaneous three times a day before meals  lisinopril 40 milliGRAM(s) Oral daily  LORazepam   Injectable 1 milliGRAM(s) IntraMuscular every 4 hours    MEDICATIONS  (PRN):  ALBUTerol    90 MICROgram(s) HFA Inhaler 2 Puff(s) Inhalation every 6 hours PRN Bronchospasm      LABS                          13.6   5.61  )-----------( 263      ( 15 Nov 2021 11:44 )             40.9     11-15    138  |  103  |  10  ----------------------------<  222<H>  4.2   |  17  |  0.6<L>    Ca    9.8      15 Nov 2021 11:44  Mg     1.7     11-15    TPro  7.4  /  Alb  4.1  /  TBili  0.9  /  DBili  x   /  AST  13  /  ALT  12  /  AlkPhos  109  11-15          Lactate Trend  11-11 @ 17:58 Lactate:1.7     CARDIAC MARKERS ( 14 Nov 2021 22:57 )  x     / <0.01 ng/mL / x     / x     / x          CAPILLARY BLOOD GLUCOSE      POCT Blood Glucose.: 152 mg/dL (15 Nov 2021 16:37)      Culture - Blood (collected 11-13-21 @ 19:18)  Source: .Blood None  Preliminary Report (11-15-21 @ 01:01):    No growth to date.    Culture - Blood (collected 11-11-21 @ 17:58)  Source: .Blood Blood-Peripheral  Preliminary Report (11-12-21 @ 23:01):    No growth to date.    Culture - Blood (collected 11-11-21 @ 17:56)  Source: .Blood Blood-Peripheral  Gram Stain (11-12-21 @ 13:03):    Growth in aerobic bottle: Gram Positive Cocci in Clusters    and Gram Positive Cocci in Pairs and Chains  Final Report (11-14-21 @ 16:48):    Growth in aerobic bottle: Rothia mucilaginosa "Susceptibilities not    performed"    Growth in aerobic bottle: Streptococcus mitis/oralis group    Alpha hemolytic strep    (not Strep. pneumoniae or Enterococcus)    Single set isolate, possible contaminant. Contact    Microbiology if susceptibility testing clinically    indicated.    ***Blood Panel PCR results on this specimen are available    approximately 3 hours after the Gram stain result.***    Gram stain, PCR, and/or culture results may not always    corresponddue to difference in methodologies.    ************************************************************    This PCR assay was performed by multiplex PCR. This    Assay tests for 66 bacterial and resistance gene targets.    Please refer to the [facility] test directory    at [email or web address] for details.  Organism: Blood Culture PCR (11-14-21 @ 16:48)  Organism: Blood Culture PCR (11-14-21 @ 16:48)      -  Streptococcus sp. (Not Grp A, B or S pneumoniae): Detec      Method Type: PCR    Culture - Urine (collected 11-11-21 @ 17:15)  Source: Clean Catch Clean Catch (Midstream)  Final Report (11-12-21 @ 18:30):    No growth        RADIOLOGY & ADDITIONAL TESTS:    Imaging Personally Reviewed:  [ ] YES  [ ] NO    HEALTH ISSUES - PROBLEM Dx:          PHYSICAL EXAM:  GENERAL: Sleepy, open her eyes, no eye contact,   HEAD:  Atraumatic, Normocephalic.  EYES: EOMI, PERRLA, conjunctiva and sclera clear.  NECK: Supple, No JVD.  CHEST/LUNG: Clear to auscultation bilaterally; No wheeze.  HEART: Regular rate and rhythm; S1 S2.   ABDOMEN: Soft, Nontender, Nondistended; Bowel sounds present.  EXTREMITIES:  2+ Peripheral Pulses, No clubbing, cyanosis, or edema.  PSYCH: AAOx3.  NEUROLOGY: non-focal.  SKIN: No rashes or lesions.

## 2021-11-15 NOTE — BEHAVIORAL HEALTH ASSESSMENT NOTE - OTHER
Common law Impacted by current presentation Noticeable with fear in her face and crying persistently Acute medical condition No prior history of suicide attempt Able to provide very minimal answer made up with yes or no. Minimally verbal Very slowed

## 2021-11-15 NOTE — BEHAVIORAL HEALTH ASSESSMENT NOTE - SUMMARY
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 All medical work up has been inconclusive, psychiatry is consulted to see if there is any psychiatric component.     On evaluation, patient is awake, alert, but minimally verbal, with fear in her face. It appears patient has some awareness of her surrounding but unable to participate in interview. At times she was observed to be internally preoccupied. All work up thus far has been negative. While delirium and other underlying medical condition work needs to continue, this patient may benefit from low dose of ativan standing, given some behavior related to catatonia. It should be noted, most common cause of catatonia is an underlying medical conditio.     Impression  Delirium, unspecified  R/o adjustment disorder with anxious mood.    Plan  -On going delirium work up per medical team, EEG showed slowing, no epileptiform activity, MRI, CT head, TSH WNL  Start ativan standing with a stat dose now, followed by ativan 1mg po at 3 pm and ativan 1mg po at 8PM.  Thereafter consider ativan 1mg po at 6 AM, 3Pm and 8PM. Monitor for sedation.  -Neuro onboard.   -Psychiatry will follow up on 11/16/21.

## 2021-11-15 NOTE — BEHAVIORAL HEALTH ASSESSMENT NOTE - NSBHCHARTREVIEWLAB_PSY_A_CORE FT
13.6   5.61  )-----------( 263      ( 15 Nov 2021 11:44 )             40.9   11-15    138  |  103  |  10  ----------------------------<  222<H>  4.2   |  17  |  0.6<L>    Ca    9.8      15 Nov 2021 11:44  Mg     1.7     11-15    TPro  7.4  /  Alb  4.1  /  TBili  0.9  /  DBili  x   /  AST  13  /  ALT  12  /  AlkPhos  109  11-15  tThyroid Stimulating Hormone, Serum in AM (11.12.21 @ 11:27)   Thyroid Stimulating Hormone, Serum: <0.00 uIU/mL Vitamin B12, Serum in AM (11.12.21 @ 11:27)   Vitamin B12, Serum: 770 pg/mL

## 2021-11-15 NOTE — PROGRESS NOTE ADULT - ASSESSMENT
Pt is a 51F with a pmhx of HTN and DM2 presenting with AMS.    #Metabolic encephalopathy - infectious vs neurologic vs drug-induced vs psychiatric - SIRS+ on admission (HR>90, RR>20)  - patient is stuporous and withdrawing to pain, minimally following commands on admission - more alert today  - Head imaging (CT/MRI) w no acute intracranial pathology (stable since 6/2021)  - UA negative; EEG w Generalized slowing but no epileptiform activity  - UTox + for benzo - s/p ativan x1  - pt has remained afebrile w no leukocytosis; ammonia & b12 wnl, cultures neg, HIV neg, RPR neg, Hep panel neg  - attempted LP without success d/t body habitus  - s/p vanc; currently on ceftriaxone and acyclovir  - may need neuroradiology if no improvement  - ID on board    #Nasopharyngeal Soft Tissue prominence on MRI  - correlate w physical exam    #DMII (A1c 9.9)  w neuropathy  - cont basal bolus insulin - monitor fs, adjust prn  - holding gabapentin given stuporous appearance  - Follows with Dr. Badillo outpatient    #HTN  - cont home amlodipine and lisinopril    #HLD  - cont statin    #Asthma  - albuterol inhaler prn    #Obesity (BMI 40.1)  - outpatient nutrition eval    #Hypomagnesemia  - replete prn    DVT: Lovenox  GI: ppi  Diet: AAT  Activity: IAT     Pt is a 51F with a pmhx of HTN and DM2 presenting with AMS.    #Metabolic encephalopathy - infectious vs neurologic vs drug-induced vs psychiatric - SIRS+ on admission (HR>90, RR>20)  - patient is stuporous and withdrawing to pain, minimally following commands on admission - more awake today  - Head imaging (CT/MRI) w no acute intracranial pathology (stable since 6/2021)  - UA negative; EEG w Generalized slowing but no epileptiform activity  - UTox + for benzo - s/p ativan x1  - pt has remained afebrile w no leukocytosis; ammonia & b12 wnl, cultures neg, HIV neg, RPR neg, Hep panel neg  - attempted LP without success d/t body habitus  - s/p vanc; currently on ceftriaxone and acyclovir - will d/c in-light of previous admissions w similar presentation and neg infectious w/u thus far  - ID on board; will consult psych as metabolic causes ruled out  ** of note patient had previous admissions with similar presentations**    #Nasopharyngeal Soft Tissue prominence on MRI  - correlate w physical exam    #DMII (A1c 9.9)  w neuropathy  - cont basal bolus insulin - monitor fs, adjust prn  - holding gabapentin given stuporous appearance  - Follows with Dr. Badillo outpatient    #HTN  - cont home amlodipine and lisinopril    #HLD  - cont statin    #Asthma  - albuterol inhaler prn    #Obesity (BMI 40.1)  - outpatient nutrition eval    #Hypomagnesemia  - replete prn    DVT: Lovenox  GI: ppi  Diet: AAT  Activity: IAT

## 2021-11-15 NOTE — PROGRESS NOTE ADULT - TIME BILLING
I have personally seen and examined this patient.    I have reviewed all pertinent clinical information and reviewed all relevant imaging and diagnostic studies personally.   I counseled the patient about diagnostic testing and treatment plan. All questions were answered.   I discussed recommendations with the primary team.
#Progress Note Handoff  Pending (specify):  follow up neuro up, utox shows benzo (received ativan), follow up mental status, if neg consider psych  Family discussion: house staff updated pt family  Disposition: med

## 2021-11-15 NOTE — PROGRESS NOTE ADULT - SUBJECTIVE AND OBJECTIVE BOX
RENÉ REIS  51y, Female  Allergy: penicillin (Rash)      LOS  4d    CHIEF COMPLAINT: ams (15 Nov 2021 09:20)      INTERVAL EVENTS/HPI  - No acute events overnight  - T(F): , Max: 99.4 (11-14-21 @ 14:05)  - awake, only nodding yes/no - unclear if purposeful, not following commands   - WBC Count: 5.61 (11-15-21 @ 11:44)  WBC Count: 7.20 (11-14-21 @ 12:15)     - Creatinine, Serum: 0.7 (11-14-21 @ 12:15)       ROS  unable to obtain history secondary to patient's mental status     VITALS:  T(F): 98, Max: 99.4 (11-14-21 @ 14:05)  HR: 106  BP: 127/68  RR: 18Vital Signs Last 24 Hrs  T(C): 36.7 (15 Nov 2021 12:38), Max: 37.4 (14 Nov 2021 14:05)  T(F): 98 (15 Nov 2021 12:38), Max: 99.4 (14 Nov 2021 14:05)  HR: 106 (15 Nov 2021 12:38) (100 - 115)  BP: 127/68 (15 Nov 2021 12:38) (101/55 - 128/67)  BP(mean): --  RR: 18 (15 Nov 2021 12:38) (18 - 18)  SpO2: 95% (14 Nov 2021 20:52) (95% - 95%)    PHYSICAL EXAM:  Gen: NAD, resting in bed  HEENT: Normocephalic, atraumatic  Neck: supple, no lymphadenopathy  CV: Regular rate & regular rhythm  Lungs: decreased BS at bases, no fremitus  Abdomen: Soft, BS present  Ext: Warm, well perfused  Neuro: non focal, awake  Skin: no rash, no erythema  Lines: no phlebitis    FH: Non-contributory  Social Hx: Non-contributory    TESTS & MEASUREMENTS:                        13.6   5.61  )-----------( 263      ( 15 Nov 2021 11:44 )             40.9     11-14    141  |  106  |  12  ----------------------------<  136<H>  4.3   |  18  |  0.7    Ca    9.7      14 Nov 2021 12:15    TPro  7.3  /  Alb  4.0  /  TBili  1.0  /  DBili  x   /  AST  14  /  ALT  12  /  AlkPhos  106  11-14      LIVER FUNCTIONS - ( 14 Nov 2021 12:15 )  Alb: 4.0 g/dL / Pro: 7.3 g/dL / ALK PHOS: 106 U/L / ALT: 12 U/L / AST: 14 U/L / GGT: x               Culture - Blood (collected 11-13-21 @ 19:18)  Source: .Blood None  Preliminary Report (11-15-21 @ 01:01):    No growth to date.    Culture - Blood (collected 11-11-21 @ 17:58)  Source: .Blood Blood-Peripheral  Preliminary Report (11-12-21 @ 23:01):    No growth to date.    Culture - Blood (collected 11-11-21 @ 17:56)  Source: .Blood Blood-Peripheral  Gram Stain (11-12-21 @ 13:03):    Growth in aerobic bottle: Gram Positive Cocci in Clusters    and Gram Positive Cocci in Pairs and Chains  Final Report (11-14-21 @ 16:48):    Growth in aerobic bottle: Rothia mucilaginosa "Susceptibilities not    performed"    Growth in aerobic bottle: Streptococcus mitis/oralis group    Alpha hemolytic strep    (not Strep. pneumoniae or Enterococcus)    Single set isolate, possible contaminant. Contact    Microbiology if susceptibility testing clinically    indicated.    ***Blood Panel PCR results on this specimen are available    approximately 3 hours after the Gram stain result.***    Gram stain, PCR, and/or culture results may not always    corresponddue to difference in methodologies.    ************************************************************    This PCR assay was performed by multiplex PCR. This    Assay tests for 66 bacterial and resistance gene targets.    Please refer to the [facility] test directory    at [email or web address] for details.  Organism: Blood Culture PCR (11-14-21 @ 16:48)  Organism: Blood Culture PCR (11-14-21 @ 16:48)      -  Streptococcus sp. (Not Grp A, B or S pneumoniae): Detec      Method Type: PCR    Culture - Urine (collected 11-11-21 @ 17:15)  Source: Clean Catch Clean Catch (Midstream)  Final Report (11-12-21 @ 18:30):    No growth        Lactate, Blood: 1.7 mmol/L (11-11-21 @ 17:58)  Blood Gas Venous - Lactate: 1.70 mmol/L (11-11-21 @ 15:33)      INFECTIOUS DISEASES TESTING  Procalcitonin, Serum: 0.05 (11-13-21 @ 19:18)  HIV-1/2 Combo Result: Nonreact (11-13-21 @ 19:18)  COVID-19 PCR: NotDetec (11-11-21 @ 16:00)  COVID-19 PCR: NotDetec (06-09-21 @ 19:18)  Rapid RVP Result: NotDetec (11-20-20 @ 09:56)      INFLAMMATORY MARKERS      RADIOLOGY & ADDITIONAL TESTS:  I have personally reviewed the last available Chest xray  CXR      CT      CARDIOLOGY TESTING  12 Lead ECG:   Ventricular Rate 112 BPM    Atrial Rate 112 BPM    P-R Interval 158 ms    QRS Duration 74 ms    Q-T Interval 356 ms    QTC Calculation(Bazett) 485 ms    P Axis 43 degrees    R Axis -2 degrees    T Axis 32 degrees    Diagnosis Line Sinus tachycardia  Minimal voltage criteria for LVH, may be normal variant ( R in aVL )  Borderline ECG    Confirmed by Iris Azar MD ([id number]) on 11/14/2021 7:03:35 PM (11-14-21 @ 17:05)  12 Lead ECG:   Ventricular Rate 108 BPM    Atrial Rate 108 BPM    P-R Interval 166 ms    QRS Duration 74 ms    Q-T Interval 370 ms    QTC Calculation(Bazett) 495 ms    P Axis 51 degrees    R Axis 22 degrees    T Axis 33 degrees    Diagnosis Line Sinus tachycardia  Otherwise normal ECG    Confirmed by Iris Azar MD ([id number]) on 11/13/2021 1:57:26 PM (11-12-21 @ 13:59)      MEDICATIONS  amLODIPine   Tablet 10 Oral daily  dextrose 40% Gel 15 Oral once  dextrose 5%. 1000 IV Continuous <Continuous>  dextrose 5%. 1000 IV Continuous <Continuous>  dextrose 50% Injectable 25 IV Push once  dextrose 50% Injectable 12.5 IV Push once  dextrose 50% Injectable 25 IV Push once  enoxaparin Injectable 40 SubCutaneous daily  glucagon  Injectable 1 IntraMuscular once  insulin glargine Injectable (LANTUS) 25 SubCutaneous at bedtime  insulin lispro (ADMELOG) corrective regimen sliding scale  SubCutaneous three times a day before meals  insulin lispro Injectable (ADMELOG) 8 SubCutaneous three times a day before meals  lisinopril 40 Oral daily  LORazepam   Injectable 1 IntraMuscular every 4 hours      WEIGHT  Weight (kg): 109.316 (11-11-21 @ 18:59)      ANTIBIOTICS:      All available historical records have been reviewed

## 2021-11-15 NOTE — BEHAVIORAL HEALTH ASSESSMENT NOTE - HPI (INCLUDE ILLNESS QUALITY, SEVERITY, DURATION, TIMING, CONTEXT, MODIFYING FACTORS, ASSOCIATED SIGNS AND SYMPTOMS)
51 year with no prior psychiatric history medical history of HTN and DM2 presenting with AMS. All medical work up has been inconclusive, psychiatry is consulted to see if there is any psychiatric component.    Stroke code called on arrival, but MRI negative, all other work up including head CT, EEG, which showed Generalized slowing but no epileptiform activity, UA negative. ID seen patient, started on IV antibiotics with minimal response.     Patient was seen awake, but able to provide minimal response. She appears very afraid with her harm shaking as this provider attempted to interview her. She was very distressed by the interview and was observed regressing from any attempt made to get close to patient. She became very tearful, most of her  answers were either no or yes. She could not reply to long sentences. She could not even say her name. She was able to express her awareness of being in a hospital.     Per sister, Ms. Hampton, who noted this similar episode happened to patient in June 2021. She admits her sister is very on hand and every one's mother, who describes her sister as  a battery always running, who works as a  and has  nine children, youngest is 9, oldest is  late 30. Patient has no prior psychiatric history, suicidal ideation or attempt. Never been in treatment with a psychiatrist. On the morning of 11/11/21, patient woke up normally and did her routine activity with no concern. Later on the same morning, patient went back to room, by the time her sister return to talk to the patient, she found her unconscious on the bed. Reportedly patient was breathing but not responding.  During this hospitalization, patient met with sister at the hospital, she could only provided yes or no answer in slow motion. 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 All medical work up has been inconclusive, psychiatry is consulted to see if there is any psychiatric component.    Stroke code called on arrival, but MRI negative, all other work up including head CT, EEG, which showed Generalized slowing but no epileptiform activity, UA negative. ID seen patient, started on IV antibiotics with minimal response.     Patient was seen awake, but able to provide minimal response. She appears very afraid with her harm shaking as this provider attempted to interview her. She was very distressed by the interview and was observed regressing from any attempt made to get close to patient. She became very tearful, most of her  answers were either no or yes. She could not reply to long sentences. She could not even say her name. She was able to express her awareness of being in a hospital.     Per sister, Ms. Hampton, who noted this similar episode happened to patient in June 2021. She admits her sister is very on hand and every one's mother, who describes her sister as  a battery always running, who works as a  and has  nine children, youngest is 9, oldest is  late 30. Patient has no prior psychiatric history, suicidal ideation or attempt. Never been in treatment with a psychiatrist. On the morning of 11/11/21, patient woke up normally and did her routine activity with no concern. Later on the same morning, patient went back to room, by the time her sister return to talk to the patient, she found her unconscious on the bed. Reportedly patient was breathing but not responding.  During this hospitalization, patient met with sister at the hospital, she could only provided yes or no answer in slow motion.

## 2021-11-15 NOTE — BEHAVIORAL HEALTH ASSESSMENT NOTE - NSBHCHARTREVIEWVS_PSY_A_CORE FT
ICU Vital Signs Last 24 Hrs  T(C): 36.7 (15 Nov 2021 12:38), Max: 37.4 (14 Nov 2021 14:05)  T(F): 98 (15 Nov 2021 12:38), Max: 99.4 (14 Nov 2021 14:05)  HR: 106 (15 Nov 2021 12:38) (100 - 115)  BP: 127/68 (15 Nov 2021 12:38) (101/55 - 128/67)  BP(mean): --  ABP: --  ABP(mean): --  RR: 18 (15 Nov 2021 12:38) (18 - 18)  SpO2: 95% (14 Nov 2021 20:52) (95% - 95%)

## 2021-11-15 NOTE — BEHAVIORAL HEALTH ASSESSMENT NOTE - NSBHCHARTREVIEWINVESTIGATE_PSY_A_CORE FT
`< from: 12 Lead ECG (11.14.21 @ 17:05) >    Ventricular Rate 112 BPM    Atrial Rate 112 BPM    P-R Interval 158 ms    QRS Duration 74 ms    Stroke code called on arrival, but MRI negative, all other work up including head CT, EEG, which showed Generalized slowing but no epileptiform activity, UA negative. ID seen patient, started on IV antibiotics with minimal response.       Q-T Interval 356 ms    QTC Calculation(Bazett) 485 ms    P Axis 43 degrees    R Axis -2 degrees    T Axis 32 degrees    Diagnosis Line Sinus tachycardia  Minimal voltage criteria for LVH, may be normal variant ( R in aVL )  Borderline ECG    Confirmed by Iris Azar MD ([id number]) on 11/14/2021 7:03:35 PM    < end of copied text >

## 2021-11-15 NOTE — PROGRESS NOTE ADULT - ASSESSMENT
51 year old female with PMH of HTN and DM2 was brought to ED for sudden change in her mental status.     A/P:   Altered mental Status: likely from psychiatric disorder, adjustment disorder, Catatonia   Metabolic encephalopathy ruled out. No signs of meningitis. no leukocytosis, no improvement despite IV antibiotics.   Neuro exam patient is awake but confused, moves all extremities, shaking her hands and lips.   Head CT showed no acute pathology.   Brain MRI No acute intracranial abnormality. Stable mild chronic microvascular changes.  One set of blood cx was growing Streptococcus mitis, possibly contaminated.   Attempt for LP 11/13 failed, patient is morbidly obese, unable to reach IR for procedure.    s/p Rocephin IV, Acyclovir. Discontinue  ID consult appreciated.   Psych consult recommended Ativan 3 times daily.   Nasopharyngeal Soft Tissue prominence on MRI    DMII (A1c 9.9)  w neuropathy  Continue Lantus and lispro sliding scale.     HTN: Continue Amlodipine and lisinopril.  Asthma albuterol inhaler prn    Morbid Obesity (BMI 40.1)  outpatient nutrition eval    DVT: Lovenox  #Progress Note Handoff:  Pending (specify):  improving mental status.   Family discussion:  Disposition: Home in 24 hrs.

## 2021-11-16 LAB
ALBUMIN SERPL ELPH-MCNC: 3.9 G/DL — SIGNIFICANT CHANGE UP (ref 3.5–5.2)
ALP SERPL-CCNC: 101 U/L — SIGNIFICANT CHANGE UP (ref 30–115)
ALT FLD-CCNC: 12 U/L — SIGNIFICANT CHANGE UP (ref 0–41)
ANION GAP SERPL CALC-SCNC: 17 MMOL/L — HIGH (ref 7–14)
AST SERPL-CCNC: 13 U/L — SIGNIFICANT CHANGE UP (ref 0–41)
BASOPHILS # BLD AUTO: 0.03 K/UL — SIGNIFICANT CHANGE UP (ref 0–0.2)
BASOPHILS NFR BLD AUTO: 0.5 % — SIGNIFICANT CHANGE UP (ref 0–1)
BILIRUB SERPL-MCNC: 0.8 MG/DL — SIGNIFICANT CHANGE UP (ref 0.2–1.2)
BUN SERPL-MCNC: 13 MG/DL — SIGNIFICANT CHANGE UP (ref 10–20)
CALCIUM SERPL-MCNC: 9.8 MG/DL — SIGNIFICANT CHANGE UP (ref 8.5–10.1)
CHLORIDE SERPL-SCNC: 104 MMOL/L — SIGNIFICANT CHANGE UP (ref 98–110)
CO2 SERPL-SCNC: 18 MMOL/L — SIGNIFICANT CHANGE UP (ref 17–32)
CREAT SERPL-MCNC: 0.8 MG/DL — SIGNIFICANT CHANGE UP (ref 0.7–1.5)
CULTURE RESULTS: SIGNIFICANT CHANGE UP
EOSINOPHIL # BLD AUTO: 0.36 K/UL — SIGNIFICANT CHANGE UP (ref 0–0.7)
EOSINOPHIL NFR BLD AUTO: 6.4 % — SIGNIFICANT CHANGE UP (ref 0–8)
FUNGITELL: <31 PG/ML — SIGNIFICANT CHANGE UP
GLUCOSE SERPL-MCNC: 156 MG/DL — HIGH (ref 70–99)
HCT VFR BLD CALC: 40.9 % — SIGNIFICANT CHANGE UP (ref 37–47)
HGB BLD-MCNC: 13.2 G/DL — SIGNIFICANT CHANGE UP (ref 12–16)
IMM GRANULOCYTES NFR BLD AUTO: 0 % — LOW (ref 0.1–0.3)
LYMPHOCYTES # BLD AUTO: 1.85 K/UL — SIGNIFICANT CHANGE UP (ref 1.2–3.4)
LYMPHOCYTES # BLD AUTO: 32.9 % — SIGNIFICANT CHANGE UP (ref 20.5–51.1)
MAGNESIUM SERPL-MCNC: 1.7 MG/DL — LOW (ref 1.8–2.4)
MCHC RBC-ENTMCNC: 25.9 PG — LOW (ref 27–31)
MCHC RBC-ENTMCNC: 32.3 G/DL — SIGNIFICANT CHANGE UP (ref 32–37)
MCV RBC AUTO: 80.2 FL — LOW (ref 81–99)
MONOCYTES # BLD AUTO: 0.85 K/UL — HIGH (ref 0.1–0.6)
MONOCYTES NFR BLD AUTO: 15.1 % — HIGH (ref 1.7–9.3)
NEUTROPHILS # BLD AUTO: 2.53 K/UL — SIGNIFICANT CHANGE UP (ref 1.4–6.5)
NEUTROPHILS NFR BLD AUTO: 45.1 % — SIGNIFICANT CHANGE UP (ref 42.2–75.2)
NRBC # BLD: 0 /100 WBCS — SIGNIFICANT CHANGE UP (ref 0–0)
PLATELET # BLD AUTO: 263 K/UL — SIGNIFICANT CHANGE UP (ref 130–400)
POTASSIUM SERPL-MCNC: 4.1 MMOL/L — SIGNIFICANT CHANGE UP (ref 3.5–5)
POTASSIUM SERPL-SCNC: 4.1 MMOL/L — SIGNIFICANT CHANGE UP (ref 3.5–5)
PROT SERPL-MCNC: 7.1 G/DL — SIGNIFICANT CHANGE UP (ref 6–8)
RBC # BLD: 5.1 M/UL — SIGNIFICANT CHANGE UP (ref 4.2–5.4)
RBC # FLD: 12.5 % — SIGNIFICANT CHANGE UP (ref 11.5–14.5)
SODIUM SERPL-SCNC: 139 MMOL/L — SIGNIFICANT CHANGE UP (ref 135–146)
SPECIMEN SOURCE: SIGNIFICANT CHANGE UP
WBC # BLD: 5.62 K/UL — SIGNIFICANT CHANGE UP (ref 4.8–10.8)
WBC # FLD AUTO: 5.62 K/UL — SIGNIFICANT CHANGE UP (ref 4.8–10.8)

## 2021-11-16 PROCEDURE — 99232 SBSQ HOSP IP/OBS MODERATE 35: CPT

## 2021-11-16 PROCEDURE — 99233 SBSQ HOSP IP/OBS HIGH 50: CPT

## 2021-11-16 RX ORDER — ACETAMINOPHEN 500 MG
650 TABLET ORAL ONCE
Refills: 0 | Status: COMPLETED | OUTPATIENT
Start: 2021-11-16 | End: 2021-11-16

## 2021-11-16 RX ORDER — MAGNESIUM SULFATE 500 MG/ML
2 VIAL (ML) INJECTION ONCE
Refills: 0 | Status: COMPLETED | OUTPATIENT
Start: 2021-11-16 | End: 2021-11-16

## 2021-11-16 RX ADMIN — Medication 1: at 12:09

## 2021-11-16 RX ADMIN — AMLODIPINE BESYLATE 10 MILLIGRAM(S): 2.5 TABLET ORAL at 06:30

## 2021-11-16 RX ADMIN — Medication 2 MILLIGRAM(S): at 22:54

## 2021-11-16 RX ADMIN — Medication 1 MILLIGRAM(S): at 19:07

## 2021-11-16 RX ADMIN — Medication 650 MILLIGRAM(S): at 09:11

## 2021-11-16 RX ADMIN — Medication 8 UNIT(S): at 12:11

## 2021-11-16 RX ADMIN — Medication 1 MILLIGRAM(S): at 01:11

## 2021-11-16 RX ADMIN — Medication 2 MILLIGRAM(S): at 11:58

## 2021-11-16 RX ADMIN — Medication 1 MILLIGRAM(S): at 06:34

## 2021-11-16 RX ADMIN — Medication 8 UNIT(S): at 08:26

## 2021-11-16 RX ADMIN — LISINOPRIL 40 MILLIGRAM(S): 2.5 TABLET ORAL at 06:30

## 2021-11-16 RX ADMIN — Medication 1: at 08:24

## 2021-11-16 RX ADMIN — Medication 16.67 GRAM(S): at 14:45

## 2021-11-16 RX ADMIN — INSULIN GLARGINE 25 UNIT(S): 100 INJECTION, SOLUTION SUBCUTANEOUS at 21:42

## 2021-11-16 RX ADMIN — ENOXAPARIN SODIUM 40 MILLIGRAM(S): 100 INJECTION SUBCUTANEOUS at 12:00

## 2021-11-16 NOTE — PROGRESS NOTE ADULT - ASSESSMENT
A/P:   #AMS- likely psych/adjustement disorder/catatonia  -psych following  -CT head neg  -MRI brain no acute abnormalities  -continue ativan   -eeg normal  -utox +benzos    #DMII (A1c 9.9)  w neuropathy  -Continue Lantus and lispro sliding scale  -gabapentin was held---now with increasing left foot periph neuropathy---trial of gabapentin 100 mg tid and can titrate dose as otuaptient     DVT/GI ppx  guarded prognosis    FULL CODE    anticipate likely dc within the next 24 hrs   discontinue telemetry

## 2021-11-16 NOTE — PROGRESS NOTE BEHAVIORAL HEALTH - OTHER
Very slowed Minimally verbal minimal response Impacted by current presentation Noticeable with fear in her face and crying persistently

## 2021-11-16 NOTE — PROGRESS NOTE ADULT - ASSESSMENT
Pt is a 51F with a pmhx of HTN and DM2 presenting with AMS.    #AMS- likely psychogenic vs unlikely infectious/neurologic etiology - improving  - SIRS+ on admission (HR>90, RR>20); pt was stuporous, withdrawing to pain, minimally following commands  - Head imaging (CT/MRI) w no acute intracranial pathology (stable since 6/2021); EEG w Generalized slowing but no epileptiform activity  - UA negative, ammonia & b12 wnl, cultures neg, HIV neg, RPR neg, Hep panel neg; UTox + for benzo - s/p ativan x1  - s/p vanc, ceftriaxone and acyclovir in light of unsuccessful LP  - ID and psych on board  - pt has remained afebrile w no leukocytosis and improving mental status  - in-light of previous admissions w similar presentation and neg infectious w/u thus far - will monitor off abx  - cont ativan for catatonia    #DMII (A1c 9.9)  w neuropathy  - cont basal bolus insulin - monitor fs, adjust prn  - holding gabapentin given stuporous appearance  - Follows with Dr. Badillo outpatient    #HTN  - cont home amlodipine and lisinopril    #HLD  - cont statin    #Asthma  - albuterol inhaler prn    #Nasopharyngeal Soft Tissue prominence on MRI  - outpatient follow up    #Obesity (BMI 40.1)  - outpatient nutrition eval    #Hypomagnesemia  - replete prn    DVT: Lovenox  GI: ppi  Diet: AAT  Activity: IAT  Dispo: acute, from home    Pending: clinical improvement

## 2021-11-16 NOTE — PROGRESS NOTE BEHAVIORAL HEALTH - NSBHFUPINTERVALHXFT_PSY_A_CORE
Patient  is seen and spoke with nursing staff. Seen and was started on ativan 1mg po scheduled as three times a day.  As per nursing staff, patient is able to stay few coherent words today, to include stating having pain in the legs. She was able to ask for a few things. On evaluation, patient was noted to be very fearful, still retracting her limbs when this writer tried to touch her legs. She asked this writer to send her home. Otherwise, patient remains very minimally engaged.

## 2021-11-16 NOTE — PROGRESS NOTE ADULT - SUBJECTIVE AND OBJECTIVE BOX
SUBJECTIVE:    Patient is a 51y old Female who presents with a chief complaint of AMS (11 Nov 2021 21:39).   Currently admitted to medicine with the primary diagnosis of Altered mental status.    Today is hospital day 5d. Patient more awake and alert today.       PAST MEDICAL & SURGICAL HISTORY  Diabetes    Hypertension    No significant past surgical history      SOCIAL HISTORY:  Negative for smoking/alcohol/drug use.     ALLERGIES:  penicillin (Rash)    MEDICATIONS:  STANDING MEDICATIONS  amLODIPine   Tablet 10 milliGRAM(s) Oral daily  dextrose 40% Gel 15 Gram(s) Oral once  dextrose 5%. 1000 milliLiter(s) IV Continuous <Continuous>  dextrose 5%. 1000 milliLiter(s) IV Continuous <Continuous>  dextrose 50% Injectable 25 Gram(s) IV Push once  dextrose 50% Injectable 12.5 Gram(s) IV Push once  dextrose 50% Injectable 25 Gram(s) IV Push once  enoxaparin Injectable 40 milliGRAM(s) SubCutaneous daily  glucagon  Injectable 1 milliGRAM(s) IntraMuscular once  insulin glargine Injectable (LANTUS) 25 Unit(s) SubCutaneous at bedtime  insulin lispro (ADMELOG) corrective regimen sliding scale   SubCutaneous three times a day before meals  insulin lispro Injectable (ADMELOG) 8 Unit(s) SubCutaneous three times a day before meals  lisinopril 40 milliGRAM(s) Oral daily    PRN MEDICATIONS  ALBUTerol    90 MICROgram(s) HFA Inhaler 2 Puff(s) Inhalation every 6 hours PRN    VITALS:  T(C): 36.7 (16 Nov 2021 04:01), Max: 36.9 (15 Nov 2021 20:08)  T(F): 98 (16 Nov 2021 04:01), Max: 98.5 (15 Nov 2021 20:08)  HR: 94 (16 Nov 2021 04:01) (94 - 99)  BP: 133/69 (16 Nov 2021 04:01) (126/69 - 133/69)  RR: 18 (16 Nov 2021 04:01) (18 - 18)  SpO2: 95% (16 Nov 2021 04:01) (94% - 95%)    PHYSICAL EXAM:  GEN: NAD, obese  Neuro: mental status improving  LUNGS: Clear to auscultation bilaterally   HEART: S1/S2 present. RRR.   ABD: Soft, non-tender, non-distended. Bowel sounds present  EXT: NC/NC/NE/2+PP/CHENG/Skin Intact.   SKIN: intact    LABS:             Labs:                        13.2   5.62  )-----------( 263      ( 16 Nov 2021 04:30 )             40.9                                   11-16    139  |  104  |  13  ----------------------------<  156<H>  4.1   |  18  |  0.8    Ca    9.8      16 Nov 2021 04:30  Mg     1.7     11-16    TPro  7.1  /  Alb  3.9  /  TBili  0.8  /  DBili  x   /  AST  13  /  ALT  12  /  AlkPhos  101  11-16                               Culture - Blood (collected 11-13-21 @ 19:18)  Source: .Blood None  Preliminary Report (11-15-21 @ 01:01):    No growth to date.    Culture - Blood (collected 11-11-21 @ 17:58)  Source: .Blood Blood-Peripheral  Preliminary Report (11-12-21 @ 23:01):    No growth to date.    Culture - Blood (collected 11-11-21 @ 17:56)  Source: .Blood Blood-Peripheral  Gram Stain (11-12-21 @ 13:03):    Growth in aerobic bottle: Gram Positive Cocci in Clusters    and Gram Positive Cocci in Pairs and Chains  Final Report (11-14-21 @ 16:48):    Growth in aerobic bottle: Rothia mucilaginosa "Susceptibilities not    performed"    Growth in aerobic bottle: Streptococcus mitis/oralis group    Alpha hemolytic strep    (not Strep. pneumoniae or Enterococcus)    Single set isolate, possible contaminant. Contact    Microbiology if susceptibility testing clinically    indicated.    ***Blood Panel PCR results on this specimen are available    approximately 3 hours after the Gram stain result.***    Gram stain, PCR, and/or culture results may not always    corresponddue to difference in methodologies.    ************************************************************    This PCR assay was performed by multiplex PCR. This    Assay tests for 66 bacterial and resistance gene targets.    Please refer to the [facility] test directory    at [email or web address] for details.  Organism: Blood Culture PCR (11-14-21 @ 16:48)  Organism: Blood Culture PCR (11-14-21 @ 16:48)      -  Streptococcus sp. (Not Grp A, B or S pneumoniae): Detec      Method Type: PCR    Culture - Urine (collected 11-11-21 @ 17:15)  Source: Clean Catch Clean Catch (Midstream)  Final Report (11-12-21 @ 18:30):    No growth    Lactate, Blood: 1.7 mmol/L (11-11-21 @ 17:58)         RADIOLOGY:    < from: Xray Chest 1 View- PORTABLE-Urgent (11.11.21 @ 14:53) >  Impression:    No radiographic evidence of acute cardiopulmonary disease.    < end of copied text >    M< from: MR Head No Cont (11.12.21 @ 08:56) >  IMPRESSION:    1.  No acute intracranial abnormality. Stable exam since 6/10/2021.    2.  Stable mild chronic microvascular changes.    3.  Prominence of the posterior nasopharyngeal soft tissues, recommend correlation with direct visualization.    < end of copied text >    < from: EEG (11.11.21 @ 22:30) >    IMPRESSION:  This is a borderline abnormal EEG due to the lack of optimal organization and borderline generalized slowing      CLINICAL CORRELATION  Consistent with borderline diffuse cerebral electrophysiological dysfunction perhaps secondary to toxic metabolic causes clinical correlation is recommended    < end of copied text >

## 2021-11-16 NOTE — PROGRESS NOTE ADULT - SUBJECTIVE AND OBJECTIVE BOX
Patient is a 51y old  Female who presents with a chief complaint of ams (16 Nov 2021 13:03)    HPI:  Pt is a 51F with a pmhx of HTN and DM2 presenting with AMS. patient was non verbal non responsive at the time of my examine, collateral information obtained from ED chart and sister. Per sister at bedside, pt was in usual state of health, A&Ox3, walking/talking this morning then went back to bed. Her sister briefly left the house and when she returned, pt was found not responding to verbal stimuli or moving but in no distress, which lasted until EMS arrived when she became anxious appearing, flapping hands and breathing quickly, crying out but still not speaking. No history of seizures. Those symptoms have continued until arrival in ED. Sister denies drug or alcohol use, change in medications, any trauma, or any hx of psychiatric disease. She does note that pt had an episode in which she appeared exactly the same which self resolved approximately 1d into an admission.    PAST MEDICAL & SURGICAL HISTORY:  Diabetes  Hypertension  No significant past surgical history    patient seen and examined independently on morning rounds for the first time today, chart reviewed and discussed with the medicine resident and on interdisciplinary rounds    no overnight events---patient is awake and alert- minimally verbal with me but says her foot hurts very quietly and points to left foot-     Vital Signs Last 24 Hrs  T(C): 36.9 (16 Nov 2021 13:04), Max: 36.9 (15 Nov 2021 20:08)  T(F): 98.4 (16 Nov 2021 13:04), Max: 98.5 (15 Nov 2021 20:08)  HR: 97 (16 Nov 2021 13:04) (94 - 99)  BP: 118/57 (16 Nov 2021 13:04) (118/57 - 133/69)  BP(mean): --  RR: 17 (16 Nov 2021 13:04) (17 - 18)  SpO2: 95% (16 Nov 2021 04:01) (94% - 95%)             PE:  GEN-NAD, awake- flat affect- minimally communicating with me  PULM- Clear to auscultation bilaterally, fair air entry  CVS- +s1/s2 RRR no murmurs  GI- soft NT ND +bs, no rebound, no guarding  EXT- lleft foot pain with only mild palpation               13.2   5.62  )-----------( 263      ( 16 Nov 2021 04:30 )             40.9     11-16    139  |  104  |  13  ----------------------------<  156<H>  4.1   |  18  |  0.8    Ca    9.8      16 Nov 2021 04:30  Mg     1.7     11-16    TPro  7.1  /  Alb  3.9  /  TBili  0.8  /  DBili  x   /  AST  13  /  ALT  12  /  AlkPhos  101  11-16    CARDIAC MARKERS ( 14 Nov 2021 22:57 )  x     / <0.01 ng/mL / x     / x     / x              Culture - Blood (collected 13 Nov 2021 19:18)  Source: .Blood None  Preliminary Report (15 Nov 2021 01:01):    No growth to date.        MEDICATIONS  (STANDING):  amLODIPine   Tablet 10 milliGRAM(s) Oral daily  dextrose 40% Gel 15 Gram(s) Oral once  dextrose 5%. 1000 milliLiter(s) (50 mL/Hr) IV Continuous <Continuous>  dextrose 5%. 1000 milliLiter(s) (100 mL/Hr) IV Continuous <Continuous>  dextrose 50% Injectable 25 Gram(s) IV Push once  dextrose 50% Injectable 12.5 Gram(s) IV Push once  dextrose 50% Injectable 25 Gram(s) IV Push once  enoxaparin Injectable 40 milliGRAM(s) SubCutaneous daily  glucagon  Injectable 1 milliGRAM(s) IntraMuscular once  insulin glargine Injectable (LANTUS) 25 Unit(s) SubCutaneous at bedtime  insulin lispro (ADMELOG) corrective regimen sliding scale   SubCutaneous three times a day before meals  insulin lispro Injectable (ADMELOG) 8 Unit(s) SubCutaneous three times a day before meals  lisinopril 40 milliGRAM(s) Oral daily  LORazepam   Injectable 2 milliGRAM(s) IntraMuscular once  LORazepam   Injectable 1 milliGRAM(s) IntraMuscular once

## 2021-11-16 NOTE — PROGRESS NOTE BEHAVIORAL HEALTH - SUMMARY
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 All medical work up has been inconclusive, psychiatry is consulted to see if there is any psychiatric component.      She remains awake, alert, with some mild improvement of words, but still labile and very tearful when attempts made to communicate with patients.   All work up thus far has been negative. As previously stated while delirium and other underlying medical condition work need to continue, this patient may benefit from low dose of ativan standing, given some behavior related to catatonia.    Impression  Delirium, unspecified  R/o adjustment disorder with anxious mood.    Plan  -On going delirium work up per medical team, EEG showed slowing, no epileptiform activity, MRI, CT head, TSH WNL  Consider increasing  ativan to 2mg po at 6AM, 1mg at 11AM, 1mg at 4PM and 2mg at 8PM.  Monitor for sedation  -Neuro onboard.   -Psychiatry will follow up on 11/18/121.

## 2021-11-16 NOTE — CHART NOTE - NSCHARTNOTEFT_GEN_A_CORE
Called by RN at 22:00 because patient extremely lethargic. She was not responding to name and was only responsive to pain with sternal rub. Patient was found with eyes closed and mouth open. On physical examination, patient found to have normal pupillary activity and posturing of extremities suggestive of catatonia. Will continue to monitor and will give 2mg ativan IV since patient unable to tolerate PO

## 2021-11-17 LAB
ALBUMIN SERPL ELPH-MCNC: 3.8 G/DL — SIGNIFICANT CHANGE UP (ref 3.5–5.2)
ALP SERPL-CCNC: 93 U/L — SIGNIFICANT CHANGE UP (ref 30–115)
ALT FLD-CCNC: 11 U/L — SIGNIFICANT CHANGE UP (ref 0–41)
ANION GAP SERPL CALC-SCNC: 17 MMOL/L — HIGH (ref 7–14)
AST SERPL-CCNC: 13 U/L — SIGNIFICANT CHANGE UP (ref 0–41)
BASOPHILS # BLD AUTO: 0.02 K/UL — SIGNIFICANT CHANGE UP (ref 0–0.2)
BASOPHILS NFR BLD AUTO: 0.4 % — SIGNIFICANT CHANGE UP (ref 0–1)
BILIRUB SERPL-MCNC: 0.7 MG/DL — SIGNIFICANT CHANGE UP (ref 0.2–1.2)
BUN SERPL-MCNC: 20 MG/DL — SIGNIFICANT CHANGE UP (ref 10–20)
CALCIUM SERPL-MCNC: 9.4 MG/DL — SIGNIFICANT CHANGE UP (ref 8.5–10.1)
CHLORIDE SERPL-SCNC: 104 MMOL/L — SIGNIFICANT CHANGE UP (ref 98–110)
CK SERPL-CCNC: 35 U/L — SIGNIFICANT CHANGE UP (ref 0–225)
CO2 SERPL-SCNC: 17 MMOL/L — SIGNIFICANT CHANGE UP (ref 17–32)
CREAT SERPL-MCNC: 1 MG/DL — SIGNIFICANT CHANGE UP (ref 0.7–1.5)
CRP SERPL-MCNC: 10 MG/L — HIGH
EOSINOPHIL # BLD AUTO: 0.42 K/UL — SIGNIFICANT CHANGE UP (ref 0–0.7)
EOSINOPHIL NFR BLD AUTO: 7.4 % — SIGNIFICANT CHANGE UP (ref 0–8)
ERYTHROCYTE [SEDIMENTATION RATE] IN BLOOD: 33 MM/HR — HIGH (ref 0–20)
GLUCOSE SERPL-MCNC: 158 MG/DL — HIGH (ref 70–99)
HCT VFR BLD CALC: 40.6 % — SIGNIFICANT CHANGE UP (ref 37–47)
HGB BLD-MCNC: 13.2 G/DL — SIGNIFICANT CHANGE UP (ref 12–16)
IMM GRANULOCYTES NFR BLD AUTO: 0.2 % — SIGNIFICANT CHANGE UP (ref 0.1–0.3)
LYMPHOCYTES # BLD AUTO: 2.17 K/UL — SIGNIFICANT CHANGE UP (ref 1.2–3.4)
LYMPHOCYTES # BLD AUTO: 38.4 % — SIGNIFICANT CHANGE UP (ref 20.5–51.1)
MAGNESIUM SERPL-MCNC: 2.1 MG/DL — SIGNIFICANT CHANGE UP (ref 1.8–2.4)
MCHC RBC-ENTMCNC: 25.9 PG — LOW (ref 27–31)
MCHC RBC-ENTMCNC: 32.5 G/DL — SIGNIFICANT CHANGE UP (ref 32–37)
MCV RBC AUTO: 79.6 FL — LOW (ref 81–99)
MONOCYTES # BLD AUTO: 0.76 K/UL — HIGH (ref 0.1–0.6)
MONOCYTES NFR BLD AUTO: 13.5 % — HIGH (ref 1.7–9.3)
NEUTROPHILS # BLD AUTO: 2.27 K/UL — SIGNIFICANT CHANGE UP (ref 1.4–6.5)
NEUTROPHILS NFR BLD AUTO: 40.1 % — LOW (ref 42.2–75.2)
NRBC # BLD: 0 /100 WBCS — SIGNIFICANT CHANGE UP (ref 0–0)
PLATELET # BLD AUTO: 262 K/UL — SIGNIFICANT CHANGE UP (ref 130–400)
POTASSIUM SERPL-MCNC: 4.3 MMOL/L — SIGNIFICANT CHANGE UP (ref 3.5–5)
POTASSIUM SERPL-SCNC: 4.3 MMOL/L — SIGNIFICANT CHANGE UP (ref 3.5–5)
PROT SERPL-MCNC: 7 G/DL — SIGNIFICANT CHANGE UP (ref 6–8)
RBC # BLD: 5.1 M/UL — SIGNIFICANT CHANGE UP (ref 4.2–5.4)
RBC # FLD: 12.5 % — SIGNIFICANT CHANGE UP (ref 11.5–14.5)
SODIUM SERPL-SCNC: 138 MMOL/L — SIGNIFICANT CHANGE UP (ref 135–146)
TSH SERPL-MCNC: <0 UIU/ML — LOW (ref 0.27–4.2)
WBC # BLD: 5.65 K/UL — SIGNIFICANT CHANGE UP (ref 4.8–10.8)
WBC # FLD AUTO: 5.65 K/UL — SIGNIFICANT CHANGE UP (ref 4.8–10.8)

## 2021-11-17 PROCEDURE — 99233 SBSQ HOSP IP/OBS HIGH 50: CPT

## 2021-11-17 RX ORDER — SODIUM CHLORIDE 9 MG/ML
1000 INJECTION, SOLUTION INTRAVENOUS
Refills: 0 | Status: DISCONTINUED | OUTPATIENT
Start: 2021-11-17 | End: 2021-11-21

## 2021-11-17 RX ADMIN — Medication 1 MILLIGRAM(S): at 12:47

## 2021-11-17 RX ADMIN — SODIUM CHLORIDE 75 MILLILITER(S): 9 INJECTION, SOLUTION INTRAVENOUS at 11:53

## 2021-11-17 RX ADMIN — INSULIN GLARGINE 25 UNIT(S): 100 INJECTION, SOLUTION SUBCUTANEOUS at 22:16

## 2021-11-17 RX ADMIN — ENOXAPARIN SODIUM 40 MILLIGRAM(S): 100 INJECTION SUBCUTANEOUS at 11:52

## 2021-11-17 RX ADMIN — Medication 1 MILLIGRAM(S): at 07:04

## 2021-11-17 RX ADMIN — Medication 2 MILLIGRAM(S): at 20:28

## 2021-11-17 RX ADMIN — Medication 1 MILLIGRAM(S): at 16:27

## 2021-11-17 NOTE — PROGRESS NOTE ADULT - SUBJECTIVE AND OBJECTIVE BOX
SUBJECTIVE:    Patient is a 51y old Female who presents with a chief complaint of ams (16 Nov 2021 18:00)    Currently admitted to medicine with the primary diagnosis of Altered mental status       Today is hospital day 6d. This morning she is resting comfortably in bed and reports no new issues or overnight events.   She is vaguely responsive to verbal stimuli this morning.    PAST MEDICAL & SURGICAL HISTORY  Diabetes    Hypertension    No significant past surgical history      SOCIAL HISTORY:  Negative for smoking/alcohol/drug use.     ALLERGIES:  penicillin (Rash)    MEDICATIONS:  STANDING MEDICATIONS  amLODIPine   Tablet 10 milliGRAM(s) Oral daily  enoxaparin Injectable 40 milliGRAM(s) SubCutaneous daily  insulin glargine Injectable (LANTUS) 25 Unit(s) SubCutaneous at bedtime  insulin lispro (ADMELOG) corrective regimen sliding scale   SubCutaneous three times a day before meals  insulin lispro Injectable (ADMELOG) 8 Unit(s) SubCutaneous three times a day before meals  lactated ringers. 1000 milliLiter(s) IV Continuous <Continuous>  lisinopril 40 milliGRAM(s) Oral daily  LORazepam   Injectable 1 milliGRAM(s) IV Push <User Schedule>  LORazepam   Injectable 2 milliGRAM(s) IV Push <User Schedule>    PRN MEDICATIONS  ALBUTerol    90 MICROgram(s) HFA Inhaler 2 Puff(s) Inhalation every 6 hours PRN    VITALS:   T(F): 98.8  HR: 111  BP: 133/69  RR: 18  SpO2: 95%    LABS:                        13.2   5.65  )-----------( 262      ( 17 Nov 2021 06:08 )             40.6     11-17    138  |  104  |  20  ----------------------------<  158<H>  4.3   |  17  |  1.0    Ca    9.4      17 Nov 2021 06:08  Mg     2.1     11-17    TPro  7.0  /  Alb  3.8  /  TBili  0.7  /  DBili  x   /  AST  13  /  ALT  11  /  AlkPhos  93  11-17          Sedimentation Rate, Erythrocyte: 33 mm/Hr *H* (11-17-21 @ 06:08)  Creatine Kinase, Serum: 35 U/L (11-17-21 @ 06:08)      CARDIAC MARKERS ( 17 Nov 2021 06:08 )  x     / x     / 35 U/L / x     / x          RADIOLOGY:    PHYSICAL EXAM:  GEN: No acute distress  LUNGS: Clear to auscultation bilaterally   HEART: Regular  ABD: Soft, non-tender, non-distended.  EXT: No edema  NEURO: AAOX0    Intravenous access:   NG tube:   Hebert Catheter:

## 2021-11-17 NOTE — PROGRESS NOTE ADULT - ASSESSMENT
Pt is a 51F with a pmhx of HTN and DM2 presenting with AMS.    #AMS- likely psychogenic vs unlikely infectious/neurologic etiology - improving  - SIRS+ on admission (HR>90, RR>20); pt was stuporous, withdrawing to pain, minimally following commands  - Head imaging (CT/MRI) w no acute intracranial pathology (stable since 6/2021); EEG w Generalized slowing but no epileptiform activity  - UA negative, ammonia & b12 wnl, cultures neg, HIV neg, RPR neg, Hep panel neg; UTox + for benzo - s/p ativan x1  - s/p vanc, ceftriaxone and acyclovir in light of unsuccessful LP  - ID and psych on board  - pt has remained afebrile w no leukocytosis and improving mental status  - in-light of previous admissions w similar presentation and neg infectious w/u thus far - will monitor off abx  - cont ativan IV for catatonia    #RYLAN-possibly prerenal  - Cr, BUN trending up possibly from not tolerating PO  - started on LR @75ml/hr    #DMII (A1c 9.9)  w neuropathy  - cont basal bolus insulin - monitor fs, adjust prn  - holding gabapentin given stuporous appearance  - Follows with Dr. Badillo outpatient    #HTN  - cont home amlodipine and lisinopril    #HLD  - cont statin    #Asthma  - albuterol inhaler prn    #Nasopharyngeal Soft Tissue prominence on MRI  - outpatient follow up    #Obesity (BMI 40.1)  - outpatient nutrition eval    #Hypomagnesemia  - replete prn    DVT: Lovenox  GI: ppi  Diet: AAT  Activity: IAT  Dispo: acute, from home    Pending: clinical improvement

## 2021-11-18 LAB
B BURGDOR DNA SPEC QL NAA+PROBE: NEGATIVE — SIGNIFICANT CHANGE UP
SARS-COV-2 RNA SPEC QL NAA+PROBE: SIGNIFICANT CHANGE UP
T4 FREE SERPL-MCNC: 2.6 NG/DL — HIGH (ref 0.9–1.8)

## 2021-11-18 PROCEDURE — 99233 SBSQ HOSP IP/OBS HIGH 50: CPT

## 2021-11-18 RX ORDER — ACETAMINOPHEN 500 MG
650 TABLET ORAL EVERY 6 HOURS
Refills: 0 | Status: DISCONTINUED | OUTPATIENT
Start: 2021-11-18 | End: 2021-11-22

## 2021-11-18 RX ADMIN — Medication 2 MILLIGRAM(S): at 05:57

## 2021-11-18 RX ADMIN — Medication 2 MILLIGRAM(S): at 09:22

## 2021-11-18 RX ADMIN — Medication 1 MILLIGRAM(S): at 17:31

## 2021-11-18 RX ADMIN — Medication 2 MILLIGRAM(S): at 11:46

## 2021-11-18 RX ADMIN — ENOXAPARIN SODIUM 40 MILLIGRAM(S): 100 INJECTION SUBCUTANEOUS at 11:44

## 2021-11-18 RX ADMIN — Medication 2 MILLIGRAM(S): at 19:59

## 2021-11-18 RX ADMIN — INSULIN GLARGINE 25 UNIT(S): 100 INJECTION, SOLUTION SUBCUTANEOUS at 21:56

## 2021-11-18 NOTE — PROGRESS NOTE ADULT - SUBJECTIVE AND OBJECTIVE BOX
Pt seen and examined at bedside. Mental status improving.     VITAL SIGNS (Last 24 hrs):  T(C): 37.3 (11-18-21 @ 13:27), Max: 37.3 (11-18-21 @ 13:27)  HR: 95 (11-18-21 @ 13:27) (95 - 100)  BP: 120/78 (11-18-21 @ 13:27) (112/67 - 126/79)  RR: 20 (11-18-21 @ 13:27) (20 - 20)       PHYSICAL EXAM:  GENERAL: NAD   HEAD:  Atraumatic, Normocephalic  EYES:   conjunctiva and sclera clear  NECK: Supple, No JVD  CHEST/LUNG: Clear to auscultation bilaterally; No wheeze  HEART: Regular rate and rhythm; No murmurs, rubs, or gallops  ABDOMEN: Soft, Nontender, Nondistended; Bowel sounds present  EXTREMITIES:  2+ Peripheral Pulses, No clubbing, cyanosis, or edema  NEUROLOGY: lethargic, withdraws, moving all ext, remains mute   SKIN: No rashes or lesions    Labs Reviewed       CBC Full  -  ( 17 Nov 2021 06:08 )  WBC Count : 5.65 K/uL  Hemoglobin : 13.2 g/dL  Hematocrit : 40.6 %  Platelet Count - Automated : 262 K/uL  Mean Cell Volume : 79.6 fL  Mean Cell Hemoglobin : 25.9 pg  Mean Cell Hemoglobin Concentration : 32.5 g/dL  Auto Neutrophil # : 2.27 K/uL  Auto Lymphocyte # : 2.17 K/uL  Auto Monocyte # : 0.76 K/uL  Auto Eosinophil # : 0.42 K/uL  Auto Basophil # : 0.02 K/uL  Auto Neutrophil % : 40.1 %  Auto Lymphocyte % : 38.4 %  Auto Monocyte % : 13.5 %  Auto Eosinophil % : 7.4 %  Auto Basophil % : 0.4 %    BMP:    11-17 @ 06:08    Blood Urea Nitrogen - 20  Calcium - 9.4  Carbond Dioxide - 17  Chloride - 104  Creatinine - 1.0  Glucose - 158  Potassium - 4.3  Sodium - 138         Urine Culture:  11-16 @ 04:30 Urine culture: --    Culture Results:   No growth to date.  Method Type: --  Organism: --  Organism Identification: --  Specimen Source: .Blood Blood  11-13 @ 19:18 Urine culture: --    Culture Results:   No growth to date.  Method Type: --  Organism: --  Organism Identification: --  Specimen Source: .Blood None        MEDICATIONS  (STANDING):  amLODIPine   Tablet 10 milliGRAM(s) Oral daily  enoxaparin Injectable 40 milliGRAM(s) SubCutaneous daily  insulin glargine Injectable (LANTUS) 25 Unit(s) SubCutaneous at bedtime  insulin lispro (ADMELOG) corrective regimen sliding scale   SubCutaneous three times a day before meals  insulin lispro Injectable (ADMELOG) 8 Unit(s) SubCutaneous three times a day before meals  lactated ringers. 1000 milliLiter(s) (75 mL/Hr) IV Continuous <Continuous>  lisinopril 40 milliGRAM(s) Oral daily  LORazepam   Injectable 1 milliGRAM(s) IV Push <User Schedule>  LORazepam   Injectable 2 milliGRAM(s) IV Push <User Schedule>    MEDICATIONS  (PRN):  ALBUTerol    90 MICROgram(s) HFA Inhaler 2 Puff(s) Inhalation every 6 hours PRN Bronchospasm

## 2021-11-18 NOTE — PROGRESS NOTE BEHAVIORAL HEALTH - SUMMARY
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 All medical work up has been inconclusive, psychiatry is consulted to see if there is any psychiatric component.      Patient is still having abnormal behavior, was more agitated this morning requiring ativan 2mg po prn dose. Symptoms are associated with catatonia, although it should be noted, most common cause of  catatonia is an underlying medical condition. Given the report of acute worsening memory deficit reported by patient's sister and notable undetectable TSH level would recommend further medical work  up before considering ECT treatment for this patient.   For now will recommend to continue current dose of  ativan standing, given some behavior related to catatonia.    Impression  Delirium, unspecified  R/o adjustment disorder with anxious mood.    Plan  -On going delirium work up per medical team, EEG showed slowing, no epileptiform activity, MRI, CT head, TSH reviewed and noted to be undetectable   -Agree for further medical  work up before considering ECT treatment.   Continue   ativan to 2mg po at 6AM, 1mg at 11AM, 1mg at 4PM and 2mg at 8PM.  Monitor for sedation  -Neuro onboard., as per primary team endo is consulted  -Psychiatry will continue to  follow

## 2021-11-18 NOTE — PROGRESS NOTE BEHAVIORAL HEALTH - OTHER
Impacted by current presentation Noticeable with fear in her face and crying persistently No verbal response Minimally verbal Very restless minimal response

## 2021-11-18 NOTE — PROGRESS NOTE ADULT - SUBJECTIVE AND OBJECTIVE BOX
SUBJECTIVE:    Patient is a 51y old Female who presents with a chief complaint of ams (18 Nov 2021 15:16)    Currently admitted to medicine with the primary diagnosis of Altered mental status       Today is hospital day 7d. This morning she is resting comfortably in bed and reports no new issues or overnight events.   Stares blankly at me but does not respond to verbal and painful stimuli. Pt was more aagiteted and trying to scratch herself when psych attending came to see the pt this morning    PAST MEDICAL & SURGICAL HISTORY  Diabetes    Hypertension    No significant past surgical history      SOCIAL HISTORY:  Negative for smoking/alcohol/drug use.     ALLERGIES:  penicillin (Rash)    MEDICATIONS:  STANDING MEDICATIONS  amLODIPine   Tablet 10 milliGRAM(s) Oral daily  enoxaparin Injectable 40 milliGRAM(s) SubCutaneous daily  insulin glargine Injectable (LANTUS) 25 Unit(s) SubCutaneous at bedtime  insulin lispro (ADMELOG) corrective regimen sliding scale   SubCutaneous three times a day before meals  insulin lispro Injectable (ADMELOG) 8 Unit(s) SubCutaneous three times a day before meals  lactated ringers. 1000 milliLiter(s) IV Continuous <Continuous>  lisinopril 40 milliGRAM(s) Oral daily  LORazepam   Injectable 1 milliGRAM(s) IV Push <User Schedule>  LORazepam   Injectable 2 milliGRAM(s) IV Push <User Schedule>    PRN MEDICATIONS  ALBUTerol    90 MICROgram(s) HFA Inhaler 2 Puff(s) Inhalation every 6 hours PRN    VITALS:   T(F): 99.2  HR: 95  BP: 120/78  RR: 20  SpO2: --    LABS:                        13.2   5.65  )-----------( 262      ( 17 Nov 2021 06:08 )             40.6     11-17    138  |  104  |  20  ----------------------------<  158<H>  4.3   |  17  |  1.0    Ca    9.4      17 Nov 2021 06:08  Mg     2.1     11-17    TPro  7.0  /  Alb  3.8  /  TBili  0.7  /  DBili  x   /  AST  13  /  ALT  11  /  AlkPhos  93  11-17              Culture - Blood (collected 16 Nov 2021 04:30)  Source: .Blood Blood  Preliminary Report (17 Nov 2021 19:01):    No growth to date.      CARDIAC MARKERS ( 17 Nov 2021 06:08 )  x     / x     / 35 U/L / x     / x          RADIOLOGY:    PHYSICAL EXAM:  GEN: No acute distress  LUNGS: Clear to auscultation bilaterally   HEART: Regular  ABD: Soft, non-tender, non-distended.  EXT: No edema  NEURO: AAOX0    Intravenous access:   NG tube:   Hebert Catheter:

## 2021-11-18 NOTE — PROGRESS NOTE ADULT - ASSESSMENT
Pt is a 51F with a pmhx of HTN and DM2 presenting with AMS.    #AMS - suspected catatonia vs hyperthyroidism?  - SIRS+ on admission (HR>90, RR>20); pt was stuporous, withdrawing to pain, minimally following commands  - Head imaging (CT/MRI) w no acute intracranial pathology (stable since 6/2021); EEG w Generalized slowing but no epileptiform activity  - UA negative, ammonia & b12 wnl, cultures neg, HIV neg, RPR neg, Hep panel neg; UTox + for benzo - s/p ativan x1  - s/p vanc, ceftriaxone and acyclovir in light of unsuccessful LP  - pt has remained afebrile w no leukocytosis and improving mental status  - in-light of previous admissions w similar presentation and neg infectious w/u thus far - will monitor off abx  - cont ativan IV for catatonia  - Psych - possible ECT if pt does not respond to ativan  - TSH <0.00  - f/u endo consult    #RYLAN-possibly prerenal  - Cr, BUN trending up possibly from not tolerating PO  - c/w LR @75ml/hr    #DMII (A1c 9.9)  w neuropathy  - basal bolus insulin held as pt is not tolrerating orally  - holding gabapentin given stuporous appearance  - Follows with Dr. Badillo outpatient    #HTN  - cont home amlodipine and lisinopril    #HLD  - cont statin    #Asthma  - albuterol inhaler prn    #Nasopharyngeal Soft Tissue prominence on MRI  - outpatient follow up    #Obesity (BMI 40.1)  - outpatient nutrition eval    #Hypomagnesemia  - replete prn    DVT: Lovenox  GI: ppi  Diet: AAT  Activity: IAT  Dispo: acute, from home    Pending: clinical improvement

## 2021-11-18 NOTE — PROGRESS NOTE ADULT - ASSESSMENT
51F with a pmhx of HTN and DM2 presenting with AMS.     #AMS, suspected catatonia, possibly due to hyperthyroidism   - Head imaging (CT/MRI) w no acute intracranial pathology (stable since 6/2021); EEG w Generalized slowing but no epileptiform activity  - UA negative, ammonia & b12 wnl, cultures neg, HIV neg, RPR neg, Hep panel neg; UTox + for benzo - s/p ativan x1  - Neuro, ID and psych on board  - Cont ativan IV   - TSH undetectable, f/u T4, T3  - Endo consult     #RYLAN - possibly prerenal  - C/w IVF     #DMII (A1c 9.9)  w neuropathy  - cont basal bolus insulin - monitor fs, adjust prn  - holding gabapentin given AMS   - Follows with Dr. Badillo outpatient    #HTN  - cont home amlodipine and lisinopril    #HLD  - cont statin    #Asthma  - albuterol inhaler prn    #Nasopharyngeal Soft Tissue prominence on MRI  - outpatient follow up    #Obesity (BMI 40.1)  - outpatient nutrition eval    #Hypomagnesemia  - replete prn    DVT: Lovenox    D/w patient' sister and brother. Updated son yesterday.

## 2021-11-19 LAB
ALBUMIN SERPL ELPH-MCNC: 3.7 G/DL — SIGNIFICANT CHANGE UP (ref 3.5–5.2)
ALBUMIN SERPL ELPH-MCNC: 3.7 G/DL — SIGNIFICANT CHANGE UP (ref 3.5–5.2)
ALP SERPL-CCNC: 80 U/L — SIGNIFICANT CHANGE UP (ref 30–115)
ALP SERPL-CCNC: 85 U/L — SIGNIFICANT CHANGE UP (ref 30–115)
ALT FLD-CCNC: 10 U/L — SIGNIFICANT CHANGE UP (ref 0–41)
ALT FLD-CCNC: 10 U/L — SIGNIFICANT CHANGE UP (ref 0–41)
ANION GAP SERPL CALC-SCNC: 14 MMOL/L — SIGNIFICANT CHANGE UP (ref 7–14)
ANION GAP SERPL CALC-SCNC: 15 MMOL/L — HIGH (ref 7–14)
AST SERPL-CCNC: 12 U/L — SIGNIFICANT CHANGE UP (ref 0–41)
AST SERPL-CCNC: 12 U/L — SIGNIFICANT CHANGE UP (ref 0–41)
BASOPHILS # BLD AUTO: 0.02 K/UL — SIGNIFICANT CHANGE UP (ref 0–0.2)
BASOPHILS NFR BLD AUTO: 0.5 % — SIGNIFICANT CHANGE UP (ref 0–1)
BILIRUB SERPL-MCNC: 0.6 MG/DL — SIGNIFICANT CHANGE UP (ref 0.2–1.2)
BILIRUB SERPL-MCNC: 0.8 MG/DL — SIGNIFICANT CHANGE UP (ref 0.2–1.2)
BUN SERPL-MCNC: 14 MG/DL — SIGNIFICANT CHANGE UP (ref 10–20)
BUN SERPL-MCNC: 16 MG/DL — SIGNIFICANT CHANGE UP (ref 10–20)
CALCIUM SERPL-MCNC: 9.5 MG/DL — SIGNIFICANT CHANGE UP (ref 8.5–10.1)
CALCIUM SERPL-MCNC: 9.7 MG/DL — SIGNIFICANT CHANGE UP (ref 8.5–10.1)
CHLORIDE SERPL-SCNC: 108 MMOL/L — SIGNIFICANT CHANGE UP (ref 98–110)
CHLORIDE SERPL-SCNC: 108 MMOL/L — SIGNIFICANT CHANGE UP (ref 98–110)
CO2 SERPL-SCNC: 17 MMOL/L — SIGNIFICANT CHANGE UP (ref 17–32)
CO2 SERPL-SCNC: 18 MMOL/L — SIGNIFICANT CHANGE UP (ref 17–32)
CREAT SERPL-MCNC: 0.6 MG/DL — LOW (ref 0.7–1.5)
CREAT SERPL-MCNC: 0.7 MG/DL — SIGNIFICANT CHANGE UP (ref 0.7–1.5)
CULTURE RESULTS: SIGNIFICANT CHANGE UP
EOSINOPHIL # BLD AUTO: 0.29 K/UL — SIGNIFICANT CHANGE UP (ref 0–0.7)
EOSINOPHIL NFR BLD AUTO: 6.5 % — SIGNIFICANT CHANGE UP (ref 0–8)
GLUCOSE SERPL-MCNC: 144 MG/DL — HIGH (ref 70–99)
GLUCOSE SERPL-MCNC: 157 MG/DL — HIGH (ref 70–99)
HCT VFR BLD CALC: 38 % — SIGNIFICANT CHANGE UP (ref 37–47)
HGB BLD-MCNC: 12.4 G/DL — SIGNIFICANT CHANGE UP (ref 12–16)
HIV1 RNA SER-IMP: SIGNIFICANT CHANGE UP COPIES/ML
HIV1 RNA SERPL NAA+PROBE-LOG#: SIGNIFICANT CHANGE UP LOGCOPIES/ML
IMM GRANULOCYTES NFR BLD AUTO: 0.2 % — SIGNIFICANT CHANGE UP (ref 0.1–0.3)
LYMPHOCYTES # BLD AUTO: 1.78 K/UL — SIGNIFICANT CHANGE UP (ref 1.2–3.4)
LYMPHOCYTES # BLD AUTO: 40.2 % — SIGNIFICANT CHANGE UP (ref 20.5–51.1)
MAGNESIUM SERPL-MCNC: 1.5 MG/DL — LOW (ref 1.8–2.4)
MCHC RBC-ENTMCNC: 25.9 PG — LOW (ref 27–31)
MCHC RBC-ENTMCNC: 32.6 G/DL — SIGNIFICANT CHANGE UP (ref 32–37)
MCV RBC AUTO: 79.3 FL — LOW (ref 81–99)
MONOCYTES # BLD AUTO: 0.67 K/UL — HIGH (ref 0.1–0.6)
MONOCYTES NFR BLD AUTO: 15.1 % — HIGH (ref 1.7–9.3)
NEUTROPHILS # BLD AUTO: 1.66 K/UL — SIGNIFICANT CHANGE UP (ref 1.4–6.5)
NEUTROPHILS NFR BLD AUTO: 37.5 % — LOW (ref 42.2–75.2)
NRBC # BLD: 0 /100 WBCS — SIGNIFICANT CHANGE UP (ref 0–0)
PLATELET # BLD AUTO: 225 K/UL — SIGNIFICANT CHANGE UP (ref 130–400)
POTASSIUM SERPL-MCNC: 4.1 MMOL/L — SIGNIFICANT CHANGE UP (ref 3.5–5)
POTASSIUM SERPL-MCNC: 4.1 MMOL/L — SIGNIFICANT CHANGE UP (ref 3.5–5)
POTASSIUM SERPL-SCNC: 4.1 MMOL/L — SIGNIFICANT CHANGE UP (ref 3.5–5)
POTASSIUM SERPL-SCNC: 4.1 MMOL/L — SIGNIFICANT CHANGE UP (ref 3.5–5)
PROT SERPL-MCNC: 6.7 G/DL — SIGNIFICANT CHANGE UP (ref 6–8)
PROT SERPL-MCNC: 6.7 G/DL — SIGNIFICANT CHANGE UP (ref 6–8)
RBC # BLD: 4.79 M/UL — SIGNIFICANT CHANGE UP (ref 4.2–5.4)
RBC # FLD: 12.4 % — SIGNIFICANT CHANGE UP (ref 11.5–14.5)
SODIUM SERPL-SCNC: 140 MMOL/L — SIGNIFICANT CHANGE UP (ref 135–146)
SODIUM SERPL-SCNC: 140 MMOL/L — SIGNIFICANT CHANGE UP (ref 135–146)
SPECIMEN SOURCE: SIGNIFICANT CHANGE UP
WBC # BLD: 4.43 K/UL — LOW (ref 4.8–10.8)
WBC # FLD AUTO: 4.43 K/UL — LOW (ref 4.8–10.8)

## 2021-11-19 PROCEDURE — 99233 SBSQ HOSP IP/OBS HIGH 50: CPT

## 2021-11-19 PROCEDURE — 95819 EEG AWAKE AND ASLEEP: CPT | Mod: 26

## 2021-11-19 RX ORDER — MAGNESIUM SULFATE 500 MG/ML
2 VIAL (ML) INJECTION
Refills: 0 | Status: COMPLETED | OUTPATIENT
Start: 2021-11-19 | End: 2021-11-19

## 2021-11-19 RX ADMIN — Medication 8 UNIT(S): at 08:08

## 2021-11-19 RX ADMIN — ENOXAPARIN SODIUM 40 MILLIGRAM(S): 100 INJECTION SUBCUTANEOUS at 12:03

## 2021-11-19 RX ADMIN — Medication 2 MILLIGRAM(S): at 22:32

## 2021-11-19 RX ADMIN — Medication 0.5 MILLIGRAM(S): at 17:42

## 2021-11-19 RX ADMIN — Medication 25 GRAM(S): at 08:49

## 2021-11-19 RX ADMIN — Medication 2 MILLIGRAM(S): at 06:21

## 2021-11-19 RX ADMIN — INSULIN GLARGINE 25 UNIT(S): 100 INJECTION, SOLUTION SUBCUTANEOUS at 22:35

## 2021-11-19 RX ADMIN — Medication 25 GRAM(S): at 10:19

## 2021-11-19 NOTE — PROGRESS NOTE BEHAVIORAL HEALTH - NSBHFUPINTERVALHXFT_PSY_A_CORE
This patient is still in ativan, with minimal improvement.    This morning, she was observed to be very calm this morning. However, patient was not conversant. She is still retracting her limbs and appears to be very fearful. This morning, she was not observed crying. This patient is still in ativan, with minimal improvement.    This morning, she is observed to be very calm. However, patient was not conversant. She is still retracting her limbs and appears to be very fearful. This morning, she  is not observed crying. However, she participates minimally in interview.

## 2021-11-19 NOTE — PROGRESS NOTE BEHAVIORAL HEALTH - OTHER
minimal response Minimally verbal Noticeable with fear in her face and crying persistently Very restless Impacted by current presentation No verbal response Noticeable with fear in her face

## 2021-11-19 NOTE — CONSULT NOTE ADULT - ASSESSMENT
Assessment and Plan:   # Hyperthyroidism  - TSH 0.00, free thyroxine is 2.6  - Can start methimazole 10  - However, the thyroid disease is likely not related to her mental status changes  - Discussed with attending    # DM  - Can continue regimen of 25/8/8/8

## 2021-11-19 NOTE — CONSULT NOTE ADULT - ATTENDING COMMENTS
Called for low TSH and high Free T4 in this pt w/o known h/o thyroid disease.  As per partner , pt is conversive and eating with family members in hospital, but at other times exhibiting catotonia-like behavior.  Partner denies pt with palpitations, weight loss, or shakiness. Exam notable for symmetrical goiter and heart rate ~ 85-90 bpm (regular).   Note low Mg+, though K+ level normal    Recommend methimazole 10 mg q24 hrs for hyperthyroidism, though do not think this is related to mental status.  Supplement magnesium.  Repeat TSH, Free T4, and T3 in 1-2 weeks.

## 2021-11-19 NOTE — PROGRESS NOTE ADULT - ASSESSMENT
Pt is a 51F with a pmhx of HTN and DM2 presenting with AMS.    #AMS - suspected catatonia vs hyperthyroidism?  - SIRS+ on admission (HR>90, RR>20); pt was stuporous, withdrawing to pain, minimally following commands  - Head imaging (CT/MRI) w no acute intracranial pathology (stable since 6/2021); EEG w Generalized slowing but no epileptiform activity  - UA negative, ammonia & b12 wnl, cultures neg, HIV neg, RPR neg, Hep panel neg; UTox + for benzo - s/p ativan x1  - s/p vanc, ceftriaxone and acyclovir in light of unsuccessful LP  - pt has remained afebrile w no leukocytosis and improving mental status  - in-light of previous admissions w similar presentation and neg infectious w/u thus far - will monitor off abx  - Ativan dose reduced 11/19 - as per psych  - Psych - possible ECT if pt does not respond to ativan  - TSH <0.00, T4 2.6, f/u total T3  - Endo - recommended methimazole 10mg PO; endo feels hyperthyroidism is not the reason for her behavioural abnormalities    #RYLAN-possibly prerenal  - Cr, BUN trended up possibly from not tolerating PO  - c/w LR @75ml/hr  - Trending down currently      #DMII (A1c 9.9)  w neuropathy  - basal bolus insulin held as pt is not tolrerating orally  - holding gabapentin given stuporous appearance  - Follows with Dr. Badillo outpatient    #HTN  - cont home amlodipine and lisinopril    #HLD  - cont statin    #Asthma  - albuterol inhaler prn    #Nasopharyngeal Soft Tissue prominence on MRI  - outpatient follow up    #Obesity (BMI 40.1)  - outpatient nutrition eval    #Hypomagnesemia  - replete prn    DVT: Lovenox  GI: ppi  Diet: AAT  Activity: IAT  Dispo: acute, from home    Pending: clinical improvement Pt is a 51F with a pmhx of HTN and DM2 presenting with AMS.    #AMS - suspected catatonia vs hyperthyroidism?  - SIRS+ on admission (HR>90, RR>20); pt was stuporous, withdrawing to pain, minimally following commands  - Head imaging (CT/MRI) w no acute intracranial pathology (stable since 6/2021); EEG w Generalized slowing but no epileptiform activity  - UA negative, ammonia & b12 wnl, cultures neg, HIV neg, RPR neg, Hep panel neg; UTox + for benzo - s/p ativan x1  - s/p vanc, ceftriaxone and acyclovir in light of unsuccessful LP  - pt has remained afebrile w no leukocytosis and improving mental status  - in-light of previous admissions w similar presentation and neg infectious w/u thus far - will monitor off abx  - Ativan dose reduced 11/19 - as per psych  - Psych - possible ECT if pt does not respond to ativan  - TSH <0.00, T4 2.6, f/u total T3; repeat TSH, T4, T3 in 1-2 weeks  - Endo - recommended methimazole 10mg PO; endo feels hyperthyroidism is not the reason for her behavioural abnormalities    #RYLAN-possibly prerenal  - Cr, BUN trended up possibly from not tolerating PO  - c/w LR @75ml/hr  - Trending down currently      #DMII (A1c 9.9)  w neuropathy  - basal bolus insulin held as pt is not tolrerating orally  - holding gabapentin given stuporous appearance  - Follows with Dr. Badillo outpatient    #HTN  - cont home amlodipine and lisinopril    #HLD  - cont statin    #Asthma  - albuterol inhaler prn    #Nasopharyngeal Soft Tissue prominence on MRI  - outpatient follow up    #Obesity (BMI 40.1)  - outpatient nutrition eval    #Hypomagnesemia  - replete prn    DVT: Lovenox  GI: ppi  Diet: AAT  Activity: IAT  Dispo: acute, from home    Pending: clinical improvement

## 2021-11-19 NOTE — PROGRESS NOTE ADULT - SUBJECTIVE AND OBJECTIVE BOX
SUBJECTIVE:    Patient is a 51y old Female who presents with a chief complaint of ams (19 Nov 2021 15:02)    Currently admitted to medicine with the primary diagnosis of Altered mental status       Today is hospital day 8d. This morning she is resting comfortably in bed and reports no new issues or overnight events.   Blanks starely, does not respond to verbal or painful stimuli for me. Vocalizing with RN - "wants to go home".    PAST MEDICAL & SURGICAL HISTORY  Diabetes    Hypertension    No significant past surgical history      SOCIAL HISTORY:  Negative for smoking/alcohol/drug use.     ALLERGIES:  penicillin (Rash)    MEDICATIONS:  STANDING MEDICATIONS  amLODIPine   Tablet 10 milliGRAM(s) Oral daily  enoxaparin Injectable 40 milliGRAM(s) SubCutaneous daily  insulin glargine Injectable (LANTUS) 25 Unit(s) SubCutaneous at bedtime  insulin lispro (ADMELOG) corrective regimen sliding scale   SubCutaneous three times a day before meals  insulin lispro Injectable (ADMELOG) 8 Unit(s) SubCutaneous three times a day before meals  lactated ringers. 1000 milliLiter(s) IV Continuous <Continuous>  lisinopril 40 milliGRAM(s) Oral daily  LORazepam     Tablet 2 milliGRAM(s) Oral <User Schedule>  LORazepam     Tablet 0.5 milliGRAM(s) Oral <User Schedule>  LORazepam     Tablet 1 milliGRAM(s) Oral <User Schedule>  methimazole 10 milliGRAM(s) Oral daily    PRN MEDICATIONS  acetaminophen     Tablet .. 650 milliGRAM(s) Oral every 6 hours PRN  ALBUTerol    90 MICROgram(s) HFA Inhaler 2 Puff(s) Inhalation every 6 hours PRN    VITALS:   T(F): 96.2  HR: 90  BP: 159/88  RR: 18  SpO2: --    LABS:                        12.4   4.43  )-----------( 225      ( 19 Nov 2021 06:20 )             38.0     11-19    140  |  108  |  14  ----------------------------<  157<H>  4.1   |  18  |  0.6<L>    Ca    9.5      19 Nov 2021 06:20  Mg     1.5     11-19    TPro  6.7  /  Alb  3.7  /  TBili  0.8  /  DBili  x   /  AST  12  /  ALT  10  /  AlkPhos  80  11-19                  RADIOLOGY:    PHYSICAL EXAM:  GEN: No acute distress  LUNGS: Clear to auscultation bilaterally   HEART: Regular  ABD: Soft, non-tender, non-distended.  EXT: No edema  NEURO: AAOX0    Intravenous access:   NG tube:   Hebert Catheter:

## 2021-11-19 NOTE — CONSULT NOTE ADULT - SUBJECTIVE AND OBJECTIVE BOX
Hospital Day:  8d    Subjective:    Pt is a 51F with a pmhx of HTN and DM2 presenting with AMS. patient was non verbal non responsive at the time of my examine, collateral information obtained from ED chart and sister. Per sister at bedside, pt was in usual state of health, A&Ox3, walking/talking this morning then went back to bed. Her sister briefly left the house and when she returned, pt was found not responding to verbal stimuli or moving but in no distress, which lasted until EMS arrived when she became anxious appearing, flapping hands and breathing quickly, crying out but still not speaking. No history of seizures. Those symptoms have continued until arrival in ED. Sister denies drug or alcohol use, change in medications, any trauma, or any hx of psychiatric disease. She does note that pt had an episode in which she appeared exactly the same which self resolved approximately 1d into an admission.    at the time of the examination, patient is obtunded, not withdrawing to pain, not following commands, no nuchal rigidity.    Per family patient is heat intolerant, but no recent weight loss or complaints of palpitations.      Past Medical Hx:   Diabetes    Hypertension      Past Sx:  No significant past surgical history      Allergies:  penicillin (Rash)    Current Meds:   Standng Meds:  amLODIPine   Tablet 10 milliGRAM(s) Oral daily  enoxaparin Injectable 40 milliGRAM(s) SubCutaneous daily  insulin glargine Injectable (LANTUS) 25 Unit(s) SubCutaneous at bedtime  insulin lispro (ADMELOG) corrective regimen sliding scale   SubCutaneous three times a day before meals  insulin lispro Injectable (ADMELOG) 8 Unit(s) SubCutaneous three times a day before meals  lactated ringers. 1000 milliLiter(s) (75 mL/Hr) IV Continuous <Continuous>  lisinopril 40 milliGRAM(s) Oral daily  LORazepam     Tablet 2 milliGRAM(s) Oral <User Schedule>  LORazepam     Tablet 0.5 milliGRAM(s) Oral <User Schedule>  LORazepam     Tablet 1 milliGRAM(s) Oral <User Schedule>  methimazole 10 milliGRAM(s) Oral daily    PRN Meds:  acetaminophen     Tablet .. 650 milliGRAM(s) Oral every 6 hours PRN Moderate Pain (4 - 6)  ALBUTerol    90 MICROgram(s) HFA Inhaler 2 Puff(s) Inhalation every 6 hours PRN Bronchospasm    HOME MEDICATIONS:  albuterol 90 mcg/inh inhalation aerosol with adapter:   amlodipine-benazepril 10 mg-40 mg oral capsule: 1 cap(s) orally once a day  atorvastatin 40 mg oral tablet: 1 tab(s) orally once a day  ezetimibe 10 mg oral tablet: 1 tab(s) orally once a day  gabapentin 300 mg oral capsule: 1 cap(s) orally 3 times a day  Ozempic (1 mg dose) 4 mg/3 mL subcutaneous solution:   pioglitazone 30 mg oral tablet: 1 tab(s) orally once a day  Tresiba 100 units/mL subcutaneous solution: 40 unit(s) subcutaneous once a day (at bedtime)      Vital Signs:   T(F): 96.2 (11-19-21 @ 12:24), Max: 98.2 (11-19-21 @ 05:10)  HR: 90 (11-19-21 @ 12:24) (85 - 97)  BP: 159/88 (11-19-21 @ 12:24) (121/67 - 159/88)  RR: 18 (11-19-21 @ 12:24) (18 - 20)  SpO2: --        Physical Exam:   GENERAL: NAD  HEENT: NCAT  CHEST/LUNG: CTAB  HEART: Regular rate and rhythm; s1 s2 appreciated, No murmurs, rubs, or gallops  ABDOMEN: Soft, Nontender, Nondistended; Bowel sounds present  EXTREMITIES: No LE edema b/l  SKIN: no rashes, no new lesions  NERVOUS SYSTEM:  Alert & Oriented X1, withdraws from pain, nonverbal          Labs:                         12.4   4.43  )-----------( 225      ( 19 Nov 2021 06:20 )             38.0     Neutophil% 37.5, Lymphocyte% 40.2, Monocyte% 15.1, Bands% 0.2 11-19-21 @ 06:20    19 Nov 2021 06:20    140    |  108    |  14     ----------------------------<  157    4.1     |  18     |  0.6      Ca    9.5        19 Nov 2021 06:20  Mg     1.5       19 Nov 2021 06:20    TPro  6.7    /  Alb  3.7    /  TBili  0.8    /  DBili  x      /  AST  12     /  ALT  10     /  AlkPhos  80     19 Nov 2021 06:20              Troponin --, CKMB --, CK 35 11-17-21 @ 06:08              Culture - Blood (collected 11-16-21 @ 04:30)  Source: .Blood Blood  Preliminary Report (11-17-21 @ 19:01):    No growth to date.    Culture - Blood (collected 11-13-21 @ 19:18)  Source: .Blood None  Final Report (11-19-21 @ 01:00):    No Growth Final

## 2021-11-19 NOTE — PROGRESS NOTE BEHAVIORAL HEALTH - SUMMARY
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 All medical work up has been inconclusive, psychiatry is consulted to see if there is any psychiatric component.      Patient is still having abnormal behavior, was more agitated this morning requiring ativan 2mg po prn dose. Symptoms are associated with catatonia, although it should be noted, most common cause of  catatonia is an underlying medical condition. Given the report of acute worsening memory deficit reported by patient's sister and notable undetectable TSH level would recommend further medical work  up before considering ECT treatment for this patient.   For now will recommend to continue current dose of  ativan standing, given some behavior related to catatonia.    Impression  Delirium, unspecified  R/o adjustment disorder with anxious mood.    Plan  -On going delirium work up per medical team, EEG showed slowing, no epileptiform activity, MRI, CT head, TSH reviewed and noted to be undetectable   -Agree for further medical  work up before considering ECT treatment.   Continue   ativan to 2mg po at 6AM, 1mg at 11AM, 1mg at 4PM and 2mg at 8PM.  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 All medical work up has been inconclusive, psychiatry is consulted to see if there is any psychiatric component.      While some of these patient's symptoms are associated with behaviors of  catatonia, it should be noted, most common cause of  catatonia is an underlying medical condition. Given the report of acute worsening memory deficit reported by patient's sister and notable undetectable TSH level would recommend further medical work  up before considering ECT treatment for this patient.     Impression  Delirium, unspecified  R/o adjustment disorder with anxious mood.    Plan  -On going delirium work up per medical team, EEG showed slowing, no epileptiform activity, MRI, CT head, TSH reviewed and noted to be undetectable, Free Thyroxine, Serum: 2.6 ng/dL  -Agree for ongoing further medical work up before considering ECT treatment.    -Recommend to lower ativan  to 1mg po at 6AM, 0.5mg at 11AM, 0.5mg at 4PM and 2mg at 8PM, monitor for sedation  -Neuro onboard,  pending endocrinology  -Psychiatry will continue to  follow on 11/22/21

## 2021-11-20 LAB
ALBUMIN SERPL ELPH-MCNC: 4 G/DL — SIGNIFICANT CHANGE UP (ref 3.5–5.2)
ALP SERPL-CCNC: 87 U/L — SIGNIFICANT CHANGE UP (ref 30–115)
ALT FLD-CCNC: 10 U/L — SIGNIFICANT CHANGE UP (ref 0–41)
ANION GAP SERPL CALC-SCNC: 18 MMOL/L — HIGH (ref 7–14)
AST SERPL-CCNC: 13 U/L — SIGNIFICANT CHANGE UP (ref 0–41)
BASOPHILS # BLD AUTO: 0.02 K/UL — SIGNIFICANT CHANGE UP (ref 0–0.2)
BASOPHILS NFR BLD AUTO: 0.4 % — SIGNIFICANT CHANGE UP (ref 0–1)
BILIRUB SERPL-MCNC: 0.8 MG/DL — SIGNIFICANT CHANGE UP (ref 0.2–1.2)
BUN SERPL-MCNC: 9 MG/DL — LOW (ref 10–20)
CALCIUM SERPL-MCNC: 9.6 MG/DL — SIGNIFICANT CHANGE UP (ref 8.5–10.1)
CHLORIDE SERPL-SCNC: 109 MMOL/L — SIGNIFICANT CHANGE UP (ref 98–110)
CO2 SERPL-SCNC: 18 MMOL/L — SIGNIFICANT CHANGE UP (ref 17–32)
CREAT SERPL-MCNC: 0.6 MG/DL — LOW (ref 0.7–1.5)
EOSINOPHIL # BLD AUTO: 0.26 K/UL — SIGNIFICANT CHANGE UP (ref 0–0.7)
EOSINOPHIL NFR BLD AUTO: 5.4 % — SIGNIFICANT CHANGE UP (ref 0–8)
GLUCOSE SERPL-MCNC: 155 MG/DL — HIGH (ref 70–99)
HCT VFR BLD CALC: 38.6 % — SIGNIFICANT CHANGE UP (ref 37–47)
HGB BLD-MCNC: 12.4 G/DL — SIGNIFICANT CHANGE UP (ref 12–16)
IMM GRANULOCYTES NFR BLD AUTO: 0.2 % — SIGNIFICANT CHANGE UP (ref 0.1–0.3)
LYMPHOCYTES # BLD AUTO: 1.54 K/UL — SIGNIFICANT CHANGE UP (ref 1.2–3.4)
LYMPHOCYTES # BLD AUTO: 32.2 % — SIGNIFICANT CHANGE UP (ref 20.5–51.1)
MAGNESIUM SERPL-MCNC: 1.6 MG/DL — LOW (ref 1.8–2.4)
MCHC RBC-ENTMCNC: 26.1 PG — LOW (ref 27–31)
MCHC RBC-ENTMCNC: 32.1 G/DL — SIGNIFICANT CHANGE UP (ref 32–37)
MCV RBC AUTO: 81.1 FL — SIGNIFICANT CHANGE UP (ref 81–99)
MONOCYTES # BLD AUTO: 0.62 K/UL — HIGH (ref 0.1–0.6)
MONOCYTES NFR BLD AUTO: 13 % — HIGH (ref 1.7–9.3)
NEUTROPHILS # BLD AUTO: 2.33 K/UL — SIGNIFICANT CHANGE UP (ref 1.4–6.5)
NEUTROPHILS NFR BLD AUTO: 48.8 % — SIGNIFICANT CHANGE UP (ref 42.2–75.2)
NRBC # BLD: 0 /100 WBCS — SIGNIFICANT CHANGE UP (ref 0–0)
PLATELET # BLD AUTO: 240 K/UL — SIGNIFICANT CHANGE UP (ref 130–400)
POTASSIUM SERPL-MCNC: 4.1 MMOL/L — SIGNIFICANT CHANGE UP (ref 3.5–5)
POTASSIUM SERPL-SCNC: 4.1 MMOL/L — SIGNIFICANT CHANGE UP (ref 3.5–5)
PROT SERPL-MCNC: 7.1 G/DL — SIGNIFICANT CHANGE UP (ref 6–8)
RBC # BLD: 4.76 M/UL — SIGNIFICANT CHANGE UP (ref 4.2–5.4)
RBC # FLD: 12.4 % — SIGNIFICANT CHANGE UP (ref 11.5–14.5)
SODIUM SERPL-SCNC: 145 MMOL/L — SIGNIFICANT CHANGE UP (ref 135–146)
WBC # BLD: 4.78 K/UL — LOW (ref 4.8–10.8)
WBC # FLD AUTO: 4.78 K/UL — LOW (ref 4.8–10.8)

## 2021-11-20 PROCEDURE — 99233 SBSQ HOSP IP/OBS HIGH 50: CPT

## 2021-11-20 RX ORDER — MAGNESIUM SULFATE 500 MG/ML
2 VIAL (ML) INJECTION ONCE
Refills: 0 | Status: COMPLETED | OUTPATIENT
Start: 2021-11-20 | End: 2021-11-20

## 2021-11-20 RX ADMIN — ENOXAPARIN SODIUM 40 MILLIGRAM(S): 100 INJECTION SUBCUTANEOUS at 11:29

## 2021-11-20 RX ADMIN — INSULIN GLARGINE 25 UNIT(S): 100 INJECTION, SOLUTION SUBCUTANEOUS at 22:49

## 2021-11-20 RX ADMIN — Medication 2: at 11:29

## 2021-11-20 RX ADMIN — Medication 0.5 MILLIGRAM(S): at 11:40

## 2021-11-20 RX ADMIN — Medication 50 GRAM(S): at 14:03

## 2021-11-20 RX ADMIN — Medication 8 UNIT(S): at 11:29

## 2021-11-20 RX ADMIN — Medication 8 UNIT(S): at 17:28

## 2021-11-20 RX ADMIN — Medication 50 GRAM(S): at 11:28

## 2021-11-20 RX ADMIN — Medication 2 MILLIGRAM(S): at 20:16

## 2021-11-20 RX ADMIN — Medication 0.5 MILLIGRAM(S): at 17:06

## 2021-11-20 RX ADMIN — Medication 2: at 17:29

## 2021-11-20 NOTE — PROGRESS NOTE ADULT - SUBJECTIVE AND OBJECTIVE BOX
SUBJECTIVE:    Patient is a 51y old Female who presents with a chief complaint of ams (19 Nov 2021 16:08)    Currently admitted to medicine with the primary diagnosis of Altered mental status       Today is hospital day 9d. This morning she is resting comfortably in bed and reports no new issues or overnight events.   Blanks starely and does not respond to verbal or painful stimuli. Pt was speaking more with family yesterday    PAST MEDICAL & SURGICAL HISTORY  Diabetes    Hypertension    No significant past surgical history      SOCIAL HISTORY:  Negative for smoking/alcohol/drug use.     ALLERGIES:  penicillin (Rash)    MEDICATIONS:  STANDING MEDICATIONS  amLODIPine   Tablet 10 milliGRAM(s) Oral daily  enoxaparin Injectable 40 milliGRAM(s) SubCutaneous daily  insulin glargine Injectable (LANTUS) 25 Unit(s) SubCutaneous at bedtime  insulin lispro (ADMELOG) corrective regimen sliding scale   SubCutaneous three times a day before meals  insulin lispro Injectable (ADMELOG) 8 Unit(s) SubCutaneous three times a day before meals  lactated ringers. 1000 milliLiter(s) IV Continuous <Continuous>  lisinopril 40 milliGRAM(s) Oral daily  LORazepam   Injectable 1 milliGRAM(s) IV Push <User Schedule>  LORazepam   Injectable 0.5 milliGRAM(s) IV Push <User Schedule>  LORazepam   Injectable 2 milliGRAM(s) IV Push <User Schedule>  magnesium sulfate  IVPB 2 Gram(s) IV Intermittent once  methimazole 10 milliGRAM(s) Oral daily    PRN MEDICATIONS  acetaminophen     Tablet .. 650 milliGRAM(s) Oral every 6 hours PRN  ALBUTerol    90 MICROgram(s) HFA Inhaler 2 Puff(s) Inhalation every 6 hours PRN    VITALS:   T(F): 97.6  HR: 95  BP: 146/65  RR: 18  SpO2: --    LABS:                        12.4   4.78  )-----------( 240      ( 20 Nov 2021 04:30 )             38.6     11-20    145  |  109  |  9<L>  ----------------------------<  155<H>  4.1   |  18  |  0.6<L>    Ca    9.6      20 Nov 2021 04:30  Mg     1.6     11-20    TPro  7.1  /  Alb  4.0  /  TBili  0.8  /  DBili  x   /  AST  13  /  ALT  10  /  AlkPhos  87  11-20                  RADIOLOGY:    PHYSICAL EXAM:  GEN: No acute distress  LUNGS: Clear to auscultation bilaterally   HEART: Regular  ABD: Soft, non-tender, non-distended.  EXT: No edema  NEURO: AAOX0    Intravenous access:   NG tube:   Hebert Catheter:

## 2021-11-20 NOTE — PROGRESS NOTE ADULT - ATTENDING COMMENTS
Patient seen and examined on 11/17/21. Patient continues to exhibit mutism, moving all ext, withdraws to tactile stimuli, tearful. Suspect catatonia, c/w Ativan, psych f/u. TSH low, check T4, T3, endo consult.
51F with a pmhx of HTN and DM2 presenting with AMS.     #AMS, suspected catatonia, possibly due to hyperthyroidism   - Head imaging (CT/MRI) w no acute intracranial pathology (stable since 6/2021); EEG w generalized slowing but no epileptiform activity  - UA negative, ammonia & b12 wnl, cultures neg, HIV neg, RPR neg, Hep panel neg; UTox + for benzo - s/p ativan x1  - Neuro, ID and psych on board  - Cont Ativan IV     #Hyperthyroidism - methimazole     #RYLAN - resolved   - c/w IVF     #DMII (A1c 9.9)  w neuropathy  - cont basal bolus insulin - monitor fs, adjust prn  - holding gabapentin given AMS   - Follows with Dr. Badillo outpatient    #HTN  - cont home amlodipine and lisinopril    #HLD  - cont statin    #Asthma  - albuterol inhaler prn    #Nasopharyngeal Soft Tissue prominence on MRI  - outpatient follow up    #Obesity (BMI 40.1)  - outpatient nutrition eval    #Hypomagnesemia  - replete prn    DVT: Lovenox    Pending: AMS improvement
51F with a pmhx of HTN and DM2 presenting with AMS.     #AMS, suspected catatonia, possibly due to hyperthyroidism   - Head imaging (CT/MRI) w no acute intracranial pathology (stable since 6/2021); EEG w Generalized slowing but no epileptiform activity  - UA negative, ammonia & b12 wnl, cultures neg, HIV neg, RPR neg, Hep panel neg; UTox + for benzo - s/p ativan x1  - Neuro, ID and psych on board  - Cont ativan IV   - LP: send encephalitis w/u     #Hyperthyroidism - methimazole     #RYLAN - resolved   - C/w IVF     #DMII (A1c 9.9)  w neuropathy  - cont basal bolus insulin - monitor fs, adjust prn  - holding gabapentin given AMS   - Follows with Dr. Badillo outpatient    #HTN  - cont home amlodipine and lisinopril    #HLD  - cont statin    #Asthma  - albuterol inhaler prn    #Nasopharyngeal Soft Tissue prominence on MRI  - outpatient follow up    #Obesity (BMI 40.1)  - outpatient nutrition eval    #Hypomagnesemia  - replete prn    DVT: Lovenox    Pending: AMS improvement, LP

## 2021-11-20 NOTE — PROGRESS NOTE ADULT - ASSESSMENT
Pt is a 51F with a pmhx of HTN and DM2 presenting with AMS.    #AMS - suspected catatonia vs hyperthyroidism?  - SIRS+ on admission (HR>90, RR>20); pt was stuporous, withdrawing to pain, minimally following commands  - Head imaging (CT/MRI) w no acute intracranial pathology (stable since 6/2021); EEG w Generalized slowing but no epileptiform activity  - UA negative, ammonia & b12 wnl, cultures neg, HIV neg, RPR neg, Hep panel neg; UTox + for benzo - s/p ativan x1  - s/p vanc, ceftriaxone and acyclovir in light of unsuccessful LP  - pt has remained afebrile w no leukocytosis and improving mental status  - in-light of previous admissions w similar presentation and neg infectious w/u thus far - will monitor off abx  - Ativan dose reduced 11/19 - as per psych  - Psych - possible ECT if pt does not respond to ativan  - TSH <0.00, T4 2.6, f/u total T3; repeat TSH, T4, T3 in 1-2 weeks  - Endo - recommended methimazole 10mg PO; endo feels hyperthyroidism is not the reason for her behavioural abnormalities  - Will try LP 11/20; give ativan 2mg IV stat before LP as pt might be agitated enough for the procedure    #RYLAN - possibly prerenal - resol;bhupinder  - c/w LR @75ml/hr    #DMII (A1c 9.9)  w neuropathy  - c/w basal bolus and before meals insulin  - holding gabapentin given stuporous appearance  - Follows with Dr. Badillo outpatient    #HTN  - cont home amlodipine and lisinopril    #HLD  - cont statin    #Asthma  - albuterol inhaler prn    #Nasopharyngeal Soft Tissue prominence on MRI  - outpatient follow up    #Obesity (BMI 40.1)  - outpatient nutrition eval    #Hypomagnesemia  - replete prn    DVT: Lovenox  GI: ppi  Diet: AAT  Activity: IAT  Dispo: acute, from home    Pending: clinical improvement Pt is a 51F with a pmhx of HTN and DM2 presenting with AMS.    #AMS - suspected catatonia vs hyperthyroidism?  - SIRS+ on admission (HR>90, RR>20); pt was stuporous, withdrawing to pain, minimally following commands  - Head imaging (CT/MRI) w no acute intracranial pathology (stable since 6/2021); EEG w Generalized slowing but no epileptiform activity  - UA negative, ammonia & b12 wnl, cultures neg, HIV neg, RPR neg, Hep panel neg; UTox + for benzo - s/p ativan x1  - s/p vanc, ceftriaxone and acyclovir in light of unsuccessful LP  - pt has remained afebrile w no leukocytosis and improving mental status  - in-light of previous admissions w similar presentation and neg infectious w/u thus far - will monitor off abx  - Ativan dose reduced 11/19 - as per psych  - Psych - possible ECT if pt does not respond to ativan  - TSH <0.00, T4 2.6, f/u total T3; repeat TSH, T4, T3 in 1-2 weeks  - Endo - recommended methimazole 10mg PO; endo feels hyperthyroidism is not the reason for her behavioural abnormalities  - Will try LP 11/20; give ativan 2mg IV stat before LP as pt might be agitated enough for the procedure    #RYLAN - possibly prerenal - resolved  - c/w LR @75ml/hr     #DMII (A1c 9.9)  w neuropathy  - c/w basal bolus and before meals insulin  - holding gabapentin given stuporous appearance  - Follows with Dr. Badillo outpatient    #HTN  - cont home amlodipine and lisinopril    #HLD  - cont statin    #Asthma  - albuterol inhaler prn    #Nasopharyngeal Soft Tissue prominence on MRI  - outpatient follow up    #Obesity (BMI 40.1)  - outpatient nutrition eval    #Hypomagnesemia  - replete prn    DVT: Lovenox  GI: ppi  Diet: AAT  Activity: IAT  Dispo: acute, from home    Pending: clinical improvement

## 2021-11-21 LAB
ALBUMIN SERPL ELPH-MCNC: 3.7 G/DL — SIGNIFICANT CHANGE UP (ref 3.5–5.2)
ALP SERPL-CCNC: 81 U/L — SIGNIFICANT CHANGE UP (ref 30–115)
ALT FLD-CCNC: 11 U/L — SIGNIFICANT CHANGE UP (ref 0–41)
ANION GAP SERPL CALC-SCNC: 13 MMOL/L — SIGNIFICANT CHANGE UP (ref 7–14)
AST SERPL-CCNC: 12 U/L — SIGNIFICANT CHANGE UP (ref 0–41)
BASOPHILS # BLD AUTO: 0.02 K/UL — SIGNIFICANT CHANGE UP (ref 0–0.2)
BASOPHILS NFR BLD AUTO: 0.4 % — SIGNIFICANT CHANGE UP (ref 0–1)
BILIRUB SERPL-MCNC: 0.6 MG/DL — SIGNIFICANT CHANGE UP (ref 0.2–1.2)
BUN SERPL-MCNC: 7 MG/DL — LOW (ref 10–20)
CALCIUM SERPL-MCNC: 9.1 MG/DL — SIGNIFICANT CHANGE UP (ref 8.5–10.1)
CHLORIDE SERPL-SCNC: 107 MMOL/L — SIGNIFICANT CHANGE UP (ref 98–110)
CO2 SERPL-SCNC: 19 MMOL/L — SIGNIFICANT CHANGE UP (ref 17–32)
CREAT SERPL-MCNC: 0.5 MG/DL — LOW (ref 0.7–1.5)
CULTURE RESULTS: SIGNIFICANT CHANGE UP
EOSINOPHIL # BLD AUTO: 0.27 K/UL — SIGNIFICANT CHANGE UP (ref 0–0.7)
EOSINOPHIL NFR BLD AUTO: 5.7 % — SIGNIFICANT CHANGE UP (ref 0–8)
GLUCOSE SERPL-MCNC: 133 MG/DL — HIGH (ref 70–99)
HCT VFR BLD CALC: 35.8 % — LOW (ref 37–47)
HGB BLD-MCNC: 11.8 G/DL — LOW (ref 12–16)
IMM GRANULOCYTES NFR BLD AUTO: 0.2 % — SIGNIFICANT CHANGE UP (ref 0.1–0.3)
LYMPHOCYTES # BLD AUTO: 1.7 K/UL — SIGNIFICANT CHANGE UP (ref 1.2–3.4)
LYMPHOCYTES # BLD AUTO: 35.9 % — SIGNIFICANT CHANGE UP (ref 20.5–51.1)
MAGNESIUM SERPL-MCNC: 1.6 MG/DL — LOW (ref 1.8–2.4)
MCHC RBC-ENTMCNC: 26.1 PG — LOW (ref 27–31)
MCHC RBC-ENTMCNC: 33 G/DL — SIGNIFICANT CHANGE UP (ref 32–37)
MCV RBC AUTO: 79.2 FL — LOW (ref 81–99)
MONOCYTES # BLD AUTO: 0.52 K/UL — SIGNIFICANT CHANGE UP (ref 0.1–0.6)
MONOCYTES NFR BLD AUTO: 11 % — HIGH (ref 1.7–9.3)
NEUTROPHILS # BLD AUTO: 2.21 K/UL — SIGNIFICANT CHANGE UP (ref 1.4–6.5)
NEUTROPHILS NFR BLD AUTO: 46.8 % — SIGNIFICANT CHANGE UP (ref 42.2–75.2)
NRBC # BLD: 0 /100 WBCS — SIGNIFICANT CHANGE UP (ref 0–0)
PLATELET # BLD AUTO: 251 K/UL — SIGNIFICANT CHANGE UP (ref 130–400)
POTASSIUM SERPL-MCNC: 4 MMOL/L — SIGNIFICANT CHANGE UP (ref 3.5–5)
POTASSIUM SERPL-SCNC: 4 MMOL/L — SIGNIFICANT CHANGE UP (ref 3.5–5)
PROT SERPL-MCNC: 6.6 G/DL — SIGNIFICANT CHANGE UP (ref 6–8)
RBC # BLD: 4.52 M/UL — SIGNIFICANT CHANGE UP (ref 4.2–5.4)
RBC # FLD: 12.4 % — SIGNIFICANT CHANGE UP (ref 11.5–14.5)
SODIUM SERPL-SCNC: 139 MMOL/L — SIGNIFICANT CHANGE UP (ref 135–146)
SPECIMEN SOURCE: SIGNIFICANT CHANGE UP
WBC # BLD: 4.73 K/UL — LOW (ref 4.8–10.8)
WBC # FLD AUTO: 4.73 K/UL — LOW (ref 4.8–10.8)

## 2021-11-21 PROCEDURE — 99232 SBSQ HOSP IP/OBS MODERATE 35: CPT

## 2021-11-21 RX ADMIN — Medication 1: at 11:26

## 2021-11-21 RX ADMIN — Medication 650 MILLIGRAM(S): at 15:38

## 2021-11-21 RX ADMIN — Medication 0.5 MILLIGRAM(S): at 16:40

## 2021-11-21 RX ADMIN — Medication 8 UNIT(S): at 17:37

## 2021-11-21 RX ADMIN — Medication 1 MILLIGRAM(S): at 05:27

## 2021-11-21 RX ADMIN — Medication 1: at 17:37

## 2021-11-21 RX ADMIN — Medication 2 MILLIGRAM(S): at 20:39

## 2021-11-21 RX ADMIN — Medication 8 UNIT(S): at 11:27

## 2021-11-21 RX ADMIN — ENOXAPARIN SODIUM 40 MILLIGRAM(S): 100 INJECTION SUBCUTANEOUS at 11:28

## 2021-11-21 RX ADMIN — INSULIN GLARGINE 25 UNIT(S): 100 INJECTION, SOLUTION SUBCUTANEOUS at 22:01

## 2021-11-21 NOTE — PROGRESS NOTE ADULT - ASSESSMENT
51F with a pmhx of HTN and DM2 presenting with AMS.     #AMS, suspected catatonia, possibly due to hyperthyroidism - improving   - Head imaging (CT/MRI) w no acute intracranial pathology (stable since 6/2021); EEG w generalized slowing but no epileptiform activity  - UA negative, ammonia & b12 wnl, cultures neg, HIV neg, RPR neg, Hep panel neg; UTox + for benzo - s/p ativan x1  - Neuro, ID and psych on board  - Cont Ativan IV     #Hyperthyroidism - methimazole     #RYLAN - resolved   - dc IVF     #DMII (A1c 9.9)  w neuropathy  - cont basal bolus insulin - monitor fs, adjust prn  - holding gabapentin given AMS   - Follows with Dr. Badillo outpatient    #HTN  - cont home amlodipine and lisinopril    #HLD  - cont statin    #Asthma  - albuterol inhaler prn    #Nasopharyngeal Soft Tissue prominence on MRI  - outpatient follow up    #Obesity (BMI 40.1)  - outpatient nutrition eval    #Hypomagnesemia  - replete prn    DVT: Lovenox    Pending: AMS improvement

## 2021-11-21 NOTE — PROGRESS NOTE ADULT - SUBJECTIVE AND OBJECTIVE BOX
Pt seen and examined at bedside. Awake, alert, talking, however is still confused.     VITAL SIGNS (Last 24 hrs):  T(C): 36.8 (11-21-21 @ 05:00), Max: 37 (11-20-21 @ 20:25)  HR: 91 (11-21-21 @ 05:00) (91 - 113)  BP: 113/69 (11-21-21 @ 05:00) (113/69 - 152/73)  RR: 18 (11-21-21 @ 05:00) (18 - 20)       PHYSICAL EXAM:  GENERAL: NAD  HEAD:  Atraumatic, Normocephalic  EYES: EOMI, PERRLA, conjunctiva and sclera clear  NECK: Supple, No JVD  CHEST/LUNG: Clear to auscultation bilaterally; No wheeze  HEART: Regular rate and rhythm; No murmurs, rubs, or gallops  ABDOMEN: Soft, Nontender, Nondistended; Bowel sounds present  EXTREMITIES:  2+ Peripheral Pulses, No clubbing, cyanosis, or edema  PSYCH: AAOx1  NEUROLOGY: alert, conversive, confused (oriented to name only), moving all ext   SKIN: No rashes or lesions    Labs Reviewed       CBC Full  -  ( 21 Nov 2021 08:04 )  WBC Count : 4.73 K/uL  Hemoglobin : 11.8 g/dL  Hematocrit : 35.8 %  Platelet Count - Automated : 251 K/uL  Mean Cell Volume : 79.2 fL  Mean Cell Hemoglobin : 26.1 pg  Mean Cell Hemoglobin Concentration : 33.0 g/dL  Auto Neutrophil # : 2.21 K/uL  Auto Lymphocyte # : 1.70 K/uL  Auto Monocyte # : 0.52 K/uL  Auto Eosinophil # : 0.27 K/uL  Auto Basophil # : 0.02 K/uL  Auto Neutrophil % : 46.8 %  Auto Lymphocyte % : 35.9 %  Auto Monocyte % : 11.0 %  Auto Eosinophil % : 5.7 %  Auto Basophil % : 0.4 %    BMP:    11-21 @ 08:04    Blood Urea Nitrogen - 7  Calcium - 9.1  Carbon  Dioxide - 19  Chloride - 107  Creatinine - 0.5  Glucose - 133  Potassium - 4.0  Sodium - 139          MEDICATIONS  (STANDING):  amLODIPine   Tablet 10 milliGRAM(s) Oral daily  enoxaparin Injectable 40 milliGRAM(s) SubCutaneous daily  insulin glargine Injectable (LANTUS) 25 Unit(s) SubCutaneous at bedtime  insulin lispro (ADMELOG) corrective regimen sliding scale   SubCutaneous three times a day before meals  insulin lispro Injectable (ADMELOG) 8 Unit(s) SubCutaneous three times a day before meals  lisinopril 40 milliGRAM(s) Oral daily  LORazepam   Injectable 1 milliGRAM(s) IV Push <User Schedule>  LORazepam   Injectable 0.5 milliGRAM(s) IV Push <User Schedule>  LORazepam   Injectable 2 milliGRAM(s) IV Push <User Schedule>  methimazole 10 milliGRAM(s) Oral daily    MEDICATIONS  (PRN):  acetaminophen     Tablet .. 650 milliGRAM(s) Oral every 6 hours PRN Moderate Pain (4 - 6)  ALBUTerol    90 MICROgram(s) HFA Inhaler 2 Puff(s) Inhalation every 6 hours PRN Bronchospasm

## 2021-11-22 ENCOUNTER — TRANSCRIPTION ENCOUNTER (OUTPATIENT)
Age: 51
End: 2021-11-22

## 2021-11-22 VITALS
TEMPERATURE: 98 F | RESPIRATION RATE: 18 BRPM | SYSTOLIC BLOOD PRESSURE: 120 MMHG | DIASTOLIC BLOOD PRESSURE: 78 MMHG | HEART RATE: 89 BPM

## 2021-11-22 LAB
ALBUMIN SERPL ELPH-MCNC: 3.9 G/DL — SIGNIFICANT CHANGE UP (ref 3.5–5.2)
ALP SERPL-CCNC: 81 U/L — SIGNIFICANT CHANGE UP (ref 30–115)
ALT FLD-CCNC: 12 U/L — SIGNIFICANT CHANGE UP (ref 0–41)
ANION GAP SERPL CALC-SCNC: 14 MMOL/L — SIGNIFICANT CHANGE UP (ref 7–14)
AST SERPL-CCNC: 13 U/L — SIGNIFICANT CHANGE UP (ref 0–41)
BASOPHILS # BLD AUTO: 0.02 K/UL — SIGNIFICANT CHANGE UP (ref 0–0.2)
BASOPHILS NFR BLD AUTO: 0.4 % — SIGNIFICANT CHANGE UP (ref 0–1)
BILIRUB SERPL-MCNC: 0.6 MG/DL — SIGNIFICANT CHANGE UP (ref 0.2–1.2)
BUN SERPL-MCNC: 8 MG/DL — LOW (ref 10–20)
CALCIUM SERPL-MCNC: 9.3 MG/DL — SIGNIFICANT CHANGE UP (ref 8.5–10.1)
CHLORIDE SERPL-SCNC: 107 MMOL/L — SIGNIFICANT CHANGE UP (ref 98–110)
CO2 SERPL-SCNC: 18 MMOL/L — SIGNIFICANT CHANGE UP (ref 17–32)
CREAT SERPL-MCNC: 0.6 MG/DL — LOW (ref 0.7–1.5)
EOSINOPHIL # BLD AUTO: 0.27 K/UL — SIGNIFICANT CHANGE UP (ref 0–0.7)
EOSINOPHIL NFR BLD AUTO: 5.9 % — SIGNIFICANT CHANGE UP (ref 0–8)
GLUCOSE SERPL-MCNC: 167 MG/DL — HIGH (ref 70–99)
HCT VFR BLD CALC: 36.8 % — LOW (ref 37–47)
HGB BLD-MCNC: 12.2 G/DL — SIGNIFICANT CHANGE UP (ref 12–16)
IMM GRANULOCYTES NFR BLD AUTO: 0.2 % — SIGNIFICANT CHANGE UP (ref 0.1–0.3)
LYMPHOCYTES # BLD AUTO: 1.74 K/UL — SIGNIFICANT CHANGE UP (ref 1.2–3.4)
LYMPHOCYTES # BLD AUTO: 37.8 % — SIGNIFICANT CHANGE UP (ref 20.5–51.1)
MAGNESIUM SERPL-MCNC: 1.5 MG/DL — LOW (ref 1.8–2.4)
MCHC RBC-ENTMCNC: 26.1 PG — LOW (ref 27–31)
MCHC RBC-ENTMCNC: 33.2 G/DL — SIGNIFICANT CHANGE UP (ref 32–37)
MCV RBC AUTO: 78.6 FL — LOW (ref 81–99)
MONOCYTES # BLD AUTO: 0.54 K/UL — SIGNIFICANT CHANGE UP (ref 0.1–0.6)
MONOCYTES NFR BLD AUTO: 11.7 % — HIGH (ref 1.7–9.3)
NEUTROPHILS # BLD AUTO: 2.02 K/UL — SIGNIFICANT CHANGE UP (ref 1.4–6.5)
NEUTROPHILS NFR BLD AUTO: 44 % — SIGNIFICANT CHANGE UP (ref 42.2–75.2)
NRBC # BLD: 0 /100 WBCS — SIGNIFICANT CHANGE UP (ref 0–0)
PLATELET # BLD AUTO: 240 K/UL — SIGNIFICANT CHANGE UP (ref 130–400)
POTASSIUM SERPL-MCNC: 4.2 MMOL/L — SIGNIFICANT CHANGE UP (ref 3.5–5)
POTASSIUM SERPL-SCNC: 4.2 MMOL/L — SIGNIFICANT CHANGE UP (ref 3.5–5)
PROT SERPL-MCNC: 7 G/DL — SIGNIFICANT CHANGE UP (ref 6–8)
RBC # BLD: 4.68 M/UL — SIGNIFICANT CHANGE UP (ref 4.2–5.4)
RBC # FLD: 12.4 % — SIGNIFICANT CHANGE UP (ref 11.5–14.5)
SODIUM SERPL-SCNC: 139 MMOL/L — SIGNIFICANT CHANGE UP (ref 135–146)
WBC # BLD: 4.6 K/UL — LOW (ref 4.8–10.8)
WBC # FLD AUTO: 4.6 K/UL — LOW (ref 4.8–10.8)

## 2021-11-22 PROCEDURE — 99239 HOSP IP/OBS DSCHRG MGMT >30: CPT

## 2021-11-22 PROCEDURE — 99233 SBSQ HOSP IP/OBS HIGH 50: CPT

## 2021-11-22 RX ORDER — GABAPENTIN 400 MG/1
1 CAPSULE ORAL
Qty: 0 | Refills: 0 | DISCHARGE

## 2021-11-22 RX ORDER — MAGNESIUM SULFATE 500 MG/ML
1 VIAL (ML) INJECTION
Refills: 0 | Status: DISCONTINUED | OUTPATIENT
Start: 2021-11-22 | End: 2021-11-22

## 2021-11-22 RX ORDER — METHIMAZOLE 10 MG/1
1 TABLET ORAL
Qty: 30 | Refills: 0
Start: 2021-11-22 | End: 2021-12-21

## 2021-11-22 RX ORDER — MAGNESIUM OXIDE 400 MG ORAL TABLET 241.3 MG
1 TABLET ORAL
Qty: 10 | Refills: 0
Start: 2021-11-22 | End: 2021-11-26

## 2021-11-22 RX ADMIN — Medication 8 UNIT(S): at 11:39

## 2021-11-22 RX ADMIN — Medication 8 UNIT(S): at 08:17

## 2021-11-22 RX ADMIN — AMLODIPINE BESYLATE 10 MILLIGRAM(S): 2.5 TABLET ORAL at 05:53

## 2021-11-22 RX ADMIN — ENOXAPARIN SODIUM 40 MILLIGRAM(S): 100 INJECTION SUBCUTANEOUS at 11:40

## 2021-11-22 RX ADMIN — Medication 1: at 08:16

## 2021-11-22 RX ADMIN — Medication 1 MILLIGRAM(S): at 05:52

## 2021-11-22 RX ADMIN — LISINOPRIL 40 MILLIGRAM(S): 2.5 TABLET ORAL at 05:53

## 2021-11-22 NOTE — PROGRESS NOTE ADULT - SUBJECTIVE AND OBJECTIVE BOX
Pt seen and examined at bedside.         VITAL SIGNS (Last 24 hrs):  T(C): 36.4 (11-22-21 @ 05:15), Max: 36.8 (11-21-21 @ 19:20)  HR: 89 (11-22-21 @ 05:15) (89 - 100)  BP: 120/78 (11-22-21 @ 05:15) (120/78 - 148/67)  RR: 18 (11-22-21 @ 05:15) (18 - 20)  SpO2: 98% (11-21-21 @ 19:20) (98% - 98%)          PHYSICAL EXAM:  GENERAL: NAD   HEAD:  Atraumatic, Normocephalic  EYES:  conjunctiva and sclera clear  NECK: Supple, No JVD  CHEST/LUNG: Clear to auscultation bilaterally; No wheeze  HEART: Regular rate and rhythm; No murmurs, rubs, or gallops  ABDOMEN: Soft, Nontender, Nondistended; Bowel sounds present  EXTREMITIES:  2+ Peripheral Pulses, No clubbing, cyanosis, or edema  PSYCH: AAOx3  NEUROLOGY: non-focal  SKIN: No rashes or lesions    Labs Reviewed  Spoke to patient in regards to abnormal labs.    CBC Full  -  ( 22 Nov 2021 04:30 )  WBC Count : 4.60 K/uL  Hemoglobin : 12.2 g/dL  Hematocrit : 36.8 %  Platelet Count - Automated : 240 K/uL  Mean Cell Volume : 78.6 fL  Mean Cell Hemoglobin : 26.1 pg  Mean Cell Hemoglobin Concentration : 33.2 g/dL  Auto Neutrophil # : 2.02 K/uL  Auto Lymphocyte # : 1.74 K/uL  Auto Monocyte # : 0.54 K/uL  Auto Eosinophil # : 0.27 K/uL  Auto Basophil # : 0.02 K/uL  Auto Neutrophil % : 44.0 %  Auto Lymphocyte % : 37.8 %  Auto Monocyte % : 11.7 %  Auto Eosinophil % : 5.9 %  Auto Basophil % : 0.4 %    BMP:    11-22 @ 04:30    Blood Urea Nitrogen - 8  Calcium - 9.3  Carbond Dioxide - 18  Chloride - 107  Creatinine - 0.6  Glucose - 167  Potassium - 4.2  Sodium - 139           MEDICATIONS  (STANDING):  amLODIPine   Tablet 10 milliGRAM(s) Oral daily  enoxaparin Injectable 40 milliGRAM(s) SubCutaneous daily  insulin glargine Injectable (LANTUS) 25 Unit(s) SubCutaneous at bedtime  insulin lispro (ADMELOG) corrective regimen sliding scale   SubCutaneous three times a day before meals  insulin lispro Injectable (ADMELOG) 8 Unit(s) SubCutaneous three times a day before meals  lisinopril 40 milliGRAM(s) Oral daily  LORazepam     Tablet 2 milliGRAM(s) Oral at bedtime  magnesium sulfate  IVPB 1 Gram(s) IV Intermittent every 2 hours  methimazole 10 milliGRAM(s) Oral daily    MEDICATIONS  (PRN):  acetaminophen     Tablet .. 650 milliGRAM(s) Oral every 6 hours PRN Moderate Pain (4 - 6)  ALBUTerol    90 MICROgram(s) HFA Inhaler 2 Puff(s) Inhalation every 6 hours PRN Bronchospasm

## 2021-11-22 NOTE — PROGRESS NOTE BEHAVIORAL HEALTH - SUMMARY
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 All medical work up has been inconclusive,  psychiatry has been following for underlying AMs.  Marked improvement of disorganized symptoms since initiation of methimazole. Patient if fluently speaking and at no distress. There is no disorganized behavior or abnormal motor behavior indicative for catatonia, her Bush Catatonia score is 0 as of 11/22/21.  She is no longer internally preoccupied, she is not responding to stimuli, and furthermore there is no labile mood. The initial symptoms of catatonia was most likely related to untreated hyperthyroidism, not a primary psychiatric disorder. This patient has no prior history of psychosis, she has never been in treatment psychiatrically. Further work up regarding memory deficit, should be considered for this patient, due to history of forgetting to take her medications, decrease decline in her ability to take care of her finance. For now, given the acute response with the initiation of methimazole, we will continue tapering ativan.       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    Plan  -On going delirium work up per medical team, EEG showed slowing, no epileptiform activity, MRI, CT head, TSH reviewed and noted to be undetectable, Free Thyroxine, Serum: 2.6 ng/dL  -No indication for ECT treatment  Recommend to continue ativan 2mg po hs, monitor for sedation  -Upon discharge, this patient is to be discharged on ativan 1mg po for three days.   starting 11/22/21 Recommend to discontinue ativan  0.5mg at 11AM, 0.5mg at 4PM    - starting 11/23/21 Recommend to discontinue ativan 1mg po at 6AM,   -Neuro and endocrinology services on board  -There is no psychiatric contraindication to discharge this patient,    Referral to [facility] outpatient psychiatry located at [address] [place] [address], [id number], [phone] can be provided

## 2021-11-22 NOTE — PROGRESS NOTE BEHAVIORAL HEALTH - NSBHCHARTREVIEWLAB_PSY_A_CORE FT
13.2   5.62  )-----------( 263      ( 16 Nov 2021 04:30 )             40.9   11-16    139  |  104  |  13  ----------------------------<  156<H>  4.1   |  18  |  0.8    Ca    9.8      16 Nov 2021 04:30  Mg     1.7     11-16    TPro  7.1  /  Alb  3.9  /  TBili  0.8  /  DBili  x   /  AST  13  /  ALT  12  /  AlkPhos  101  11-16
13.2   5.65  )-----------( 262      ( 17 Nov 2021 06:08 )             40.6   11-17    138  |  104  |  20  ----------------------------<  158<H>  4.3   |  17  |  1.0    Ca    9.4      17 Nov 2021 06:08  Mg     2.1     11-17    TPro  7.0  /  Alb  3.8  /  TBili  0.7  /  DBili  x   /  AST  13  /  ALT  11  /  AlkPhos  93  11-17      Thyroid Stimulating Hormone, Serum in AM (11.17.21 @ 06:08)   Thyroid Stimulating Hormone, Serum: <0.00 uIU/mL
12.2   4.60  )-----------( 240      ( 22 Nov 2021 04:30 )             36.8   11-22    139  |  107  |  8<L>  ----------------------------<  167<H>  4.2   |  18  |  0.6<L>    Ca    9.3      22 Nov 2021 04:30  Mg     1.5     11-22    TPro  7.0  /  Alb  3.9  /  TBili  0.6  /  DBili  x   /  AST  13  /  ALT  12  /  AlkPhos  81  11-22    Thyroid Stimulating Hormone, Serum: <0.00 uIU/mL (11.17.21 @ 06:08)       Historical Values  Thyroid Stimulating Hormone, Serum: <0.00 uIU/mL (11.17.21 @ 06:08)   Thyroid Stimulating Hormone, Serum: <0.00 uIU/mL (11.12.21 @ 11:27) Vitamin B12, Serum in AM (11.12.21 @ 11:27)   Vitamin B12, Serum: 770 pg/mL Vitamin B12, Serum in AM (11.12.21 @ 11:27)   Vitamin B12, Serum: 770 pg/mL Treponema Pallidum Antibody Interpretation: Negative: A negative treponemal antibody screen indicates no serologic evidence of
12.4   4.43  )-----------( 225      ( 19 Nov 2021 06:20 )             38.0   11-19    140  |  108  |  14  ----------------------------<  157<H>  4.1   |  18  |  0.6<L>    Ca    9.5      19 Nov 2021 06:20  Mg     1.5     11-19    TPro  6.7  /  Alb  3.7  /  TBili  0.8  /  DBili  x   /  AST  12  /  ALT  10  /  AlkPhos  80  11-19    tThyroid Stimulating Hormone, Serum: <0.00 uIU/mL (11.17.21 @ 06:08)Free Thyroxine, Serum in AM (11.18.21 @ 11:56)   Free Thyroxine, Serum: 2.6 ng/dL

## 2021-11-22 NOTE — DISCHARGE NOTE NURSING/CASE MANAGEMENT/SOCIAL WORK - PATIENT PORTAL LINK FT
You can access the FollowMyHealth Patient Portal offered by [facility] by registering at the following website: [email or web address]. By joining [facility]’s FollowMyHealth portal, you will also be able to view your health information using other applications (apps) compatible with our system.

## 2021-11-22 NOTE — PROGRESS NOTE BEHAVIORAL HEALTH - NSBHCHARTREVIEWIMAGING_PSY_A_CORE FT
< from: MR Head No Cont (11.12.21 @ 08:56) >    IMPRESSION:    1.  No acute intracranial abnormality. Stable exam since 6/10/2021.    2.  Stable mild chronic microvascular changes.    3.  Prominence of the posterior nasopharyngeal soft tissues, recommend correlation with direct visualization.    --- End of Report ---    < end of copied text >
< from: MR Head No Cont (11.12.21 @ 08:56) >      IMPRESSION:    1.  No acute intracranial abnormality. Stable exam since 6/10/2021.    2.  Stable mild chronic microvascular changes.    3.  Prominence of the posterior nasopharyngeal soft tissues, recommend correlation with direct visualization.    --- End of Report ---    < end of copied text >
Review
Reviewed

## 2021-11-22 NOTE — CHART NOTE - NSCHARTNOTEFT_GEN_A_CORE
This is a 51 year old Female w/ pmhx of HTN and DM2, who is compliant with medication regimen, fully functional at baseline, works as a  p/w acute AMS. As per sister who lives with the patient, patient woke up at her baseline, walked her dog and was doing routine activities and then around 11 am she was found to be unresponsive to repeated verbal stimulus but in no apparent distress. On arrival to ED, patient was obtunded. At initial Neuro evaluation, she had altered mentation but able to move all extremities follows few simple commands, still not speaking in words or sentences. Family reports a similar episode in the past and was asked by her PCP to get an MRI of the brain and see a neurologist but patient did not comply. Sister denies drug or alcohol use, change in medications, any trauma, fever chills, sick contacts or any hx of psychiatric disease. Patient was afebrile, no obvious focality or CN deficits.   CTH is negative for acute findings. MR Head negative except for mild chronic microvascular changes. EEG showed borderline generalized slowing. Notably, her TSH was <0.00 and T4 was elevated at 2.6. On examination today, she is still altered but is much more oriented than before and able to follow commands. No further neurological testing is recommended at this time. We recommend continuing to treat underlying cause of metabolic encephalopathy, namely hyperthyroidism, and to replete pt's Mg. Pt should be advised to follow up with our outpatient office upon discharge.     #Metabolic encephalopathy 2/2 hyperthyroidism   - TSH <0.00, T4 2.6, ESR 33 --> continue treatment for hyperthyroidism per primary team recs  - Mg low at 1.5 --> replete Mg  - A1c 9.9 --> continue treatment for DM per primary team recs  - Negative MRI BRAIN, CTH, EEG  - B12 70 wnl  - Ammonia 41 wnl  - Urine drug screen negative  - Aspiration precautions  - Recommend Speech and swallow evaluation  - Recommend obtaining echo  - Pt should be advised to follow up with our outpatient office upon discharge.     Thank you for consulting Neurology. Please contact us with any neurological changes or inquiries.

## 2021-11-22 NOTE — DISCHARGE NOTE PROVIDER - CARE PROVIDER_API CALL
Mira Rolle)  Geriatric Medicine; Internal Medicine  [address], [place] [id number]  Phone: [phone]  Fax: [phone]  Follow Up Time:     Alonzo Benson)  EndocrinologyMetabDiabetes; Internal Medicine  [address] [place] [address]  Phone: [phone]  Fax: [phone]  Follow Up Time:     Stephanie Jordan)  Psychiatry; Psychosomatic Medicine  [address] [place] [address]  Phone: [phone]  Fax: [phone]  Follow Up Time:

## 2021-11-22 NOTE — PROGRESS NOTE BEHAVIORAL HEALTH - NSBHCHARTREVIEWINVESTIGATE_PSY_A_CORE FT
< from: 12 Lead ECG (11.14.21 @ 20:51) >    Ventricular Rate 106 BPM    Atrial Rate 106 BPM    P-R Interval 156 ms    QRS Duration 70 ms    Q-T Interval 370 ms    QTC Calculation(Bazett) 491 ms    P Axis 51 degrees    R Axis 12 degrees    T Axis 39 degrees    Diagnosis Line Sinus tachycardia  Otherwise normal ECG    Confirmed by Iris Azar MD ([id number]) on 11/16/2021 10:15:38 AM    < end of copied text >
< from: 12 Lead ECG (11.14.21 @ 17:05) >      Ventricular Rate 112 BPM    Atrial Rate 112 BPM    P-R Interval 158 ms    QRS Duration 74 ms    Q-T Interval 356 ms    QTC Calculation(Bazett) 485 ms    P Axis 43 degrees    R Axis -2 degrees    T Axis 32 degrees    Diagnosis Line Sinus tachycardia  Minimal voltage criteria for LVH, may be normal variant ( R in aVL )  Borderline ECG    Confirmed by Iris Azar MD ([id number]) on 11/14/2021 7:03:35 PM    < end of copied text >
`< from: 12 Lead ECG (11.14.21 @ 20:51) >      Ventricular Rate 106 BPM    Atrial Rate 106 BPM    P-R Interval 156 ms    QRS Duration 70 ms    Q-T Interval 370 ms    QTC Calculation(Bazett) 491 ms    P Axis 51 degrees    R Axis 12 degrees    T Axis 39 degrees    Diagnosis Line Sinus tachycardia  Otherwise normal ECG    Confirmed by Iris Azar MD ([id number]) on 11/16/2021 10:15:38 AM    < end of copied text >

## 2021-11-22 NOTE — PROGRESS NOTE ADULT - ASSESSMENT
51F with a pmhx of HTN and DM2 presenting with AMS.     #AMS, suspected catatonia, possibly due to hyperthyroidism - improving   - Head imaging (CT/MRI) w no acute intracranial pathology (stable since 6/2021); EEG w generalized slowing but no epileptiform activity  - UA negative, ammonia & b12 wnl, cultures neg, HIV neg, RPR neg, Hep panel neg; UTox + for benzo - s/p ativan x1  - Neuro, ID and psych on board  - Cont Ativan IV     #Hyperthyroidism - methimazole     #RYLAN - resolved   - dc IVF     #DMII (A1c 9.9)  w neuropathy  - cont basal bolus insulin - monitor fs, adjust prn  - holding gabapentin given AMS   - Follows with Dr. Badillo outpatient    #HTN  - cont home amlodipine and lisinopril    #HLD  - cont statin    #Asthma  - albuterol inhaler prn    #Nasopharyngeal Soft Tissue prominence on MRI  - outpatient follow up    #Obesity (BMI 40.1)  - outpatient nutrition eval    #Hypomagnesemia  - replete prn    DVT: Lovenox    Pending: Neuro f/u

## 2021-11-22 NOTE — PROGRESS NOTE ADULT - ASSESSMENT
ASSESSMENT & PLAN  Pt is a 51F with a pmhx of HTN and DM2 presenting with AMS.    #AMS - suspected catatonia vs hyperthyroidism?  - SIRS+ on admission (HR>90, RR>20); pt was stuporous, withdrawing to pain, minimally following commands  - Head imaging (CT/MRI) w no acute intracranial pathology (stable since 6/2021); EEG w Generalized slowing but no epileptiform activity  - UA negative, ammonia & b12 wnl, cultures neg, HIV neg, RPR neg, Hep panel neg; UTox + for benzo - s/p ativan x1  - s/p vanc, ceftriaxone and acyclovir in light of unsuccessful LP  - pt has remained afebrile w no leukocytosis and improving mental status  - in-light of previous admissions w similar presentation and neg infectious w/u thus far - will monitor off abx  - Ativan dose reduced 11/19 - as per psych  - Current ativan dose 11/22: 2mg PO in pm, then 1 mg in pm x 3 days  - Psych - possible ECT if pt does not respond to ativan  - TSH <0.00, T4 2.6, f/u total T3; repeat TSH, T4, T3 in 1-2 weeks  - Endo - recommended methimazole 10mg PO; endo feels hyperthyroidism is not the reason for her behavioural abnormalities  - tried LP 11/20; give ativan 2mg IV stat before LP as pt might be agitated enough for the procedure [FAILED]  - Reconsult neuro for 11/22/2021 pm spell    #RYLAN - possibly prerenal  - resolved    #DMII (A1c 9.9)  w neuropathy  - c/w basal bolus and before meals insulin  - holding gabapentin given stuporous appearance  - Follows with Dr. Badillo outpatient    #HTN  - cont home amlodipine and lisinopril    #HLD  - cont statin    #Asthma  - albuterol inhaler prn    #Nasopharyngeal Soft Tissue prominence on MRI  - outpatient follow up    #Obesity (BMI 40.1)  - outpatient nutrition eval    #Hypomagnesemia  - replete prn                                                                            ----------------------------------------------------  # DVT prophylaxis lovenox    # GI prophylaxis     # Diet dash/tlc, carb consistent    # Activity Score (AM-PAC)    # Code status     # Disposition                                                                              --------------------------------------------------------    Glenn Wiseman      ASSESSMENT & PLAN  Pt is a 51F with a pmhx of HTN and DM2 presenting with AMS.    #AMS - suspected catatonia vs hyperthyroidism?  - SIRS+ on admission (HR>90, RR>20); pt was stuporous, withdrawing to pain, minimally following commands  - Head imaging (CT/MRI) w no acute intracranial pathology (stable since 6/2021); EEG w Generalized slowing but no epileptiform activity  - UA negative, ammonia & b12 wnl, cultures neg, HIV neg, RPR neg, Hep panel neg; UTox + for benzo - s/p ativan x1  - s/p vanc, ceftriaxone and acyclovir in light of unsuccessful LP  - pt has remained afebrile w no leukocytosis and improving mental status  - in-light of previous admissions w similar presentation and neg infectious w/u thus far - will monitor off abx  - Ativan dose reduced 11/19 - as per psych  - Current ativan dose 11/22: 2mg PO in pm, then 1 mg in pm x 3 days  - Psych - possible ECT if pt does not respond to ativan  - TSH <0.00, T4 2.6, f/u total T3; repeat TSH, T4, T3 in 1-2 weeks  - Endo - recommended methimazole 10mg PO; endo feels hyperthyroidism is not the reason for her behavioural abnormalities  - tried LP 11/20; give ativan 2mg IV stat before LP as pt might be agitated enough for the procedure [FAILED]  - Reconsult neuro for 11/22/2021 pm spell -> seizure/encephalitis unlikely    #RYLAN - possibly prerenal  - resolved    #DMII (A1c 9.9)  w neuropathy  - c/w basal bolus and before meals insulin  - holding gabapentin given stuporous appearance  - Follows with Dr. Badillo outpatient    #HTN  - cont home amlodipine and lisinopril    #HLD  - cont statin    #Asthma  - albuterol inhaler prn    #Nasopharyngeal Soft Tissue prominence on MRI  - outpatient follow up    #Obesity (BMI 40.1)  - outpatient nutrition eval    #Hypomagnesemia  - replete prn                                                                            ----------------------------------------------------  # DVT prophylaxis lovenox    # GI prophylaxis     # Diet dash/tlc, carb consistent    # Activity Score (AM-PAC)    # Code status     # Disposition                                                                              --------------------------------------------------------    Glenn Wiseman

## 2021-11-22 NOTE — PROGRESS NOTE ADULT - SUBJECTIVE AND OBJECTIVE BOX
RENÉ REIS 51y Female  MRN#: [id number]   CODE STATUS:________    Hospital Day: 11d    Pt is currently admitted with the primary diagnosis of     SUBJECTIVE  Hospital Course  Patient admitted with AMS was lethargic and obtunded, improved mental status post lorazepam for catatonia and methimazole for hyperthyroidism.  Patient had eeg x 2 showing generalized slowing, no seizures noted.  LP could not be done x2 despite anxiolytic.  Ammonia, U/A, B12 -ve    Patient wants to leave and see autistic son @ home.    Patient had an episode 11/22/21 ~ 3:30 pmwitnessed by her sister where she was confused, emotional, had tremors but no tonic clonic activity and was oriented at the time of the episode. Episode lasted 20 minutes as per sister, 2 minutes after the nurse attended the patient. No extremity weakness was noticed by physical appearance, refused Physical Exam.    Overnight events     Subjective complaints     Present Today:   - Hebert:  No [  ], Yes [   ] : Indication:     - Type of IV Access:       .. CVC/Piccline:  No [  ], Yes [   ] : Indication:       .. Midline: No [  ], Yes [   ] : Indication:                                              OBJECTIVE  PAST MEDICAL & SURGICAL HISTORY  Diabetes    Hypertension    No significant past surgical history                                                ALLERGIES:  penicillin (Rash)                           HOME MEDICATIONS  Home Medications:  albuterol 90 mcg/inh inhalation aerosol with adapter:  (09 Jun 2021 22:13)  amlodipine-benazepril 10 mg-40 mg oral capsule: 1 cap(s) orally once a day (09 Jun 2021 22:13)  atorvastatin 40 mg oral tablet: 1 tab(s) orally once a day (09 Jun 2021 22:13)  ezetimibe 10 mg oral tablet: 1 tab(s) orally once a day (09 Jun 2021 22:13)  LORazepam 1 mg oral tablet: 1 tab(s) orally once a day (at bedtime) for 3 days as of 11/23/2021 (22 Nov 2021 12:02)  LORazepam 2 mg oral tablet: 1 tab(s) orally once a day (at bedtime) on 11/22/2021 only (22 Nov 2021 12:02)  Ozempic (1 mg dose) 4 mg/3 mL subcutaneous solution:  (09 Jun 2021 22:13)  pioglitazone 30 mg oral tablet: 1 tab(s) orally once a day (09 Jun 2021 22:13)  Tresiba 100 units/mL subcutaneous solution: 40 unit(s) subcutaneous once a day (at bedtime) (09 Jun 2021 22:13)                           MEDICATIONS:  STANDING MEDICATIONS  amLODIPine   Tablet 10 milliGRAM(s) Oral daily  enoxaparin Injectable 40 milliGRAM(s) SubCutaneous daily  insulin glargine Injectable (LANTUS) 25 Unit(s) SubCutaneous at bedtime  insulin lispro (ADMELOG) corrective regimen sliding scale   SubCutaneous three times a day before meals  insulin lispro Injectable (ADMELOG) 8 Unit(s) SubCutaneous three times a day before meals  lisinopril 40 milliGRAM(s) Oral daily  LORazepam     Tablet 2 milliGRAM(s) Oral at bedtime  magnesium sulfate  IVPB 1 Gram(s) IV Intermittent every 2 hours  methimazole 10 milliGRAM(s) Oral daily    PRN MEDICATIONS  acetaminophen     Tablet .. 650 milliGRAM(s) Oral every 6 hours PRN  ALBUTerol    90 MICROgram(s) HFA Inhaler 2 Puff(s) Inhalation every 6 hours PRN                                            ------------------------------------------------------------  VITAL SIGNS: Last 24 Hours  T(C): 36.4 (22 Nov 2021 05:15), Max: 36.8 (21 Nov 2021 19:20)  T(F): 97.5 (22 Nov 2021 05:15), Max: 98.3 (21 Nov 2021 19:20)  HR: 89 (22 Nov 2021 05:15) (89 - 100)  BP: 120/78 (22 Nov 2021 05:15) (120/78 - 148/67)  BP(mean): --  RR: 18 (22 Nov 2021 05:15) (18 - 20)  SpO2: 98% (21 Nov 2021 19:20) (98% - 98%)                                               LABS:                        12.2   4.60  )-----------( 240      ( 22 Nov 2021 04:30 )             36.8     11-22    139  |  107  |  8<L>  ----------------------------<  167<H>  4.2   |  18  |  0.6<L>    Ca    9.3      22 Nov 2021 04:30  Mg     1.5     11-22    TPro  7.0  /  Alb  3.9  /  TBili  0.6  /  DBili  x   /  AST  13  /  ALT  12  /  AlkPhos  81  11-22                                                              RADIOLOGY:        PHYSICAL EXAM:  GENERAL: NAD, lying in bed comfortably, actively wants to leave and see her son  HEAD & Neck:  refuses to bend her head forward due to headache, refuses to lay down in bed  EYES: conjunctiva and sclera clear  ENT: Moist mucous membranes  CHEST/LUNG: Clear to auscultation bilaterally; No rales, rhonchi, wheezing, or rubs. Unlabored respirations  HEART: Regular rate and rhythm; No murmurs, rubs, or gallops  ABDOMEN: BSx4; Soft, nontender, nondistended  EXTREMITIES:  No LE edema  NERVOUS SYSTEM:  A&Ox2 (partially to place and time), no focal deficits   SKIN: No rashes or lesions

## 2021-11-22 NOTE — DISCHARGE NOTE PROVIDER - CARE PROVIDERS DIRECT ADDRESSES
[email or web address]

## 2021-11-22 NOTE — PROGRESS NOTE BEHAVIORAL HEALTH - MUSCLE TONE / STRENGTH
Abnormal muscle tone/strength
Normal muscle tone/strength
Abnormal muscle tone/strength
Abnormal muscle tone/strength

## 2021-11-22 NOTE — DISCHARGE NOTE PROVIDER - HOSPITAL COURSE
51F with a pmhx of HTN and DM2 presenting with AMS.     #AMS, suspected catatonia, possibly due to hyperthyroidism - improving   - Head imaging (CT/MRI) w no acute intracranial pathology (stable since 6/2021); EEG w generalized slowing but no epileptiform activity  - UA negative, ammonia & b12 wnl, cultures neg, HIV neg, RPR neg, Hep panel neg; UTox + for benzo - s/p ativan x1  - Neuro, ID and psych on board -> lorazepam, taper on discharge    # Hyper thyroidism  - started methimazole    #Nasopharyngeal Soft Tissue prominence on MRI  - outpatient follow up    #RYLAN  - resolved with IV hydration 51F with a pmhx of HTN and DM2 presenting with AMS.     #AMS, suspected catatonia, possibly due to hyperthyroidism - improving   - Head imaging (CT/MRI) w no acute intracranial pathology (stable since 6/2021); EEG w generalized slowing but no epileptiform activity  - UA negative, ammonia & b12 wnl, cultures neg, HIV neg, RPR neg, Hep panel neg; UTox + for benzo - s/p ativan x1  - Neuro, ID and psych on board -> lorazepam, taper on discharge    # Hyper thyroidism  - started methimazole    #Nasopharyngeal Soft Tissue prominence on MRI  - outpatient follow up    #RYLAN  - resolved with IV hydration      Attending addendum: Patient seen and examined. Patient's mental status has improved. Confusion has resolved. She is stable for discharge. Needs close endo follow up.

## 2021-11-22 NOTE — PROGRESS NOTE BEHAVIORAL HEALTH - NSBHCHARTREVIEWVS_PSY_A_CORE FT
ICU Vital Signs Last 24 Hrs  T(C): 36.7 (16 Nov 2021 04:01), Max: 36.9 (15 Nov 2021 20:08)  T(F): 98 (16 Nov 2021 04:01), Max: 98.5 (15 Nov 2021 20:08)  HR: 94 (16 Nov 2021 04:01) (94 - 106)  BP: 133/69 (16 Nov 2021 04:01) (126/69 - 133/69)  BP(mean): --  ABP: --  ABP(mean): --  RR: 18 (16 Nov 2021 04:01) (18 - 18)  SpO2: 95% (16 Nov 2021 04:01) (94% - 95%)
ICU Vital Signs Last 24 Hrs  T(C): 36.8 (19 Nov 2021 05:10), Max: 37.3 (18 Nov 2021 13:27)  T(F): 98.2 (19 Nov 2021 05:10), Max: 99.2 (18 Nov 2021 13:27)  HR: 85 (19 Nov 2021 05:10) (85 - 97)  BP: 121/67 (19 Nov 2021 05:10) (120/78 - 130/70)  BP(mean): --  ABP: --  ABP(mean): --  RR: 18 (19 Nov 2021 05:10) (18 - 20)  SpO2: --
ICU Vital Signs Last 24 Hrs  T(C): 36.8 (18 Nov 2021 05:35), Max: 37.1 (17 Nov 2021 13:46)  T(F): 98.3 (18 Nov 2021 05:35), Max: 98.8 (17 Nov 2021 13:46)  HR: 99 (18 Nov 2021 05:35) (99 - 111)  BP: 126/79 (18 Nov 2021 05:35) (112/67 - 133/69)  BP(mean): --  ABP: --  ABP(mean): --  RR: 20 (17 Nov 2021 21:04) (18 - 20)  SpO2: --
ICU Vital Signs Last 24 Hrs  T(C): 36.4 (22 Nov 2021 05:15), Max: 36.8 (21 Nov 2021 19:20)  T(F): 97.5 (22 Nov 2021 05:15), Max: 98.3 (21 Nov 2021 19:20)  HR: 89 (22 Nov 2021 05:15) (81 - 100)  BP: 120/78 (22 Nov 2021 05:15) (120/78 - 148/67)  BP(mean): --  ABP: --  ABP(mean): --  RR: 18 (22 Nov 2021 05:15) (18 - 20)  SpO2: 98% (21 Nov 2021 19:20) (98% - 98%)

## 2021-11-22 NOTE — DISCHARGE NOTE PROVIDER - NSDCCPCAREPLAN_GEN_ALL_CORE_FT
PRINCIPAL DISCHARGE DIAGNOSIS  Diagnosis: Hyperthyroidism  Assessment and Plan of Treatment: You have elevated thyroid function causing altered mental status. You should follow up with your endocrinologist for best management. Thyroid disease can lead to fatigeu, palpitations, diarrhea, altered menstrual cycle, heat intolerance, hair changes.      SECONDARY DISCHARGE DIAGNOSES  Diagnosis: AMS (altered mental status)  Assessment and Plan of Treatment: You have elevated thyroid function causing altered mental status. You should follow up with your endocrinologist for best management. Thyroid disease can lead to fatigeu, palpitations, diarrhea, altered menstrual cycle, heat intolerance, hair changes.

## 2021-11-22 NOTE — PROGRESS NOTE BEHAVIORAL HEALTH - NSBHCONSULTFOLLOWAFTERCARE_PSY_A_CORE FT
Referral to [facility] outpatient psychiatry located at [address] [place] [address], [id number], [phone] can be provided

## 2021-11-22 NOTE — DISCHARGE NOTE PROVIDER - PROVIDER TOKENS
PROVIDER:[TOKEN:[[id number]]],PROVIDER:[TOKEN:[[id number]]],PROVIDER:[TOKEN:[[id number]]]

## 2021-11-22 NOTE — PROGRESS NOTE ADULT - PROVIDER SPECIALTY LIST ADULT
Internal Medicine
Internal Medicine
Hospitalist
Hospitalist
Internal Medicine
Internal Medicine
Hospitalist
Internal Medicine
Hospitalist
Infectious Disease
Internal Medicine

## 2021-11-22 NOTE — DISCHARGE NOTE PROVIDER - NSDCMRMEDTOKEN_GEN_ALL_CORE_FT
albuterol 90 mcg/inh inhalation aerosol with adapter:   amlodipine-benazepril 10 mg-40 mg oral capsule: 1 cap(s) orally once a day  atorvastatin 40 mg oral tablet: 1 tab(s) orally once a day  ezetimibe 10 mg oral tablet: 1 tab(s) orally once a day  LORazepam 1 mg oral tablet: 1 tab(s) orally once a day (at bedtime) for 3 days as of 11/23/2021  LORazepam 2 mg oral tablet: 1 tab(s) orally once a day (at bedtime) on 11/22/2021 only  methIMAzole 10 mg oral tablet: 1 tab(s) orally once a day  Ozempic (1 mg dose) 4 mg/3 mL subcutaneous solution:   pioglitazone 30 mg oral tablet: 1 tab(s) orally once a day  Tresiba 100 units/mL subcutaneous solution: 40 unit(s) subcutaneous once a day (at bedtime)   albuterol 90 mcg/inh inhalation aerosol with adapter:   amlodipine-benazepril 10 mg-40 mg oral capsule: 1 cap(s) orally once a day  atorvastatin 40 mg oral tablet: 1 tab(s) orally once a day  ezetimibe 10 mg oral tablet: 1 tab(s) orally once a day  LORazepam 1 mg oral tablet: 1 tab(s) orally once a day (at bedtime) for 3 days as of 11/23/2021  LORazepam 1 mg oral tablet: 1 tab(s) orally once a day (at bedtime) for 3 days as of 11/23/2021 MDD:1 tab  LORazepam 2 mg oral tablet: 1 tab(s) orally once a day (at bedtime) on 11/22/2021 only MDD:1 tab  LORazepam 2 mg oral tablet: 1 tab(s) orally once a day (at bedtime) on 11/22/2021 only  methIMAzole 10 mg oral tablet: 1 tab(s) orally once a day  Ozempic (1 mg dose) 4 mg/3 mL subcutaneous solution:   pioglitazone 30 mg oral tablet: 1 tab(s) orally once a day  Tresiba 100 units/mL subcutaneous solution: 40 unit(s) subcutaneous once a day (at bedtime)   albuterol 90 mcg/inh inhalation aerosol with adapter:   amlodipine-benazepril 10 mg-40 mg oral capsule: 1 cap(s) orally once a day  atorvastatin 40 mg oral tablet: 1 tab(s) orally once a day  ezetimibe 10 mg oral tablet: 1 tab(s) orally once a day  LORazepam 1 mg oral tablet: 1 tab(s) orally once a day (at bedtime) for 3 days as of 11/23/2021 MDD:1 tab  LORazepam 2 mg oral tablet: 1 tab(s) orally once a day (at bedtime) on 11/22/2021 only MDD:1 tab  magnesium oxide 400 mg oral tablet: 1 tab(s) orally 2 times a day   methIMAzole 10 mg oral tablet: 1 tab(s) orally once a day  Ozempic (1 mg dose) 4 mg/3 mL subcutaneous solution:   pioglitazone 30 mg oral tablet: 1 tab(s) orally once a day  Tresiba 100 units/mL subcutaneous solution: 40 unit(s) subcutaneous once a day (at bedtime)

## 2021-11-24 LAB — T3 SERPL-MCNC: 240 NG/DL — HIGH

## 2021-11-26 ENCOUNTER — NON-APPOINTMENT (OUTPATIENT)
Age: 51
End: 2021-11-26

## 2021-11-28 DIAGNOSIS — E05.90 THYROTOXICOSIS, UNSPECIFIED WITHOUT THYROTOXIC CRISIS OR STORM: ICD-10-CM

## 2021-11-28 DIAGNOSIS — R41.82 ALTERED MENTAL STATUS, UNSPECIFIED: ICD-10-CM

## 2021-11-28 DIAGNOSIS — G92.8 OTHER TOXIC ENCEPHALOPATHY: ICD-10-CM

## 2021-11-28 DIAGNOSIS — E66.01 MORBID (SEVERE) OBESITY DUE TO EXCESS CALORIES: ICD-10-CM

## 2021-11-28 DIAGNOSIS — E87.2 ACIDOSIS: ICD-10-CM

## 2021-11-28 DIAGNOSIS — Z79.4 LONG TERM (CURRENT) USE OF INSULIN: ICD-10-CM

## 2021-11-28 DIAGNOSIS — N17.9 ACUTE KIDNEY FAILURE, UNSPECIFIED: ICD-10-CM

## 2021-11-28 DIAGNOSIS — E11.65 TYPE 2 DIABETES MELLITUS WITH HYPERGLYCEMIA: ICD-10-CM

## 2021-11-28 DIAGNOSIS — Z88.0 ALLERGY STATUS TO PENICILLIN: ICD-10-CM

## 2021-11-28 DIAGNOSIS — J45.909 UNSPECIFIED ASTHMA, UNCOMPLICATED: ICD-10-CM

## 2021-11-28 DIAGNOSIS — I10 ESSENTIAL (PRIMARY) HYPERTENSION: ICD-10-CM

## 2021-11-28 DIAGNOSIS — F06.1 CATATONIC DISORDER DUE TO KNOWN PHYSIOLOGICAL CONDITION: ICD-10-CM

## 2021-11-28 DIAGNOSIS — E11.42 TYPE 2 DIABETES MELLITUS WITH DIABETIC POLYNEUROPATHY: ICD-10-CM

## 2021-11-28 DIAGNOSIS — R65.10 SYSTEMIC INFLAMMATORY RESPONSE SYNDROME (SIRS) OF NON-INFECTIOUS ORIGIN WITHOUT ACUTE ORGAN DYSFUNCTION: ICD-10-CM

## 2021-11-28 DIAGNOSIS — E83.42 HYPOMAGNESEMIA: ICD-10-CM

## 2021-12-01 PROCEDURE — G9005: CPT

## 2021-12-06 ENCOUNTER — APPOINTMENT (OUTPATIENT)
Dept: INTERNAL MEDICINE | Facility: CLINIC | Age: 51
End: 2021-12-06
Payer: MEDICAID

## 2021-12-06 ENCOUNTER — NON-APPOINTMENT (OUTPATIENT)
Age: 51
End: 2021-12-06

## 2021-12-06 ENCOUNTER — OUTPATIENT (OUTPATIENT)
Dept: OUTPATIENT SERVICES | Facility: HOSPITAL | Age: 51
LOS: 1 days | Discharge: HOME | End: 2021-12-06

## 2021-12-06 VITALS
BODY MASS INDEX: 40.65 KG/M2 | DIASTOLIC BLOOD PRESSURE: 79 MMHG | SYSTOLIC BLOOD PRESSURE: 159 MMHG | TEMPERATURE: 98.6 F | OXYGEN SATURATION: 99 % | HEIGHT: 65 IN | HEART RATE: 99 BPM | WEIGHT: 244 LBS

## 2021-12-06 DIAGNOSIS — E11.43 TYPE 2 DIABETES MELLITUS WITH DIABETIC AUTONOMIC (POLY)NEUROPATHY: ICD-10-CM

## 2021-12-06 DIAGNOSIS — F06.1 CATATONIC DISORDER DUE TO KNOWN PHYSIOLOGICAL CONDITION: ICD-10-CM

## 2021-12-06 DIAGNOSIS — Z00.00 ENCOUNTER FOR GENERAL ADULT MEDICAL EXAMINATION WITHOUT ABNORMAL FINDINGS: ICD-10-CM

## 2021-12-06 DIAGNOSIS — G25.0 ESSENTIAL TREMOR: ICD-10-CM

## 2021-12-06 DIAGNOSIS — E78.00 PURE HYPERCHOLESTEROLEMIA, UNSPECIFIED: ICD-10-CM

## 2021-12-06 DIAGNOSIS — E11.9 TYPE 2 DIABETES MELLITUS WITHOUT COMPLICATIONS: ICD-10-CM

## 2021-12-06 DIAGNOSIS — E05.90 THYROTOXICOSIS, UNSPECIFIED WITHOUT THYROTOXIC CRISIS OR STORM: ICD-10-CM

## 2021-12-06 DIAGNOSIS — I10 ESSENTIAL (PRIMARY) HYPERTENSION: ICD-10-CM

## 2021-12-06 DIAGNOSIS — E66.01 MORBID (SEVERE) OBESITY DUE TO EXCESS CALORIES: ICD-10-CM

## 2021-12-06 PROCEDURE — 99213 OFFICE O/P EST LOW 20 MIN: CPT | Mod: GC

## 2021-12-06 NOTE — PHYSICAL EXAM
[No Acute Distress] : no acute distress [Well Nourished] : well nourished [Normal Sclera/Conjunctiva] : normal sclera/conjunctiva [PERRL] : pupils equal round and reactive to light [Normal Outer Ear/Nose] : the outer ears and nose were normal in appearance [Normal Oropharynx] : the oropharynx was normal [No Respiratory Distress] : no respiratory distress  [No Accessory Muscle Use] : no accessory muscle use [Clear to Auscultation] : lungs were clear to auscultation bilaterally [Normal Rate] : normal rate  [Regular Rhythm] : with a regular rhythm [Normal S1, S2] : normal S1 and S2 [No Murmur] : no murmur heard [No Edema] : there was no peripheral edema [Soft] : abdomen soft [Non Tender] : non-tender [Normal Bowel Sounds] : normal bowel sounds [No CVA Tenderness] : no CVA  tenderness [No Spinal Tenderness] : no spinal tenderness [No Joint Swelling] : no joint swelling [Grossly Normal Strength/Tone] : grossly normal strength/tone [Coordination Grossly Intact] : coordination grossly intact [No Focal Deficits] : no focal deficits [Normal Affect] : the affect was normal [Alert and Oriented x3] : oriented to person, place, and time [Normal Insight/Judgement] : insight and judgment were intact

## 2021-12-06 NOTE — ASSESSMENT
[FreeTextEntry1] : 51 year old woman with a Past Medical History of HTN, morbid obesity, hypertriglyceridemia, DM (Last HbA1c 12),\par neuropathy of the b/l lower extremities, hx of PUD on protonix, ?Hx of throat cancer s/p chemo (now in\par remission), and asthma, recently admitted for AMS likely psychogenic.\par  \par \par #Recent hospitalization for catatonia\par - stroke ruled out, likely psychogenic\par - neuro workup was negative\par - Refer to psych, patient agreeable\par \par # Hyperthyroidism\par # tremors with intentional movements, either 2/2 hyperthyroidism or essential\par - TSH was 0 and free thyroxine was 2.6 in 11/2021\par - Continue methimazole 10 and f/u with endo\par - Start propranolol\par \par #Diabetes:uncontrolled, patient non compliant bec meds ran out\par - mentions she has been seeing spots\par - takes poiglitazone. tresiba 40units and ozempic, renewed.\par - hba1c 9.9 down from 12\par - follows , did not follow up with him since 1 year. Referral given again\par - optho and podiatry follow was last year, optho referral given last visit\par - Meds renewed\par \par # Diabetic neuropathy\par - continue gabapentin for now, renewed\par \par ?history of throat cancer: No clear documentation of it in [facility] PMD notes\par -Sister reported she underwent chemotherapy and was in remission but no recent follow up\par -Oncologist is unknown.\par - speech and swallow eval is normal\par \par #hx of pud\par -protonix to continue\par \par #HTN\par - uncontrolled\par -159/96 today\par - continue on Amlodipine- Benazipril \par - Start propanolol \par - educated on diet and exercise\par \par # Asthma\par - Not using any medications\par - PFT previously ordered\par - referred to pulm last visit\par \par # Hepatic hemangioma\par - CT scan showed hepatic mass suspicious of hepatic hemangioma.\par - reordered CT abdomen-pelvis not Obtained\par \par # Hx of Hypertriglyceridemia\par - Triglycerides of 717\par - Continue atorvastatin \par - on ezemtimibe 10mg \par -ACS risk 35% in the next 10 years\par patient was educated on importance of adherent to medication\par \par # HCM\par -Flu shot refused last visit\par -Routine labs ordered\par -mammogram 11/2020: BIRADS2, will reorder next visit\par - GI referral for colonoscopy\par - mentions she had PAP in a different clinic, mentioned to bring records\par - DEXA scan\par - Follow up in 3 month and prn. \par

## 2021-12-06 NOTE — HISTORY OF PRESENT ILLNESS
[FreeTextEntry1] : F/u [de-identified] : 51 year old woman with a Past Medical History of HTN, morbid obesity, hypertriglyceridemia, DM 2 (Last HbA1c 12),\par neuropathy of the b/l lower extremities, hx of PUD on protonix, ?Hx of throat cancer s/p chemo (now in\par remission), and asthma.\par Recently admitted for AMS twice during past year likely psychogenic, stroke was ruled out both admissions. Also diagnosed with hyperthyroidism.

## 2021-12-21 ENCOUNTER — APPOINTMENT (OUTPATIENT)
Dept: NEUROLOGY | Facility: CLINIC | Age: 51
End: 2021-12-21

## 2022-01-03 ENCOUNTER — APPOINTMENT (OUTPATIENT)
Dept: PODIATRY | Facility: CLINIC | Age: 52
End: 2022-01-03

## 2022-02-15 NOTE — PROGRESS NOTE BEHAVIORAL HEALTH - HYGIENE
Poor
Poor
Good
Poor
Metronidazole Counseling:  I discussed with the patient the risks of metronidazole including but not limited to seizures, nausea/vomiting, a metallic taste in the mouth, nausea/vomiting and severe allergy.

## 2022-04-12 ENCOUNTER — APPOINTMENT (OUTPATIENT)
Dept: INTERNAL MEDICINE | Facility: CLINIC | Age: 52
End: 2022-04-12

## 2022-04-13 ENCOUNTER — APPOINTMENT (OUTPATIENT)
Dept: INTERNAL MEDICINE | Facility: CLINIC | Age: 52
End: 2022-04-13

## 2022-04-19 ENCOUNTER — NON-APPOINTMENT (OUTPATIENT)
Age: 52
End: 2022-04-19

## 2022-04-21 ENCOUNTER — APPOINTMENT (OUTPATIENT)
Dept: ENDOCRINOLOGY | Facility: CLINIC | Age: 52
End: 2022-04-21

## 2022-05-09 ENCOUNTER — APPOINTMENT (OUTPATIENT)
Dept: INTERNAL MEDICINE | Facility: CLINIC | Age: 52
End: 2022-05-09

## 2022-06-15 NOTE — PHYSICAL THERAPY INITIAL EVALUATION ADULT - PLANNED THERAPY INTERVENTIONS, PT EVAL
Breath sounds clear and equal bilaterally.
balance training/bed mobility training/gait training/transfer training

## 2022-07-21 ENCOUNTER — APPOINTMENT (OUTPATIENT)
Dept: PODIATRY | Facility: CLINIC | Age: 52
End: 2022-07-21

## 2022-07-22 ENCOUNTER — APPOINTMENT (OUTPATIENT)
Dept: INTERNAL MEDICINE | Facility: CLINIC | Age: 52
End: 2022-07-22

## 2022-08-18 ENCOUNTER — APPOINTMENT (OUTPATIENT)
Dept: OBGYN | Facility: CLINIC | Age: 52
End: 2022-08-18

## 2022-09-09 ENCOUNTER — APPOINTMENT (OUTPATIENT)
Dept: INTERNAL MEDICINE | Facility: CLINIC | Age: 52
End: 2022-09-09

## 2022-11-03 ENCOUNTER — APPOINTMENT (OUTPATIENT)
Dept: PODIATRY | Facility: CLINIC | Age: 52
End: 2022-11-03

## 2022-11-16 ENCOUNTER — APPOINTMENT (OUTPATIENT)
Dept: INTERNAL MEDICINE | Facility: CLINIC | Age: 52
End: 2022-11-16

## 2022-12-11 ENCOUNTER — INPATIENT (INPATIENT)
Facility: HOSPITAL | Age: 52
LOS: 5 days | Discharge: LEFT BEFORE DISCHARGE | End: 2022-12-17
Attending: HOSPITALIST | Admitting: HOSPITALIST

## 2022-12-11 VITALS
TEMPERATURE: 97 F | SYSTOLIC BLOOD PRESSURE: 102 MMHG | WEIGHT: 160.06 LBS | HEART RATE: 140 BPM | RESPIRATION RATE: 18 BRPM | DIASTOLIC BLOOD PRESSURE: 61 MMHG | OXYGEN SATURATION: 97 %

## 2022-12-11 LAB
ALBUMIN SERPL ELPH-MCNC: 4.4 G/DL — SIGNIFICANT CHANGE UP (ref 3.5–5.2)
ALP SERPL-CCNC: 124 U/L — HIGH (ref 30–115)
ALT FLD-CCNC: 15 U/L — SIGNIFICANT CHANGE UP (ref 0–41)
ANION GAP SERPL CALC-SCNC: 15 MMOL/L — HIGH (ref 7–14)
APAP SERPL-MCNC: <5 UG/ML — LOW (ref 10–30)
AST SERPL-CCNC: 19 U/L — SIGNIFICANT CHANGE UP (ref 0–41)
BASE EXCESS BLDV CALC-SCNC: -1.5 MMOL/L — SIGNIFICANT CHANGE UP (ref -2–3)
BASOPHILS # BLD AUTO: 0.02 K/UL — SIGNIFICANT CHANGE UP (ref 0–0.2)
BASOPHILS NFR BLD AUTO: 0.3 % — SIGNIFICANT CHANGE UP (ref 0–1)
BILIRUB SERPL-MCNC: 0.7 MG/DL — SIGNIFICANT CHANGE UP (ref 0.2–1.2)
BUN SERPL-MCNC: 8 MG/DL — LOW (ref 10–20)
CA-I SERPL-SCNC: 1.23 MMOL/L — SIGNIFICANT CHANGE UP (ref 1.15–1.33)
CALCIUM SERPL-MCNC: 9.4 MG/DL — SIGNIFICANT CHANGE UP (ref 8.4–10.4)
CHLORIDE SERPL-SCNC: 98 MMOL/L — SIGNIFICANT CHANGE UP (ref 98–110)
CO2 SERPL-SCNC: 20 MMOL/L — SIGNIFICANT CHANGE UP (ref 17–32)
CREAT SERPL-MCNC: 0.7 MG/DL — SIGNIFICANT CHANGE UP (ref 0.7–1.5)
EGFR: 105 ML/MIN/1.73M2 — SIGNIFICANT CHANGE UP
EOSINOPHIL # BLD AUTO: 0.19 K/UL — SIGNIFICANT CHANGE UP (ref 0–0.7)
EOSINOPHIL NFR BLD AUTO: 2.4 % — SIGNIFICANT CHANGE UP (ref 0–8)
ETHANOL SERPL-MCNC: <10 MG/DL — SIGNIFICANT CHANGE UP
GAS PNL BLDV: 132 MMOL/L — LOW (ref 136–145)
GAS PNL BLDV: SIGNIFICANT CHANGE UP
GLUCOSE SERPL-MCNC: 289 MG/DL — HIGH (ref 70–99)
HCO3 BLDV-SCNC: 24 MMOL/L — SIGNIFICANT CHANGE UP (ref 22–29)
HCT VFR BLD CALC: 41.1 % — SIGNIFICANT CHANGE UP (ref 37–47)
HCT VFR BLDA CALC: 41 % — SIGNIFICANT CHANGE UP (ref 39–51)
HGB BLD CALC-MCNC: 13.6 G/DL — SIGNIFICANT CHANGE UP (ref 12.6–17.4)
HGB BLD-MCNC: 13.3 G/DL — SIGNIFICANT CHANGE UP (ref 12–16)
IMM GRANULOCYTES NFR BLD AUTO: 0.5 % — HIGH (ref 0.1–0.3)
LACTATE BLDV-MCNC: 2.4 MMOL/L — HIGH (ref 0.5–2)
LYMPHOCYTES # BLD AUTO: 0.81 K/UL — LOW (ref 1.2–3.4)
LYMPHOCYTES # BLD AUTO: 10.2 % — LOW (ref 20.5–51.1)
MCHC RBC-ENTMCNC: 25.7 PG — LOW (ref 27–31)
MCHC RBC-ENTMCNC: 32.4 G/DL — SIGNIFICANT CHANGE UP (ref 32–37)
MCV RBC AUTO: 79.5 FL — LOW (ref 81–99)
MONOCYTES # BLD AUTO: 0.78 K/UL — HIGH (ref 0.1–0.6)
MONOCYTES NFR BLD AUTO: 9.8 % — HIGH (ref 1.7–9.3)
NEUTROPHILS # BLD AUTO: 6.14 K/UL — SIGNIFICANT CHANGE UP (ref 1.4–6.5)
NEUTROPHILS NFR BLD AUTO: 76.8 % — HIGH (ref 42.2–75.2)
NRBC # BLD: 0 /100 WBCS — SIGNIFICANT CHANGE UP (ref 0–0)
PCO2 BLDV: 41 MMHG — SIGNIFICANT CHANGE UP (ref 39–42)
PH BLDV: 7.37 — SIGNIFICANT CHANGE UP (ref 7.32–7.43)
PLATELET # BLD AUTO: 230 K/UL — SIGNIFICANT CHANGE UP (ref 130–400)
PO2 BLDV: 36 MMHG — SIGNIFICANT CHANGE UP
POTASSIUM BLDV-SCNC: 3.8 MMOL/L — SIGNIFICANT CHANGE UP (ref 3.5–5.1)
POTASSIUM SERPL-MCNC: 4.2 MMOL/L — SIGNIFICANT CHANGE UP (ref 3.5–5)
POTASSIUM SERPL-SCNC: 4.2 MMOL/L — SIGNIFICANT CHANGE UP (ref 3.5–5)
PROT SERPL-MCNC: 7.6 G/DL — SIGNIFICANT CHANGE UP (ref 6–8)
RBC # BLD: 5.17 M/UL — SIGNIFICANT CHANGE UP (ref 4.2–5.4)
RBC # FLD: 12.3 % — SIGNIFICANT CHANGE UP (ref 11.5–14.5)
SALICYLATES SERPL-MCNC: <0.3 MG/DL — LOW (ref 4–30)
SAO2 % BLDV: 64.1 % — SIGNIFICANT CHANGE UP
SARS-COV-2 RNA SPEC QL NAA+PROBE: SIGNIFICANT CHANGE UP
SODIUM SERPL-SCNC: 133 MMOL/L — LOW (ref 135–146)
WBC # BLD: 7.98 K/UL — SIGNIFICANT CHANGE UP (ref 4.8–10.8)
WBC # FLD AUTO: 7.98 K/UL — SIGNIFICANT CHANGE UP (ref 4.8–10.8)

## 2022-12-11 PROCEDURE — 99285 EMERGENCY DEPT VISIT HI MDM: CPT | Mod: 25

## 2022-12-11 PROCEDURE — 71045 X-RAY EXAM CHEST 1 VIEW: CPT | Mod: 26

## 2022-12-11 PROCEDURE — 93010 ELECTROCARDIOGRAM REPORT: CPT

## 2022-12-11 PROCEDURE — 43753 TX GASTRO INTUB W/ASP: CPT

## 2022-12-11 RX ORDER — MIDAZOLAM HYDROCHLORIDE 1 MG/ML
10 INJECTION, SOLUTION INTRAMUSCULAR; INTRAVENOUS ONCE
Refills: 0 | Status: DISCONTINUED | OUTPATIENT
Start: 2022-12-11 | End: 2022-12-11

## 2022-12-11 RX ORDER — MIDAZOLAM HYDROCHLORIDE 1 MG/ML
5 INJECTION, SOLUTION INTRAMUSCULAR; INTRAVENOUS ONCE
Refills: 0 | Status: DISCONTINUED | OUTPATIENT
Start: 2022-12-11 | End: 2022-12-12

## 2022-12-11 RX ORDER — PIOGLITAZONE HYDROCHLORIDE 15 MG/1
1 TABLET ORAL
Qty: 0 | Refills: 0 | DISCHARGE

## 2022-12-11 RX ORDER — CEFEPIME 1 G/1
2000 INJECTION, POWDER, FOR SOLUTION INTRAMUSCULAR; INTRAVENOUS ONCE
Refills: 0 | Status: COMPLETED | OUTPATIENT
Start: 2022-12-11 | End: 2022-12-11

## 2022-12-11 RX ORDER — SODIUM CHLORIDE 9 MG/ML
1000 INJECTION, SOLUTION INTRAVENOUS ONCE
Refills: 0 | Status: COMPLETED | OUTPATIENT
Start: 2022-12-11 | End: 2022-12-11

## 2022-12-11 RX ORDER — VANCOMYCIN HCL 1 G
1500 VIAL (EA) INTRAVENOUS ONCE
Refills: 0 | Status: COMPLETED | OUTPATIENT
Start: 2022-12-11 | End: 2022-12-11

## 2022-12-11 RX ORDER — ATORVASTATIN CALCIUM 80 MG/1
40 TABLET, FILM COATED ORAL AT BEDTIME
Refills: 0 | Status: DISCONTINUED | OUTPATIENT
Start: 2022-12-11 | End: 2022-12-17

## 2022-12-11 RX ORDER — ALBUTEROL 90 UG/1
2 AEROSOL, METERED ORAL EVERY 6 HOURS
Refills: 0 | Status: DISCONTINUED | OUTPATIENT
Start: 2022-12-11 | End: 2022-12-17

## 2022-12-11 RX ORDER — ESMOLOL HCL 100MG/10ML
36300 VIAL (ML) INTRAVENOUS ONCE
Refills: 0 | Status: COMPLETED | OUTPATIENT
Start: 2022-12-11 | End: 2022-12-11

## 2022-12-11 RX ORDER — INSULIN DEGLUDEC 100 U/ML
40 INJECTION, SOLUTION SUBCUTANEOUS
Qty: 0 | Refills: 0 | DISCHARGE

## 2022-12-11 RX ORDER — HYDROCORTISONE 20 MG
100 TABLET ORAL ONCE
Refills: 0 | Status: COMPLETED | OUTPATIENT
Start: 2022-12-11 | End: 2022-12-11

## 2022-12-11 RX ORDER — ESMOLOL HCL 100MG/10ML
50 VIAL (ML) INTRAVENOUS
Qty: 2500 | Refills: 0 | Status: DISCONTINUED | OUTPATIENT
Start: 2022-12-11 | End: 2022-12-12

## 2022-12-11 RX ADMIN — CEFEPIME 100 MILLIGRAM(S): 1 INJECTION, POWDER, FOR SOLUTION INTRAMUSCULAR; INTRAVENOUS at 22:10

## 2022-12-11 RX ADMIN — SODIUM CHLORIDE 1000 MILLILITER(S): 9 INJECTION, SOLUTION INTRAVENOUS at 21:51

## 2022-12-11 RX ADMIN — Medication 36300 MICROGRAM(S): at 23:31

## 2022-12-11 RX ADMIN — MIDAZOLAM HYDROCHLORIDE 10 MILLIGRAM(S): 1 INJECTION, SOLUTION INTRAMUSCULAR; INTRAVENOUS at 21:17

## 2022-12-11 RX ADMIN — Medication 21.8 MICROGRAM(S)/KG/MIN: at 21:50

## 2022-12-11 RX ADMIN — Medication 250 MILLIGRAM(S): at 23:31

## 2022-12-11 RX ADMIN — Medication 100 MILLIGRAM(S): at 21:51

## 2022-12-11 NOTE — ED PROVIDER NOTE - NS ED ROS FT
Limited due to patient no responding to our questions.  Constitutional:  No fevers or chills.  Eyes:  No eye discharge.  Neck:  No neck stiffness.  Cardiac:  No CP or edema.  Resp:  No cough.  GI:  No nausea, vomiting, diarrhea.  :  No dysuria, frequency, or hematuria.  MSK:  No myalgias or joint pain/swelling.  Neuro:  No headache, dizziness, or weakness.  Skin:  No skin rash.

## 2022-12-11 NOTE — H&P ADULT - NSHPPHYSICALEXAM_GEN_ALL_CORE
GENERAL: NAD somnolent  HEENT: pupils constricted, no goiter, no proptosis    CHEST/LUNG: Clear to auscultation bilaterally; No rales, rhonchi, wheezing, or rubs. Unlabored respirations  HEART: Regular rate and rhythm; No murmurs, rubs, or gallops  ABDOMEN: Bowel sounds present; Soft, Nontender, Nondistended.   EXTREMITIES:  no edema  NERVOUS SYSTEM:  Alert & Oriented X0, withdraws to stimuli  MSK: FROM all 4 extremities, full and equal strength  SKIN: No rashes or lesions

## 2022-12-11 NOTE — ED ADULT TRIAGE NOTE - CHIEF COMPLAINT QUOTE
Pt BIBA from home with daughter and sister for auditory and visual hallucinations. Pt states she is seeing her dead mother and her mother is telling her to " go home". Pt states she doesn't have a name or birthday. Family denies any Psychiatric history. Family states this happened once before. Pt BIBA from home with daughter and sister for auditory and visual hallucinations. Pt states she is seeing her dead mother and her mother is telling her to " go home". Pt states she doesn't have a name or birthday. Family denies any Psychiatric history. Family states this happened once before. Pt denies SI/HI or wanting to hurt anyone

## 2022-12-11 NOTE — ED PROVIDER NOTE - CLINICAL SUMMARY MEDICAL DECISION MAKING FREE TEXT BOX
53 yo woman w/ DM, HTN, hyperthyroidism p/w family for AMS. Sister states pt in [facility] at 630 pm when left hospital (was visiting another patient). On arrival home, other family noticed she was acting stragely. Stated she was seeing her mother and hearing her calling for her (patient mother passed away years ago). According to sister, had a similar presentation a couple of years ago and was found to have a thyroid issue    On presentation, pt agitated though not aggressive, non-sensical answers and visual hallucinations. Tachy to 140s. High suspicion for decompensated hyperthyroidism.    Moved to crit. Sedated w/ IM midazolam. Rectal temp 103. No infectious symptoms prior to today    wd/wn  eomi, perrl  MMM  Tachy to 140s  Altered, confused  cta b/l  nt/nd + BS  alert, oriented X 0  from X 4 (grossly - pt not following commands)    Ramirez Wartofsky scale = 70 (tachycardia, fever, agitation)    a/p 53 yo woman w/ acute decompensated hyperthyroidism  Steroids, beta blockade, methimazole, sski (1 hour post methimazole)  Fluids, broad spectrum abx  Will require ICU admission No

## 2022-12-11 NOTE — H&P ADULT - ASSESSMENT
IMPRESSION:    suspected thyroid storm   Hyperthyroidism on methimazole   HTN  HLD  Obesity      PLAN:    CNS: Avoid sedatives. send sds, uds. Monitor Mental status if no improvement obtain CTH and eeg     HEENT: Oral care    PULMONARY:  HOB @ 45 degrees.  Aspiration precautions. Monitor airway     CARDIOVASCULAR: continue esmolol. check echo    GI: GI prophylaxis.  NPO for now for mental status or possible need for intubation.     RENAL:  Follow up lytes.  Correct as needed. start maintence fluids     INFECTIOUS DISEASE: check RVP, check UA/UCX, procal. Follow up cultures    HEMATOLOGICAL:  DVT prophylaxis.    ENDOCRINE:  FU tsh, t4, t3. start methimizole 20 mg q4, esmolol gtt , Lugol's solution – 10 drops orally three times daily,  hydrocortisone, 100 mg intravenously every eight hours; endo consult Follow up FS.  Insulin protocol if needed.    MUSCULOSKELETAL Bed rest    ICU monitoring

## 2022-12-11 NOTE — ED PROVIDER NOTE - WR INTERPRETATION 1
no focal infiltrates. no vascular congestion or cardiomegaly. NG tube in place in stomach as interpreted by me

## 2022-12-11 NOTE — ED PROVIDER NOTE - PROGRESS NOTE DETAILS
This patient was not treated for sespis. Her fever, tachycardia, elevated lactate were all due to thyroid storm. Infection was considered as a cause of storm and antibiotics given but, there was not evidence of hypoperfusion. Ultimately, the patient was determined to have thyroid storm due to not having access to her medications (methimazole). Fluids were given for insensible losses but, the patient was not hypotensive and her IVC was plump on US. As a result, additional fluids were held.

## 2022-12-11 NOTE — ED PROVIDER NOTE - PHYSICAL EXAMINATION
PHYSICAL EXAM: I have reviewed current vital signs.  GENERAL: Patient is hyperventilating and not responding to our questions. Fixated on hallucinations.  HEAD:  Normocephalic, atraumatic.  EYES: Conjunctiva and sclera clear.  ENT: MMM, no erythema/exudates.  NECK: Supple, no JVD.  CHEST/LUNG: Tachypneic  HEART: Tachycardic  ABDOMEN: Soft, nontender, nondistended.  EXTREMITIES:  2+ peripheral pulses; no clubbing, cyanosis, or edema.  PSYCH: Patient is fixated on hallucinations and does not know who she is or who her sisters are.   NEUROLOGY: patient is A&Ox0  SKIN: Warm and dry.

## 2022-12-11 NOTE — H&P ADULT - ATTENDING COMMENTS
events noted, presented for altered MS ( same presentation 11/21 sp head CT/ MRI brain. EEG/ attempted LP, seen by Endo/ hyperthyroid TSH 0), spoke with daughter at bedside, acute onset of altered MS, in ED, febrile tachycardic, so hydrocortisone/ methimazole/ esmolol NGT on precedex, suspected thyroid storm, continue methi/ steroids/ Endo eval, full TFT, RVP, pancx, MICU

## 2022-12-11 NOTE — H&P ADULT - NSHPLABSRESULTS_GEN_ALL_CORE
LABS:                        13.3   7.98  )-----------( 230      ( 11 Dec 2022 21:07 )             41.1     12-11    133<L>  |  98  |  8<L>  ----------------------------<  289<H>  4.2   |  20  |  0.7    Ca    9.4      11 Dec 2022 21:07    TPro  7.6  /  Alb  4.4  /  TBili  0.7  /  DBili  x   /  AST  19  /  ALT  15  /  AlkPhos  124<H>  12-11

## 2022-12-11 NOTE — ED PROVIDER NOTE - OBJECTIVE STATEMENT
51 yo F with PMH of hyperthyroidism, HTN, and HLD presents to the ED with hallucinations. Patient states she can see her  mother who is telling her to "come home." Symptoms began abruptly 2 hours prior to arrival; patient was in regular state of health earlier today. Patient's sister was called because patient was found to be hyperventilating and hallucinating. Patient states she does not know what her name is and does not recognize her sisters. As per sister, patient experienced a similar episode approximately 1 year ago when she was first diagnosed with hyperthyroidism. Patient has not had fever, headache, dizziness, cough, chest pain, nausea, vomiting, diarrhea, or urinary symptoms.

## 2022-12-11 NOTE — H&P ADULT - HISTORY OF PRESENT ILLNESS
53 yo F with PMH of hyperthyroidism, HTN, and HLD presents to the ED with hallucinations. HPI was limited due to mental status - history provided by sister and daughter at bedside. Her symptoms started today in the afternoon. Her symptoms started after coming home from the hospital visiting her nephew who is sick with the flu. Patient in was aox3 in the afternoon but started to states she can see her  mother who is telling her to "come home." Patient's sister was called because patient was found to be hyperventilating and hallucinating and tremors. Patient states she does not know what her name is and does not recognize her sisters. Patient apparently has not been taking her thyroid medication as prescribed. As per sister, patient experienced a similar episode approximately 1 year ago when she was first diagnosed with hyperthyroidism. Patient has headache, dizziness, cough, chest pain, nausea, vomiting, diarrhea, or urinary symptoms.    In the ED     ICU Vital Signs Last 24 Hrs  T(C): 36.1 (11 Dec 2022 20:11), Max: 36.1 (11 Dec 2022 20:11)  T(F): 97 (11 Dec 2022 20:11), Max: 97 (11 Dec 2022 20:11)  HR: 140 (11 Dec 2022 20:11) (140 - 140)  BP: 102/61 (11 Dec 2022 20:11) (102/61 - 102/61)  RR: 18 (11 Dec 2022 20:11) (18 - 18)  SpO2: 97% (11 Dec 2022 20:11) (97% - 97%)    recieved Cefpime, vanc, LR bolus, Esmolol gtt, Hydrocortisone , methimazole po , and lugols solution

## 2022-12-11 NOTE — ED PROVIDER NOTE - ICU PHYSICIAN
----- Message from Kaylyn Flowers MD sent at 1/2/2022  2:22 PM CST -----  Please let Pt know his R shoulder MRI shows tendonopathy and tearing of 3 of the rotator cuff tendons and also shows a partial tear of one of the biceps tendons.  I think it would be [place] for him to see an orthopedic doctor to discuss treatment options.   I have placed an order for a referral.    Kaylyn Flowers MD  [facility]       charlie approving

## 2022-12-12 ENCOUNTER — APPOINTMENT (OUTPATIENT)
Dept: PODIATRY | Facility: CLINIC | Age: 52
End: 2022-12-12

## 2022-12-12 LAB
ALBUMIN SERPL ELPH-MCNC: 3.9 G/DL — SIGNIFICANT CHANGE UP (ref 3.5–5.2)
ALP SERPL-CCNC: 120 U/L — HIGH (ref 30–115)
ALT FLD-CCNC: 13 U/L — SIGNIFICANT CHANGE UP (ref 0–41)
ANION GAP SERPL CALC-SCNC: 13 MMOL/L — SIGNIFICANT CHANGE UP (ref 7–14)
AST SERPL-CCNC: 13 U/L — SIGNIFICANT CHANGE UP (ref 0–41)
BASOPHILS # BLD AUTO: 0.01 K/UL — SIGNIFICANT CHANGE UP (ref 0–0.2)
BASOPHILS NFR BLD AUTO: 0.2 % — SIGNIFICANT CHANGE UP (ref 0–1)
BILIRUB SERPL-MCNC: 0.9 MG/DL — SIGNIFICANT CHANGE UP (ref 0.2–1.2)
BUN SERPL-MCNC: 10 MG/DL — SIGNIFICANT CHANGE UP (ref 10–20)
CALCIUM SERPL-MCNC: 8.9 MG/DL — SIGNIFICANT CHANGE UP (ref 8.4–10.5)
CHLORIDE SERPL-SCNC: 100 MMOL/L — SIGNIFICANT CHANGE UP (ref 98–110)
CHOLEST SERPL-MCNC: 113 MG/DL — SIGNIFICANT CHANGE UP
CO2 SERPL-SCNC: 22 MMOL/L — SIGNIFICANT CHANGE UP (ref 17–32)
CREAT SERPL-MCNC: 0.6 MG/DL — LOW (ref 0.7–1.5)
EGFR: 109 ML/MIN/1.73M2 — SIGNIFICANT CHANGE UP
EOSINOPHIL # BLD AUTO: 0 K/UL — SIGNIFICANT CHANGE UP (ref 0–0.7)
EOSINOPHIL NFR BLD AUTO: 0 % — SIGNIFICANT CHANGE UP (ref 0–8)
GLUCOSE BLDC GLUCOMTR-MCNC: 230 MG/DL — HIGH (ref 70–99)
GLUCOSE BLDC GLUCOMTR-MCNC: 282 MG/DL — HIGH (ref 70–99)
GLUCOSE BLDC GLUCOMTR-MCNC: 310 MG/DL — HIGH (ref 70–99)
GLUCOSE SERPL-MCNC: 364 MG/DL — HIGH (ref 70–99)
HCT VFR BLD CALC: 39.4 % — SIGNIFICANT CHANGE UP (ref 37–47)
HDLC SERPL-MCNC: 38 MG/DL — LOW
HGB BLD-MCNC: 13.3 G/DL — SIGNIFICANT CHANGE UP (ref 12–16)
IMM GRANULOCYTES NFR BLD AUTO: 0.2 % — SIGNIFICANT CHANGE UP (ref 0.1–0.3)
LIPID PNL WITH DIRECT LDL SERPL: 63 MG/DL — SIGNIFICANT CHANGE UP
LYMPHOCYTES # BLD AUTO: 0.53 K/UL — LOW (ref 1.2–3.4)
LYMPHOCYTES # BLD AUTO: 11.1 % — LOW (ref 20.5–51.1)
MCHC RBC-ENTMCNC: 26 PG — LOW (ref 27–31)
MCHC RBC-ENTMCNC: 33.8 G/DL — SIGNIFICANT CHANGE UP (ref 32–37)
MCV RBC AUTO: 77.1 FL — LOW (ref 81–99)
MONOCYTES # BLD AUTO: 0.17 K/UL — SIGNIFICANT CHANGE UP (ref 0.1–0.6)
MONOCYTES NFR BLD AUTO: 3.6 % — SIGNIFICANT CHANGE UP (ref 1.7–9.3)
NEUTROPHILS # BLD AUTO: 4.05 K/UL — SIGNIFICANT CHANGE UP (ref 1.4–6.5)
NEUTROPHILS NFR BLD AUTO: 84.9 % — HIGH (ref 42.2–75.2)
NON HDL CHOLESTEROL: 75 MG/DL — SIGNIFICANT CHANGE UP
NRBC # BLD: 0 /100 WBCS — SIGNIFICANT CHANGE UP (ref 0–0)
PLATELET # BLD AUTO: 216 K/UL — SIGNIFICANT CHANGE UP (ref 130–400)
POTASSIUM SERPL-MCNC: 4.4 MMOL/L — SIGNIFICANT CHANGE UP (ref 3.5–5)
POTASSIUM SERPL-SCNC: 4.4 MMOL/L — SIGNIFICANT CHANGE UP (ref 3.5–5)
PROT SERPL-MCNC: 7 G/DL — SIGNIFICANT CHANGE UP (ref 6–8)
RBC # BLD: 5.11 M/UL — SIGNIFICANT CHANGE UP (ref 4.2–5.4)
RBC # FLD: 12.3 % — SIGNIFICANT CHANGE UP (ref 11.5–14.5)
SODIUM SERPL-SCNC: 135 MMOL/L — SIGNIFICANT CHANGE UP (ref 135–146)
T3 SERPL-MCNC: 194 NG/DL — SIGNIFICANT CHANGE UP (ref 80–200)
T4 AB SER-ACNC: 11.8 UG/DL — SIGNIFICANT CHANGE UP (ref 4.6–12)
TRIGL SERPL-MCNC: 60 MG/DL — SIGNIFICANT CHANGE UP
TSH SERPL-MCNC: <0 UIU/ML — LOW (ref 0.27–4.2)
WBC # BLD: 4.77 K/UL — LOW (ref 4.8–10.8)
WBC # FLD AUTO: 4.77 K/UL — LOW (ref 4.8–10.8)

## 2022-12-12 PROCEDURE — 99291 CRITICAL CARE FIRST HOUR: CPT

## 2022-12-12 RX ORDER — IODINE/POTASSIUM IODIDE 5 %-10 %
10 SOLUTION, NON-ORAL TOPICAL EVERY 8 HOURS
Refills: 0 | Status: DISCONTINUED | OUTPATIENT
Start: 2022-12-12 | End: 2022-12-12

## 2022-12-12 RX ORDER — MIDAZOLAM HYDROCHLORIDE 1 MG/ML
5 INJECTION, SOLUTION INTRAMUSCULAR; INTRAVENOUS ONCE
Refills: 0 | Status: DISCONTINUED | OUTPATIENT
Start: 2022-12-12 | End: 2022-12-12

## 2022-12-12 RX ORDER — HYDROCORTISONE 20 MG
200 TABLET ORAL ONCE
Refills: 0 | Status: COMPLETED | OUTPATIENT
Start: 2022-12-12 | End: 2022-12-12

## 2022-12-12 RX ORDER — DEXTROSE 50 % IN WATER 50 %
25 SYRINGE (ML) INTRAVENOUS ONCE
Refills: 0 | Status: DISCONTINUED | OUTPATIENT
Start: 2022-12-12 | End: 2022-12-12

## 2022-12-12 RX ORDER — SODIUM CHLORIDE 9 MG/ML
1000 INJECTION, SOLUTION INTRAVENOUS
Refills: 0 | Status: DISCONTINUED | OUTPATIENT
Start: 2022-12-12 | End: 2022-12-12

## 2022-12-12 RX ORDER — DEXTROSE 50 % IN WATER 50 %
15 SYRINGE (ML) INTRAVENOUS ONCE
Refills: 0 | Status: DISCONTINUED | OUTPATIENT
Start: 2022-12-12 | End: 2022-12-12

## 2022-12-12 RX ORDER — DEXTROSE 50 % IN WATER 50 %
12.5 SYRINGE (ML) INTRAVENOUS ONCE
Refills: 0 | Status: DISCONTINUED | OUTPATIENT
Start: 2022-12-12 | End: 2022-12-12

## 2022-12-12 RX ORDER — MIDAZOLAM HYDROCHLORIDE 1 MG/ML
3 INJECTION, SOLUTION INTRAMUSCULAR; INTRAVENOUS ONCE
Refills: 0 | Status: DISCONTINUED | OUTPATIENT
Start: 2022-12-12 | End: 2022-12-12

## 2022-12-12 RX ORDER — DEXMEDETOMIDINE HYDROCHLORIDE IN 0.9% SODIUM CHLORIDE 4 UG/ML
0.2 INJECTION INTRAVENOUS
Qty: 200 | Refills: 0 | Status: DISCONTINUED | OUTPATIENT
Start: 2022-12-12 | End: 2022-12-12

## 2022-12-12 RX ORDER — CEFEPIME 1 G/1
2000 INJECTION, POWDER, FOR SOLUTION INTRAMUSCULAR; INTRAVENOUS EVERY 8 HOURS
Refills: 0 | Status: DISCONTINUED | OUTPATIENT
Start: 2022-12-12 | End: 2022-12-13

## 2022-12-12 RX ORDER — HYDROCORTISONE 20 MG
100 TABLET ORAL EVERY 8 HOURS
Refills: 0 | Status: DISCONTINUED | OUTPATIENT
Start: 2022-12-12 | End: 2022-12-12

## 2022-12-12 RX ORDER — PANTOPRAZOLE SODIUM 20 MG/1
40 TABLET, DELAYED RELEASE ORAL DAILY
Refills: 0 | Status: DISCONTINUED | OUTPATIENT
Start: 2022-12-12 | End: 2022-12-17

## 2022-12-12 RX ORDER — ACETAMINOPHEN 500 MG
650 TABLET ORAL EVERY 6 HOURS
Refills: 0 | Status: DISCONTINUED | OUTPATIENT
Start: 2022-12-12 | End: 2022-12-17

## 2022-12-12 RX ORDER — INSULIN GLARGINE 100 [IU]/ML
10 INJECTION, SOLUTION SUBCUTANEOUS AT BEDTIME
Refills: 0 | Status: DISCONTINUED | OUTPATIENT
Start: 2022-12-12 | End: 2022-12-13

## 2022-12-12 RX ORDER — INSULIN LISPRO 100/ML
VIAL (ML) SUBCUTANEOUS
Refills: 0 | Status: DISCONTINUED | OUTPATIENT
Start: 2022-12-12 | End: 2022-12-16

## 2022-12-12 RX ORDER — HALOPERIDOL DECANOATE 100 MG/ML
5 INJECTION INTRAMUSCULAR ONCE
Refills: 0 | Status: COMPLETED | OUTPATIENT
Start: 2022-12-12 | End: 2022-12-12

## 2022-12-12 RX ORDER — HYDROCORTISONE 20 MG
50 TABLET ORAL EVERY 8 HOURS
Refills: 0 | Status: DISCONTINUED | OUTPATIENT
Start: 2022-12-12 | End: 2022-12-13

## 2022-12-12 RX ORDER — GLUCAGON INJECTION, SOLUTION 0.5 MG/.1ML
1 INJECTION, SOLUTION SUBCUTANEOUS ONCE
Refills: 0 | Status: DISCONTINUED | OUTPATIENT
Start: 2022-12-12 | End: 2022-12-17

## 2022-12-12 RX ADMIN — Medication 50 MILLIGRAM(S): at 21:47

## 2022-12-12 RX ADMIN — Medication 200 MILLIGRAM(S): at 02:24

## 2022-12-12 RX ADMIN — Medication 3: at 17:37

## 2022-12-12 RX ADMIN — Medication 100 MILLIGRAM(S): at 08:14

## 2022-12-12 RX ADMIN — MIDAZOLAM HYDROCHLORIDE 5 MILLIGRAM(S): 1 INJECTION, SOLUTION INTRAMUSCULAR; INTRAVENOUS at 00:52

## 2022-12-12 RX ADMIN — DEXMEDETOMIDINE HYDROCHLORIDE IN 0.9% SODIUM CHLORIDE 3.63 MICROGRAM(S)/KG/HR: 4 INJECTION INTRAVENOUS at 04:36

## 2022-12-12 RX ADMIN — INSULIN GLARGINE 10 UNIT(S): 100 INJECTION, SOLUTION SUBCUTANEOUS at 21:58

## 2022-12-12 RX ADMIN — Medication 4: at 10:40

## 2022-12-12 RX ADMIN — CEFEPIME 100 MILLIGRAM(S): 1 INJECTION, POWDER, FOR SOLUTION INTRAMUSCULAR; INTRAVENOUS at 21:46

## 2022-12-12 RX ADMIN — MIDAZOLAM HYDROCHLORIDE 5 MILLIGRAM(S): 1 INJECTION, SOLUTION INTRAMUSCULAR; INTRAVENOUS at 04:35

## 2022-12-12 RX ADMIN — ATORVASTATIN CALCIUM 40 MILLIGRAM(S): 80 TABLET, FILM COATED ORAL at 21:46

## 2022-12-12 RX ADMIN — PANTOPRAZOLE SODIUM 40 MILLIGRAM(S): 20 TABLET, DELAYED RELEASE ORAL at 11:15

## 2022-12-12 RX ADMIN — SODIUM CHLORIDE 75 MILLILITER(S): 9 INJECTION, SOLUTION INTRAVENOUS at 06:23

## 2022-12-12 RX ADMIN — CEFEPIME 100 MILLIGRAM(S): 1 INJECTION, POWDER, FOR SOLUTION INTRAMUSCULAR; INTRAVENOUS at 14:16

## 2022-12-12 RX ADMIN — HALOPERIDOL DECANOATE 5 MILLIGRAM(S): 100 INJECTION INTRAMUSCULAR at 04:34

## 2022-12-12 RX ADMIN — SODIUM CHLORIDE 75 MILLILITER(S): 9 INJECTION, SOLUTION INTRAVENOUS at 11:14

## 2022-12-12 RX ADMIN — CEFEPIME 100 MILLIGRAM(S): 1 INJECTION, POWDER, FOR SOLUTION INTRAMUSCULAR; INTRAVENOUS at 07:01

## 2022-12-12 NOTE — ED PROCEDURE NOTE - ATTENDING CONTRIBUTION TO CARE
I personally supervised the study. I reviewed the images and interpretation by the resident/ACP and have edited wherer appropriate.
I personally supervised the study. I reviewed the images and interpretation by the resident/ACP and have edited wherer appropriate.

## 2022-12-12 NOTE — CONSULT NOTE ADULT - SUBJECTIVE AND OBJECTIVE BOX
Request for consultation:  Requested by:    Patient is a 52y old  Female who presents with a chief complaint of Thyroid storm (11 Dec 2022 23:25)    HPI:  53 yo F with PMH of hyperthyroidism, HTN, and HLD presents to the ED with hallucinations. HPI was limited due to mental status - history provided by sister and daughter at bedside. Her symptoms started today in the afternoon. Her symptoms started after coming home from the hospital visiting her nephew who is sick with the flu. Patient in was aox3 in the afternoon but started to states she can see her  mother who is telling her to "come home." Patient's sister was called because patient was found to be hyperventilating and hallucinating and tremors. Patient states she does not know what her name is and does not recognize her sisters. Patient apparently has not been taking her thyroid medication as prescribed. As per sister, patient experienced a similar episode approximately 1 year ago when she was first diagnosed with hyperthyroidism. Patient has headache, dizziness, cough, chest pain, nausea, vomiting, diarrhea, or urinary symptoms.    In the ED     ICU Vital Signs Last 24 Hrs  T(C): 36.1 (11 Dec 2022 20:11), Max: 36.1 (11 Dec 2022 20:11)  T(F): 97 (11 Dec 2022 20:11), Max: 97 (11 Dec 2022 20:11)  HR: 140 (11 Dec 2022 20:11) (140 - 140)  BP: 102/61 (11 Dec 2022 20:11) (102/61 - 102/61)  RR: 18 (11 Dec 2022 20:11) (18 - 18)  SpO2: 97% (11 Dec 2022 20:11) (97% - 97%)    recieved Cefpime, vanc, LR bolus, Esmolol gtt, Hydrocortisone , methimazole po , and lugols solution    (11 Dec 2022 23:25)      FAMILY HISTORY:    PAST MEDICAL & SURGICAL HISTORY:  Diabetes      Hypertension      No significant past surgical history        Birth History:  Developmental History:    Review of Systems:  All review of systems negative, except for those marked:  General:		[] Abnormal:  Pulmonary:		[] Abnormal:  Cardiac:		[] Abnormal:  Gastrointestinal:	[] Abnormal:  ENT:			[] Abnormal:  Renal/Urologic:		[] Abnormal:  Musculoskeletal:	[] Abnormal:  Endocrine:		[] Abnormal:  Hematologic:		[] Abnormal:  Neurologic:		[] Abnormal:  Skin:			[] Abnormal:  Allergy/Immune:	[] Abnormal:  Psychiatric:		[] Abnormal:    Allergies    penicillin (Rash)    Intolerances      MEDICATIONS  (STANDING):  atorvastatin 40 milliGRAM(s) Oral at bedtime  cefepime   IVPB 2000 milliGRAM(s) IV Intermittent every 8 hours  dexMEDEtomidine Infusion 0.2 MICROgram(s)/kG/Hr (3.63 mL/Hr) IV Continuous <Continuous>  dextrose 5%. 1000 milliLiter(s) (100 mL/Hr) IV Continuous <Continuous>  dextrose 5%. 1000 milliLiter(s) (50 mL/Hr) IV Continuous <Continuous>  dextrose 50% Injectable 25 Gram(s) IV Push once  dextrose 50% Injectable 12.5 Gram(s) IV Push once  dextrose 50% Injectable 25 Gram(s) IV Push once  glucagon  Injectable 1 milliGRAM(s) IntraMuscular once  hydrocortisone sodium succinate Injectable 100 milliGRAM(s) IV Push every 8 hours  insulin glargine Injectable (LANTUS) 10 Unit(s) SubCutaneous at bedtime  insulin lispro (ADMELOG) corrective regimen sliding scale   SubCutaneous three times a day before meals  lactated ringers. 1000 milliLiter(s) (75 mL/Hr) IV Continuous <Continuous>  methimazole 20 milliGRAM(s) Oral <User Schedule>  pantoprazole   Suspension 40 milliGRAM(s) Oral daily  propranolol 60 milliGRAM(s) Oral every 6 hours    MEDICATIONS  (PRN):  acetaminophen    Suspension .. 650 milliGRAM(s) Oral every 6 hours PRN Temp greater or equal to 38C (100.4F)  albuterol    90 MICROgram(s) HFA Inhaler 2 Puff(s) Inhalation every 6 hours PRN Shortness of Breath and/or Wheezing  dextrose Oral Gel 15 Gram(s) Oral once PRN Blood Glucose LESS THAN 70 milliGRAM(s)/deciliter      Vital Signs Last 24 Hrs  T(C): 37.5 (12 Dec 2022 07:30), Max: 38.9 (11 Dec 2022 22:35)  T(F): 99.5 (12 Dec 2022 07:30), Max: 102 (11 Dec 2022 22:35)  HR: 85 (12 Dec 2022 09:00) (81 - 140)  BP: 154/69 (12 Dec 2022 09:00) (102/61 - 162/74)  BP(mean): 99 (12 Dec 2022 09:00) (90 - 99)  RR: 16 (12 Dec 2022 09:00) (16 - 20)  SpO2: 96% (12 Dec 2022 09:00) (95% - 98%)    Parameters below as of 12 Dec 2022 07:30  Patient On (Oxygen Delivery Method): room air        Weight (kg): 72.6 (12-11 @ 20:11)    PHYSICAL EXAM  All physical exam findings normal, except those marked:  General:	Alert, active, cooperative, NAD, well hydrated  Neck		Normal: supple, no cervical adenopathy, no palpable thyroid  Cardiovascular	Normal: regular rate, normal S1, S2, no murmurs  Respiratory	Normal: no chest wall deformity, normal respiratory pattern, CTA B/L  Abdominal	Normal: soft, ND, NT, bowel sounds present, no masses, no organomegaly  		Normal normal genitalia, testes descended, circumcised/uncircumcised  .		Stacey stage:			Breast stacey:  .		Menstrual history:  Extremities	Normal: FROM x4  Skin		Normal: intact and not indurated, no rash, no acanthosis nigricans  Neurologic	Normal: grossly intact    LABS  VBG - ( 11 Dec 2022 21:48 )  pH: 7.37  /  pCO2: 41    /  pO2: 36    / HCO3: 24    / Base Excess: -1.5  /  SvO2: 64.1  / Lactate: 2.40                           13.3   4.77  )-----------( 216      ( 12 Dec 2022 06:32 )             39.4     12    135  |  100  |  10  ----------------------------<  364<H>  4.4   |  22  |  0.6<L>    Ca    8.9      12 Dec 2022 06:32    TPro  7.0  /  Alb  3.9  /  TBili  0.9  /  DBili  x   /  AST  13  /  ALT  13  /  AlkPhos  120<H>  12        CAPILLARY BLOOD GLUCOSE      POCT Blood Glucose.: 283 mg/dL (11 Dec 2022 21:02)

## 2022-12-12 NOTE — CONSULT NOTE ADULT - ATTENDING COMMENTS
Pt with known h/o hyperthyroidism; non-adherant with outpatient methimazole, admitted with confusion/tachycardia/jitteriness.  TFTS revealed nl T4 and T3 but suppressed TSH.  Pt now hemodynamically stable and feeling much better on increased methimazole dose and beta-blocker therapy.  Also on stress dose steroids.  Can decrease hydrocortisone to 50 mg IV q8hrs, with continued rapid taper if remains hemodynamically stable.  No change in other meds.  Recheck TSH, Free T4, and total T3 tomorrow. Pt with known h/o hyperthyroidism; non-adherant with outpatient methimazole, admitted with confusion/tachycardia/jitteriness.  TFTS revealed nl T4 and T3 but suppressed TSH.  Pt now hemodynamically stable and feeling much better on increased methimazole dose (getting 20 mg q4hrs) and beta-blocker therapy.  Also on stress dose steroids.  Can decrease hydrocortisone to 50 mg IV q8hrs, with continued rapid taper if remains hemodynamically stable.   Decrease methimazole to 20 mg qAM only.  Recheck TSH, Free T4, and total T3 tomorrow.  (Above d/w Dr Moseley)

## 2022-12-12 NOTE — ED ADULT NURSE NOTE - CHIEF COMPLAINT QUOTE
Pt BIBA from home with daughter and sister for auditory and visual hallucinations. Pt states she is seeing her dead mother and her mother is telling her to " go home". Pt states she doesn't have a name or birthday. Family denies any Psychiatric history. Family states this happened once before. Pt denies SI/HI or wanting to hurt anyone

## 2022-12-12 NOTE — ED ADULT NURSE REASSESSMENT NOTE - NS ED NURSE REASSESS COMMENT FT1
Pt. confused, agitated and restless.  Trying to pull out NG tube, IV lines and attempting to get OOB.  MD made aware.

## 2022-12-12 NOTE — CONSULT NOTE ADULT - ASSESSMENT
53 yo F with PMH of hyperthyroidism, HTN, and HLD presents to the ED with hallucinations. HPI was limited due to mental status - history provided by sister and daughter at bedside. Her symptoms started today in the afternoon. Her symptoms started after coming home from the hospital visiting her nephew who is sick with the flu. Patient in was aox3 in the afternoon but started to states she can see her  mother who is telling her to "come home." Patient's sister was called because patient was found to be hyperventilating and hallucinating and tremors. Patient states she does not know what her name is and does not recognize her sisters. Patient apparently has not been taking her thyroid medication as prescribed. As per sister, patient experienced a similar episode approximately 1 year ago when she was first diagnosed with hyperthyroidism. Patient has headache, dizziness, cough, chest pain, nausea, vomiting, diarrhea, or urinary symptoms.        #Hyperthyroidism  - pt was febrile, hypertensive and tachycardic on admission   - Mg and K are nl   - f/u TSH, T4, T3 (TSH 0.00, free thyroxine is 2.6 on 2022)  - c/w methimazole 10 for now   - previously 22 she was here with similar concerns and mental status change, which as per endocrine was not related to her thyroid disease as she was also catatonic    # DM  - -400  - on prior admission insulin regimen was /, please switch to this dose as BG is not well controlled on current regimen.      51 yo F with PMH of hyperthyroidism, HTN, and HLD presents to the ED with hallucinations. HPI was limited due to mental status - history provided by sister and daughter at bedside. Her symptoms started today in the afternoon. Her symptoms started after coming home from the hospital visiting her nephew who is sick with the flu. Patient in was aox3 in the afternoon but started to states she can see her  mother who is telling her to "come home." Patient's sister was called because patient was found to be hyperventilating and hallucinating and tremors. Patient states she does not know what her name is and does not recognize her sisters. Patient apparently has not been taking her thyroid medication as prescribed. As per sister, patient experienced a similar episode approximately 1 year ago when she was first diagnosed with hyperthyroidism. Patient denies headache, dizziness, cough, chest pain, nausea, vomiting, diarrhea, or urinary symptoms.        #Hyperthyroidism  - pt was febrile, hypertensive and tachycardic on admission   - Mg and K are nl   - f/u TSH, T4, T3 (TSH 0.00, free thyroxine is 2.6 on 2022)  - c/w methimazole 10 for now   - previously 22 she was here with similar concerns and mental status change, which as per endocrine was not related to her thyroid disease as she was also catatonic    # DM  - -400  - on prior admission insulin regimen was /, please switch to this dose as BG is not well controlled on current regimen.      51 yo F with PMH of hyperthyroidism, HTN, and HLD presents to the ED with hallucinations. HPI was limited due to mental status - history provided by sister and daughter at bedside. Her symptoms started today in the afternoon. Her symptoms started after coming home from the hospital visiting her nephew who is sick with the flu. Patient in was aox3 in the afternoon but started to states she can see her  mother who is telling her to "come home." Patient's sister was called because patient was found to be hyperventilating and hallucinating and tremors. Patient states she does not know what her name is and does not recognize her sisters. Patient apparently has not been taking her thyroid medication as prescribed. As per sister, patient experienced a similar episode approximately 1 year ago when she was first diagnosed with hyperthyroidism. Patient denies headache, dizziness, cough, chest pain, nausea, vomiting, diarrhea, or urinary symptoms.        #Hyperthyroidism  - pt was febrile, hypertensive and tachycardic on admission   - Mg and K are nl   - f/u TSH, T4, T3 (TSH 0.00, free thyroxine is 2.6 on 2022)  - c/w methimazole    - previously 22 she was here with similar concerns and mental status change, which as per endocrine was not related to her thyroid disease as she was also catatonic    # DM  - -400  - on prior admission insulin regimen was , please switch to this dose as BG is not well controlled on current regimen.      53 yo F with PMH of hyperthyroidism, HTN, and HLD presents to the ED with hallucinations. HPI was limited due to mental status - history provided by sister and daughter at bedside. Her symptoms started today in the afternoon. Her symptoms started after coming home from the hospital visiting her nephew who is sick with the flu. Patient in was aox3 in the afternoon but started to states she can see her  mother who is telling her to "come home." Patient's sister was called because patient was found to be hyperventilating and hallucinating and tremors. Patient states she does not know what her name is and does not recognize her sisters. Patient apparently has not been taking her thyroid medication as prescribed. As per sister, patient experienced a similar episode approximately 1 year ago when she was first diagnosed with hyperthyroidism. Patient denies headache, dizziness, cough, chest pain, nausea, vomiting, diarrhea, or urinary symptoms.        #Hyperthyroidism  - pt was febrile, hypertensive and tachycardic on admission   - Mg and K are nl   - f/u TSH, T4, T3 (TSH 0.00, free thyroxine is 2.6 on 2022)  - Can decrease hydrocortisone to 50 mg IV q8hrs   - Decrease methimazole to 20 mg qAM only.  - check TSH, Free T4, and total T3 tomorrow.

## 2022-12-13 LAB
ALBUMIN SERPL ELPH-MCNC: 3.9 G/DL — SIGNIFICANT CHANGE UP (ref 3.5–5.2)
ALP SERPL-CCNC: 103 U/L — SIGNIFICANT CHANGE UP (ref 30–115)
ALT FLD-CCNC: 17 U/L — SIGNIFICANT CHANGE UP (ref 0–41)
ANION GAP SERPL CALC-SCNC: 10 MMOL/L — SIGNIFICANT CHANGE UP (ref 7–14)
AST SERPL-CCNC: 18 U/L — SIGNIFICANT CHANGE UP (ref 0–41)
BASOPHILS # BLD AUTO: 0 K/UL — SIGNIFICANT CHANGE UP (ref 0–0.2)
BASOPHILS NFR BLD AUTO: 0 % — SIGNIFICANT CHANGE UP (ref 0–1)
BILIRUB SERPL-MCNC: 0.6 MG/DL — SIGNIFICANT CHANGE UP (ref 0.2–1.2)
BUN SERPL-MCNC: 14 MG/DL — SIGNIFICANT CHANGE UP (ref 10–20)
CALCIUM SERPL-MCNC: 8.7 MG/DL — SIGNIFICANT CHANGE UP (ref 8.4–10.5)
CHLORIDE SERPL-SCNC: 104 MMOL/L — SIGNIFICANT CHANGE UP (ref 98–110)
CO2 SERPL-SCNC: 21 MMOL/L — SIGNIFICANT CHANGE UP (ref 17–32)
CREAT SERPL-MCNC: 0.6 MG/DL — LOW (ref 0.7–1.5)
EGFR: 109 ML/MIN/1.73M2 — SIGNIFICANT CHANGE UP
EOSINOPHIL # BLD AUTO: 0 K/UL — SIGNIFICANT CHANGE UP (ref 0–0.7)
EOSINOPHIL NFR BLD AUTO: 0 % — SIGNIFICANT CHANGE UP (ref 0–8)
GLUCOSE BLDC GLUCOMTR-MCNC: 123 MG/DL — HIGH (ref 70–99)
GLUCOSE BLDC GLUCOMTR-MCNC: 181 MG/DL — HIGH (ref 70–99)
GLUCOSE BLDC GLUCOMTR-MCNC: 289 MG/DL — HIGH (ref 70–99)
GLUCOSE BLDC GLUCOMTR-MCNC: 316 MG/DL — HIGH (ref 70–99)
GLUCOSE SERPL-MCNC: 305 MG/DL — HIGH (ref 70–99)
HCT VFR BLD CALC: 36.7 % — LOW (ref 37–47)
HGB BLD-MCNC: 12.4 G/DL — SIGNIFICANT CHANGE UP (ref 12–16)
IMM GRANULOCYTES NFR BLD AUTO: 0.3 % — SIGNIFICANT CHANGE UP (ref 0.1–0.3)
LYMPHOCYTES # BLD AUTO: 0.83 K/UL — LOW (ref 1.2–3.4)
LYMPHOCYTES # BLD AUTO: 20.8 % — SIGNIFICANT CHANGE UP (ref 20.5–51.1)
MAGNESIUM SERPL-MCNC: 1.9 MG/DL — SIGNIFICANT CHANGE UP (ref 1.8–2.4)
MCHC RBC-ENTMCNC: 26.3 PG — LOW (ref 27–31)
MCHC RBC-ENTMCNC: 33.8 G/DL — SIGNIFICANT CHANGE UP (ref 32–37)
MCV RBC AUTO: 77.9 FL — LOW (ref 81–99)
MONOCYTES # BLD AUTO: 0.72 K/UL — HIGH (ref 0.1–0.6)
MONOCYTES NFR BLD AUTO: 18 % — HIGH (ref 1.7–9.3)
NEUTROPHILS # BLD AUTO: 2.44 K/UL — SIGNIFICANT CHANGE UP (ref 1.4–6.5)
NEUTROPHILS NFR BLD AUTO: 60.9 % — SIGNIFICANT CHANGE UP (ref 42.2–75.2)
NRBC # BLD: 0 /100 WBCS — SIGNIFICANT CHANGE UP (ref 0–0)
PLATELET # BLD AUTO: 201 K/UL — SIGNIFICANT CHANGE UP (ref 130–400)
POTASSIUM SERPL-MCNC: 3.9 MMOL/L — SIGNIFICANT CHANGE UP (ref 3.5–5)
POTASSIUM SERPL-SCNC: 3.9 MMOL/L — SIGNIFICANT CHANGE UP (ref 3.5–5)
PROCALCITONIN SERPL-MCNC: 0.04 NG/ML — SIGNIFICANT CHANGE UP (ref 0.02–0.1)
PROT SERPL-MCNC: 6.7 G/DL — SIGNIFICANT CHANGE UP (ref 6–8)
RAPID RVP RESULT: SIGNIFICANT CHANGE UP
RBC # BLD: 4.71 M/UL — SIGNIFICANT CHANGE UP (ref 4.2–5.4)
RBC # FLD: 12.3 % — SIGNIFICANT CHANGE UP (ref 11.5–14.5)
SARS-COV-2 RNA SPEC QL NAA+PROBE: SIGNIFICANT CHANGE UP
SODIUM SERPL-SCNC: 135 MMOL/L — SIGNIFICANT CHANGE UP (ref 135–146)
WBC # BLD: 4 K/UL — LOW (ref 4.8–10.8)
WBC # FLD AUTO: 4 K/UL — LOW (ref 4.8–10.8)

## 2022-12-13 PROCEDURE — 99233 SBSQ HOSP IP/OBS HIGH 50: CPT

## 2022-12-13 PROCEDURE — 93306 TTE W/DOPPLER COMPLETE: CPT | Mod: 26

## 2022-12-13 RX ORDER — INSULIN GLARGINE 100 [IU]/ML
14 INJECTION, SOLUTION SUBCUTANEOUS AT BEDTIME
Refills: 0 | Status: DISCONTINUED | OUTPATIENT
Start: 2022-12-13 | End: 2022-12-16

## 2022-12-13 RX ORDER — HEPARIN SODIUM 5000 [USP'U]/ML
5000 INJECTION INTRAVENOUS; SUBCUTANEOUS EVERY 8 HOURS
Refills: 0 | Status: DISCONTINUED | OUTPATIENT
Start: 2022-12-13 | End: 2022-12-17

## 2022-12-13 RX ADMIN — Medication 1: at 17:38

## 2022-12-13 RX ADMIN — Medication 50 MILLIGRAM(S): at 06:00

## 2022-12-13 RX ADMIN — INSULIN GLARGINE 14 UNIT(S): 100 INJECTION, SOLUTION SUBCUTANEOUS at 22:21

## 2022-12-13 RX ADMIN — Medication 3: at 08:38

## 2022-12-13 RX ADMIN — Medication 4: at 12:46

## 2022-12-13 RX ADMIN — HEPARIN SODIUM 5000 UNIT(S): 5000 INJECTION INTRAVENOUS; SUBCUTANEOUS at 16:22

## 2022-12-13 RX ADMIN — ATORVASTATIN CALCIUM 40 MILLIGRAM(S): 80 TABLET, FILM COATED ORAL at 21:51

## 2022-12-13 RX ADMIN — CEFEPIME 100 MILLIGRAM(S): 1 INJECTION, POWDER, FOR SOLUTION INTRAMUSCULAR; INTRAVENOUS at 06:00

## 2022-12-13 NOTE — PROGRESS NOTE ADULT - SUBJECTIVE AND OBJECTIVE BOX
INTERVAL HPI/OVERNIGHT EVENTS:    SUBJECTIVE: Patient seen and examined at bedside.     cc: ams  no cp, sob, abd pain, fever  no ha, dizziness, lightheadedness, syncope    OBJECTIVE:    VITAL SIGNS:  Vital Signs Last 24 Hrs  T(C): 36.2 (13 Dec 2022 08:27), Max: 36.9 (12 Dec 2022 16:30)  T(F): 97.1 (13 Dec 2022 08:27), Max: 98.5 (12 Dec 2022 16:30)  HR: 71 (13 Dec 2022 08:27) (69 - 85)  BP: 135/64 (13 Dec 2022 08:27) (118/57 - 161/70)  BP(mean): 92 (13 Dec 2022 06:00) (82 - 100)  RR: 18 (13 Dec 2022 08:27) (16 - 18)  SpO2: 98% (13 Dec 2022 08:27) (85% - 98%)    Parameters below as of 13 Dec 2022 06:00  Patient On (Oxygen Delivery Method): room air          PHYSICAL EXAM:    General: NAD  HEENT: NC/AT; PERRL, clear conjunctiva  Neck: supple  Respiratory: CTA b/l  Cardiovascular: +S1/S2; RRR  Abdomen: soft, NT/ND; +BS x4  Extremities: WWP, 2+ peripheral pulses b/l; no LE edema  Skin: normal color and turgor; no rash  Neurological:    MEDICATIONS:  MEDICATIONS  (STANDING):  atorvastatin 40 milliGRAM(s) Oral at bedtime  glucagon  Injectable 1 milliGRAM(s) IntraMuscular once  heparin   Injectable 5000 Unit(s) SubCutaneous every 8 hours  insulin glargine Injectable (LANTUS) 14 Unit(s) SubCutaneous at bedtime  insulin lispro (ADMELOG) corrective regimen sliding scale   SubCutaneous three times a day before meals  methimazole 20 milliGRAM(s) Oral <User Schedule>  pantoprazole   Suspension 40 milliGRAM(s) Oral daily  propranolol 40 milliGRAM(s) Oral every 12 hours    MEDICATIONS  (PRN):  acetaminophen    Suspension .. 650 milliGRAM(s) Oral every 6 hours PRN Temp greater or equal to 38C (100.4F)  albuterol    90 MICROgram(s) HFA Inhaler 2 Puff(s) Inhalation every 6 hours PRN Shortness of Breath and/or Wheezing      ALLERGIES:  Allergies    penicillin (Rash)    Intolerances        LABS:                        12.4   4.00  )-----------( 201      ( 13 Dec 2022 07:39 )             36.7     Hemoglobin: 12.4 g/dL (12-13 @ 07:39)  Hemoglobin: 13.3 g/dL (12-12 @ 06:32)  Hemoglobin: 13.3 g/dL (12-11 @ 21:07)    CBC Full  -  ( 13 Dec 2022 07:39 )  WBC Count : 4.00 K/uL  RBC Count : 4.71 M/uL  Hemoglobin : 12.4 g/dL  Hematocrit : 36.7 %  Platelet Count - Automated : 201 K/uL  Mean Cell Volume : 77.9 fL  Mean Cell Hemoglobin : 26.3 pg  Mean Cell Hemoglobin Concentration : 33.8 g/dL  Auto Neutrophil # : 2.44 K/uL  Auto Lymphocyte # : 0.83 K/uL  Auto Monocyte # : 0.72 K/uL  Auto Eosinophil # : 0.00 K/uL  Auto Basophil # : 0.00 K/uL  Auto Neutrophil % : 60.9 %  Auto Lymphocyte % : 20.8 %  Auto Monocyte % : 18.0 %  Auto Eosinophil % : 0.0 %  Auto Basophil % : 0.0 %    12-13    135  |  104  |  14  ----------------------------<  305<H>  3.9   |  21  |  0.6<L>    Ca    8.7      13 Dec 2022 07:39  Mg     1.9     12-13    TPro  6.7  /  Alb  3.9  /  TBili  0.6  /  DBili  x   /  AST  18  /  ALT  17  /  AlkPhos  103  12-13    Creatinine Trend: 0.6<--, 0.6<--, 0.7<--  LIVER FUNCTIONS - ( 13 Dec 2022 07:39 )  Alb: 3.9 g/dL / Pro: 6.7 g/dL / ALK PHOS: 103 U/L / ALT: 17 U/L / AST: 18 U/L / GGT: x               hs Troponin:              CSF:                      EKG:   MICROBIOLOGY:    Culture - Blood (collected 11 Dec 2022 21:11)  Source: .Blood Blood-Peripheral  Preliminary Report (13 Dec 2022 09:02):    No growth to date.    Culture - Blood (collected 11 Dec 2022 21:11)  Source: .Blood Blood-Peripheral  Preliminary Report (13 Dec 2022 09:02):    No growth to date.      IMAGING:      Labs, imaging, EKG personally reviewed    RADIOLOGY & ADDITIONAL TESTS: Reviewed.

## 2022-12-13 NOTE — PROGRESS NOTE ADULT - TIME BILLING
52F PMHx hyperthyroidism, HTN, HLD here with altered mental status, psychosis.    #Altered mental status/ psychosis; due to hyperthyroidism  tsh low  t3, t4 wnl  resolving  check ua  no evidence infection  methimazole 20  propanol 40 bid  prednisone 20  appreciate endo  #Hyperglycemia  lantus 14  ssi  #HTN  outpt f/u  #HLD  lipitor 40  #DVT ppx  subq hep    #Progress Note Handoff:  Pending (specify):  Consults_________, Tests________, Test Results_______, Other____f/u endo_____  Family discussion: d/w pt at bedside re: treatment plan, primary dx  Disposition: Home_x__/SNF___/Other________/Unknown at this time________

## 2022-12-13 NOTE — PROGRESS NOTE ADULT - ASSESSMENT
Continue methimazole and propranolol.  Change hydrocortisone to prednisone 20 mg q24 hrs.  If remains hemodynamically stable over subsequent 24 hours then discontinue steroids.

## 2022-12-13 NOTE — PROGRESS NOTE ADULT - SUBJECTIVE AND OBJECTIVE BOX
Over Night Events: events noted, doing much better, awake, follows commands, states was not taking her methimazole, afebrile, Endo noted  PHYSICAL EXAM    ICU Vital Signs Last 24 Hrs  T(C): 36.6 (12 Dec 2022 23:50), Max: 37.6 (12 Dec 2022 12:00)  T(F): 97.9 (12 Dec 2022 23:50), Max: 99.6 (12 Dec 2022 12:00)  HR: 69 (13 Dec 2022 05:00) (69 - 85)  BP: 161/70 (13 Dec 2022 05:00) (118/57 - 161/70)  BP(mean): 100 (13 Dec 2022 05:00) (82 - 100)  RR: 18 (13 Dec 2022 05:00) (14 - 19)  SpO2: 97% (13 Dec 2022 05:00) (85% - 98%)    O2 Parameters below as of 13 Dec 2022 05:00  Patient On (Oxygen Delivery Method): room air            General: comfortable  Lungs: Bilateral BS  Cardiovascular: Regular   Abdomen: Soft, Positive BS  Extremities: No clubbing   Skin: Warm  Neurological: Non focal       12-12-22 @ 07:01  -  12-13-22 @ 05:48  --------------------------------------------------------  IN:    IV PiggyBack: 950 mL  Total IN: 950 mL    OUT:    Voided (mL): 600 mL  Total OUT: 600 mL    Total NET: 350 mL          LABS:                          13.3   4.77  )-----------( 216      ( 12 Dec 2022 06:32 )             39.4                                               12-12    135  |  100  |  10  ----------------------------<  364<H>  4.4   |  22  |  0.6<L>    Ca    8.9      12 Dec 2022 06:32    TPro  7.0  /  Alb  3.9  /  TBili  0.9  /  DBili  x   /  AST  13  /  ALT  13  /  AlkPhos  120<H>  12-12                                                                                           LIVER FUNCTIONS - ( 12 Dec 2022 06:32 )  Alb: 3.9 g/dL / Pro: 7.0 g/dL / ALK PHOS: 120 U/L / ALT: 13 U/L / AST: 13 U/L / GGT: x                                                                                                                                       MEDICATIONS  (STANDING):  atorvastatin 40 milliGRAM(s) Oral at bedtime  cefepime   IVPB 2000 milliGRAM(s) IV Intermittent every 8 hours  glucagon  Injectable 1 milliGRAM(s) IntraMuscular once  hydrocortisone sodium succinate Injectable 50 milliGRAM(s) IV Push every 8 hours  insulin glargine Injectable (LANTUS) 10 Unit(s) SubCutaneous at bedtime  insulin lispro (ADMELOG) corrective regimen sliding scale   SubCutaneous three times a day before meals  methimazole 20 milliGRAM(s) Oral <User Schedule>  pantoprazole   Suspension 40 milliGRAM(s) Oral daily  propranolol 60 milliGRAM(s) Oral every 6 hours    MEDICATIONS  (PRN):  acetaminophen    Suspension .. 650 milliGRAM(s) Oral every 6 hours PRN Temp greater or equal to 38C (100.4F)  albuterol    90 MICROgram(s) HFA Inhaler 2 Puff(s) Inhalation every 6 hours PRN Shortness of Breath and/or Wheezing

## 2022-12-13 NOTE — PROGRESS NOTE ADULT - ASSESSMENT
IMPRESSION:    Altered MS/ toxic metabolic resolved  thyroid storm improved   Hyperthyroidism on methimazole ( not compliant)  HTN  HLD  Obesity      PLAN:    CNS: Avoid sedatives.     HEENT: Oral care    PULMONARY:  HOB @ 45 degrees.  Aspiration precautions. on RA    CARDIOVASCULAR: Echo, propranolol    GI: GI prophylaxis.  start feeds    RENAL:  Follow up lytes.  Correct as needed. dc IVF if tolerates po    INFECTIOUS DISEASE: dc abx if procal/ cx -    HEMATOLOGICAL:  DVT prophylaxis.    ENDOCRINE:  FU tsh, t4, t3. methimazole/ steroids per Endo    AAT  FLOOR

## 2022-12-13 NOTE — CHART NOTE - NSCHARTNOTEFT_GEN_A_CORE
MICU Transfer Note    Transfer from: MICU  Transfer to:  Medicine     MICU COURSE:  HPI:  51 yo F with PMH of hyperthyroidism, HTN, and HLD presents to the ED with hallucinations. HPI was limited due to mental status - history provided by sister and daughter at bedside. Her symptoms started today in the afternoon. Her symptoms started after coming home from the hospital visiting her nephew who is sick with the flu. Patient in was aox3 in the afternoon but started to states she can see her  mother who is telling her to "come home." Patient's sister was called because patient was found to be hyperventilating and hallucinating and tremors. Patient states she does not know what her name is and does not recognize her sisters. Patient apparently has not been taking her thyroid medication as prescribed. As per sister, patient experienced a similar episode approximately 1 year ago when she was first diagnosed with hyperthyroidism. Patient has headache, dizziness, cough, chest pain, nausea, vomiting, diarrhea, or urinary symptoms.    In the ED     ICU Vital Signs Last 24 Hrs  T(C): 36.1 (11 Dec 2022 20:11), Max: 36.1 (11 Dec 2022 20:11)  T(F): 97 (11 Dec 2022 20:11), Max: 97 (11 Dec 2022 20:11)  HR: 140 (11 Dec 2022 20:11) (140 - 140)  BP: 102/61 (11 Dec 2022 20:11) (102/61 - 102/61)  RR: 18 (11 Dec 2022 20:11) (18 - 18)  SpO2: 97% (11 Dec 2022 20:11) (97% - 97%)    recieved Cefpime, vanc, LR bolus, Esmolol gtt, Hydrocortisone , methimazole po , and lugols solution    (11 Dec 2022 23:25)    Patient was admitted to the ICU for Thryoid storm. Patient was seen by endocrine.  TFTS revealed nl T4 and T3 but suppressed TSH.  Pt now hemodynamically stable and feeling much better on increased methimazole dose (getting 20 mg q4hrs) and beta-blocker therapy.  Also on stress dose steroids.  Patient was decrease hydrocortisone to 50 mg IV q8hrs and  Decrease methimazole to 20 mg qAM only.      ASSESSMENT & PLAN:     suspected thyroid storm   Hyperthyroidism on methimazole   HTN  HLD  Obesity      PLAN:    CNS: Avoid sedatives. FU sds, uds.     HEENT: Oral care    PULMONARY:  HOB @ 45 degrees.  Aspiration precautions. Monitor airway     CARDIOVASCULAR: on propranalol. FU ECHO    GI: GI prophylaxis.  Regular diet     RENAL:  Follow up lytes.  Correct as needed. dc fluids     INFECTIOUS DISEASE: continue cefepime. check RVP, check UA/UCX, procal. Follow up cultures    HEMATOLOGICAL:  DVT prophylaxis.    ENDOCRINE:  FU tsh, t4, t3. continue methimizole 20 mg qd, hydrocortisone, 50 q8 mg intravenously FU endo     MUSCULOSKELETAL OOBTC    DGTF       For Follow-Up:    FU Thyroid panel  wean steroids  wean betablockers   FU endo       Vital Signs Last 24 Hrs  T(C): 36.6 (12 Dec 2022 23:50), Max: 37.6 (12 Dec 2022 12:00)  T(F): 97.9 (12 Dec 2022 23:50), Max: 99.6 (12 Dec 2022 12:00)  HR: 69 (13 Dec 2022 05:00) (69 - 85)  BP: 161/70 (13 Dec 2022 05:00) (118/57 - 161/70)  BP(mean): 100 (13 Dec 2022 05:00) (82 - 100)  RR: 18 (13 Dec 2022 05:00) (14 - 19)  SpO2: 97% (13 Dec 2022 05:00) (85% - 98%)    Parameters below as of 13 Dec 2022 05:00  Patient On (Oxygen Delivery Method): room air      I&O's Summary    12 Dec 2022 07:01  -  13 Dec 2022 05:41  --------------------------------------------------------  IN: 950 mL / OUT: 600 mL / NET: 350 mL          MEDICATIONS  (STANDING):  atorvastatin 40 milliGRAM(s) Oral at bedtime  cefepime   IVPB 2000 milliGRAM(s) IV Intermittent every 8 hours  glucagon  Injectable 1 milliGRAM(s) IntraMuscular once  hydrocortisone sodium succinate Injectable 50 milliGRAM(s) IV Push every 8 hours  insulin glargine Injectable (LANTUS) 10 Unit(s) SubCutaneous at bedtime  insulin lispro (ADMELOG) corrective regimen sliding scale   SubCutaneous three times a day before meals  methimazole 20 milliGRAM(s) Oral <User Schedule>  pantoprazole   Suspension 40 milliGRAM(s) Oral daily  propranolol 60 milliGRAM(s) Oral every 6 hours    MEDICATIONS  (PRN):  acetaminophen    Suspension .. 650 milliGRAM(s) Oral every 6 hours PRN Temp greater or equal to 38C (100.4F)  albuterol    90 MICROgram(s) HFA Inhaler 2 Puff(s) Inhalation every 6 hours PRN Shortness of Breath and/or Wheezing        LABS                                            13.3                  Neurophils% (auto):   84.9   ( @ 06:32):    4.77 )-----------(216          Lymphocytes% (auto):  11.1                                          39.4                   Eosinphils% (auto):   0.0      Manual%: Neutrophils x    ; Lymphocytes x    ; Eosinophils x    ; Bands%: x    ; Blasts x                                    135    |  100    |  10                  Calcium: 8.9   / iCa: x      ( @ 06:32)    ----------------------------<  364       Magnesium: x                                4.4     |  22     |  0.6              Phosphorous: x        TPro  7.0    /  Alb  3.9    /  TBili  0.9    /  DBili  x      /  AST  13     /  ALT  13     /  AlkPhos  120    12 Dec 2022 06:32

## 2022-12-14 LAB
ANION GAP SERPL CALC-SCNC: 11 MMOL/L — SIGNIFICANT CHANGE UP (ref 7–14)
APPEARANCE UR: CLEAR — SIGNIFICANT CHANGE UP
BASOPHILS # BLD AUTO: 0 K/UL — SIGNIFICANT CHANGE UP (ref 0–0.2)
BASOPHILS NFR BLD AUTO: 0 % — SIGNIFICANT CHANGE UP (ref 0–1)
BILIRUB UR-MCNC: NEGATIVE — SIGNIFICANT CHANGE UP
BUN SERPL-MCNC: 13 MG/DL — SIGNIFICANT CHANGE UP (ref 10–20)
CALCIUM SERPL-MCNC: 8.2 MG/DL — LOW (ref 8.4–10.5)
CHLORIDE SERPL-SCNC: 102 MMOL/L — SIGNIFICANT CHANGE UP (ref 98–110)
CO2 SERPL-SCNC: 22 MMOL/L — SIGNIFICANT CHANGE UP (ref 17–32)
COLOR SPEC: SIGNIFICANT CHANGE UP
CREAT SERPL-MCNC: 0.6 MG/DL — LOW (ref 0.7–1.5)
DIFF PNL FLD: NEGATIVE — SIGNIFICANT CHANGE UP
EGFR: 109 ML/MIN/1.73M2 — SIGNIFICANT CHANGE UP
EOSINOPHIL # BLD AUTO: 0.01 K/UL — SIGNIFICANT CHANGE UP (ref 0–0.7)
EOSINOPHIL NFR BLD AUTO: 0.2 % — SIGNIFICANT CHANGE UP (ref 0–8)
GLUCOSE BLDC GLUCOMTR-MCNC: 141 MG/DL — HIGH (ref 70–99)
GLUCOSE BLDC GLUCOMTR-MCNC: 158 MG/DL — HIGH (ref 70–99)
GLUCOSE BLDC GLUCOMTR-MCNC: 291 MG/DL — HIGH (ref 70–99)
GLUCOSE BLDC GLUCOMTR-MCNC: 306 MG/DL — HIGH (ref 70–99)
GLUCOSE SERPL-MCNC: 288 MG/DL — HIGH (ref 70–99)
GLUCOSE UR QL: ABNORMAL
HCT VFR BLD CALC: 37.7 % — SIGNIFICANT CHANGE UP (ref 37–47)
HGB BLD-MCNC: 12.4 G/DL — SIGNIFICANT CHANGE UP (ref 12–16)
IMM GRANULOCYTES NFR BLD AUTO: 0.2 % — SIGNIFICANT CHANGE UP (ref 0.1–0.3)
KETONES UR-MCNC: NEGATIVE — SIGNIFICANT CHANGE UP
LEUKOCYTE ESTERASE UR-ACNC: NEGATIVE — SIGNIFICANT CHANGE UP
LYMPHOCYTES # BLD AUTO: 0.87 K/UL — LOW (ref 1.2–3.4)
LYMPHOCYTES # BLD AUTO: 18.8 % — LOW (ref 20.5–51.1)
MAGNESIUM SERPL-MCNC: 1.7 MG/DL — LOW (ref 1.8–2.4)
MCHC RBC-ENTMCNC: 25.6 PG — LOW (ref 27–31)
MCHC RBC-ENTMCNC: 32.9 G/DL — SIGNIFICANT CHANGE UP (ref 32–37)
MCV RBC AUTO: 77.9 FL — LOW (ref 81–99)
MONOCYTES # BLD AUTO: 0.4 K/UL — SIGNIFICANT CHANGE UP (ref 0.1–0.6)
MONOCYTES NFR BLD AUTO: 8.7 % — SIGNIFICANT CHANGE UP (ref 1.7–9.3)
NEUTROPHILS # BLD AUTO: 3.33 K/UL — SIGNIFICANT CHANGE UP (ref 1.4–6.5)
NEUTROPHILS NFR BLD AUTO: 72.1 % — SIGNIFICANT CHANGE UP (ref 42.2–75.2)
NITRITE UR-MCNC: NEGATIVE — SIGNIFICANT CHANGE UP
NRBC # BLD: 0 /100 WBCS — SIGNIFICANT CHANGE UP (ref 0–0)
PH UR: 6.5 — SIGNIFICANT CHANGE UP (ref 5–8)
PLATELET # BLD AUTO: 205 K/UL — SIGNIFICANT CHANGE UP (ref 130–400)
POTASSIUM SERPL-MCNC: 3.7 MMOL/L — SIGNIFICANT CHANGE UP (ref 3.5–5)
POTASSIUM SERPL-SCNC: 3.7 MMOL/L — SIGNIFICANT CHANGE UP (ref 3.5–5)
PROT UR-MCNC: SIGNIFICANT CHANGE UP
RBC # BLD: 4.84 M/UL — SIGNIFICANT CHANGE UP (ref 4.2–5.4)
RBC # FLD: 12 % — SIGNIFICANT CHANGE UP (ref 11.5–14.5)
SODIUM SERPL-SCNC: 135 MMOL/L — SIGNIFICANT CHANGE UP (ref 135–146)
SP GR SPEC: 1.03 — SIGNIFICANT CHANGE UP (ref 1.01–1.03)
UROBILINOGEN FLD QL: SIGNIFICANT CHANGE UP
WBC # BLD: 4.62 K/UL — LOW (ref 4.8–10.8)
WBC # FLD AUTO: 4.62 K/UL — LOW (ref 4.8–10.8)

## 2022-12-14 PROCEDURE — 99232 SBSQ HOSP IP/OBS MODERATE 35: CPT

## 2022-12-14 RX ORDER — ONDANSETRON 8 MG/1
4 TABLET, FILM COATED ORAL THREE TIMES A DAY
Refills: 0 | Status: DISCONTINUED | OUTPATIENT
Start: 2022-12-14 | End: 2022-12-17

## 2022-12-14 RX ADMIN — HEPARIN SODIUM 5000 UNIT(S): 5000 INJECTION INTRAVENOUS; SUBCUTANEOUS at 13:02

## 2022-12-14 RX ADMIN — Medication 20 MILLIGRAM(S): at 06:22

## 2022-12-14 RX ADMIN — ATORVASTATIN CALCIUM 40 MILLIGRAM(S): 80 TABLET, FILM COATED ORAL at 22:39

## 2022-12-14 RX ADMIN — INSULIN GLARGINE 14 UNIT(S): 100 INJECTION, SOLUTION SUBCUTANEOUS at 22:40

## 2022-12-14 RX ADMIN — HEPARIN SODIUM 5000 UNIT(S): 5000 INJECTION INTRAVENOUS; SUBCUTANEOUS at 22:42

## 2022-12-14 RX ADMIN — PANTOPRAZOLE SODIUM 40 MILLIGRAM(S): 20 TABLET, DELAYED RELEASE ORAL at 11:33

## 2022-12-14 RX ADMIN — ONDANSETRON 4 MILLIGRAM(S): 8 TABLET, FILM COATED ORAL at 22:39

## 2022-12-14 RX ADMIN — Medication 600 MILLIGRAM(S): at 18:01

## 2022-12-14 RX ADMIN — Medication 3: at 11:33

## 2022-12-14 RX ADMIN — Medication 1: at 08:38

## 2022-12-14 RX ADMIN — Medication 4: at 17:02

## 2022-12-14 NOTE — PROGRESS NOTE ADULT - SUBJECTIVE AND OBJECTIVE BOX
Pt stable on current methimazole and propranolol.  Can continue as outpatient.  If pt to remain in hospital ( for additional 24 hours) then repeat TFTs.  (D/W Dr So)

## 2022-12-14 NOTE — PROGRESS NOTE ADULT - SUBJECTIVE AND OBJECTIVE BOX
RENÉ REIS 52y Female  MRN#: [id number]   CODE STATUS:_Full_______    Hospital Day: 3d    Patient is currently admitted with the primary diagnosis of thyroid storm.     SUBJECTIVE:    Patient seen and examined at bedside this am. Patient is stable and has no new issues.     OBJECTIVE:  PAST MEDICAL & SURGICAL HISTORY:  Diabetes    Hypertension    No significant past surgical history                                 ALLERGIES:  penicillin (Rash)        HOME MEDICATIONS:  Home Medications:  albuterol 90 mcg/inh inhalation aerosol with adapter:  (11 Dec 2022 23:51)  amlodipine-benazepril 10 mg-40 mg oral capsule: 1 cap(s) orally once a day (11 Dec 2022 23:51)  atorvastatin 40 mg oral tablet: 1 tab(s) orally once a day (11 Dec 2022 23:51)  ezetimibe 10 mg oral tablet: 1 tab(s) orally once a day (11 Dec 2022 23:51)  gabapentin 400 mg oral capsule: 1 cap(s) orally 3 times a day (11 Dec 2022 23:51)  Ozempic (1 mg dose) 4 mg/3 mL subcutaneous solution:  (11 Dec 2022 23:51)                           MEDICATIONS:  STANDING MEDICATIONS  atorvastatin 40 milliGRAM(s) Oral at bedtime  glucagon  Injectable 1 milliGRAM(s) IntraMuscular once  heparin   Injectable 5000 Unit(s) SubCutaneous every 8 hours  insulin glargine Injectable (LANTUS) 14 Unit(s) SubCutaneous at bedtime  insulin lispro (ADMELOG) corrective regimen sliding scale   SubCutaneous three times a day before meals  methimazole 20 milliGRAM(s) Oral <User Schedule>  pantoprazole   Suspension 40 milliGRAM(s) Oral daily  propranolol 40 milliGRAM(s) Oral every 12 hours    PRN MEDICATIONS  acetaminophen    Suspension .. 650 milliGRAM(s) Oral every 6 hours PRN  albuterol    90 MICROgram(s) HFA Inhaler 2 Puff(s) Inhalation every 6 hours PRN      VITAL SIGNS: Last 24 Hours  T(C): 36.2 (14 Dec 2022 07:44), Max: 36.9 (13 Dec 2022 15:36)  T(F): 97.2 (14 Dec 2022 07:44), Max: 98.4 (13 Dec 2022 15:36)  HR: 75 (14 Dec 2022 07:44) (67 - 78)  BP: 168/72 (14 Dec 2022 07:44) (131/64 - 168/72)  BP(mean): --  RR: 18 (14 Dec 2022 07:44) (18 - 18)  SpO2: 97% (14 Dec 2022 00:00) (96% - 97%)      LABS:                        12.4   4.00  )-----------( 201      ( 13 Dec 2022 07:39 )             36.7     12-13    135  |  104  |  14  ----------------------------<  305<H>  3.9   |  21  |  0.6<L>    Ca    8.7      13 Dec 2022 07:39  Mg     1.9     12-13    TPro  6.7  /  Alb  3.9  /  TBili  0.6  /  DBili  x   /  AST  18  /  ALT  17  /  AlkPhos  103  12-13    Culture - Blood (collected 11 Dec 2022 21:11)  Source: .Blood Blood-Peripheral  Preliminary Report (13 Dec 2022 09:02):    No growth to date.    Culture - Blood (collected 11 Dec 2022 21:11)  Source: .Blood Blood-Peripheral  Preliminary Report (13 Dec 2022 09:02):    No growth to date.      PHYSICAL EXAM:  General: Resting comfortably in no acute painful distress  HEENT: Normocephalic, atraumatic  LUNGS: Clear to auscultation bilaterally, no wheezes, rales, rhonchi  HEART: S1S2 present, regular rate and rhythm, no murmurs, rubs, gallops  ABDOMEN: Soft, nontender, nondistended  EXT: No edema

## 2022-12-14 NOTE — PATIENT PROFILE ADULT - NS PRO AD NO ADVANCE DIRECTIVE
END OF SHIFT NOTE:    INTAKE/OUTPUT  No intake/output data recorded. Voiding: Yes  Catheter: No  Drain:   Chest Tube Anterior; Left Midclavicular; Other (Comment) 1 (Active)   $ Chest tube insertion $ Yes 12/13/22 1540   Chest Tube Airleak No 12/13/22 1821   Status Continuous Suction 12/13/22 1821   Suction -20 cm H2O 12/13/22 1821   Y Connector Used No 12/13/22 1821   Drainage Description Other (Comment) 12/13/22 1540   Dressing Status Clean, dry & intact 12/13/22 1821   Chest Tube Dressing Petroleum Jelly 12/13/22 1821   Site Assessment Clean, dry & intact 12/13/22 1821   Surrounding Skin Clean, dry & intact 12/13/22 1821   Output (ml) 0 ml 12/13/22 1540               Flatus: Patient does have flatus present. Stool:  occurrences. Characteristics:                Emesis:  occurrences.     Characteristics:        VITAL SIGNS  Patient Vitals for the past 12 hrs:   Temp Pulse Resp BP SpO2   12/13/22 1806 97.9 °F (36.6 °C) 57 18 123/73 100 %   12/13/22 1630 -- 58 16 -- 97 %   12/13/22 1615 -- 65 15 -- --   12/13/22 1600 -- 57 17 138/78 --   12/13/22 1545 -- 67 13 -- --   12/13/22 1530 -- 58 18 125/70 --   12/13/22 1515 -- (!) 44 15 -- --   12/13/22 1445 -- 52 17 118/83 99 %   12/13/22 1430 -- (!) 46 16 -- 100 %   12/13/22 1415 -- 70 16 -- 100 %   12/13/22 1400 -- 54 20 135/86 100 %   12/13/22 1330 -- 52 14 (!) 144/63 100 %   12/13/22 1315 -- 62 15 -- --   12/13/22 1300 -- 70 17 130/77 100 %   12/13/22 1245 -- 53 17 -- 100 %   12/13/22 1230 -- (!) 48 16 129/76 100 %   12/13/22 1215 -- 67 17 -- 100 %   12/13/22 1200 -- 52 13 128/80 100 %   12/13/22 1145 -- 50 14 -- 100 %   12/13/22 1130 -- 56 17 126/73 100 %   12/13/22 1115 -- (!) 49 16 -- 100 %   12/13/22 1100 -- (!) 47 17 (!) 126/55 100 %   12/13/22 1045 -- (!) 49 20 122/77 100 %   12/13/22 1030 -- 56 19 -- 100 %   12/13/22 1015 -- (!) 49 21 -- 100 %   12/13/22 1000 -- 56 19 -- 100 %   12/13/22 0945 -- 65 16 -- 100 %   12/13/22 0930 -- 53 19 -- 100 %       Pain Assessment             Ambulating  Yes    Shift report given to oncoming nurse at the bedside.     Inez Grover RN No

## 2022-12-14 NOTE — PROGRESS NOTE ADULT - ASSESSMENT
52F PMHx hyperthyroidism, HTN, HLD here with altered mental status, psychosis.    PLAN    #AMS secondary to hyperthyroidism   #SIRS likely due to thyroid storm   - was febrile with temp of 102 and Tachycardia HR of 140's  - TSH < 0, T3 T4 WNL   - evaluated by endocrinology  - initially received hydrocortisone which was changed to prednisone   - prednisone discontinued on 12/14 as patient is improved hemodynamically  - Patient to continue with methimazole 20 mg qAM and propranolol 40 mg BID   - Echo: EF 64%, G1DD   - BCx negative   - patient is s/p treatment with cefepime ( 3 doses)   - patient remains afebrile and no leukocytosis     #HTN   - c/w propranolol 40 mg BID     #HLD  - c/w Atorvastatin 40 mg QHS    #Steroid induced hyperglycemia   - f/u HgBA1C  - c/w SS   - monitor for hypoglycemia      #DVT Ppx: Heparin subq  #GI Ppx: Pantoprazole   #Diet: DASH/TLC  #Activity: IAT  #Code: Full  #Dispo: Anticipate D'C in 24 hours

## 2022-12-14 NOTE — PROGRESS NOTE ADULT - SUBJECTIVE AND OBJECTIVE BOX
RENÉ REIS  52y  [facility] [id number]      Patient is a 52y old  Female who presents with a chief complaint of Thyroid storm (14 Dec 2022 13:25)      INTERVAL HPI/OVERNIGHT EVENTS:  Patient still has multiple complains that might be due to hyperthyroidsim   She also reported burning pain upon urinating           FAMILY HISTORY:    T(C): 36.2 (12-14-22 @ 07:44), Max: 36.9 (12-13-22 @ 15:36)  HR: 75 (12-14-22 @ 07:44) (67 - 78)  BP: 168/72 (12-14-22 @ 07:44) (131/64 - 168/72)  RR: 18 (12-14-22 @ 07:44) (18 - 18)  SpO2: 97% (12-14-22 @ 00:00) (96% - 97%)  Wt(kg): --Vital Signs Last 24 Hrs  T(C): 36.2 (14 Dec 2022 07:44), Max: 36.9 (13 Dec 2022 15:36)  T(F): 97.2 (14 Dec 2022 07:44), Max: 98.4 (13 Dec 2022 15:36)  HR: 75 (14 Dec 2022 07:44) (67 - 78)  BP: 168/72 (14 Dec 2022 07:44) (131/64 - 168/72)  BP(mean): --  RR: 18 (14 Dec 2022 07:44) (18 - 18)  SpO2: 97% (14 Dec 2022 00:00) (96% - 97%)        PHYSICAL EXAM:  GENERAL: NAD, well-groomed, well-developed  NERVOUS SYSTEM:  Alert & Oriented X3, Good concentration  PULM: Clear to auscultation bilaterally  CARDIAC: Regular rate and rhythm  GI: Soft, Nontender, Nondistended; Bowel sounds present  EXTREMITIES:  2+ Peripheral Pulses,    Consultant(s) Notes Reviewed:  [x ] YES  [ ] NO  Care Discussed with Consultants/Other Providers [ x] YES  [ ] NO    LABS:                            12.4   4.62  )-----------( 205      ( 14 Dec 2022 12:18 )             37.7   12-14    135  |  102  |  13  ----------------------------<  288<H>  3.7   |  22  |  0.6<L>    Ca    8.2<L>      14 Dec 2022 12:18  Mg     1.7     12-14    TPro  6.7  /  Alb  3.9  /  TBili  0.6  /  DBili  x   /  AST  18  /  ALT  17  /  AlkPhos  103  12-13            Culture - Blood (collected 11 Dec 2022 21:11)  Source: .Blood Blood-Peripheral  Preliminary Report (13 Dec 2022 09:02):    No growth to date.    Culture - Blood (collected 11 Dec 2022 21:11)  Source: .Blood Blood-Peripheral  Preliminary Report (13 Dec 2022 09:02):    No growth to date.      acetaminophen    Suspension .. 650 milliGRAM(s) Oral every 6 hours PRN  albuterol    90 MICROgram(s) HFA Inhaler 2 Puff(s) Inhalation every 6 hours PRN  atorvastatin 40 milliGRAM(s) Oral at bedtime  glucagon  Injectable 1 milliGRAM(s) IntraMuscular once  heparin   Injectable 5000 Unit(s) SubCutaneous every 8 hours  insulin glargine Injectable (LANTUS) 14 Unit(s) SubCutaneous at bedtime  insulin lispro (ADMELOG) corrective regimen sliding scale   SubCutaneous three times a day before meals  methimazole 20 milliGRAM(s) Oral <User Schedule>  pantoprazole   Suspension 40 milliGRAM(s) Oral daily  propranolol 40 milliGRAM(s) Oral every 12 hours      52F PMHx hyperthyroidism, HTN, HLD here with altered mental status, psychosis.        1. Metabolic encephalopathy secondary to hyperthyroidism. thyroid storm?resolving   * SIRS due to thyroid storm   - Admit to medicine   - TSH undetectable. T3 and T4 high nl   - Was on steroid that was stopped today   - Echo :WNL   - Was started on broad spectrum abs that was stopped   - BLood cx:NTD    - u/a:pending     2. HTN   - c/w propranolol 40 mg BID     3. HLD  - c/w Atorvastatin 40 mg QHS    4. Steroid induced hyperglycemia   - f/u HgBA1C  - c/w SS . Plan to dc on metformin     #DVT Ppx: Heparin subq  #GI Ppx: Pantoprazole   #Diet: DASH/TLC  #Activity: IAT  #Code: Full  #Dispo: Anticipate D'C in 24 hours           Case Discussed with House Staff   30  minutes spent on total encounter; more than 50% of the visit was spent counseling and/or coordinating care by the attending physician.   Spectra [phone]

## 2022-12-15 ENCOUNTER — TRANSCRIPTION ENCOUNTER (OUTPATIENT)
Age: 52
End: 2022-12-15

## 2022-12-15 LAB
ANION GAP SERPL CALC-SCNC: 12 MMOL/L — SIGNIFICANT CHANGE UP (ref 7–14)
BASOPHILS # BLD AUTO: 0.01 K/UL — SIGNIFICANT CHANGE UP (ref 0–0.2)
BASOPHILS NFR BLD AUTO: 0.2 % — SIGNIFICANT CHANGE UP (ref 0–1)
BUN SERPL-MCNC: 11 MG/DL — SIGNIFICANT CHANGE UP (ref 10–20)
CALCIUM SERPL-MCNC: 8.5 MG/DL — SIGNIFICANT CHANGE UP (ref 8.4–10.5)
CHLORIDE SERPL-SCNC: 104 MMOL/L — SIGNIFICANT CHANGE UP (ref 98–110)
CO2 SERPL-SCNC: 22 MMOL/L — SIGNIFICANT CHANGE UP (ref 17–32)
CREAT SERPL-MCNC: 0.7 MG/DL — SIGNIFICANT CHANGE UP (ref 0.7–1.5)
DRUG SCREEN, SERUM: SIGNIFICANT CHANGE UP
EGFR: 105 ML/MIN/1.73M2 — SIGNIFICANT CHANGE UP
EOSINOPHIL # BLD AUTO: 0.04 K/UL — SIGNIFICANT CHANGE UP (ref 0–0.7)
EOSINOPHIL NFR BLD AUTO: 0.9 % — SIGNIFICANT CHANGE UP (ref 0–8)
GLUCOSE BLDC GLUCOMTR-MCNC: 184 MG/DL — HIGH (ref 70–99)
GLUCOSE BLDC GLUCOMTR-MCNC: 188 MG/DL — HIGH (ref 70–99)
GLUCOSE BLDC GLUCOMTR-MCNC: 231 MG/DL — HIGH (ref 70–99)
GLUCOSE BLDC GLUCOMTR-MCNC: 241 MG/DL — HIGH (ref 70–99)
GLUCOSE SERPL-MCNC: 230 MG/DL — HIGH (ref 70–99)
HCT VFR BLD CALC: 37.5 % — SIGNIFICANT CHANGE UP (ref 37–47)
HGB BLD-MCNC: 12.7 G/DL — SIGNIFICANT CHANGE UP (ref 12–16)
IMM GRANULOCYTES NFR BLD AUTO: 0 % — LOW (ref 0.1–0.3)
LYMPHOCYTES # BLD AUTO: 2.3 K/UL — SIGNIFICANT CHANGE UP (ref 1.2–3.4)
LYMPHOCYTES # BLD AUTO: 53.4 % — HIGH (ref 20.5–51.1)
MAGNESIUM SERPL-MCNC: 1.8 MG/DL — SIGNIFICANT CHANGE UP (ref 1.8–2.4)
MCHC RBC-ENTMCNC: 26 PG — LOW (ref 27–31)
MCHC RBC-ENTMCNC: 33.9 G/DL — SIGNIFICANT CHANGE UP (ref 32–37)
MCV RBC AUTO: 76.8 FL — LOW (ref 81–99)
MONOCYTES # BLD AUTO: 0.63 K/UL — HIGH (ref 0.1–0.6)
MONOCYTES NFR BLD AUTO: 14.6 % — HIGH (ref 1.7–9.3)
NEUTROPHILS # BLD AUTO: 1.33 K/UL — LOW (ref 1.4–6.5)
NEUTROPHILS NFR BLD AUTO: 30.9 % — LOW (ref 42.2–75.2)
NRBC # BLD: 0 /100 WBCS — SIGNIFICANT CHANGE UP (ref 0–0)
PLATELET # BLD AUTO: 190 K/UL — SIGNIFICANT CHANGE UP (ref 130–400)
POTASSIUM SERPL-MCNC: 3.6 MMOL/L — SIGNIFICANT CHANGE UP (ref 3.5–5)
POTASSIUM SERPL-SCNC: 3.6 MMOL/L — SIGNIFICANT CHANGE UP (ref 3.5–5)
RBC # BLD: 4.88 M/UL — SIGNIFICANT CHANGE UP (ref 4.2–5.4)
RBC # FLD: 12.2 % — SIGNIFICANT CHANGE UP (ref 11.5–14.5)
SARS-COV-2 RNA SPEC QL NAA+PROBE: SIGNIFICANT CHANGE UP
SODIUM SERPL-SCNC: 138 MMOL/L — SIGNIFICANT CHANGE UP (ref 135–146)
T3 SERPL-MCNC: 106 NG/DL — SIGNIFICANT CHANGE UP (ref 80–200)
T4 FREE SERPL-MCNC: 1.7 NG/DL — SIGNIFICANT CHANGE UP (ref 0.9–1.8)
T4 FREE SERPL-MCNC: 1.7 NG/DL — SIGNIFICANT CHANGE UP (ref 0.9–1.8)
T4 FREE+ TSH PNL SERPL: <0 UIU/ML — LOW (ref 0.27–4.2)
TSH SERPL-MCNC: <0 UIU/ML — LOW (ref 0.27–4.2)
WBC # BLD: 4.31 K/UL — LOW (ref 4.8–10.8)
WBC # FLD AUTO: 4.31 K/UL — LOW (ref 4.8–10.8)

## 2022-12-15 PROCEDURE — 99233 SBSQ HOSP IP/OBS HIGH 50: CPT

## 2022-12-15 PROCEDURE — 71045 X-RAY EXAM CHEST 1 VIEW: CPT | Mod: 26

## 2022-12-15 RX ORDER — FUROSEMIDE 40 MG
40 TABLET ORAL ONCE
Refills: 0 | Status: COMPLETED | OUTPATIENT
Start: 2022-12-15 | End: 2022-12-15

## 2022-12-15 RX ORDER — SEMAGLUTIDE 0.68 MG/ML
0 INJECTION, SOLUTION SUBCUTANEOUS
Qty: 0 | Refills: 0 | DISCHARGE

## 2022-12-15 RX ORDER — POTASSIUM CHLORIDE 20 MEQ
20 PACKET (EA) ORAL ONCE
Refills: 0 | Status: COMPLETED | OUTPATIENT
Start: 2022-12-15 | End: 2022-12-15

## 2022-12-15 RX ORDER — BUDESONIDE, MICRONIZED 100 %
0.5 POWDER (GRAM) MISCELLANEOUS
Refills: 0 | Status: DISCONTINUED | OUTPATIENT
Start: 2022-12-15 | End: 2022-12-17

## 2022-12-15 RX ADMIN — ATORVASTATIN CALCIUM 40 MILLIGRAM(S): 80 TABLET, FILM COATED ORAL at 22:36

## 2022-12-15 RX ADMIN — Medication 1: at 17:08

## 2022-12-15 RX ADMIN — Medication 40 MILLIGRAM(S): at 15:21

## 2022-12-15 RX ADMIN — Medication 1: at 08:32

## 2022-12-15 RX ADMIN — PANTOPRAZOLE SODIUM 40 MILLIGRAM(S): 20 TABLET, DELAYED RELEASE ORAL at 11:27

## 2022-12-15 RX ADMIN — Medication 20 MILLIEQUIVALENT(S): at 13:29

## 2022-12-15 RX ADMIN — INSULIN GLARGINE 14 UNIT(S): 100 INJECTION, SOLUTION SUBCUTANEOUS at 21:20

## 2022-12-15 RX ADMIN — HEPARIN SODIUM 5000 UNIT(S): 5000 INJECTION INTRAVENOUS; SUBCUTANEOUS at 13:29

## 2022-12-15 RX ADMIN — Medication 2: at 11:27

## 2022-12-15 RX ADMIN — HEPARIN SODIUM 5000 UNIT(S): 5000 INJECTION INTRAVENOUS; SUBCUTANEOUS at 05:23

## 2022-12-15 RX ADMIN — HEPARIN SODIUM 5000 UNIT(S): 5000 INJECTION INTRAVENOUS; SUBCUTANEOUS at 22:36

## 2022-12-15 NOTE — DISCHARGE NOTE PROVIDER - ATTENDING DISCHARGE PHYSICAL EXAMINATION:
VITALS:   T(C): 36.4 (12-17-22 @ 08:00), Max: 36.7 (12-16-22 @ 17:32)  HR: 67 (12-17-22 @ 08:00) (63 - 67)  BP: 139/70 (12-17-22 @ 08:00) (129/59 - 176/77)  RR: 19 (12-17-22 @ 08:00) (18 - 19)  SpO2: --    GENERAL: NAD, lying in bed comfortably  HEART: Regular rate and rhythm,   LUNGS: Unlabored respirations.  Clear to auscultation bilaterally  ABDOMEN: Soft, nontender, nondistended, +BS  EXTREMITIES: 2+ peripheral pulses bilaterally.shaking   NERVOUS SYSTEM:  A&Ox3, no focal deficits

## 2022-12-15 NOTE — DISCHARGE NOTE PROVIDER - NSDCCPCAREPLAN_GEN_ALL_CORE_FT
PRINCIPAL DISCHARGE DIAGNOSIS  Diagnosis: Thyroid storm  Assessment and Plan of Treatment: complicated by acute diastolic CHF resolving. take methimazole 30mg po daily. report any sore throat to ur endocrine and PMD      SECONDARY DISCHARGE DIAGNOSES  Diagnosis: Acute asthma exacerbation  Assessment and Plan of Treatment: might be triggered by propanolol. take steroid as instructed    Diagnosis: Type II diabetes mellitus  Assessment and Plan of Treatment: Start taking metformin 1000mg po bid with food. Please also use NPH 25u while on prednisone 50-40 mg, 20u while on prednisone 30mg. 15 unit while on prednisone 20mg, 10u while on prednisone 10mg then stop

## 2022-12-15 NOTE — DISCHARGE NOTE PROVIDER - NSDCMRMEDTOKEN_GEN_ALL_CORE_FT
albuterol 90 mcg/inh inhalation aerosol with adapter:   amlodipine-benazepril 10 mg-40 mg oral capsule: 1 cap(s) orally once a day  atorvastatin 40 mg oral tablet: 1 tab(s) orally once a day  ezetimibe 10 mg oral tablet: 1 tab(s) orally once a day  gabapentin 400 mg oral capsule: 1 cap(s) orally 3 times a day  methIMAzole 10 mg oral tablet: 1 tab(s) orally once a day   albuterol 90 mcg/inh inhalation aerosol with adapter: 1 puff(s) inhaled every 4 hours, As Needed for sob or wheezing   amlodipine-benazepril 10 mg-40 mg oral capsule: 1 cap(s) orally once a day  atenolol 50 mg oral tablet: 1 tab(s) orally once a day  atorvastatin 40 mg oral tablet: 1 tab(s) orally once a day  ezetimibe 10 mg oral tablet: 1 tab(s) orally once a day  gabapentin 400 mg oral capsule: 1 cap(s) orally 3 times a day  guaiFENesin 600 mg oral tablet, extended release: 1 tab(s) orally every 12 hours -Cough   HumuLIN N KwikPen 100 units/mL subcutaneous suspension: 25 u subcutaneous once a day for 2days then 20u sc daily for 2 days then 15u sc daily for 2 days then 10u sc daily for 2 days then stop while on prednisone   levoFLOXacin 500 mg oral tablet: 1 tab(s) orally every 24 hours  metFORMIN 1000 mg oral tablet: 1 tab(s) orally 2 times a day with meals   methIMAzole 10 mg oral tablet: 3 tab(s) orally once a day   montelukast 10 mg oral tablet: 1 tab(s) orally once a day (at bedtime)   albuterol 90 mcg/inh inhalation aerosol with adapter: 1 puff(s) inhaled every 4 hours, As Needed for sob or wheezing   amlodipine-benazepril 10 mg-40 mg oral capsule: 1 cap(s) orally once a day  atenolol 50 mg oral tablet: 1 tab(s) orally once a day  atorvastatin 40 mg oral tablet: 1 tab(s) orally once a day  ezetimibe 10 mg oral tablet: 1 tab(s) orally once a day  gabapentin 400 mg oral capsule: 1 cap(s) orally 3 times a day  guaiFENesin 600 mg oral tablet, extended release: 1 tab(s) orally every 12 hours -Cough   HumuLIN N KwikPen 100 units/mL subcutaneous suspension: 25 u subcutaneous once a day for 4days then 20u sc daily for 2 days then 15u sc daily for 2 days then 10u sc daily for 2 days then stop while on prednisone   levoFLOXacin 500 mg oral tablet: 1 tab(s) orally every 24 hours  metFORMIN 1000 mg oral tablet: 1 tab(s) orally 2 times a day with meals   methIMAzole 10 mg oral tablet: 3 tab(s) orally once a day   montelukast 10 mg oral tablet: 1 tab(s) orally once a day (at bedtime)  predniSONE 10 mg oral tablet: 5 tab(s) orally once a day for 2 days then 4tb for 2 days then 3tab for 2 days then 2tab for 2 days then 1 tab for 2 days then stop     albuterol 90 mcg/inh inhalation aerosol with adapter: 1 puff(s) inhaled every 4 hours, As Needed for sob or wheezing   amlodipine-benazepril 10 mg-40 mg oral capsule: 1 cap(s) orally once a day  atenolol 50 mg oral tablet: 1 tab(s) orally once a day  atorvastatin 40 mg oral tablet: 1 tab(s) orally once a day  ezetimibe 10 mg oral tablet: 1 tab(s) orally once a day  gabapentin 400 mg oral capsule: 1 cap(s) orally 3 times a day  glucometer (per patient&#x27;s insurance): Test blood sugars four times a day. Dispense #1 glucometer.  guaiFENesin 600 mg oral tablet, extended release: 1 tab(s) orally every 12 hours -Cough   HumuLIN N KwikPen 100 units/mL subcutaneous suspension: 25 u subcutaneous once a day for 4days then 20u sc daily for 2 days then 15u sc daily for 2 days then 10u sc daily for 2 days then stop while on prednisone   levoFLOXacin 500 mg oral tablet: 1 tab(s) orally every 24 hours  metFORMIN 1000 mg oral tablet: 1 tab(s) orally 2 times a day with meals   methIMAzole 10 mg oral tablet: 3 tab(s) orally once a day   montelukast 10 mg oral tablet: 1 tab(s) orally once a day (at bedtime)  predniSONE 10 mg oral tablet: 5 tab(s) orally once a day for 2 days then 4tb for 2 days then 3tab for 2 days then 2tab for 2 days then 1 tab for 2 days then stop     albuterol 90 mcg/inh inhalation aerosol with adapter: 1 puff(s) inhaled every 4 hours, As Needed for sob or wheezing   alcohol swabs : Apply topically to affected area 4 times a day   amlodipine-benazepril 10 mg-40 mg oral capsule: 1 cap(s) orally once a day  atenolol 50 mg oral tablet: 1 tab(s) orally once a day  atorvastatin 40 mg oral tablet: 1 tab(s) orally once a day  ezetimibe 10 mg oral tablet: 1 tab(s) orally once a day  gabapentin 400 mg oral capsule: 1 cap(s) orally 3 times a day  glucometer (per patient&#x27;s insurance): Test blood sugars four times a day. Dispense #1 glucometer.  guaiFENesin 600 mg oral tablet, extended release: 1 tab(s) orally every 12 hours -Cough   HumuLIN N KwikPen 100 units/mL subcutaneous suspension: 25 u subcutaneous once a day for 4days then 20u sc daily for 2 days then 15u sc daily for 2 days then 10u sc daily for 2 days then stop while on prednisone   Insulin Pen Needles, 4mm: 1 application subcutaneously 4 times a day. ** Use with insulin pen **   lancets: 1 application subcutaneously 4 times a day   levoFLOXacin 500 mg oral tablet: 1 tab(s) orally every 24 hours  metFORMIN 1000 mg oral tablet: 1 tab(s) orally 2 times a day with meals   methIMAzole 10 mg oral tablet: 3 tab(s) orally once a day   montelukast 10 mg oral tablet: 1 tab(s) orally once a day (at bedtime)  predniSONE 10 mg oral tablet: 5 tab(s) orally once a day for 2 days then 4tb for 2 days then 3tab for 2 days then 2tab for 2 days then 1 tab for 2 days then stop    test strips (per patient&#x27;s insurance): 1 application subcutaneously 4 times a day. ** Compatible with patient&#x27;s glucometer **

## 2022-12-15 NOTE — PROGRESS NOTE ADULT - SUBJECTIVE AND OBJECTIVE BOX
RENÉ REIS 52y Female  MRN#: [id number]   CODE STATUS:_Full_______    Hospital Day: 4d    Patient is currently admitted with the primary diagnosis of thyroid storm.    SUBJECTIVE:    Patient seen and examined at bedside this am. Patient is stable. Patient endorses having the cough upon deep breathing. Otherwise denies palpitations.     OBJECTIVE:  PAST MEDICAL & SURGICAL HISTORY:  Diabetes    Hypertension    No significant past surgical history      ALLERGIES:  penicillin (Rash)      HOME MEDICATIONS:  Home Medications:  albuterol 90 mcg/inh inhalation aerosol with adapter:  (11 Dec 2022 23:51)  amlodipine-benazepril 10 mg-40 mg oral capsule: 1 cap(s) orally once a day (11 Dec 2022 23:51)  atorvastatin 40 mg oral tablet: 1 tab(s) orally once a day (11 Dec 2022 23:51)  ezetimibe 10 mg oral tablet: 1 tab(s) orally once a day (11 Dec 2022 23:51)  gabapentin 400 mg oral capsule: 1 cap(s) orally 3 times a day (11 Dec 2022 23:51)                           MEDICATIONS:  STANDING MEDICATIONS  atorvastatin 40 milliGRAM(s) Oral at bedtime  buDESOnide    Inhalation Suspension 0.5 milliGRAM(s) Inhalation two times a day  furosemide   Injectable 40 milliGRAM(s) IV Push once  glucagon  Injectable 1 milliGRAM(s) IntraMuscular once  heparin   Injectable 5000 Unit(s) SubCutaneous every 8 hours  insulin glargine Injectable (LANTUS) 14 Unit(s) SubCutaneous at bedtime  insulin lispro (ADMELOG) corrective regimen sliding scale   SubCutaneous three times a day before meals  methimazole 20 milliGRAM(s) Oral <User Schedule>  pantoprazole   Suspension 40 milliGRAM(s) Oral daily  propranolol 40 milliGRAM(s) Oral every 12 hours    PRN MEDICATIONS  acetaminophen    Suspension .. 650 milliGRAM(s) Oral every 6 hours PRN  albuterol    90 MICROgram(s) HFA Inhaler 2 Puff(s) Inhalation every 6 hours PRN  guaiFENesin  milliGRAM(s) Oral every 12 hours PRN  ondansetron    Tablet 4 milliGRAM(s) Oral three times a day PRN                                    VITAL SIGNS: Last 24 Hours  T(C): 36.5 (15 Dec 2022 08:00), Max: 37.7 (15 Dec 2022 00:00)  T(F): 97.7 (15 Dec 2022 08:00), Max: 99.8 (15 Dec 2022 00:00)  HR: 71 (15 Dec 2022 08:00) (71 - 79)  BP: 166/77 (15 Dec 2022 08:00) (138/61 - 166/77)  BP(mean): --  RR: 18 (15 Dec 2022 08:00) (18 - 18)  SpO2: 99% (15 Dec 2022 05:20) (99% - 99%)    LABS:                        12.7   4.31  )-----------( 190      ( 15 Dec 2022 08:17 )             37.5     12-15    138  |  104  |  11  ----------------------------<  230<H>  3.6   |  22  |  0.7    Ca    8.5      15 Dec 2022 08:17  Mg     1.8     12-15        Urinalysis Basic - ( 14 Dec 2022 16:41 )    Color: Light Yellow / Appearance: Clear / S.028 / pH: x  Gluc: x / Ketone: Negative  / Bili: Negative / Urobili: <2 mg/dL   Blood: x / Protein: Trace / Nitrite: Negative   Leuk Esterase: Negative / RBC: x / WBC x   Sq Epi: x / Non Sq Epi: x / Bacteria: x      RADIOLOGY:    < from: Xray Chest 1 View- PORTABLE-Urgent (Xray Chest 1 View- PORTABLE-Urgent .) (12.15.22 @ 10:41) >  Impression:    No radiographic evidence of acute cardiopulmonary disease.        --- End of Report ---        < end of copied text >      PHYSICAL EXAM:  General: Resting comfortably in no acute painful distress  HEENT: Normocephalic, atraumatic  LUNGS: Clear to auscultation bilaterally, no wheezes, rales, rhonchi  HEART: S1S2 present, regular rate and rhythm, no murmurs, rubs, gallops  ABDOMEN: Soft, nontender, nondistended  EXT: No edema

## 2022-12-15 NOTE — DISCHARGE NOTE PROVIDER - CARE PROVIDERS DIRECT ADDRESSES
[email or web address]

## 2022-12-15 NOTE — PROGRESS NOTE ADULT - ASSESSMENT
52F PMHx hyperthyroidism, HTN, HLD here with altered mental status, psychosis.    PLAN    #Cough on inspiration   - CXR 12/15: mild congestion   - likely from thyroid storm       #AMS secondary to hyperthyroidism   #SIRS likely due to thyroid storm   - was febrile with temp of 102 and Tachycardia HR of 140's  - TSH < 0, T3 T4 WNL   - evaluated by endocrinology  - initially received hydrocortisone which was changed to prednisone   - prednisone discontinued on 12/14 as patient is improved hemodynamically  - Patient to continue with methimazole 20 mg qAM and propranolol 40 mg BID   - Echo: EF 64%, G1DD   - BCx negative   - patient is s/p treatment with cefepime ( 3 doses)   - patient remains afebrile and no leukocytosis     #HTN   - c/w propranolol 40 mg BID     #HLD  - c/w Atorvastatin 40 mg QHS    #Steroid induced hyperglycemia   - f/u HgBA1C  - c/w SS   - monitor for hypoglycemia      #DVT Ppx: Heparin subq  #GI Ppx: Pantoprazole   #Diet: DASH/TLC  #Activity: IAT  #Code: Full  #Dispo: Anticipate D'C in 24 hours    52F PMHx hyperthyroidism, HTN, HLD here with altered mental status, psychosis.    PLAN    #Cough on inspiration   - CXR 12/15: mild congestion   - likely from thyroid storm   - give 1 dose of lasix   - send Covid-PCR  - repeat CXR am       #AMS secondary to hyperthyroidism   #SIRS likely due to thyroid storm   - was febrile with temp of 102 and Tachycardia HR of 140's  - TSH < 0, T3 T4 WNL   - evaluated by endocrinology  - initially received hydrocortisone which was changed to prednisone   - prednisone discontinued on 12/14 as patient is improved hemodynamically  - Patient to continue with methimazole 20 mg qAM and propranolol 40 mg BID   - f/u Free T3 levels   - Total T4 levels WNL   - Echo: EF 64%, G1DD   - BCx negative   - patient is s/p treatment with cefepime ( 3 doses)   - patient remains afebrile and no leukocytosis     #HTN   - c/w propranolol 40 mg BID     #HLD  - c/w Atorvastatin 40 mg QHS    #Steroid induced hyperglycemia   - f/u HgBA1C  - c/w SS   - monitor for hypoglycemia      #DVT Ppx: Heparin subq  #GI Ppx: Pantoprazole   #Diet: DASH/TLC  #Activity: IAT  #Code: Full  #Dispo: Anticipate D'C in 24 hours

## 2022-12-15 NOTE — DISCHARGE NOTE PROVIDER - HOSPITAL COURSE
52F PMHx hyperthyroidism, HTN, HLD here with altered mental status, psychosis.  In the hospital,         1. Metabolic encephalopathy secondary to hyperthyroidism. thyroid storm?resolving   * SIRS due to thyroid storm   - Admit to medicine   - TSH undetectable. T3 and T4 high nl   - Was on steroid that was stopped today   - Echo :WNL   - Was started on broad spectrum abs that was stopped   - Started on Methimazole. DC on methimazole daily   - BLood cx:NTD    - u/a:WNL     2. HTN   - c/w propranolol 40 mg BID . Dc on propanolol 40mg po bid     3. HLD  - c/w Atorvastatin 40 mg QHS    4. Steroid induced hyperglycemia   - HgBA1C:pending   - Was on insulin . DC on metformin bid      52F PMHx hyperthyroidism, HTN, HLD here with altered mental status, psychosis.  In the hospital, patient was treated for the following conditions:         1. Metabolic encephalopathy secondary to Thyroid storm complicated by acute diastolic CHF resolving   * SIRS due to thyroid storm   - Admit to medicine         - u/a:WNL        - Last CXR: resolving intersitial infiltrate   - TSH undetectable. T3 and T4 high nl   - Started on methimazole. Dc on Methimazole 30mg po daily with close follow up   - Echo :WNL   - Received lasix 40mg *1.   - Was started on broad spectrum abs that was stopped   - BLood cx:NTD    - Endocrine consult appreciated.    2. HTN   - Was on propranolol 40 mg BID that I will change to atenolol 40mg po daily ( due to ashtma exacerbation triggered by propanolol) . DC on atenolol 40mg po daily. HR well controlled     3. HLD  - c/w Atorvastatin 40 mg QHS    4. Steroid induced hyperglycemia DM-II   - Hgba1c:pending   - Was on insulin protocol. DC on NPH 25u daily while on prednisone 40-50mg then 20u while on prednisone 30mg then 15u while on prednisone 20 then 10u while on prednisone 10mg then stop.   - DC on metformin 1000mg po bid       5.persistent SOB associated with wheezing likely due to Asthma exacerbation triggered by atypical pneumonia? and propanolol   - COVID 19: negative   - Started on  Solumedrol 60mg IV daily . DC on Prednisone taper with PPI   - Start ed on Singulair at bed time . DC on singulair 10mg HS   - Inhalers  - Mucinex bid. DC on mucinex bid    - levofloxacin in case of atypical pneumonia ( interstitial infiltrate) . DC on levofloxacin for 5 days          52F PMHx hyperthyroidism, HTN, HLD here with altered mental status, psychosis.  In the hospital, patient was treated for the following conditions:         1. Metabolic encephalopathy secondary to Thyroid storm complicated by acute diastolic CHF resolving   * SIRS due to thyroid storm   - Admit to medicine         - u/a:WNL        - Last CXR: resolving intersitial infiltrate   - TSH undetectable. T3 and T4 high nl   - Started on methimazole. Dc on Methimazole 30mg po daily with close follow up   - Echo :WNL   - Received lasix 40mg *1.   - Was started on broad spectrum abs that was stopped   - BLood cx:NTD    - Endocrine consult appreciated.    2. HTN   - Was on propranolol 40 mg BID that I will change to atenolol 40mg po daily ( due to ashtma exacerbation triggered by propanolol) . DC on atenolol 40mg po daily. HR well controlled     3. HLD  - c/w Atorvastatin 40 mg QHS    4. Steroid induced hyperglycemia DM-II   - Hgba1c:pending   - Was on insulin protocol. DC on NPH 25u daily while on prednisone 40-50mg then 20u while on prednisone 30mg then 15u while on prednisone 20 then 10u while on prednisone 10mg then stop.   - DC on metformin 1000mg po bid       5.persistent SOB associated with wheezing likely due to Asthma exacerbation triggered by atypical pneumonia? and propanolol   - COVID 19: negative   - Started on  Solumedrol 60mg IV daily . DC on Prednisone taper with PPI   - Start ed on Singulair at bed time . DC on singulair 10mg HS   - Inhalers  - Mucinex bid. DC on mucinex bid    - levofloxacin in case of atypical pneumonia ( interstitial infiltrate) . DC on levofloxacin for 5 days       Patient feels well. She has no complains. still with hand shaking which is related to her hyperthyroidism. Her HR is well controlled. she is been ambulating without sob. she is agreeable with dc planning. I explain to her multiple times the plan and I will get visiting nurse due to her complicated condition    She will need to fu with PMD for regular check up     She will need to fu with endocrine for thyroid storm   52F PMHx hyperthyroidism, HTN, HLD here with altered mental status, psychosis.  In the hospital, patient was treated for the following conditions:         1. Metabolic encephalopathy secondary to Thyroid storm complicated by acute diastolic CHF resolving   * SIRS due to thyroid storm   - Admit to medicine         - u/a:WNL        - Last CXR: resolving intersitial infiltrate   - TSH undetectable. T3 and T4 high nl   - Started on methimazole. Dc on Methimazole 30mg po daily with close follow up   - Echo :WNL   - Received lasix 40mg *1.   - Was started on broad spectrum abs that was stopped   - BLood cx:NTD    - Endocrine consult appreciated.    2. HTN   - Was on propranolol 40 mg BID that I will change to atenolol 40mg po daily ( due to ashtma exacerbation triggered by propanolol) . DC on atenolol 40mg po daily. HR well controlled     3. HLD  - c/w Atorvastatin 40 mg QHS    4. Steroid induced hyperglycemia DM-II   - Hgba1c:pending   - Was on insulin protocol. DC on NPH 25u daily while on prednisone 40-50mg then 20u while on prednisone 30mg then 15u while on prednisone 20 then 10u while on prednisone 10mg then stop.   - DC on metformin 1000mg po bid       5.persistent SOB associated with wheezing likely due to Asthma exacerbation triggered by atypical pneumonia? and propanolol   - COVID 19: negative   - Started on  Solumedrol 60mg IV daily . DC on Prednisone taper with PPI   - Start ed on Singulair at bed time . DC on singulair 10mg HS   - Inhalers  - Mucinex bid. DC on mucinex bid    - levofloxacin in case of atypical pneumonia ( interstitial infiltrate) . DC on levofloxacin for 5 days       Patient feels well. She has no complains. still with hand shaking which is related to her hyperthyroidism. Her HR is well controlled. she is been ambulating without sob. she is agreeable with dc planning. I explain to her multiple times the plan and I will get visiting nurse due to her complicated condition    She will need to fu with PMD for regular check up     She will need to fu with endocrine for thyroid storm     Patient script for glucometer and supplies were sent to her pharmacy. her glucose remained above 250 and was advised to stay for better control. My plan was to increase her morning dose NPH by 5-8u. patient insisted on leaving AMA. She verbalize understanding of risks to our team. She also stated that her glucose go up to 600 at home.

## 2022-12-15 NOTE — PROGRESS NOTE ADULT - SUBJECTIVE AND OBJECTIVE BOX
RENÉ REIS  52y  [facility] F[facility] [address]      Patient is a 52y old  Female who presents with a chief complaint of Thyroid storm (14 Dec 2022 13:25)      INTERVAL HPI/OVERNIGHT EVENTS:  Patient is feeling sob and reported orthopnea  cxr in favor of chf.   no other events noted           FAMILY HISTORY:    T(C): 36.2 (12-14-22 @ 07:44), Max: 36.9 (12-13-22 @ 15:36)  HR: 75 (12-14-22 @ 07:44) (67 - 78)  BP: 168/72 (12-14-22 @ 07:44) (131/64 - 168/72)  RR: 18 (12-14-22 @ 07:44) (18 - 18)  SpO2: 97% (12-14-22 @ 00:00) (96% - 97%)  Wt(kg): --Vital Signs Last 24 Hrs  T(C): 36.2 (14 Dec 2022 07:44), Max: 36.9 (13 Dec 2022 15:36)  T(F): 97.2 (14 Dec 2022 07:44), Max: 98.4 (13 Dec 2022 15:36)  HR: 75 (14 Dec 2022 07:44) (67 - 78)  BP: 168/72 (14 Dec 2022 07:44) (131/64 - 168/72)  BP(mean): --  RR: 18 (14 Dec 2022 07:44) (18 - 18)  SpO2: 97% (14 Dec 2022 00:00) (96% - 97%)        PHYSICAL EXAM:  GENERAL: NAD, well-groomed, well-developed  NERVOUS SYSTEM:  Alert & Oriented X3, Good concentration  PULM: Clear to auscultation bilaterally  CARDIAC: Regular rate and rhythm  GI: Soft, Nontender, Nondistended; Bowel sounds present  EXTREMITIES:  2+ Peripheral Pulses,    Consultant(s) Notes Reviewed:  [x ] YES  [ ] NO  Care Discussed with Consultants/Other Providers [ x] YES  [ ] NO    LABS:                            12.4   4.62  )-----------( 205      ( 14 Dec 2022 12:18 )             37.7   12-14    135  |  102  |  13  ----------------------------<  288<H>  3.7   |  22  |  0.6<L>    Ca    8.2<L>      14 Dec 2022 12:18  Mg     1.7     12-14    TPro  6.7  /  Alb  3.9  /  TBili  0.6  /  DBili  x   /  AST  18  /  ALT  17  /  AlkPhos  103  12-13            Culture - Blood (collected 11 Dec 2022 21:11)  Source: .Blood Blood-Peripheral  Preliminary Report (13 Dec 2022 09:02):    No growth to date.    Culture - Blood (collected 11 Dec 2022 21:11)  Source: .Blood Blood-Peripheral  Preliminary Report (13 Dec 2022 09:02):    No growth to date.      acetaminophen    Suspension .. 650 milliGRAM(s) Oral every 6 hours PRN  albuterol    90 MICROgram(s) HFA Inhaler 2 Puff(s) Inhalation every 6 hours PRN  atorvastatin 40 milliGRAM(s) Oral at bedtime  glucagon  Injectable 1 milliGRAM(s) IntraMuscular once  heparin   Injectable 5000 Unit(s) SubCutaneous every 8 hours  insulin glargine Injectable (LANTUS) 14 Unit(s) SubCutaneous at bedtime  insulin lispro (ADMELOG) corrective regimen sliding scale   SubCutaneous three times a day before meals  methimazole 20 milliGRAM(s) Oral <User Schedule>  pantoprazole   Suspension 40 milliGRAM(s) Oral daily  propranolol 40 milliGRAM(s) Oral every 12 hours      52F PMHx hyperthyroidism, HTN, HLD here with altered mental status, psychosis.        1. Metabolic encephalopathy secondary to hyperthyroidism. thyroid storm?resolving   * SIRS due to thyroid storm   - Admit to medicine   - TSH undetectable. T3 and T4 high nl   - Was on steroid that was stopped today   - Echo :WNL   - Was started on broad spectrum abs that was stopped   - BLood cx:NTD    - u/a:WNL     2. HTN   - c/w propranolol 40 mg BID     3. HLD  - c/w Atorvastatin 40 mg QHS    4. Steroid induced hyperglycemia   - f/u HgBA1C  - c/w SS . Plan to dc on metformin       5. Acute diastolic CHF likely triggered by thyroid storm  * pt also reported orthopnea and cough at night   - CXR interstitial infiltrate. Repeat CXR in am   - Lasix 40mg IV*1.     #DVT Ppx: Heparin subq  #GI Ppx: Pantoprazole   #Diet: DASH/TLC  #Activity: IAT  #Code: Full      patient is sob with wheezing today. CXR showing CHF likely triggered by hyperthyroidism. I will also add pulmicort as propanolol might exacerbate her asthma         Case Discussed with House Staff   30  minutes spent on total encounter; more than 50% of the visit was spent counseling and/or coordinating care by the attending physician.   Spectra [phone]     RENÉ REIS  52y  [facility] [address]      Patient is a 52y old  Female who presents with a chief complaint of Thyroid storm (14 Dec 2022 13:25)      INTERVAL HPI/OVERNIGHT EVENTS:  Patient is feeling sob and reported orthopnea  cxr in favor of chf.   no other events noted           FAMILY HISTORY:    T(C): 36.2 (12-14-22 @ 07:44), Max: 36.9 (12-13-22 @ 15:36)  HR: 75 (12-14-22 @ 07:44) (67 - 78)  BP: 168/72 (12-14-22 @ 07:44) (131/64 - 168/72)  RR: 18 (12-14-22 @ 07:44) (18 - 18)  SpO2: 97% (12-14-22 @ 00:00) (96% - 97%)  Wt(kg): --Vital Signs Last 24 Hrs  T(C): 36.2 (14 Dec 2022 07:44), Max: 36.9 (13 Dec 2022 15:36)  T(F): 97.2 (14 Dec 2022 07:44), Max: 98.4 (13 Dec 2022 15:36)  HR: 75 (14 Dec 2022 07:44) (67 - 78)  BP: 168/72 (14 Dec 2022 07:44) (131/64 - 168/72)  BP(mean): --  RR: 18 (14 Dec 2022 07:44) (18 - 18)  SpO2: 97% (14 Dec 2022 00:00) (96% - 97%)        PHYSICAL EXAM:  GENERAL: NAD, well-groomed, well-developed  NERVOUS SYSTEM:  Alert & Oriented X3, Good concentration  PULM: Clear to auscultation bilaterally  CARDIAC: Regular rate and rhythm  GI: Soft, Nontender, Nondistended; Bowel sounds present  EXTREMITIES:  2+ Peripheral Pulses,    Consultant(s) Notes Reviewed:  [x ] YES  [ ] NO  Care Discussed with Consultants/Other Providers [ x] YES  [ ] NO    LABS:                            12.4   4.62  )-----------( 205      ( 14 Dec 2022 12:18 )             37.7   12-14    135  |  102  |  13  ----------------------------<  288<H>  3.7   |  22  |  0.6<L>    Ca    8.2<L>      14 Dec 2022 12:18  Mg     1.7     12-14    TPro  6.7  /  Alb  3.9  /  TBili  0.6  /  DBili  x   /  AST  18  /  ALT  17  /  AlkPhos  103  12-13            Culture - Blood (collected 11 Dec 2022 21:11)  Source: .Blood Blood-Peripheral  Preliminary Report (13 Dec 2022 09:02):    No growth to date.    Culture - Blood (collected 11 Dec 2022 21:11)  Source: .Blood Blood-Peripheral  Preliminary Report (13 Dec 2022 09:02):    No growth to date.      acetaminophen    Suspension .. 650 milliGRAM(s) Oral every 6 hours PRN  albuterol    90 MICROgram(s) HFA Inhaler 2 Puff(s) Inhalation every 6 hours PRN  atorvastatin 40 milliGRAM(s) Oral at bedtime  glucagon  Injectable 1 milliGRAM(s) IntraMuscular once  heparin   Injectable 5000 Unit(s) SubCutaneous every 8 hours  insulin glargine Injectable (LANTUS) 14 Unit(s) SubCutaneous at bedtime  insulin lispro (ADMELOG) corrective regimen sliding scale   SubCutaneous three times a day before meals  methimazole 20 milliGRAM(s) Oral <User Schedule>  pantoprazole   Suspension 40 milliGRAM(s) Oral daily  propranolol 40 milliGRAM(s) Oral every 12 hours      52F PMHx hyperthyroidism, HTN, HLD here with altered mental status, psychosis.        1. Metabolic encephalopathy secondary to hyperthyroidism. thyroid storm?resolving   * SIRS due to thyroid storm   - Admit to medicine   - TSH undetectable. T3 and T4 high nl   - Was on steroid that was stopped today   - Cont methimazole   - Echo :WNL   - Was started on broad spectrum abs that was stopped   - BLood cx:NTD    - u/a:WNL     2. HTN   - c/w propranolol 40 mg BID     3. HLD  - c/w Atorvastatin 40 mg QHS    4. Steroid induced hyperglycemia   - f/u HgBA1C  - c/w SS . Plan to dc on metformin       5. Acute diastolic CHF likely triggered by thyroid storm  * pt also reported orthopnea and cough at night   - CXR interstitial infiltrate. Repeat CXR in am   - Lasix 40mg IV*1.     #DVT Ppx: Heparin subq  #GI Ppx: Pantoprazole   #Diet: DASH/TLC  #Activity: IAT  #Code: Full      patient is sob with wheezing today. CXR showing CHF likely triggered by hyperthyroidism. I will also add pulmicort as propanolol might exacerbate her asthma         Case Discussed with House Staff   30  minutes spent on total encounter; more than 50% of the visit was spent counseling and/or coordinating care by the attending physician.   Spectra [phone]

## 2022-12-15 NOTE — DISCHARGE NOTE PROVIDER - CARE PROVIDER_API CALL
Mira Rolle)  Geriatric Medicine; Internal Medicine  [address], [place] [id number]  Phone: [phone]  Fax: [phone]  Established Patient  Follow Up Time: 1 week    Alonzo Benson)  EndocrinologyMetabDiabetes; Internal Medicine  [address] [place] [address]  Phone: [phone]  Fax: [phone]  Established Patient  Follow Up Time: 2 weeks

## 2022-12-15 NOTE — DISCHARGE NOTE PROVIDER - NSDCFUSCHEDAPPT_GEN_ALL_CORE_FT
Daksha Connor  [facility]  INTMED [address]  Scheduled Appointment: 12/21/2022    [facility]  OBGYNGISSAC [phone]  Scheduled Appointment: 12/30/2022

## 2022-12-15 NOTE — DISCHARGE NOTE PROVIDER - PROVIDER TOKENS
PROVIDER:[TOKEN:[[id number]],FOLLOWUP:[1 week],ESTABLISHEDPATIENT:[T]],PROVIDER:[TOKEN:[[id number]],FOLLOWUP:[2 weeks],ESTABLISHEDPATIENT:[T]]

## 2022-12-16 LAB
GLUCOSE BLDC GLUCOMTR-MCNC: 304 MG/DL — HIGH (ref 70–99)
GLUCOSE BLDC GLUCOMTR-MCNC: 318 MG/DL — HIGH (ref 70–99)
GLUCOSE BLDC GLUCOMTR-MCNC: 337 MG/DL — HIGH (ref 70–99)
GLUCOSE BLDC GLUCOMTR-MCNC: 357 MG/DL — HIGH (ref 70–99)
T4 FREE SERPL-MCNC: 1.9 NG/DL — HIGH (ref 0.9–1.8)

## 2022-12-16 PROCEDURE — 99233 SBSQ HOSP IP/OBS HIGH 50: CPT

## 2022-12-16 PROCEDURE — 71045 X-RAY EXAM CHEST 1 VIEW: CPT | Mod: 26

## 2022-12-16 RX ORDER — DEXTROSE 50 % IN WATER 50 %
25 SYRINGE (ML) INTRAVENOUS ONCE
Refills: 0 | Status: DISCONTINUED | OUTPATIENT
Start: 2022-12-16 | End: 2022-12-17

## 2022-12-16 RX ORDER — SODIUM CHLORIDE 9 MG/ML
1000 INJECTION, SOLUTION INTRAVENOUS
Refills: 0 | Status: DISCONTINUED | OUTPATIENT
Start: 2022-12-16 | End: 2022-12-17

## 2022-12-16 RX ORDER — HUMAN INSULIN 100 [IU]/ML
15 INJECTION, SUSPENSION SUBCUTANEOUS DAILY
Refills: 0 | Status: DISCONTINUED | OUTPATIENT
Start: 2022-12-16 | End: 2022-12-17

## 2022-12-16 RX ORDER — GLUCAGON INJECTION, SOLUTION 0.5 MG/.1ML
1 INJECTION, SOLUTION SUBCUTANEOUS ONCE
Refills: 0 | Status: DISCONTINUED | OUTPATIENT
Start: 2022-12-16 | End: 2022-12-17

## 2022-12-16 RX ORDER — ATENOLOL 25 MG/1
50 TABLET ORAL DAILY
Refills: 0 | Status: DISCONTINUED | OUTPATIENT
Start: 2022-12-16 | End: 2022-12-17

## 2022-12-16 RX ORDER — MONTELUKAST 4 MG/1
10 TABLET, CHEWABLE ORAL AT BEDTIME
Refills: 0 | Status: DISCONTINUED | OUTPATIENT
Start: 2022-12-16 | End: 2022-12-17

## 2022-12-16 RX ORDER — DEXTROSE 50 % IN WATER 50 %
15 SYRINGE (ML) INTRAVENOUS ONCE
Refills: 0 | Status: DISCONTINUED | OUTPATIENT
Start: 2022-12-16 | End: 2022-12-17

## 2022-12-16 RX ORDER — DEXTROSE 50 % IN WATER 50 %
12.5 SYRINGE (ML) INTRAVENOUS ONCE
Refills: 0 | Status: DISCONTINUED | OUTPATIENT
Start: 2022-12-16 | End: 2022-12-17

## 2022-12-16 RX ORDER — INSULIN LISPRO 100/ML
5 VIAL (ML) SUBCUTANEOUS
Refills: 0 | Status: DISCONTINUED | OUTPATIENT
Start: 2022-12-16 | End: 2022-12-17

## 2022-12-16 RX ADMIN — PANTOPRAZOLE SODIUM 40 MILLIGRAM(S): 20 TABLET, DELAYED RELEASE ORAL at 11:54

## 2022-12-16 RX ADMIN — MONTELUKAST 10 MILLIGRAM(S): 4 TABLET, CHEWABLE ORAL at 21:06

## 2022-12-16 RX ADMIN — Medication 4: at 08:36

## 2022-12-16 RX ADMIN — Medication 5 UNIT(S): at 17:01

## 2022-12-16 RX ADMIN — ATENOLOL 50 MILLIGRAM(S): 25 TABLET ORAL at 13:05

## 2022-12-16 RX ADMIN — Medication 5 UNIT(S): at 11:53

## 2022-12-16 RX ADMIN — HEPARIN SODIUM 5000 UNIT(S): 5000 INJECTION INTRAVENOUS; SUBCUTANEOUS at 13:05

## 2022-12-16 RX ADMIN — ATORVASTATIN CALCIUM 40 MILLIGRAM(S): 80 TABLET, FILM COATED ORAL at 21:06

## 2022-12-16 RX ADMIN — Medication 60 MILLIGRAM(S): at 08:33

## 2022-12-16 RX ADMIN — HEPARIN SODIUM 5000 UNIT(S): 5000 INJECTION INTRAVENOUS; SUBCUTANEOUS at 05:30

## 2022-12-16 RX ADMIN — HUMAN INSULIN 15 UNIT(S): 100 INJECTION, SUSPENSION SUBCUTANEOUS at 11:53

## 2022-12-16 RX ADMIN — HEPARIN SODIUM 5000 UNIT(S): 5000 INJECTION INTRAVENOUS; SUBCUTANEOUS at 21:06

## 2022-12-16 NOTE — PROGRESS NOTE ADULT - SUBJECTIVE AND OBJECTIVE BOX
RENÉ REIS 52y Female  MRN#: [id number]   CODE STATUS:__Full______    Hospital Day: 5d    Patient is currently admitted with the primary diagnosis of thyroid storm.     SUBJECTIVE:    Patient seen and examined at bedside this am. Patient is still endorsing SOB.     OBJECTIVE:  PAST MEDICAL & SURGICAL HISTORY:  Diabetes    Hypertension    No significant past surgical history    ALLERGIES:  penicillin (Rash)                                        HOME MEDICATIONS:  Home Medications:  albuterol 90 mcg/inh inhalation aerosol with adapter:  (11 Dec 2022 23:51)  amlodipine-benazepril 10 mg-40 mg oral capsule: 1 cap(s) orally once a day (11 Dec 2022 23:51)  atorvastatin 40 mg oral tablet: 1 tab(s) orally once a day (11 Dec 2022 23:51)  ezetimibe 10 mg oral tablet: 1 tab(s) orally once a day (11 Dec 2022 23:51)  gabapentin 400 mg oral capsule: 1 cap(s) orally 3 times a day (11 Dec 2022 23:51)                           MEDICATIONS:  STANDING MEDICATIONS  atenolol  Tablet 50 milliGRAM(s) Oral daily  atorvastatin 40 milliGRAM(s) Oral at bedtime  buDESOnide    Inhalation Suspension 0.5 milliGRAM(s) Inhalation two times a day  dextrose 5%. 1000 milliLiter(s) IV Continuous <Continuous>  dextrose 5%. 1000 milliLiter(s) IV Continuous <Continuous>  dextrose 50% Injectable 25 Gram(s) IV Push once  dextrose 50% Injectable 12.5 Gram(s) IV Push once  dextrose 50% Injectable 25 Gram(s) IV Push once  glucagon  Injectable 1 milliGRAM(s) IntraMuscular once  glucagon  Injectable 1 milliGRAM(s) IntraMuscular once  heparin   Injectable 5000 Unit(s) SubCutaneous every 8 hours  insulin lispro Injectable (ADMELOG) 5 Unit(s) SubCutaneous three times a day before meals  insulin NPH human recombinant 15 Unit(s) SubCutaneous daily  levoFLOXacin  Tablet 500 milliGRAM(s) Oral every 24 hours  methimazole 20 milliGRAM(s) Oral <User Schedule>  methylPREDNISolone sodium succinate Injectable 60 milliGRAM(s) IV Push daily  pantoprazole   Suspension 40 milliGRAM(s) Oral daily    PRN MEDICATIONS  acetaminophen    Suspension .. 650 milliGRAM(s) Oral every 6 hours PRN  albuterol    90 MICROgram(s) HFA Inhaler 2 Puff(s) Inhalation every 6 hours PRN  dextrose Oral Gel 15 Gram(s) Oral once PRN  guaiFENesin  milliGRAM(s) Oral every 12 hours PRN  ondansetron    Tablet 4 milliGRAM(s) Oral three times a day PRN                       VITAL SIGNS: Last 24 Hours  T(C): 36.8 (16 Dec 2022 08:00), Max: 37.2 (16 Dec 2022 00:00)  T(F): 98.2 (16 Dec 2022 08:00), Max: 98.9 (16 Dec 2022 00:00)  HR: 69 (16 Dec 2022 08:00) (69 - 75)  BP: 133/62 (16 Dec 2022 08:00) (110/54 - 133/62)  BP(mean): --  RR: 18 (16 Dec 2022 08:00) (18 - 18)  SpO2: 100% (16 Dec 2022 05:10) (100% - 100%)    LABS:                        12.7   4.31  )-----------( 190      ( 15 Dec 2022 08:17 )             37.5     12-15    138  |  104  |  11  ----------------------------<  230<H>  3.6   |  22  |  0.7    Ca    8.5      15 Dec 2022 08:17  Mg     1.8     12-15        Urinalysis Basic - ( 14 Dec 2022 16:41 )    Color: Light Yellow / Appearance: Clear / S.028 / pH: x  Gluc: x / Ketone: Negative  / Bili: Negative / Urobili: <2 mg/dL   Blood: x / Protein: Trace / Nitrite: Negative   Leuk Esterase: Negative / RBC: x / WBC x   Sq Epi: x / Non Sq Epi: x / Bacteria: x    RADIOLOGY:    < from: Xray Chest 1 View- PORTABLE-Routine (Xray Chest 1 View- PORTABLE-Routine in AM.) (22 @ 06:08) >  Impression:    No radiographic evidence of acute cardiopulmonary disease.        --- End of Report ---    < end of copied text >                                 PHYSICAL EXAM:  General: Resting comfortably in no acute painful distress  HEENT: Normocephalic, atraumatic  LUNGS: diffuse crackles bilateral lungs   HEART: S1S2 present, regular rate and rhythm, no murmurs, rubs, gallops  ABDOMEN: Soft, nontender, nondistended  EXT: No edema

## 2022-12-16 NOTE — PROGRESS NOTE ADULT - ASSESSMENT
Increase methimazole to 30 mg daily.  Can follow-up in Endocrinology clinic [phone] in 2-3 weeks.

## 2022-12-16 NOTE — PROGRESS NOTE ADULT - ASSESSMENT
52F PMHx hyperthyroidism, HTN, HLD here with altered mental status, psychosis.    PLAN    #History of Asthma?  #Cough on inspiration   - CXR 12/15: mild congestion   - likely from thyroid storm   - give 1 dose of lasix   - Covid-PCR negative   - CXR 12/16: no acute cardiopulmonary findings    - patient is symptomatic   - will start solumededrol IVP 60 mg daily 12/16   - started on Levaquin 500 mg PO daily 12/16   - c/w Mucinex BID PRN  - c/w Pulmicort BID   - c/w Albuterol PRN      #AMS secondary to hyperthyroidism   #SIRS likely due to thyroid storm   - was febrile with temp of 102 and Tachycardia HR of 140's  - TSH < 0, T3 T4 WNL   - evaluated by endocrinology  - initially received hydrocortisone which was changed to prednisone   - prednisone discontinued on 12/14 as patient is improved hemodynamically  - Patient to continue with methimazole 20 mg qAM  - Changed Propranolol to Atenolol 50 mg daily as propranolol may be exacerbating patient's asthma   - Total T3 level WNL   - Free T4 level 1.9   - Echo: EF 64%, G1DD   - BCx negative   - patient is s/p treatment with cefepime ( 3 doses)   - patient remains afebrile and no leukocytosis     #HTN   - c/w Atenolol 50 mg daily     #HLD  - c/w Atorvastatin 40 mg QHS    #Steroid induced hyperglycemia   - f/u HgBA1C  - c/w SS   - changed the lantus to NPH 15 units daily   - changed lispro to 5 units TID prior to meals   - monitor for hypoglycemia      #DVT Ppx: Heparin subq  #GI Ppx: Pantoprazole   #Diet: DASH/TLC  #Activity: IAT  #Code: Full  #Dispo: Anticipate D'C in 24 hours    52F PMHx hyperthyroidism, HTN, HLD here with altered mental status, psychosis.    PLAN    #History of Asthma?  #Cough on inspiration   - CXR 12/15: mild congestion   - likely from thyroid storm   - give 1 dose of lasix   - Covid-PCR negative   - CXR 12/16: no acute cardiopulmonary findings    - patient is symptomatic   - will start solumededrol IVP 60 mg daily 12/16   - started on Levaquin 500 mg PO daily 12/16   - c/w Mucinex BID PRN  - c/w Pulmicort BID   - c/w Albuterol PRN      #AMS secondary to hyperthyroidism   #SIRS likely due to thyroid storm   - was febrile with temp of 102 and Tachycardia HR of 140's  - TSH < 0, T3 T4 WNL   - evaluated by endocrinology  - initially received hydrocortisone which was changed to prednisone   - prednisone discontinued on 12/14 as patient is improved hemodynamically  - Patient to increase methimazole to 30 mg QD  - Changed Propranolol to Atenolol 50 mg daily as propranolol may be exacerbating patient's asthma   - Total T3 level WNL   - Free T4 level 1.9   - Echo: EF 64%, G1DD   - BCx negative   - patient is s/p treatment with cefepime ( 3 doses)   - patient remains afebrile and no leukocytosis     #HTN   - c/w Atenolol 50 mg daily     #HLD  - c/w Atorvastatin 40 mg QHS    #Steroid induced hyperglycemia   - f/u HgBA1C  - c/w SS   - changed the lantus to NPH 15 units daily   - changed lispro to 5 units TID prior to meals   - monitor for hypoglycemia      #DVT Ppx: Heparin subq  #GI Ppx: Pantoprazole   #Diet: DASH/TLC  #Activity: IAT  #Code: Full  #Dispo: Anticipate D'C in 24 hours

## 2022-12-16 NOTE — PROGRESS NOTE ADULT - SUBJECTIVE AND OBJECTIVE BOX
RENÉ REIS  52y  [facility] F4-4B 020 B      Patient is a 52y old  Female who presents with a chief complaint of Thyroid storm (14 Dec 2022 13:25)      INTERVAL HPI/OVERNIGHT EVENTS:  Patient still not feeling better with sob, wheezing and cough   no fever overnight covid 19 neg           FAMILY HISTORY:    T(C): 36.2 (12-14-22 @ 07:44), Max: 36.9 (12-13-22 @ 15:36)  HR: 75 (12-14-22 @ 07:44) (67 - 78)  BP: 168/72 (12-14-22 @ 07:44) (131/64 - 168/72)  RR: 18 (12-14-22 @ 07:44) (18 - 18)  SpO2: 97% (12-14-22 @ 00:00) (96% - 97%)  Wt(kg): --Vital Signs Last 24 Hrs  T(C): 36.2 (14 Dec 2022 07:44), Max: 36.9 (13 Dec 2022 15:36)  T(F): 97.2 (14 Dec 2022 07:44), Max: 98.4 (13 Dec 2022 15:36)  HR: 75 (14 Dec 2022 07:44) (67 - 78)  BP: 168/72 (14 Dec 2022 07:44) (131/64 - 168/72)  BP(mean): --  RR: 18 (14 Dec 2022 07:44) (18 - 18)  SpO2: 97% (14 Dec 2022 00:00) (96% - 97%)        PHYSICAL EXAM:  GENERAL: NAD, well-groomed, well-developed  NERVOUS SYSTEM:  Alert & Oriented X3, Good concentration  PULM: Diffuse wheezing and rhonchi   CARDIAC: Regular rate and rhythm  GI: Soft, Nontender, Nondistended; Bowel sounds present  EXTREMITIES:  2+ Peripheral Pulses,    Consultant(s) Notes Reviewed:  [x ] YES  [ ] NO  Care Discussed with Consultants/Other Providers [ x] YES  [ ] NO    LABS:                            12.4   4.62  )-----------( 205      ( 14 Dec 2022 12:18 )             37.7   12-14    135  |  102  |  13  ----------------------------<  288<H>  3.7   |  22  |  0.6<L>    Ca    8.2<L>      14 Dec 2022 12:18  Mg     1.7     12-14    TPro  6.7  /  Alb  3.9  /  TBili  0.6  /  DBili  x   /  AST  18  /  ALT  17  /  AlkPhos  103  12-13            Culture - Blood (collected 11 Dec 2022 21:11)  Source: .Blood Blood-Peripheral  Preliminary Report (13 Dec 2022 09:02):    No growth to date.    Culture - Blood (collected 11 Dec 2022 21:11)  Source: .Blood Blood-Peripheral  Preliminary Report (13 Dec 2022 09:02):    No growth to date.      acetaminophen    Suspension .. 650 milliGRAM(s) Oral every 6 hours PRN  albuterol    90 MICROgram(s) HFA Inhaler 2 Puff(s) Inhalation every 6 hours PRN  atorvastatin 40 milliGRAM(s) Oral at bedtime  glucagon  Injectable 1 milliGRAM(s) IntraMuscular once  heparin   Injectable 5000 Unit(s) SubCutaneous every 8 hours  insulin glargine Injectable (LANTUS) 14 Unit(s) SubCutaneous at bedtime  insulin lispro (ADMELOG) corrective regimen sliding scale   SubCutaneous three times a day before meals  methimazole 20 milliGRAM(s) Oral <User Schedule>  pantoprazole   Suspension 40 milliGRAM(s) Oral daily  propranolol 40 milliGRAM(s) Oral every 12 hours      52F PMHx hyperthyroidism, HTN, HLD here with altered mental status, psychosis.        1. Metabolic encephalopathy secondary to Thyroid storm complicated by acute diastolic CHF resolving   * SIRS due to thyroid storm   - Admit to medicine         - u/a:WNL        - Last CXR: resolving intersitial infiltrate   - TSH undetectable. T3 and T4 high nl   - Was on steroid that was stopped  - Cont methimazole but FT4 was going up   - Echo :WNL   - Received lasix 40mg *1. might repeat   - Was started on broad spectrum abs that was stopped   - BLood cx:NTD    - Endocrine consult appreciated. Follow up :pending     2. HTN   - Was on propranolol 40 mg BID that I will change to atenolol 40mg po daily ( due to ashtma exacerbation triggered by propanolol)     3. HLD  - c/w Atorvastatin 40 mg QHS    4. Steroid induced hyperglycemia DM-II   - Hgba1c:pending   - Insulin sliding scale. NPH while on steroid       5.persistent SOB associated with wheezing likely due to Asthma exacerbation triggered by atypical pneumonia? and propanolol   - COVID 19: negative   - Start Solumedrol 60mg IV daily   - Start Singulair at bed time   - Inhalers  - Mucinex bid   - levofloxacin in case of atypical pneumonia ( interstitial infiltrate)       #DVT Ppx: Heparin subq  #GI Ppx: Pantoprazole   #Diet: DASH/TLC  #Activity: IAT  #Code: Full      patient sob is persistent despite diuresis and improvement in her CXR. She is likely exhibiting ashtma exacerbation triggered by propanolo. change non-selective beta blocker to atenolo. start steroid and singulair for asthma exacerbation. levofloxacin in case interstitial infiltrate is due to atypical pneumonia    Need another 24 hour of monitoring before dc       Case Discussed with House Staff   36  minutes spent on total encounter; more than 50% of the visit was spent counseling and/or coordinating care by the attending physician.   Spectra [phone]

## 2022-12-17 VITALS
TEMPERATURE: 98 F | RESPIRATION RATE: 19 BRPM | HEART RATE: 67 BPM | DIASTOLIC BLOOD PRESSURE: 70 MMHG | SYSTOLIC BLOOD PRESSURE: 139 MMHG

## 2022-12-17 LAB
A1C WITH ESTIMATED AVERAGE GLUCOSE RESULT: 12.4 % — HIGH (ref 4–5.6)
ANION GAP SERPL CALC-SCNC: 13 MMOL/L — SIGNIFICANT CHANGE UP (ref 7–14)
BASOPHILS # BLD AUTO: 0.04 K/UL — SIGNIFICANT CHANGE UP (ref 0–0.2)
BASOPHILS NFR BLD AUTO: 0.9 % — SIGNIFICANT CHANGE UP (ref 0–1)
BUN SERPL-MCNC: 11 MG/DL — SIGNIFICANT CHANGE UP (ref 10–20)
CALCIUM SERPL-MCNC: 9.2 MG/DL — SIGNIFICANT CHANGE UP (ref 8.4–10.5)
CHLORIDE SERPL-SCNC: 100 MMOL/L — SIGNIFICANT CHANGE UP (ref 98–110)
CO2 SERPL-SCNC: 21 MMOL/L — SIGNIFICANT CHANGE UP (ref 17–32)
CREAT SERPL-MCNC: 0.6 MG/DL — LOW (ref 0.7–1.5)
CULTURE RESULTS: SIGNIFICANT CHANGE UP
CULTURE RESULTS: SIGNIFICANT CHANGE UP
EGFR: 109 ML/MIN/1.73M2 — SIGNIFICANT CHANGE UP
EOSINOPHIL # BLD AUTO: 0 K/UL — SIGNIFICANT CHANGE UP (ref 0–0.7)
EOSINOPHIL NFR BLD AUTO: 0 % — SIGNIFICANT CHANGE UP (ref 0–8)
ESTIMATED AVERAGE GLUCOSE: 309 MG/DL — HIGH (ref 68–114)
GIANT PLATELETS BLD QL SMEAR: PRESENT — SIGNIFICANT CHANGE UP
GLUCOSE BLDC GLUCOMTR-MCNC: 237 MG/DL — HIGH (ref 70–99)
GLUCOSE BLDC GLUCOMTR-MCNC: 351 MG/DL — HIGH (ref 70–99)
GLUCOSE BLDC GLUCOMTR-MCNC: 408 MG/DL — HIGH (ref 70–99)
GLUCOSE BLDC GLUCOMTR-MCNC: 426 MG/DL — HIGH (ref 70–99)
GLUCOSE BLDC GLUCOMTR-MCNC: 489 MG/DL — CRITICAL HIGH (ref 70–99)
GLUCOSE SERPL-MCNC: 290 MG/DL — HIGH (ref 70–99)
HCT VFR BLD CALC: 39.8 % — SIGNIFICANT CHANGE UP (ref 37–47)
HGB BLD-MCNC: 13.9 G/DL — SIGNIFICANT CHANGE UP (ref 12–16)
LYMPHOCYTES # BLD AUTO: 0.62 K/UL — LOW (ref 1.2–3.4)
LYMPHOCYTES # BLD AUTO: 15.7 % — LOW (ref 20.5–51.1)
MAGNESIUM SERPL-MCNC: 1.7 MG/DL — LOW (ref 1.8–2.4)
MANUAL SMEAR VERIFICATION: SIGNIFICANT CHANGE UP
MCHC RBC-ENTMCNC: 26.1 PG — LOW (ref 27–31)
MCHC RBC-ENTMCNC: 34.9 G/DL — SIGNIFICANT CHANGE UP (ref 32–37)
MCV RBC AUTO: 74.7 FL — LOW (ref 81–99)
MONOCYTES # BLD AUTO: 0.07 K/UL — LOW (ref 0.1–0.6)
MONOCYTES NFR BLD AUTO: 1.7 % — SIGNIFICANT CHANGE UP (ref 1.7–9.3)
MYELOCYTES NFR BLD: 1.7 % — HIGH (ref 0–0)
NEUTROPHILS # BLD AUTO: 2.88 K/UL — SIGNIFICANT CHANGE UP (ref 1.4–6.5)
NEUTROPHILS NFR BLD AUTO: 73 % — SIGNIFICANT CHANGE UP (ref 42.2–75.2)
PLAT MORPH BLD: NORMAL — SIGNIFICANT CHANGE UP
PLATELET # BLD AUTO: 232 K/UL — SIGNIFICANT CHANGE UP (ref 130–400)
POTASSIUM SERPL-MCNC: 4 MMOL/L — SIGNIFICANT CHANGE UP (ref 3.5–5)
POTASSIUM SERPL-SCNC: 4 MMOL/L — SIGNIFICANT CHANGE UP (ref 3.5–5)
RBC # BLD: 5.33 M/UL — SIGNIFICANT CHANGE UP (ref 4.2–5.4)
RBC # FLD: 12.1 % — SIGNIFICANT CHANGE UP (ref 11.5–14.5)
RBC BLD AUTO: NORMAL — SIGNIFICANT CHANGE UP
SODIUM SERPL-SCNC: 134 MMOL/L — LOW (ref 135–146)
SPECIMEN SOURCE: SIGNIFICANT CHANGE UP
SPECIMEN SOURCE: SIGNIFICANT CHANGE UP
T3 SERPL-MCNC: 121 NG/DL — SIGNIFICANT CHANGE UP (ref 80–200)
T4 FREE SERPL-MCNC: 2 NG/DL — HIGH (ref 0.9–1.8)
TSH SERPL-MCNC: <0 UIU/ML — LOW (ref 0.27–4.2)
VARIANT LYMPHS # BLD: 7 % — HIGH (ref 0–5)
WBC # BLD: 3.95 K/UL — LOW (ref 4.8–10.8)
WBC # FLD AUTO: 3.95 K/UL — LOW (ref 4.8–10.8)

## 2022-12-17 PROCEDURE — 99239 HOSP IP/OBS DSCHRG MGMT >30: CPT

## 2022-12-17 RX ORDER — METFORMIN HYDROCHLORIDE 850 MG/1
1000 TABLET ORAL
Refills: 0 | Status: DISCONTINUED | OUTPATIENT
Start: 2022-12-17 | End: 2022-12-17

## 2022-12-17 RX ORDER — METHIMAZOLE 10 MG/1
3 TABLET ORAL
Qty: 90 | Refills: 0
Start: 2022-12-17 | End: 2023-01-15

## 2022-12-17 RX ORDER — METFORMIN HYDROCHLORIDE 850 MG/1
1 TABLET ORAL
Qty: 60 | Refills: 0
Start: 2022-12-17 | End: 2023-01-15

## 2022-12-17 RX ORDER — ALBUTEROL 90 UG/1
0 AEROSOL, METERED ORAL
Qty: 0 | Refills: 0 | DISCHARGE

## 2022-12-17 RX ORDER — ISOPROPYL ALCOHOL, BENZOCAINE .7; .06 ML/ML; ML/ML
1 SWAB TOPICAL
Qty: 100 | Refills: 1
Start: 2022-12-17 | End: 2023-02-04

## 2022-12-17 RX ORDER — ATENOLOL 25 MG/1
1 TABLET ORAL
Qty: 30 | Refills: 0
Start: 2022-12-17 | End: 2023-01-15

## 2022-12-17 RX ORDER — INSULIN LISPRO 100/ML
12 VIAL (ML) SUBCUTANEOUS ONCE
Refills: 0 | Status: COMPLETED | OUTPATIENT
Start: 2022-12-17 | End: 2022-12-17

## 2022-12-17 RX ORDER — LEVOFLOXACIN 5 MG/ML
1 INJECTION, SOLUTION INTRAVENOUS
Qty: 5 | Refills: 0
Start: 2022-12-17 | End: 2022-12-21

## 2022-12-17 RX ORDER — HUMAN INSULIN 100 [IU]/ML
25 INJECTION, SUSPENSION SUBCUTANEOUS
Qty: 1 | Refills: 0
Start: 2022-12-17 | End: 2022-12-24

## 2022-12-17 RX ORDER — HUMAN INSULIN 100 [IU]/ML
24 INJECTION, SUSPENSION SUBCUTANEOUS DAILY
Refills: 0 | Status: DISCONTINUED | OUTPATIENT
Start: 2022-12-17 | End: 2022-12-17

## 2022-12-17 RX ORDER — MONTELUKAST 4 MG/1
1 TABLET, CHEWABLE ORAL
Qty: 30 | Refills: 0
Start: 2022-12-17 | End: 2023-01-15

## 2022-12-17 RX ADMIN — HEPARIN SODIUM 5000 UNIT(S): 5000 INJECTION INTRAVENOUS; SUBCUTANEOUS at 05:37

## 2022-12-17 RX ADMIN — Medication 5 UNIT(S): at 12:09

## 2022-12-17 RX ADMIN — METFORMIN HYDROCHLORIDE 1000 MILLIGRAM(S): 850 TABLET ORAL at 17:16

## 2022-12-17 RX ADMIN — PANTOPRAZOLE SODIUM 40 MILLIGRAM(S): 20 TABLET, DELAYED RELEASE ORAL at 12:10

## 2022-12-17 RX ADMIN — Medication 60 MILLIGRAM(S): at 05:37

## 2022-12-17 RX ADMIN — ATENOLOL 50 MILLIGRAM(S): 25 TABLET ORAL at 05:38

## 2022-12-17 RX ADMIN — Medication 12 UNIT(S): at 14:59

## 2022-12-17 RX ADMIN — Medication 5 UNIT(S): at 08:07

## 2022-12-17 RX ADMIN — HUMAN INSULIN 24 UNIT(S): 100 INJECTION, SUSPENSION SUBCUTANEOUS at 12:09

## 2022-12-17 NOTE — PROGRESS NOTE ADULT - PROVIDER SPECIALTY LIST ADULT
Hospitalist
Endocrinology
Hospitalist
Internal Medicine
Critical Care
Endocrinology
Internal Medicine

## 2022-12-17 NOTE — PROGRESS NOTE ADULT - REASON FOR ADMISSION
Thyroid storm

## 2022-12-17 NOTE — PROVIDER CONTACT NOTE (OTHER) - SITUATION
Notified patient that she cannot be discharged until fingerstick is under 250 as per MD order. Pt is refusing to stay.

## 2022-12-17 NOTE — PROGRESS NOTE ADULT - SUBJECTIVE AND OBJECTIVE BOX
RENÉ REIS  52y  [facility] F4-4B 020 B      Patient is a 52y old  Female who presents with a chief complaint of Thyroid storm (14 Dec 2022 13:25)      INTERVAL HPI/OVERNIGHT EVENTS:  Patient feels better today. she is off oxygen . ambulating without sob   no overnight events   her glucose is out of control and might need to keep her if it remains above 300           FAMILY HISTORY:    T(C): 36.2 (12-14-22 @ 07:44), Max: 36.9 (12-13-22 @ 15:36)  HR: 75 (12-14-22 @ 07:44) (67 - 78)  BP: 168/72 (12-14-22 @ 07:44) (131/64 - 168/72)  RR: 18 (12-14-22 @ 07:44) (18 - 18)  SpO2: 97% (12-14-22 @ 00:00) (96% - 97%)  Wt(kg): --Vital Signs Last 24 Hrs  T(C): 36.2 (14 Dec 2022 07:44), Max: 36.9 (13 Dec 2022 15:36)  T(F): 97.2 (14 Dec 2022 07:44), Max: 98.4 (13 Dec 2022 15:36)  HR: 75 (14 Dec 2022 07:44) (67 - 78)  BP: 168/72 (14 Dec 2022 07:44) (131/64 - 168/72)  BP(mean): --  RR: 18 (14 Dec 2022 07:44) (18 - 18)  SpO2: 97% (14 Dec 2022 00:00) (96% - 97%)        PHYSICAL EXAM:  GENERAL: NAD, well-groomed, well-developed  NERVOUS SYSTEM:  Alert & Oriented X3, Good concentration  PULM: Diffuse wheezing and rhonchi   CARDIAC: Regular rate and rhythm  GI: Soft, Nontender, Nondistended; Bowel sounds present  EXTREMITIES:  2+ Peripheral Pulses,    Consultant(s) Notes Reviewed:  [x ] YES  [ ] NO  Care Discussed with Consultants/Other Providers [ x] YES  [ ] NO    LABS:                            12.4   4.62  )-----------( 205      ( 14 Dec 2022 12:18 )             37.7   12-14    135  |  102  |  13  ----------------------------<  288<H>  3.7   |  22  |  0.6<L>    Ca    8.2<L>      14 Dec 2022 12:18  Mg     1.7     12-14    TPro  6.7  /  Alb  3.9  /  TBili  0.6  /  DBili  x   /  AST  18  /  ALT  17  /  AlkPhos  103  12-13            Culture - Blood (collected 11 Dec 2022 21:11)  Source: .Blood Blood-Peripheral  Preliminary Report (13 Dec 2022 09:02):    No growth to date.    Culture - Blood (collected 11 Dec 2022 21:11)  Source: .Blood Blood-Peripheral  Preliminary Report (13 Dec 2022 09:02):    No growth to date.      acetaminophen    Suspension .. 650 milliGRAM(s) Oral every 6 hours PRN  albuterol    90 MICROgram(s) HFA Inhaler 2 Puff(s) Inhalation every 6 hours PRN  atorvastatin 40 milliGRAM(s) Oral at bedtime  glucagon  Injectable 1 milliGRAM(s) IntraMuscular once  heparin   Injectable 5000 Unit(s) SubCutaneous every 8 hours  insulin glargine Injectable (LANTUS) 14 Unit(s) SubCutaneous at bedtime  insulin lispro (ADMELOG) corrective regimen sliding scale   SubCutaneous three times a day before meals  methimazole 20 milliGRAM(s) Oral <User Schedule>  pantoprazole   Suspension 40 milliGRAM(s) Oral daily  propranolol 40 milliGRAM(s) Oral every 12 hours        52F PMHx hyperthyroidism, HTN, HLD here with altered mental status, psychosis.      1. Metabolic encephalopathy secondary to Thyroid storm complicated by acute diastolic CHF resolving   * SIRS due to thyroid storm   - Admit to medicine         - u/a:WNL        - Last CXR: resolving intersitial infiltrate   - TSH undetectable. T3 and T4 high nl   - Started on methimazole. Dc on Methimazole 30mg po daily with close follow up   - Echo :WNL   - Received lasix 40mg *1.   - Was started on broad spectrum abs that was stopped   - BLood cx:NTD    - Endocrine consult appreciated.    2. HTN   - Was on propranolol 40 mg BID that I will change to atenolol 40mg po daily ( due to ashtma exacerbation triggered by propanolol) . DC on atenolol 40mg po daily. HR well controlled     3. HLD  - c/w Atorvastatin 40 mg QHS    4. Steroid induced hyperglycemia DM-II   - Hgba1c:pending   - Was on insulin protocol. DC on NPH 25u daily while on prednisone 40-50mg then 20u while on prednisone 30mg then 15u while on prednisone 20 then 10u while on prednisone 10mg then stop.   - DC on metformin 1000mg po bid       5.persistent SOB associated with wheezing likely due to Asthma exacerbation triggered by atypical pneumonia? and propanolol   - COVID 19: negative   - Started on  Solumedrol 60mg IV daily . DC on Prednisone taper with PPI   - Start ed on Singulair at bed time . DC on singulair 10mg HS   - Inhalers  - Mucinex bid. DC on mucinex bid    - levofloxacin in case of atypical pneumonia ( interstitial infiltrate) . DC on levofloxacin for 5 days         Case Discussed with House Staff   30 minutes spent on total encounter; more than 50% of the visit was spent counseling and/or coordinating care by the attending physician.   Spectra [phone]

## 2022-12-21 ENCOUNTER — APPOINTMENT (OUTPATIENT)
Dept: INTERNAL MEDICINE | Facility: CLINIC | Age: 52
End: 2022-12-21

## 2022-12-23 DIAGNOSIS — E09.65 DRUG OR CHEMICAL INDUCED DIABETES MELLITUS WITH HYPERGLYCEMIA: ICD-10-CM

## 2022-12-23 DIAGNOSIS — R25.1 TREMOR, UNSPECIFIED: ICD-10-CM

## 2022-12-23 DIAGNOSIS — J18.9 PNEUMONIA, UNSPECIFIED ORGANISM: ICD-10-CM

## 2022-12-23 DIAGNOSIS — Z91.14 PATIENT'S OTHER NONCOMPLIANCE WITH MEDICATION REGIMEN: ICD-10-CM

## 2022-12-23 DIAGNOSIS — R06.4 HYPERVENTILATION: ICD-10-CM

## 2022-12-23 DIAGNOSIS — E78.5 HYPERLIPIDEMIA, UNSPECIFIED: ICD-10-CM

## 2022-12-23 DIAGNOSIS — R06.01 ORTHOPNEA: ICD-10-CM

## 2022-12-23 DIAGNOSIS — G93.41 METABOLIC ENCEPHALOPATHY: ICD-10-CM

## 2022-12-23 DIAGNOSIS — R44.1 VISUAL HALLUCINATIONS: ICD-10-CM

## 2022-12-23 DIAGNOSIS — J45.901 UNSPECIFIED ASTHMA WITH (ACUTE) EXACERBATION: ICD-10-CM

## 2022-12-23 DIAGNOSIS — F29 UNSPECIFIED PSYCHOSIS NOT DUE TO A SUBSTANCE OR KNOWN PHYSIOLOGICAL CONDITION: ICD-10-CM

## 2022-12-23 DIAGNOSIS — R65.10 SYSTEMIC INFLAMMATORY RESPONSE SYNDROME (SIRS) OF NON-INFECTIOUS ORIGIN WITHOUT ACUTE ORGAN DYSFUNCTION: ICD-10-CM

## 2022-12-23 DIAGNOSIS — T50.995A ADVERSE EFFECT OF OTHER DRUGS, MEDICAMENTS AND BIOLOGICAL SUBSTANCES, INITIAL ENCOUNTER: ICD-10-CM

## 2022-12-23 DIAGNOSIS — Z88.0 ALLERGY STATUS TO PENICILLIN: ICD-10-CM

## 2022-12-23 DIAGNOSIS — E05.91 THYROTOXICOSIS, UNSPECIFIED WITH THYROTOXIC CRISIS OR STORM: ICD-10-CM

## 2022-12-23 DIAGNOSIS — I10 ESSENTIAL (PRIMARY) HYPERTENSION: ICD-10-CM

## 2022-12-23 DIAGNOSIS — I50.31 ACUTE DIASTOLIC (CONGESTIVE) HEART FAILURE: ICD-10-CM

## 2022-12-23 DIAGNOSIS — E11.9 TYPE 2 DIABETES MELLITUS WITHOUT COMPLICATIONS: ICD-10-CM

## 2022-12-30 ENCOUNTER — APPOINTMENT (OUTPATIENT)
Dept: OBGYN | Facility: CLINIC | Age: 52
End: 2022-12-30

## 2023-03-21 ENCOUNTER — APPOINTMENT (OUTPATIENT)
Dept: NEUROLOGY | Facility: CLINIC | Age: 53
End: 2023-03-21

## 2023-05-15 ENCOUNTER — APPOINTMENT (OUTPATIENT)
Dept: PODIATRY | Facility: CLINIC | Age: 53
End: 2023-05-15

## 2023-06-05 ENCOUNTER — APPOINTMENT (OUTPATIENT)
Dept: INTERNAL MEDICINE | Facility: CLINIC | Age: 53
End: 2023-06-05

## 2023-09-15 ENCOUNTER — APPOINTMENT (OUTPATIENT)
Dept: INTERNAL MEDICINE | Facility: CLINIC | Age: 53
End: 2023-09-15

## 2023-09-29 ENCOUNTER — OUTPATIENT (OUTPATIENT)
Dept: OUTPATIENT SERVICES | Facility: HOSPITAL | Age: 53
LOS: 1 days | End: 2023-09-29
Payer: MEDICAID

## 2023-09-29 ENCOUNTER — APPOINTMENT (OUTPATIENT)
Dept: INTERNAL MEDICINE | Facility: CLINIC | Age: 53
End: 2023-09-29

## 2023-09-29 VITALS
SYSTOLIC BLOOD PRESSURE: 143 MMHG | BODY MASS INDEX: 36.49 KG/M2 | OXYGEN SATURATION: 100 % | WEIGHT: 219 LBS | HEIGHT: 65 IN | TEMPERATURE: 96.4 F | HEART RATE: 103 BPM | DIASTOLIC BLOOD PRESSURE: 79 MMHG

## 2023-09-29 DIAGNOSIS — E66.01 MORBID (SEVERE) OBESITY DUE TO EXCESS CALORIES: ICD-10-CM

## 2023-09-29 DIAGNOSIS — Z00.00 ENCOUNTER FOR GENERAL ADULT MEDICAL EXAMINATION WITHOUT ABNORMAL FINDINGS: ICD-10-CM

## 2023-09-29 PROCEDURE — 99204 OFFICE O/P NEW MOD 45 MIN: CPT

## 2023-10-05 DIAGNOSIS — E05.90 THYROTOXICOSIS, UNSPECIFIED WITHOUT THYROTOXIC CRISIS OR STORM: ICD-10-CM

## 2023-10-05 DIAGNOSIS — I10 ESSENTIAL (PRIMARY) HYPERTENSION: ICD-10-CM

## 2023-10-05 DIAGNOSIS — E11.43 TYPE 2 DIABETES MELLITUS WITH DIABETIC AUTONOMIC (POLY)NEUROPATHY: ICD-10-CM

## 2023-10-05 DIAGNOSIS — Z00.00 ENCOUNTER FOR GENERAL ADULT MEDICAL EXAMINATION WITHOUT ABNORMAL FINDINGS: ICD-10-CM

## 2023-10-05 DIAGNOSIS — E11.9 TYPE 2 DIABETES MELLITUS WITHOUT COMPLICATIONS: ICD-10-CM

## 2023-10-05 DIAGNOSIS — E66.01 MORBID (SEVERE) OBESITY DUE TO EXCESS CALORIES: ICD-10-CM

## 2023-10-05 DIAGNOSIS — E78.00 PURE HYPERCHOLESTEROLEMIA, UNSPECIFIED: ICD-10-CM

## 2023-10-08 NOTE — ED ADULT NURSE NOTE - BREATHING INTERVENTIONS
Goal Outcome Evaluation:    Alert to self. Bedside attendant. Slept most of the night. Incontinent x 2. No IV access. Cervical collar present. Discharge pending placement.      Oxygen

## 2023-10-20 ENCOUNTER — APPOINTMENT (OUTPATIENT)
Dept: INTERNAL MEDICINE | Facility: CLINIC | Age: 53
End: 2023-10-20

## 2023-10-27 ENCOUNTER — APPOINTMENT (OUTPATIENT)
Dept: INTERNAL MEDICINE | Facility: CLINIC | Age: 53
End: 2023-10-27

## 2023-12-25 ENCOUNTER — INPATIENT (INPATIENT)
Facility: HOSPITAL | Age: 53
LOS: 3 days | Discharge: HOME | DRG: 720 | End: 2023-12-29
Attending: INTERNAL MEDICINE | Admitting: INTERNAL MEDICINE
Payer: MEDICAID

## 2023-12-25 VITALS
OXYGEN SATURATION: 97 % | SYSTOLIC BLOOD PRESSURE: 161 MMHG | RESPIRATION RATE: 19 BRPM | TEMPERATURE: 98 F | WEIGHT: 220.02 LBS | HEART RATE: 121 BPM | DIASTOLIC BLOOD PRESSURE: 78 MMHG

## 2023-12-25 DIAGNOSIS — E05.91 THYROTOXICOSIS, UNSPECIFIED WITH THYROTOXIC CRISIS OR STORM: ICD-10-CM

## 2023-12-25 LAB
ALBUMIN SERPL ELPH-MCNC: 4.1 G/DL — SIGNIFICANT CHANGE UP (ref 3.5–5.2)
ALBUMIN SERPL ELPH-MCNC: 4.1 G/DL — SIGNIFICANT CHANGE UP (ref 3.5–5.2)
ALP SERPL-CCNC: 158 U/L — HIGH (ref 30–115)
ALP SERPL-CCNC: 158 U/L — HIGH (ref 30–115)
ALT FLD-CCNC: 16 U/L — SIGNIFICANT CHANGE UP (ref 0–41)
ALT FLD-CCNC: 16 U/L — SIGNIFICANT CHANGE UP (ref 0–41)
ANION GAP SERPL CALC-SCNC: 14 MMOL/L — SIGNIFICANT CHANGE UP (ref 7–14)
ANION GAP SERPL CALC-SCNC: 14 MMOL/L — SIGNIFICANT CHANGE UP (ref 7–14)
AST SERPL-CCNC: 17 U/L — SIGNIFICANT CHANGE UP (ref 0–41)
AST SERPL-CCNC: 17 U/L — SIGNIFICANT CHANGE UP (ref 0–41)
BASE EXCESS BLDV CALC-SCNC: -0.5 MMOL/L — SIGNIFICANT CHANGE UP (ref -2–3)
BASE EXCESS BLDV CALC-SCNC: -0.5 MMOL/L — SIGNIFICANT CHANGE UP (ref -2–3)
BASOPHILS # BLD AUTO: 0.01 K/UL — SIGNIFICANT CHANGE UP (ref 0–0.2)
BASOPHILS # BLD AUTO: 0.01 K/UL — SIGNIFICANT CHANGE UP (ref 0–0.2)
BASOPHILS NFR BLD AUTO: 0.1 % — SIGNIFICANT CHANGE UP (ref 0–1)
BASOPHILS NFR BLD AUTO: 0.1 % — SIGNIFICANT CHANGE UP (ref 0–1)
BILIRUB SERPL-MCNC: 1.1 MG/DL — SIGNIFICANT CHANGE UP (ref 0.2–1.2)
BILIRUB SERPL-MCNC: 1.1 MG/DL — SIGNIFICANT CHANGE UP (ref 0.2–1.2)
BUN SERPL-MCNC: 10 MG/DL — SIGNIFICANT CHANGE UP (ref 10–20)
BUN SERPL-MCNC: 10 MG/DL — SIGNIFICANT CHANGE UP (ref 10–20)
CA-I SERPL-SCNC: 1.22 MMOL/L — SIGNIFICANT CHANGE UP (ref 1.15–1.33)
CA-I SERPL-SCNC: 1.22 MMOL/L — SIGNIFICANT CHANGE UP (ref 1.15–1.33)
CALCIUM SERPL-MCNC: 9.5 MG/DL — SIGNIFICANT CHANGE UP (ref 8.4–10.5)
CALCIUM SERPL-MCNC: 9.5 MG/DL — SIGNIFICANT CHANGE UP (ref 8.4–10.5)
CHLORIDE SERPL-SCNC: 105 MMOL/L — SIGNIFICANT CHANGE UP (ref 98–110)
CHLORIDE SERPL-SCNC: 105 MMOL/L — SIGNIFICANT CHANGE UP (ref 98–110)
CO2 SERPL-SCNC: 21 MMOL/L — SIGNIFICANT CHANGE UP (ref 17–32)
CO2 SERPL-SCNC: 21 MMOL/L — SIGNIFICANT CHANGE UP (ref 17–32)
CREAT SERPL-MCNC: 0.6 MG/DL — LOW (ref 0.7–1.5)
CREAT SERPL-MCNC: 0.6 MG/DL — LOW (ref 0.7–1.5)
EGFR: 108 ML/MIN/1.73M2 — SIGNIFICANT CHANGE UP
EGFR: 108 ML/MIN/1.73M2 — SIGNIFICANT CHANGE UP
EOSINOPHIL # BLD AUTO: 0.04 K/UL — SIGNIFICANT CHANGE UP (ref 0–0.7)
EOSINOPHIL # BLD AUTO: 0.04 K/UL — SIGNIFICANT CHANGE UP (ref 0–0.7)
EOSINOPHIL NFR BLD AUTO: 0.3 % — SIGNIFICANT CHANGE UP (ref 0–8)
EOSINOPHIL NFR BLD AUTO: 0.3 % — SIGNIFICANT CHANGE UP (ref 0–8)
GAS PNL BLDV: 135 MMOL/L — LOW (ref 136–145)
GAS PNL BLDV: 135 MMOL/L — LOW (ref 136–145)
GAS PNL BLDV: SIGNIFICANT CHANGE UP
GAS PNL BLDV: SIGNIFICANT CHANGE UP
GLUCOSE SERPL-MCNC: 164 MG/DL — HIGH (ref 70–99)
GLUCOSE SERPL-MCNC: 164 MG/DL — HIGH (ref 70–99)
HCG SERPL QL: NEGATIVE — SIGNIFICANT CHANGE UP
HCG SERPL QL: NEGATIVE — SIGNIFICANT CHANGE UP
HCO3 BLDV-SCNC: 24 MMOL/L — SIGNIFICANT CHANGE UP (ref 22–29)
HCO3 BLDV-SCNC: 24 MMOL/L — SIGNIFICANT CHANGE UP (ref 22–29)
HCT VFR BLD CALC: 38.8 % — SIGNIFICANT CHANGE UP (ref 37–47)
HCT VFR BLD CALC: 38.8 % — SIGNIFICANT CHANGE UP (ref 37–47)
HGB BLD-MCNC: 12.5 G/DL — SIGNIFICANT CHANGE UP (ref 12–16)
HGB BLD-MCNC: 12.5 G/DL — SIGNIFICANT CHANGE UP (ref 12–16)
IMM GRANULOCYTES NFR BLD AUTO: 0.4 % — HIGH (ref 0.1–0.3)
IMM GRANULOCYTES NFR BLD AUTO: 0.4 % — HIGH (ref 0.1–0.3)
LACTATE BLDV-MCNC: 1.6 MMOL/L — SIGNIFICANT CHANGE UP (ref 0.5–2)
LACTATE BLDV-MCNC: 1.6 MMOL/L — SIGNIFICANT CHANGE UP (ref 0.5–2)
LACTATE SERPL-SCNC: 1.2 MMOL/L — SIGNIFICANT CHANGE UP (ref 0.7–2)
LACTATE SERPL-SCNC: 1.2 MMOL/L — SIGNIFICANT CHANGE UP (ref 0.7–2)
LYMPHOCYTES # BLD AUTO: 1.2 K/UL — SIGNIFICANT CHANGE UP (ref 1.2–3.4)
LYMPHOCYTES # BLD AUTO: 1.2 K/UL — SIGNIFICANT CHANGE UP (ref 1.2–3.4)
LYMPHOCYTES # BLD AUTO: 7.8 % — LOW (ref 20.5–51.1)
LYMPHOCYTES # BLD AUTO: 7.8 % — LOW (ref 20.5–51.1)
MAGNESIUM SERPL-MCNC: 1.5 MG/DL — LOW (ref 1.8–2.4)
MAGNESIUM SERPL-MCNC: 1.5 MG/DL — LOW (ref 1.8–2.4)
MCHC RBC-ENTMCNC: 24.4 PG — LOW (ref 27–31)
MCHC RBC-ENTMCNC: 24.4 PG — LOW (ref 27–31)
MCHC RBC-ENTMCNC: 32.2 G/DL — SIGNIFICANT CHANGE UP (ref 32–37)
MCHC RBC-ENTMCNC: 32.2 G/DL — SIGNIFICANT CHANGE UP (ref 32–37)
MCV RBC AUTO: 75.8 FL — LOW (ref 81–99)
MCV RBC AUTO: 75.8 FL — LOW (ref 81–99)
MONOCYTES # BLD AUTO: 1.12 K/UL — HIGH (ref 0.1–0.6)
MONOCYTES # BLD AUTO: 1.12 K/UL — HIGH (ref 0.1–0.6)
MONOCYTES NFR BLD AUTO: 7.3 % — SIGNIFICANT CHANGE UP (ref 1.7–9.3)
MONOCYTES NFR BLD AUTO: 7.3 % — SIGNIFICANT CHANGE UP (ref 1.7–9.3)
NEUTROPHILS # BLD AUTO: 12.94 K/UL — HIGH (ref 1.4–6.5)
NEUTROPHILS # BLD AUTO: 12.94 K/UL — HIGH (ref 1.4–6.5)
NEUTROPHILS NFR BLD AUTO: 84.1 % — HIGH (ref 42.2–75.2)
NEUTROPHILS NFR BLD AUTO: 84.1 % — HIGH (ref 42.2–75.2)
NRBC # BLD: 0 /100 WBCS — SIGNIFICANT CHANGE UP (ref 0–0)
NRBC # BLD: 0 /100 WBCS — SIGNIFICANT CHANGE UP (ref 0–0)
NT-PROBNP SERPL-SCNC: 400 PG/ML — HIGH (ref 0–300)
NT-PROBNP SERPL-SCNC: 400 PG/ML — HIGH (ref 0–300)
PCO2 BLDV: 39 MMHG — SIGNIFICANT CHANGE UP (ref 39–42)
PCO2 BLDV: 39 MMHG — SIGNIFICANT CHANGE UP (ref 39–42)
PH BLDV: 7.4 — SIGNIFICANT CHANGE UP (ref 7.32–7.43)
PH BLDV: 7.4 — SIGNIFICANT CHANGE UP (ref 7.32–7.43)
PLATELET # BLD AUTO: 219 K/UL — SIGNIFICANT CHANGE UP (ref 130–400)
PLATELET # BLD AUTO: 219 K/UL — SIGNIFICANT CHANGE UP (ref 130–400)
PMV BLD: 10 FL — SIGNIFICANT CHANGE UP (ref 7.4–10.4)
PMV BLD: 10 FL — SIGNIFICANT CHANGE UP (ref 7.4–10.4)
PO2 BLDV: 41 MMHG — SIGNIFICANT CHANGE UP (ref 25–45)
PO2 BLDV: 41 MMHG — SIGNIFICANT CHANGE UP (ref 25–45)
POTASSIUM BLDV-SCNC: 3.9 MMOL/L — SIGNIFICANT CHANGE UP (ref 3.5–5.1)
POTASSIUM BLDV-SCNC: 3.9 MMOL/L — SIGNIFICANT CHANGE UP (ref 3.5–5.1)
POTASSIUM SERPL-MCNC: 4 MMOL/L — SIGNIFICANT CHANGE UP (ref 3.5–5)
POTASSIUM SERPL-MCNC: 4 MMOL/L — SIGNIFICANT CHANGE UP (ref 3.5–5)
POTASSIUM SERPL-SCNC: 4 MMOL/L — SIGNIFICANT CHANGE UP (ref 3.5–5)
POTASSIUM SERPL-SCNC: 4 MMOL/L — SIGNIFICANT CHANGE UP (ref 3.5–5)
PROT SERPL-MCNC: 7.2 G/DL — SIGNIFICANT CHANGE UP (ref 6–8)
PROT SERPL-MCNC: 7.2 G/DL — SIGNIFICANT CHANGE UP (ref 6–8)
RBC # BLD: 5.12 M/UL — SIGNIFICANT CHANGE UP (ref 4.2–5.4)
RBC # BLD: 5.12 M/UL — SIGNIFICANT CHANGE UP (ref 4.2–5.4)
RBC # FLD: 13 % — SIGNIFICANT CHANGE UP (ref 11.5–14.5)
RBC # FLD: 13 % — SIGNIFICANT CHANGE UP (ref 11.5–14.5)
SAO2 % BLDV: 71.4 % — SIGNIFICANT CHANGE UP (ref 67–88)
SAO2 % BLDV: 71.4 % — SIGNIFICANT CHANGE UP (ref 67–88)
SODIUM SERPL-SCNC: 140 MMOL/L — SIGNIFICANT CHANGE UP (ref 135–146)
SODIUM SERPL-SCNC: 140 MMOL/L — SIGNIFICANT CHANGE UP (ref 135–146)
TROPONIN T, HIGH SENSITIVITY RESULT: 9 NG/L — SIGNIFICANT CHANGE UP (ref 6–13)
TROPONIN T, HIGH SENSITIVITY RESULT: 9 NG/L — SIGNIFICANT CHANGE UP (ref 6–13)
WBC # BLD: 15.37 K/UL — HIGH (ref 4.8–10.8)
WBC # BLD: 15.37 K/UL — HIGH (ref 4.8–10.8)
WBC # FLD AUTO: 15.37 K/UL — HIGH (ref 4.8–10.8)
WBC # FLD AUTO: 15.37 K/UL — HIGH (ref 4.8–10.8)

## 2023-12-25 PROCEDURE — 80048 BASIC METABOLIC PNL TOTAL CA: CPT

## 2023-12-25 PROCEDURE — 80053 COMPREHEN METABOLIC PANEL: CPT

## 2023-12-25 PROCEDURE — 84132 ASSAY OF SERUM POTASSIUM: CPT

## 2023-12-25 PROCEDURE — 92610 EVALUATE SWALLOWING FUNCTION: CPT | Mod: GN

## 2023-12-25 PROCEDURE — 95714 VEEG EA 12-26 HR UNMNTR: CPT

## 2023-12-25 PROCEDURE — 85610 PROTHROMBIN TIME: CPT

## 2023-12-25 PROCEDURE — 82962 GLUCOSE BLOOD TEST: CPT

## 2023-12-25 PROCEDURE — 83036 HEMOGLOBIN GLYCOSYLATED A1C: CPT

## 2023-12-25 PROCEDURE — 99291 CRITICAL CARE FIRST HOUR: CPT

## 2023-12-25 PROCEDURE — 83735 ASSAY OF MAGNESIUM: CPT

## 2023-12-25 PROCEDURE — 71045 X-RAY EXAM CHEST 1 VIEW: CPT

## 2023-12-25 PROCEDURE — 84295 ASSAY OF SERUM SODIUM: CPT

## 2023-12-25 PROCEDURE — 83605 ASSAY OF LACTIC ACID: CPT

## 2023-12-25 PROCEDURE — 71045 X-RAY EXAM CHEST 1 VIEW: CPT | Mod: 26,77

## 2023-12-25 PROCEDURE — 84145 PROCALCITONIN (PCT): CPT

## 2023-12-25 PROCEDURE — 36415 COLL VENOUS BLD VENIPUNCTURE: CPT

## 2023-12-25 PROCEDURE — 80346 BENZODIAZEPINES1-12: CPT

## 2023-12-25 PROCEDURE — 70450 CT HEAD/BRAIN W/O DYE: CPT

## 2023-12-25 PROCEDURE — 94760 N-INVAS EAR/PLS OXIMETRY 1: CPT

## 2023-12-25 PROCEDURE — 82803 BLOOD GASES ANY COMBINATION: CPT

## 2023-12-25 PROCEDURE — 0225U NFCT DS DNA&RNA 21 SARSCOV2: CPT

## 2023-12-25 PROCEDURE — C9113: CPT

## 2023-12-25 PROCEDURE — 93010 ELECTROCARDIOGRAM REPORT: CPT

## 2023-12-25 PROCEDURE — 80061 LIPID PANEL: CPT

## 2023-12-25 PROCEDURE — 85025 COMPLETE CBC W/AUTO DIFF WBC: CPT

## 2023-12-25 PROCEDURE — 71045 X-RAY EXAM CHEST 1 VIEW: CPT | Mod: 26

## 2023-12-25 PROCEDURE — 87086 URINE CULTURE/COLONY COUNT: CPT

## 2023-12-25 PROCEDURE — 93005 ELECTROCARDIOGRAM TRACING: CPT

## 2023-12-25 PROCEDURE — 81003 URINALYSIS AUTO W/O SCOPE: CPT

## 2023-12-25 PROCEDURE — 82330 ASSAY OF CALCIUM: CPT

## 2023-12-25 PROCEDURE — 93970 EXTREMITY STUDY: CPT

## 2023-12-25 PROCEDURE — 80307 DRUG TEST PRSMV CHEM ANLYZR: CPT

## 2023-12-25 PROCEDURE — 85730 THROMBOPLASTIN TIME PARTIAL: CPT

## 2023-12-25 PROCEDURE — 95700 EEG CONT REC W/VID EEG TECH: CPT

## 2023-12-25 PROCEDURE — 80354 DRUG SCREENING FENTANYL: CPT

## 2023-12-25 RX ORDER — MIDAZOLAM HYDROCHLORIDE 1 MG/ML
5 INJECTION, SOLUTION INTRAMUSCULAR; INTRAVENOUS ONCE
Refills: 0 | Status: DISCONTINUED | OUTPATIENT
Start: 2023-12-25 | End: 2023-12-25

## 2023-12-25 RX ORDER — HYDROCORTISONE 20 MG
100 TABLET ORAL EVERY 8 HOURS
Refills: 0 | Status: DISCONTINUED | OUTPATIENT
Start: 2023-12-25 | End: 2023-12-27

## 2023-12-25 RX ORDER — SODIUM CHLORIDE 9 MG/ML
2000 INJECTION, SOLUTION INTRAVENOUS ONCE
Refills: 0 | Status: COMPLETED | OUTPATIENT
Start: 2023-12-25 | End: 2023-12-25

## 2023-12-25 RX ORDER — DIPHENHYDRAMINE HCL 50 MG
50 CAPSULE ORAL ONCE
Refills: 0 | Status: COMPLETED | OUTPATIENT
Start: 2023-12-25 | End: 2023-12-25

## 2023-12-25 RX ORDER — SODIUM CHLORIDE 9 MG/ML
1000 INJECTION, SOLUTION INTRAVENOUS ONCE
Refills: 0 | Status: COMPLETED | OUTPATIENT
Start: 2023-12-25 | End: 2023-12-25

## 2023-12-25 RX ORDER — PROPRANOLOL HCL 160 MG
2 CAPSULE, EXTENDED RELEASE 24HR ORAL ONCE
Refills: 0 | Status: COMPLETED | OUTPATIENT
Start: 2023-12-25 | End: 2023-12-25

## 2023-12-25 RX ADMIN — SODIUM CHLORIDE 1000 MILLILITER(S): 9 INJECTION, SOLUTION INTRAVENOUS at 22:15

## 2023-12-25 RX ADMIN — Medication 2 MILLIGRAM(S): at 22:18

## 2023-12-25 RX ADMIN — Medication 50 MILLIGRAM(S): at 21:00

## 2023-12-25 RX ADMIN — MIDAZOLAM HYDROCHLORIDE 5 MILLIGRAM(S): 1 INJECTION, SOLUTION INTRAMUSCULAR; INTRAVENOUS at 21:00

## 2023-12-25 RX ADMIN — Medication 100 MILLIGRAM(S): at 22:13

## 2023-12-25 RX ADMIN — SODIUM CHLORIDE 2000 MILLILITER(S): 9 INJECTION, SOLUTION INTRAVENOUS at 22:15

## 2023-12-25 NOTE — ED PROVIDER NOTE - DIFFERENTIAL DIAGNOSIS
Differential Diagnosis AMS, likely thyroid storm but will also r/o sepsis vs emergent intracranial pathology vs other concurrent metabolic derangement

## 2023-12-25 NOTE — ED PROVIDER NOTE - PROGRESS NOTE DETAILS
Dr Radha Rodriguez,    Alysia Mchugh to send in [place]?  She is on Basaglar BID but has Medicaid so not sure if it will get approved Ramirez-Wartofsky Point Scale (BWPS) for Thyrotoxicosis from [employer]  on 12/25/2023  ** All calculations should be rechecked by clinician prior to use **    RESULT SUMMARY:  60 points  Highly suggestive of thyroid storm      INPUTS:  Temperature &deg;F (&deg;C) —> 20 = 102–102.9 (38.9-39.2)  Central nervous system effects —> 20 = Moderate (delirium, psychosis, extreme lethargy)  Gastrointestinal-hepatic dysfunction —> 0 = Absent  Heart Rate (beats/minute) —> 15 = 120-129  Congestive Heart Failure —> 5 = Mild (pedal edema)  Atrial fibrillation present —> 0 = No  Precipitating event —> 0 = No Ramirez-Wartofsky Point Scale (BWPS) for Thyrotoxicosis from [employer]  on 12/25/2023  ** All calculations should be rechecked by clinician prior to use **    RESULT SUMMARY:  60 points  Highly suggestive of thyroid storm      INPUTS:  Temperature &deg;F (&deg;C) —> 20 = 102–102.9 (38.9-39.2)  Central nervous system effects —> 20 = Moderate (delirium, psychosis, extreme lethargy)  Gastrointestinal-hepatic dysfunction —> 0 = Absent  Heart Rate (beats/minute) —> 15 = 120-129  Congestive Heart Failure —> 5 = Mild (pedal edema)  Atrial fibrillation present —> 0 = No  Precipitating event —> 0 = No

## 2023-12-25 NOTE — ED PROCEDURE NOTE - CPROC ED TIME OUT STATEMENT1
“Patient's name, , procedure and correct site were confirmed during the [place] Timeout.” “Patient's name, , procedure and correct site were confirmed during the [place] Timeout.”

## 2023-12-25 NOTE — ED PROVIDER NOTE - ATTENDING CONTRIBUTION TO CARE
pt with ams for the past 3 hours after not taking thyroid meds as per family. pt with ams for the past 3 hours after not taking thyroid meds as per family. Here patient hyperventilating crying moves extremities when touched but not spontaneously moving them otherwise. tachycardic with tachypnea.  Review of records demonstrate the patient presented similarly with hallucinations altered mental status.  Patient admitted to ICU treated for possible thyroid storm.  Due to patient's acute altered mental status tachycardia and the patient was moved to critical care. Dr. Dwayne peoples.

## 2023-12-25 NOTE — ED ADULT TRIAGE NOTE - CHIEF COMPLAINT QUOTE
Pt was brought for AMS, pt was at the party with the family and as per family was not responding to them, when EMS arrived, pt was not answering any questions but when they tried to put oxygen mask on her she would push it away. Pt is crying in triage and not answering any questions. FS in triage 159 mg/dl. Pt was recently admitted for thyroid storm.

## 2023-12-25 NOTE — ED ADULT NURSE NOTE - OBJECTIVE STATEMENT
53y Female BIBA c/o AMS, per pt family pt missed her tyroid medication for a period of time, report pt mood swings, not answering questions, insomnia for days. pt arrive to ED agitated and restless.

## 2023-12-25 NOTE — ED PROVIDER NOTE - OBJECTIVE STATEMENT
53y F PMH of hyperthyroidism, HTN, and HLD presents to the ED by family to the ED for evaluation of altered mental status started acutely approximately 3 hours prior to arrival.  Patient had similar episode 1 year ago when she did not take her thyroid medications, and it progressed to thyroid storm.  Patient's family states patient has been living with other family members, not sure if she has been taking her medications as prescribed.  Patient brought to ER hyperventilating moving extremities not following commands, tachycardic, agitated.  Medications given to decrease agitation so we could perform labs.  Rectal temp elevated at 102.3, tachycardic, concern for thyroid storm given previous presentation.

## 2023-12-25 NOTE — ED PROVIDER NOTE - CLINICAL SUMMARY MEDICAL DECISION MAKING FREE TEXT BOX
Patient presented with acute onset of altered mental status 3 hours prior to arrival.  Patient has not been compliant with her thyroid medications per family and has presented similarly in the past at which time patient was found to be in thyroid storm.  On arrival, patient acutely agitated, making involuntary movements, not providing further history.  Seen initially in the main ER and upgraded to critical care.  In crit, patient was given IM Versed which calmed patient down enough to obtain IV access and obtain rectal temperature which was shown to be elevated.  Patient was treated with IV fluid bolus as well as broad-spectrum antibiotics for possible underlying sepsis.  Also given high suspicion for thyroid storm, patient was given IV propranolol and hydrocortisone, as well as methimazole via NG tube upfront.  EKG was obtained and showed sinus tachycardia without evidence of ischemia.  Labs obtained remarkable for leukocytosis around 15,000 but otherwise no other significant abnormalities.  Thyroid studies were sent but will not return likely until tomorrow morning.  Chest xray negative for pneumothorax, pneumonia, widened mediastinum, evidence of rib fractures, enlarged cardiac silhouette or any other emergent pathologies..  According to the Ramirez-Wartofsky scale, high concern for thyroid storm.  Consulted ICU who agreed with plan to admit to their service for further management.  Potassium iodide to be administered by ICU exactly 1 hour after methimazole administration.  Hemodynamically stable at time of admission.

## 2023-12-25 NOTE — ED PROVIDER NOTE - PHYSICAL EXAMINATION
Vital Signs: I have reviewed the initial vital signs.  Constitutional: agitated, not following commands  HEENT: Airway patent, moist MM, EOMI,   CV: tachycardic, no murmurs  Lungs: Clear to ascultation bilaterally,   ABD: Non-tender, Non-distended,   MSK: moving extremities spontaneously  INTEG: Skin warm,   NEURO: A&Ox0, not following commands, agitated

## 2023-12-25 NOTE — ED PROVIDER NOTE - CONSIDERATION OF ADMISSION OBSERVATION
Consideration of Admission/Observation Patient with acute thyroid storm requiring admission for treatment and close monitoring

## 2023-12-25 NOTE — ED ADULT NURSE NOTE - NSFALLUNIVINTERV_ED_ALL_ED
Bed/Stretcher in lowest position, wheels locked, appropriate side rails in place/Call bell, personal items and telephone in reach/Instruct patient to call for assistance before getting out of bed/chair/stretcher/Non-slip footwear applied when patient is off stretcher/[place] to call system/Physically safe environment - no spills, clutter or unnecessary equipment/Purposeful proactive rounding/Room/bathroom lighting operational, light cord in reach Bed/Stretcher in lowest position, wheels locked, appropriate side rails in place/Call bell, personal items and telephone in reach/Instruct patient to call for assistance before getting out of bed/chair/stretcher/Non-slip footwear applied when patient is off stretcher/[place] to call system/Physically safe environment - no spills, clutter or unnecessary equipment/Purposeful proactive rounding/Room/bathroom lighting operational, light cord in reach

## 2023-12-26 LAB
A1C WITH ESTIMATED AVERAGE GLUCOSE RESULT: 10.4 % — HIGH (ref 4–5.6)
A1C WITH ESTIMATED AVERAGE GLUCOSE RESULT: 10.4 % — HIGH (ref 4–5.6)
ALBUMIN SERPL ELPH-MCNC: 3.6 G/DL — SIGNIFICANT CHANGE UP (ref 3.5–5.2)
ALBUMIN SERPL ELPH-MCNC: 3.6 G/DL — SIGNIFICANT CHANGE UP (ref 3.5–5.2)
ALP SERPL-CCNC: 154 U/L — HIGH (ref 30–115)
ALP SERPL-CCNC: 154 U/L — HIGH (ref 30–115)
ALT FLD-CCNC: 17 U/L — SIGNIFICANT CHANGE UP (ref 0–41)
ALT FLD-CCNC: 17 U/L — SIGNIFICANT CHANGE UP (ref 0–41)
ANION GAP SERPL CALC-SCNC: 11 MMOL/L — SIGNIFICANT CHANGE UP (ref 7–14)
APPEARANCE UR: CLEAR — SIGNIFICANT CHANGE UP
APPEARANCE UR: CLEAR — SIGNIFICANT CHANGE UP
APTT BLD: 33.3 SEC — SIGNIFICANT CHANGE UP (ref 27–39.2)
APTT BLD: 33.3 SEC — SIGNIFICANT CHANGE UP (ref 27–39.2)
AST SERPL-CCNC: 19 U/L — SIGNIFICANT CHANGE UP (ref 0–41)
AST SERPL-CCNC: 19 U/L — SIGNIFICANT CHANGE UP (ref 0–41)
BASOPHILS # BLD AUTO: 0.02 K/UL — SIGNIFICANT CHANGE UP (ref 0–0.2)
BASOPHILS # BLD AUTO: 0.02 K/UL — SIGNIFICANT CHANGE UP (ref 0–0.2)
BASOPHILS NFR BLD AUTO: 0.1 % — SIGNIFICANT CHANGE UP (ref 0–1)
BASOPHILS NFR BLD AUTO: 0.1 % — SIGNIFICANT CHANGE UP (ref 0–1)
BILIRUB SERPL-MCNC: 1.1 MG/DL — SIGNIFICANT CHANGE UP (ref 0.2–1.2)
BILIRUB SERPL-MCNC: 1.1 MG/DL — SIGNIFICANT CHANGE UP (ref 0.2–1.2)
BILIRUB UR-MCNC: NEGATIVE — SIGNIFICANT CHANGE UP
BILIRUB UR-MCNC: NEGATIVE — SIGNIFICANT CHANGE UP
BUN SERPL-MCNC: 10 MG/DL — SIGNIFICANT CHANGE UP (ref 10–20)
BUN SERPL-MCNC: 10 MG/DL — SIGNIFICANT CHANGE UP (ref 10–20)
BUN SERPL-MCNC: 11 MG/DL — SIGNIFICANT CHANGE UP (ref 10–20)
BUN SERPL-MCNC: 11 MG/DL — SIGNIFICANT CHANGE UP (ref 10–20)
CALCIUM SERPL-MCNC: 9 MG/DL — SIGNIFICANT CHANGE UP (ref 8.4–10.4)
CALCIUM SERPL-MCNC: 9 MG/DL — SIGNIFICANT CHANGE UP (ref 8.4–10.4)
CALCIUM SERPL-MCNC: 9.2 MG/DL — SIGNIFICANT CHANGE UP (ref 8.4–10.5)
CALCIUM SERPL-MCNC: 9.2 MG/DL — SIGNIFICANT CHANGE UP (ref 8.4–10.5)
CHLORIDE SERPL-SCNC: 102 MMOL/L — SIGNIFICANT CHANGE UP (ref 98–110)
CHOLEST SERPL-MCNC: 100 MG/DL — SIGNIFICANT CHANGE UP
CHOLEST SERPL-MCNC: 100 MG/DL — SIGNIFICANT CHANGE UP
CO2 SERPL-SCNC: 22 MMOL/L — SIGNIFICANT CHANGE UP (ref 17–32)
COLOR SPEC: YELLOW — SIGNIFICANT CHANGE UP
COLOR SPEC: YELLOW — SIGNIFICANT CHANGE UP
CREAT SERPL-MCNC: 0.5 MG/DL — LOW (ref 0.7–1.5)
DIFF PNL FLD: NEGATIVE — SIGNIFICANT CHANGE UP
DIFF PNL FLD: NEGATIVE — SIGNIFICANT CHANGE UP
DRUG SCREEN 1, URINE RESULT: SIGNIFICANT CHANGE UP
DRUG SCREEN 1, URINE RESULT: SIGNIFICANT CHANGE UP
EGFR: 113 ML/MIN/1.73M2 — SIGNIFICANT CHANGE UP
EOSINOPHIL # BLD AUTO: 0 K/UL — SIGNIFICANT CHANGE UP (ref 0–0.7)
EOSINOPHIL # BLD AUTO: 0 K/UL — SIGNIFICANT CHANGE UP (ref 0–0.7)
EOSINOPHIL NFR BLD AUTO: 0 % — SIGNIFICANT CHANGE UP (ref 0–8)
EOSINOPHIL NFR BLD AUTO: 0 % — SIGNIFICANT CHANGE UP (ref 0–8)
ESTIMATED AVERAGE GLUCOSE: 252 MG/DL — HIGH (ref 68–114)
ESTIMATED AVERAGE GLUCOSE: 252 MG/DL — HIGH (ref 68–114)
GLUCOSE BLDC GLUCOMTR-MCNC: 196 MG/DL — HIGH (ref 70–99)
GLUCOSE BLDC GLUCOMTR-MCNC: 196 MG/DL — HIGH (ref 70–99)
GLUCOSE BLDC GLUCOMTR-MCNC: 226 MG/DL — HIGH (ref 70–99)
GLUCOSE BLDC GLUCOMTR-MCNC: 226 MG/DL — HIGH (ref 70–99)
GLUCOSE BLDC GLUCOMTR-MCNC: 272 MG/DL — HIGH (ref 70–99)
GLUCOSE BLDC GLUCOMTR-MCNC: 272 MG/DL — HIGH (ref 70–99)
GLUCOSE SERPL-MCNC: 195 MG/DL — HIGH (ref 70–99)
GLUCOSE SERPL-MCNC: 195 MG/DL — HIGH (ref 70–99)
GLUCOSE SERPL-MCNC: 226 MG/DL — HIGH (ref 70–99)
GLUCOSE SERPL-MCNC: 226 MG/DL — HIGH (ref 70–99)
GLUCOSE UR QL: 100 MG/DL
GLUCOSE UR QL: 100 MG/DL
HCT VFR BLD CALC: 36.1 % — LOW (ref 37–47)
HCT VFR BLD CALC: 36.1 % — LOW (ref 37–47)
HDLC SERPL-MCNC: 44 MG/DL — LOW
HDLC SERPL-MCNC: 44 MG/DL — LOW
HGB BLD-MCNC: 11.7 G/DL — LOW (ref 12–16)
HGB BLD-MCNC: 11.7 G/DL — LOW (ref 12–16)
IMM GRANULOCYTES NFR BLD AUTO: 0.5 % — HIGH (ref 0.1–0.3)
IMM GRANULOCYTES NFR BLD AUTO: 0.5 % — HIGH (ref 0.1–0.3)
INR BLD: 1.29 RATIO — SIGNIFICANT CHANGE UP (ref 0.65–1.3)
INR BLD: 1.29 RATIO — SIGNIFICANT CHANGE UP (ref 0.65–1.3)
KETONES UR-MCNC: ABNORMAL MG/DL
KETONES UR-MCNC: ABNORMAL MG/DL
LEUKOCYTE ESTERASE UR-ACNC: NEGATIVE — SIGNIFICANT CHANGE UP
LEUKOCYTE ESTERASE UR-ACNC: NEGATIVE — SIGNIFICANT CHANGE UP
LIPID PNL WITH DIRECT LDL SERPL: 47 MG/DL — SIGNIFICANT CHANGE UP
LIPID PNL WITH DIRECT LDL SERPL: 47 MG/DL — SIGNIFICANT CHANGE UP
LYMPHOCYTES # BLD AUTO: 1.38 K/UL — SIGNIFICANT CHANGE UP (ref 1.2–3.4)
LYMPHOCYTES # BLD AUTO: 1.38 K/UL — SIGNIFICANT CHANGE UP (ref 1.2–3.4)
LYMPHOCYTES # BLD AUTO: 9.3 % — LOW (ref 20.5–51.1)
LYMPHOCYTES # BLD AUTO: 9.3 % — LOW (ref 20.5–51.1)
MAGNESIUM SERPL-MCNC: 2.3 MG/DL — SIGNIFICANT CHANGE UP (ref 1.8–2.4)
MAGNESIUM SERPL-MCNC: 2.3 MG/DL — SIGNIFICANT CHANGE UP (ref 1.8–2.4)
MCHC RBC-ENTMCNC: 24.3 PG — LOW (ref 27–31)
MCHC RBC-ENTMCNC: 24.3 PG — LOW (ref 27–31)
MCHC RBC-ENTMCNC: 32.4 G/DL — SIGNIFICANT CHANGE UP (ref 32–37)
MCHC RBC-ENTMCNC: 32.4 G/DL — SIGNIFICANT CHANGE UP (ref 32–37)
MCV RBC AUTO: 74.9 FL — LOW (ref 81–99)
MCV RBC AUTO: 74.9 FL — LOW (ref 81–99)
MONOCYTES # BLD AUTO: 0.63 K/UL — HIGH (ref 0.1–0.6)
MONOCYTES # BLD AUTO: 0.63 K/UL — HIGH (ref 0.1–0.6)
MONOCYTES NFR BLD AUTO: 4.2 % — SIGNIFICANT CHANGE UP (ref 1.7–9.3)
MONOCYTES NFR BLD AUTO: 4.2 % — SIGNIFICANT CHANGE UP (ref 1.7–9.3)
NEUTROPHILS # BLD AUTO: 12.8 K/UL — HIGH (ref 1.4–6.5)
NEUTROPHILS # BLD AUTO: 12.8 K/UL — HIGH (ref 1.4–6.5)
NEUTROPHILS NFR BLD AUTO: 85.9 % — HIGH (ref 42.2–75.2)
NEUTROPHILS NFR BLD AUTO: 85.9 % — HIGH (ref 42.2–75.2)
NITRITE UR-MCNC: NEGATIVE — SIGNIFICANT CHANGE UP
NITRITE UR-MCNC: NEGATIVE — SIGNIFICANT CHANGE UP
NON HDL CHOLESTEROL: 56 MG/DL — SIGNIFICANT CHANGE UP
NON HDL CHOLESTEROL: 56 MG/DL — SIGNIFICANT CHANGE UP
NRBC # BLD: 0 /100 WBCS — SIGNIFICANT CHANGE UP (ref 0–0)
NRBC # BLD: 0 /100 WBCS — SIGNIFICANT CHANGE UP (ref 0–0)
PH UR: 6 — SIGNIFICANT CHANGE UP (ref 5–8)
PH UR: 6 — SIGNIFICANT CHANGE UP (ref 5–8)
PLATELET # BLD AUTO: 203 K/UL — SIGNIFICANT CHANGE UP (ref 130–400)
PLATELET # BLD AUTO: 203 K/UL — SIGNIFICANT CHANGE UP (ref 130–400)
PMV BLD: 10 FL — SIGNIFICANT CHANGE UP (ref 7.4–10.4)
PMV BLD: 10 FL — SIGNIFICANT CHANGE UP (ref 7.4–10.4)
POTASSIUM SERPL-MCNC: 4.1 MMOL/L — SIGNIFICANT CHANGE UP (ref 3.5–5)
POTASSIUM SERPL-SCNC: 4.1 MMOL/L — SIGNIFICANT CHANGE UP (ref 3.5–5)
PROT SERPL-MCNC: 6.6 G/DL — SIGNIFICANT CHANGE UP (ref 6–8)
PROT SERPL-MCNC: 6.6 G/DL — SIGNIFICANT CHANGE UP (ref 6–8)
PROT UR-MCNC: NEGATIVE MG/DL — SIGNIFICANT CHANGE UP
PROT UR-MCNC: NEGATIVE MG/DL — SIGNIFICANT CHANGE UP
PROTHROM AB SERPL-ACNC: 14.7 SEC — HIGH (ref 9.95–12.87)
PROTHROM AB SERPL-ACNC: 14.7 SEC — HIGH (ref 9.95–12.87)
RBC # BLD: 4.82 M/UL — SIGNIFICANT CHANGE UP (ref 4.2–5.4)
RBC # BLD: 4.82 M/UL — SIGNIFICANT CHANGE UP (ref 4.2–5.4)
RBC # FLD: 12.9 % — SIGNIFICANT CHANGE UP (ref 11.5–14.5)
RBC # FLD: 12.9 % — SIGNIFICANT CHANGE UP (ref 11.5–14.5)
SODIUM SERPL-SCNC: 135 MMOL/L — SIGNIFICANT CHANGE UP (ref 135–146)
SP GR SPEC: 1.01 — SIGNIFICANT CHANGE UP (ref 1–1.03)
SP GR SPEC: 1.01 — SIGNIFICANT CHANGE UP (ref 1–1.03)
T3 SERPL-MCNC: 316 NG/DL — HIGH (ref 80–200)
T3 SERPL-MCNC: 316 NG/DL — HIGH (ref 80–200)
T4 AB SER-ACNC: 17.7 UG/DL — HIGH (ref 4.6–12)
T4 AB SER-ACNC: 17.7 UG/DL — HIGH (ref 4.6–12)
T4 FREE SERPL-MCNC: 4.1 NG/DL — HIGH (ref 0.9–1.8)
T4 FREE SERPL-MCNC: 4.1 NG/DL — HIGH (ref 0.9–1.8)
TRIGL SERPL-MCNC: 41 MG/DL — SIGNIFICANT CHANGE UP
TRIGL SERPL-MCNC: 41 MG/DL — SIGNIFICANT CHANGE UP
TSH SERPL-MCNC: <0 UIU/ML — LOW (ref 0.27–4.2)
TSH SERPL-MCNC: <0 UIU/ML — LOW (ref 0.27–4.2)
UROBILINOGEN FLD QL: 1 MG/DL — SIGNIFICANT CHANGE UP (ref 0.2–1)
UROBILINOGEN FLD QL: 1 MG/DL — SIGNIFICANT CHANGE UP (ref 0.2–1)
WBC # BLD: 14.91 K/UL — HIGH (ref 4.8–10.8)
WBC # BLD: 14.91 K/UL — HIGH (ref 4.8–10.8)
WBC # FLD AUTO: 14.91 K/UL — HIGH (ref 4.8–10.8)
WBC # FLD AUTO: 14.91 K/UL — HIGH (ref 4.8–10.8)

## 2023-12-26 PROCEDURE — 70450 CT HEAD/BRAIN W/O DYE: CPT | Mod: 26

## 2023-12-26 PROCEDURE — 93970 EXTREMITY STUDY: CPT | Mod: 26

## 2023-12-26 PROCEDURE — 99291 CRITICAL CARE FIRST HOUR: CPT

## 2023-12-26 RX ORDER — ENOXAPARIN SODIUM 100 MG/ML
40 INJECTION SUBCUTANEOUS EVERY 24 HOURS
Refills: 0 | Status: DISCONTINUED | OUTPATIENT
Start: 2023-12-26 | End: 2023-12-29

## 2023-12-26 RX ORDER — ACETAMINOPHEN 500 MG
650 TABLET ORAL EVERY 6 HOURS
Refills: 0 | Status: DISCONTINUED | OUTPATIENT
Start: 2023-12-26 | End: 2023-12-28

## 2023-12-26 RX ORDER — PANTOPRAZOLE SODIUM 20 MG/1
40 TABLET, DELAYED RELEASE ORAL DAILY
Refills: 0 | Status: DISCONTINUED | OUTPATIENT
Start: 2023-12-26 | End: 2023-12-29

## 2023-12-26 RX ORDER — ATORVASTATIN CALCIUM 80 MG/1
40 TABLET, FILM COATED ORAL AT BEDTIME
Refills: 0 | Status: DISCONTINUED | OUTPATIENT
Start: 2023-12-26 | End: 2023-12-26

## 2023-12-26 RX ORDER — LEVETIRACETAM 250 MG/1
1500 TABLET, FILM COATED ORAL EVERY 12 HOURS
Refills: 0 | Status: DISCONTINUED | OUTPATIENT
Start: 2023-12-26 | End: 2023-12-27

## 2023-12-26 RX ORDER — EZETIMIBE 10 MG/1
1 TABLET ORAL
Qty: 0 | Refills: 0 | DISCHARGE

## 2023-12-26 RX ORDER — SODIUM CHLORIDE 9 MG/ML
1000 INJECTION, SOLUTION INTRAVENOUS
Refills: 0 | Status: DISCONTINUED | OUTPATIENT
Start: 2023-12-26 | End: 2023-12-29

## 2023-12-26 RX ORDER — GABAPENTIN 400 MG/1
1 CAPSULE ORAL
Qty: 0 | Refills: 0 | DISCHARGE

## 2023-12-26 RX ORDER — SODIUM CHLORIDE 9 MG/ML
1000 INJECTION, SOLUTION INTRAVENOUS
Refills: 0 | Status: DISCONTINUED | OUTPATIENT
Start: 2023-12-26 | End: 2023-12-27

## 2023-12-26 RX ORDER — GLUCAGON INJECTION, SOLUTION 0.5 MG/.1ML
1 INJECTION, SOLUTION SUBCUTANEOUS ONCE
Refills: 0 | Status: DISCONTINUED | OUTPATIENT
Start: 2023-12-26 | End: 2023-12-29

## 2023-12-26 RX ORDER — LEVETIRACETAM 250 MG/1
2000 TABLET, FILM COATED ORAL ONCE
Refills: 0 | Status: DISCONTINUED | OUTPATIENT
Start: 2023-12-26 | End: 2023-12-26

## 2023-12-26 RX ORDER — LEVETIRACETAM 250 MG/1
2000 TABLET, FILM COATED ORAL EVERY 12 HOURS
Refills: 0 | Status: DISCONTINUED | OUTPATIENT
Start: 2023-12-26 | End: 2023-12-26

## 2023-12-26 RX ORDER — CEFTRIAXONE 500 MG/1
2000 INJECTION, POWDER, FOR SOLUTION INTRAMUSCULAR; INTRAVENOUS ONCE
Refills: 0 | Status: COMPLETED | OUTPATIENT
Start: 2023-12-26 | End: 2023-12-26

## 2023-12-26 RX ORDER — DEXTROSE 50 % IN WATER 50 %
15 SYRINGE (ML) INTRAVENOUS ONCE
Refills: 0 | Status: DISCONTINUED | OUTPATIENT
Start: 2023-12-26 | End: 2023-12-29

## 2023-12-26 RX ORDER — ACETAMINOPHEN 500 MG
650 TABLET ORAL EVERY 6 HOURS
Refills: 0 | Status: DISCONTINUED | OUTPATIENT
Start: 2023-12-26 | End: 2023-12-26

## 2023-12-26 RX ORDER — ATORVASTATIN CALCIUM 80 MG/1
40 TABLET, FILM COATED ORAL AT BEDTIME
Refills: 0 | Status: DISCONTINUED | OUTPATIENT
Start: 2023-12-26 | End: 2023-12-28

## 2023-12-26 RX ORDER — VANCOMYCIN HCL 1 G
1500 VIAL (EA) INTRAVENOUS EVERY 12 HOURS
Refills: 0 | Status: DISCONTINUED | OUTPATIENT
Start: 2023-12-26 | End: 2023-12-27

## 2023-12-26 RX ORDER — INSULIN LISPRO 100/ML
VIAL (ML) SUBCUTANEOUS
Refills: 0 | Status: DISCONTINUED | OUTPATIENT
Start: 2023-12-26 | End: 2023-12-29

## 2023-12-26 RX ORDER — DEXTROSE 50 % IN WATER 50 %
25 SYRINGE (ML) INTRAVENOUS ONCE
Refills: 0 | Status: DISCONTINUED | OUTPATIENT
Start: 2023-12-26 | End: 2023-12-29

## 2023-12-26 RX ORDER — CHLORHEXIDINE GLUCONATE 213 G/1000ML
1 SOLUTION TOPICAL
Refills: 0 | Status: DISCONTINUED | OUTPATIENT
Start: 2023-12-26 | End: 2023-12-29

## 2023-12-26 RX ORDER — VANCOMYCIN HCL 1 G
VIAL (EA) INTRAVENOUS
Refills: 0 | Status: DISCONTINUED | OUTPATIENT
Start: 2023-12-26 | End: 2023-12-27

## 2023-12-26 RX ORDER — LANOLIN ALCOHOL/MO/W.PET/CERES
3 CREAM (GRAM) TOPICAL AT BEDTIME
Refills: 0 | Status: DISCONTINUED | OUTPATIENT
Start: 2023-12-26 | End: 2023-12-26

## 2023-12-26 RX ORDER — MAGNESIUM SULFATE 500 MG/ML
2 VIAL (ML) INJECTION
Refills: 0 | Status: COMPLETED | OUTPATIENT
Start: 2023-12-26 | End: 2023-12-26

## 2023-12-26 RX ORDER — ATORVASTATIN CALCIUM 80 MG/1
1 TABLET, FILM COATED ORAL
Qty: 0 | Refills: 0 | DISCHARGE

## 2023-12-26 RX ORDER — AMLODIPINE BESYLATE AND BENAZEPRIL HYDROCHLORIDE 10; 20 MG/1; MG/1
1 CAPSULE ORAL
Qty: 0 | Refills: 0 | DISCHARGE

## 2023-12-26 RX ORDER — ALBUTEROL 90 UG/1
1 AEROSOL, METERED ORAL
Qty: 0 | Refills: 0 | DISCHARGE

## 2023-12-26 RX ORDER — CEFTRIAXONE 500 MG/1
INJECTION, POWDER, FOR SOLUTION INTRAMUSCULAR; INTRAVENOUS
Refills: 0 | Status: DISCONTINUED | OUTPATIENT
Start: 2023-12-26 | End: 2023-12-27

## 2023-12-26 RX ORDER — DEXTROSE 50 % IN WATER 50 %
12.5 SYRINGE (ML) INTRAVENOUS ONCE
Refills: 0 | Status: DISCONTINUED | OUTPATIENT
Start: 2023-12-26 | End: 2023-12-29

## 2023-12-26 RX ORDER — VANCOMYCIN HCL 1 G
1500 VIAL (EA) INTRAVENOUS ONCE
Refills: 0 | Status: COMPLETED | OUTPATIENT
Start: 2023-12-26 | End: 2023-12-26

## 2023-12-26 RX ORDER — LANOLIN ALCOHOL/MO/W.PET/CERES
3 CREAM (GRAM) TOPICAL AT BEDTIME
Refills: 0 | Status: DISCONTINUED | OUTPATIENT
Start: 2023-12-26 | End: 2023-12-28

## 2023-12-26 RX ORDER — CEFTRIAXONE 500 MG/1
2000 INJECTION, POWDER, FOR SOLUTION INTRAMUSCULAR; INTRAVENOUS EVERY 24 HOURS
Refills: 0 | Status: DISCONTINUED | OUTPATIENT
Start: 2023-12-27 | End: 2023-12-27

## 2023-12-26 RX ADMIN — Medication 300 MILLIGRAM(S): at 17:22

## 2023-12-26 RX ADMIN — Medication 100 MILLIGRAM(S): at 14:46

## 2023-12-26 RX ADMIN — Medication 6: at 12:40

## 2023-12-26 RX ADMIN — Medication 2: at 17:47

## 2023-12-26 RX ADMIN — Medication 4 MILLIGRAM(S): at 07:45

## 2023-12-26 RX ADMIN — Medication 650 MILLIGRAM(S): at 01:36

## 2023-12-26 RX ADMIN — CHLORHEXIDINE GLUCONATE 1 APPLICATION(S): 213 SOLUTION TOPICAL at 05:59

## 2023-12-26 RX ADMIN — Medication 25 GRAM(S): at 09:56

## 2023-12-26 RX ADMIN — ATORVASTATIN CALCIUM 40 MILLIGRAM(S): 80 TABLET, FILM COATED ORAL at 21:15

## 2023-12-26 RX ADMIN — Medication 4: at 06:32

## 2023-12-26 RX ADMIN — Medication 300 MILLIGRAM(S): at 10:58

## 2023-12-26 RX ADMIN — PANTOPRAZOLE SODIUM 40 MILLIGRAM(S): 20 TABLET, DELAYED RELEASE ORAL at 12:22

## 2023-12-26 RX ADMIN — LEVETIRACETAM 1000 MILLIGRAM(S): 250 TABLET, FILM COATED ORAL at 18:36

## 2023-12-26 RX ADMIN — CEFTRIAXONE 100 MILLIGRAM(S): 500 INJECTION, POWDER, FOR SOLUTION INTRAMUSCULAR; INTRAVENOUS at 12:56

## 2023-12-26 RX ADMIN — ENOXAPARIN SODIUM 40 MILLIGRAM(S): 100 INJECTION SUBCUTANEOUS at 12:55

## 2023-12-26 RX ADMIN — Medication 650 MILLIGRAM(S): at 01:51

## 2023-12-26 RX ADMIN — Medication 100 MILLIGRAM(S): at 05:58

## 2023-12-26 RX ADMIN — Medication 100 MILLIGRAM(S): at 21:15

## 2023-12-26 RX ADMIN — LEVETIRACETAM 600 MILLIGRAM(S): 250 TABLET, FILM COATED ORAL at 09:25

## 2023-12-26 RX ADMIN — Medication 25 GRAM(S): at 12:00

## 2023-12-26 NOTE — H&P ADULT - NSHPPHYSICALEXAM_GEN_ALL_CORE
GA: alert, oriented x0, somnolent and lethargic  Heart: normal s1 s2, tachycardic  Lungs: clear  Abdomen: soft  LE: R>L edema

## 2023-12-26 NOTE — PHARMACOTHERAPY INTERVENTION NOTE - COMMENTS
Patient was started on vancomycin 1500 mg IV q12h. As per [employer] calculations, the vancomycin AUC/[place] is predicted to be 522.67 mg/L*h, as per population data. Will confirm with next trough. Serum creatinine is 0.5 mg/dL. Can continue vancomycin 1500 mg IV q12h for now.    Prashanth Daniel, PharmD, [facility]  Clinical Pharmacy Specialist, Infectious Diseases  Tele-Antimicrobial Stewardship Program (Tele-ASP)  Tele-ASP Phone: [phone]   Patient was started on vancomycin 1500 mg IV q12h. As per PrecisePK calculations, the vancomycin AUC/[place] is predicted to be 522.67 mg/L*h, as per population data. Will confirm with next trough. Serum creatinine is 0.5 mg/dL. Can continue vancomycin 1500 mg IV q12h for now.    Prashanth Daniel, PharmD, [facility]  Clinical Pharmacy Specialist, Infectious Diseases  Tele-Antimicrobial Stewardship Program (Tele-ASP)  Tele-ASP Phone: [phone]

## 2023-12-26 NOTE — CONSULT NOTE ADULT - SUBJECTIVE AND OBJECTIVE BOX
Neurology Consult    Patient is a 53y old  Female who presents with a chief complaint of AMS (26 Dec 2023 10:37)    Neurology consulted for witnessed GTC x 3 mins s/p Lorazepam 4mg and Levetiracetam 2g IV x 1 while MICU       HPI:  53y F PMH of hyperthyroidism, HTN, DMII, and HLD presents to the ED by family to the ED for evaluation of altered mental status started acutely approximately 3 hours prior to arrival to ED. History taken from sister as patient is somnolent and incoherent. Patient had similar episode 1 year ago when she did not take her thyroid medications, and it progressed to thyroid storm.  Patient's family states patient has been living with her daughter, not sure if she has been taking her medications as prescribed.  Patient brought to ER hyperventilating moving extremities not following commands, tachycardic, agitated. Rectal temp elevated at 102.3, tachycardic, concern for thyroid storm given previous presentation.  In the ED, BP was 161/78, , temp 98.2 repeated 102.1. Labs remarkable for WBC 15, Mg 1.5. Patient was given propranolol IV 2 mg, Solu-cortef 100 mg, and methimazole 20 mg in addition to 3 L of IV fluids.    PLEASE OBTAIN MED REC FORM [facility] IN AM (Patient is altered and family do not know her meds) (26 Dec 2023 00:33)      PAST MEDICAL & SURGICAL HISTORY:  Diabetes  Hypertension      No significant past surgical history      FAMILY HISTORY:      Social History: (-) x 3    Allergies    penicillin (Rash)    Intolerances        MEDICATIONS  (STANDING):  atorvastatin 40 milliGRAM(s) Oral at bedtime  cefTRIAXone   IVPB      cefTRIAXone   IVPB 2000 milliGRAM(s) IV Intermittent once  chlorhexidine 2% Cloths 1 Application(s) Topical <User Schedule>  dextrose 5%. 1000 milliLiter(s) (50 mL/Hr) IV Continuous <Continuous>  dextrose 5%. 1000 milliLiter(s) (100 mL/Hr) IV Continuous <Continuous>  dextrose 50% Injectable 25 Gram(s) IV Push once  dextrose 50% Injectable 25 Gram(s) IV Push once  dextrose 50% Injectable 12.5 Gram(s) IV Push once  enoxaparin Injectable 40 milliGRAM(s) SubCutaneous every 24 hours  glucagon  Injectable 1 milliGRAM(s) IntraMuscular once  hydrocortisone sodium succinate Injectable 100 milliGRAM(s) IV Push every 8 hours  insulin lispro (ADMELOG) corrective regimen sliding scale   SubCutaneous three times a day before meals  lactated ringers. 1000 milliLiter(s) (75 mL/Hr) IV Continuous <Continuous>  levETIRAcetam  IVPB 2000 milliGRAM(s) IV Intermittent every 12 hours  magnesium sulfate  IVPB 2 Gram(s) IV Intermittent every 2 hours  methimazole 20 milliGRAM(s) Oral <User Schedule>  pantoprazole  Injectable 40 milliGRAM(s) IV Push daily  Potassium iodide 250 milliGRAM(s) 250 milliGRAM(s) Oral every 6 hours  propranolol 80 milliGRAM(s) Oral every 6 hours  vancomycin  IVPB 1500 milliGRAM(s) IV Intermittent once  vancomycin  IVPB      vancomycin  IVPB 1500 milliGRAM(s) IV Intermittent every 12 hours    MEDICATIONS  (PRN):  acetaminophen     Tablet .. 650 milliGRAM(s) Oral every 6 hours PRN Temp greater or equal to 38C (100.4F), Mild Pain (1 - 3)  dextrose Oral Gel 15 Gram(s) Oral once PRN Blood Glucose LESS THAN 70 milliGRAM(s)/deciliter  melatonin 3 milliGRAM(s) Oral at bedtime PRN Insomnia      Review of systems:      Vital Signs Last 24 Hrs  T(C): 37.6 (26 Dec 2023 04:00), Max: 38.9 (25 Dec 2023 22:00)  T(F): 99.6 (26 Dec 2023 04:00), Max: 102.1 (25 Dec 2023 22:00)  HR: 90 (26 Dec 2023 09:30) (85 - 121)  BP: 113/58 (26 Dec 2023 09:30) (107/61 - 164/89)  BP(mean): 83 (26 Dec 2023 09:30) (83 - 117)  RR: 22 (26 Dec 2023 09:30) (16 - 34)  SpO2: 97% (26 Dec 2023 09:30) (95% - 100%)    Parameters below as of 26 Dec 2023 10:00  Patient On (Oxygen Delivery Method): room air        Examination:  General:  Appearance is consistent with chronologic age.  No abnormal facies.  Gross skin survey within normal limits.    Cognitive/Language:  The patient is obtunded.  no spontaneous eye opening   Eyes: no gaze deviation. pupils pinpoint and reactive.    Face: no facial asymmetry.    Ears/Nose/Throat:  deferred.   Motor examination:   flaccid tone in RUE. Normal tone, bulk in all other extremity. no tremor, myoclonic activity or posturing    Formal Muscle Strength Testing: able to move LUE, and b/l LE against gravity  Reflexes:  Plantar response downgoing b/l. clonus absent.  Sensory examination:  withdraw some limbs to noxious stimulation  Cerebellum:   deferred   Gait deferred       Labs:   CBC Full  -  ( 26 Dec 2023 05:47 )  WBC Count : 14.91 K/uL  RBC Count : 4.82 M/uL  Hemoglobin : 11.7 g/dL  Hematocrit : 36.1 %  Platelet Count - Automated : 203 K/uL  Mean Cell Volume : 74.9 fL  Mean Cell Hemoglobin : 24.3 pg  Mean Cell Hemoglobin Concentration : 32.4 g/dL  Auto Neutrophil # : 12.80 K/uL  Auto Lymphocyte # : 1.38 K/uL  Auto Monocyte # : 0.63 K/uL  Auto Eosinophil # : 0.00 K/uL  Auto Basophil # : 0.02 K/uL  Auto Neutrophil % : 85.9 %  Auto Lymphocyte % : 9.3 %  Auto Monocyte % : 4.2 %  Auto Eosinophil % : 0.0 %  Auto Basophil % : 0.1 %    12-26    135  |  102  |  10  ----------------------------<  226<H>  4.1   |  22  |  0.5<L>    Ca    9.0      26 Dec 2023 05:47  Mg     1.5     12-25    TPro  6.6  /  Alb  3.6  /  TBili  1.1  /  DBili  x   /  AST  19  /  ALT  17  /  AlkPhos  154<H>  12-26    LIVER FUNCTIONS - ( 26 Dec 2023 05:47 )  Alb: 3.6 g/dL / Pro: 6.6 g/dL / ALK PHOS: 154 U/L / ALT: 17 U/L / AST: 19 U/L / GGT: x             Urinalysis Basic - ( 26 Dec 2023 06:39 )    Neuroimaging:  NCHCT: CT Head No Cont:   ACC: [id number] EXAM:  CT BRAIN   ORDERED BY: TATY BONILLA     PROCEDURE DATE:  12/26/2023          INTERPRETATION:  CLINICAL INDICATION: Obtunded. Altered mental status.    Technique: CT of the head was performed without contrast.    Multiple contiguous axial images were acquired from the skull-base to the   vertex without the administration of intravenous contrast.  Coronal and   sagittal reformations were made.    COMPARISON:  prior head CT dated 11/11/2021    FINDINGS:    The ventricles and sulci are unremarkable in appearance.     There is no intraparenchymal hematoma, mass effect or midline shift. No   abnormal extra-axial fluid collections are present.    The calvarium is intact. The visualized intraorbital compartments,   paranasal sinuses and mastoid complexes appear free of acute disease.    IMPRESSION:  No acute intracranial pathology. No evidence of midline shift, mass   effect or intracranial hemorrhage.    --- End of Report ---            GABE BRAGA MD; Attending Radiologist  This document has been electronically signed. Dec 26 2023  9:22AM (12-26-23 @ 09:13)      12-26-23 @ 10:43       Neurology Consult    Patient is a 53y old  Female who presents with a chief complaint of AMS (26 Dec 2023 10:37)    Neurology consulted for witnessed GTC x 3 mins s/p Lorazepam 4mg and Levetiracetam 2g IV x 1 while MICU       HPI:  53y F PMH of hyperthyroidism, HTN, DMII, and HLD presents to the ED by family to the ED for evaluation of altered mental status started acutely approximately 3 hours prior to arrival to ED. History taken from sister as patient is somnolent and incoherent. Patient had similar episode 1 year ago when she did not take her thyroid medications, and it progressed to thyroid storm.  Patient's family states patient has been living with her daughter, not sure if she has been taking her medications as prescribed.  Patient brought to ER hyperventilating moving extremities not following commands, tachycardic, agitated. Rectal temp elevated at 102.3, tachycardic, concern for thyroid storm given previous presentation.  In the ED, BP was 161/78, , temp 98.2 repeated 102.1. Labs remarkable for WBC 15, Mg 1.5. Patient was given propranolol IV 2 mg, Solu-cortef 100 mg, and methimazole 20 mg in addition to 3 L of IV fluids.    PLEASE OBTAIN MED REC FORM [facility] IN AM (Patient is altered and family do not know her meds) (26 Dec 2023 00:33)      PAST MEDICAL & SURGICAL HISTORY:  Diabetes  Hypertension      No significant past surgical history      FAMILY HISTORY:      Social History: (-) x 3    Allergies    penicillin (Rash)    Intolerances        MEDICATIONS  (STANDING):  atorvastatin 40 milliGRAM(s) Oral at bedtime  cefTRIAXone   IVPB      cefTRIAXone   IVPB 2000 milliGRAM(s) IV Intermittent once  chlorhexidine 2% Cloths 1 Application(s) Topical <User Schedule>  dextrose 5%. 1000 milliLiter(s) (50 mL/Hr) IV Continuous <Continuous>  dextrose 5%. 1000 milliLiter(s) (100 mL/Hr) IV Continuous <Continuous>  dextrose 50% Injectable 25 Gram(s) IV Push once  dextrose 50% Injectable 25 Gram(s) IV Push once  dextrose 50% Injectable 12.5 Gram(s) IV Push once  enoxaparin Injectable 40 milliGRAM(s) SubCutaneous every 24 hours  glucagon  Injectable 1 milliGRAM(s) IntraMuscular once  hydrocortisone sodium succinate Injectable 100 milliGRAM(s) IV Push every 8 hours  insulin lispro (ADMELOG) corrective regimen sliding scale   SubCutaneous three times a day before meals  lactated ringers. 1000 milliLiter(s) (75 mL/Hr) IV Continuous <Continuous>  levETIRAcetam  IVPB 2000 milliGRAM(s) IV Intermittent every 12 hours  magnesium sulfate  IVPB 2 Gram(s) IV Intermittent every 2 hours  methimazole 20 milliGRAM(s) Oral <User Schedule>  pantoprazole  Injectable 40 milliGRAM(s) IV Push daily  Potassium iodide 250 milliGRAM(s) 250 milliGRAM(s) Oral every 6 hours  propranolol 80 milliGRAM(s) Oral every 6 hours  vancomycin  IVPB 1500 milliGRAM(s) IV Intermittent once  vancomycin  IVPB      vancomycin  IVPB 1500 milliGRAM(s) IV Intermittent every 12 hours    MEDICATIONS  (PRN):  acetaminophen     Tablet .. 650 milliGRAM(s) Oral every 6 hours PRN Temp greater or equal to 38C (100.4F), Mild Pain (1 - 3)  dextrose Oral Gel 15 Gram(s) Oral once PRN Blood Glucose LESS THAN 70 milliGRAM(s)/deciliter  melatonin 3 milliGRAM(s) Oral at bedtime PRN Insomnia      Review of systems:      Vital Signs Last 24 Hrs  T(C): 37.6 (26 Dec 2023 04:00), Max: 38.9 (25 Dec 2023 22:00)  T(F): 99.6 (26 Dec 2023 04:00), Max: 102.1 (25 Dec 2023 22:00)  HR: 90 (26 Dec 2023 09:30) (85 - 121)  BP: 113/58 (26 Dec 2023 09:30) (107/61 - 164/89)  BP(mean): 83 (26 Dec 2023 09:30) (83 - 117)  RR: 22 (26 Dec 2023 09:30) (16 - 34)  SpO2: 97% (26 Dec 2023 09:30) (95% - 100%)    Parameters below as of 26 Dec 2023 10:00  Patient On (Oxygen Delivery Method): room air        Examination:  General:  Appearance is consistent with chronologic age.  No abnormal facies.  Gross skin survey within normal limits.    Cognitive/Language:  The patient is obtunded.  no spontaneous eye opening   Eyes: no gaze deviation. pupils pinpoint and reactive.    Face: no facial asymmetry.    Ears/Nose/Throat:  deferred.   Motor examination:   flaccid tone in RUE. Normal tone, bulk in all other extremity. no tremor, myoclonic activity or posturing    Formal Muscle Strength Testing: able to move LUE, and b/l LE against gravity  Reflexes:  Plantar response downgoing b/l. clonus absent.  Sensory examination:  withdraw some limbs to noxious stimulation  Cerebellum:   deferred   Gait deferred       Labs:   CBC Full  -  ( 26 Dec 2023 05:47 )  WBC Count : 14.91 K/uL  RBC Count : 4.82 M/uL  Hemoglobin : 11.7 g/dL  Hematocrit : 36.1 %  Platelet Count - Automated : 203 K/uL  Mean Cell Volume : 74.9 fL  Mean Cell Hemoglobin : 24.3 pg  Mean Cell Hemoglobin Concentration : 32.4 g/dL  Auto Neutrophil # : 12.80 K/uL  Auto Lymphocyte # : 1.38 K/uL  Auto Monocyte # : 0.63 K/uL  Auto Eosinophil # : 0.00 K/uL  Auto Basophil # : 0.02 K/uL  Auto Neutrophil % : 85.9 %  Auto Lymphocyte % : 9.3 %  Auto Monocyte % : 4.2 %  Auto Eosinophil % : 0.0 %  Auto Basophil % : 0.1 %    12-26    135  |  102  |  10  ----------------------------<  226<H>  4.1   |  22  |  0.5<L>    Ca    9.0      26 Dec 2023 05:47  Mg     1.5     12-25    TPro  6.6  /  Alb  3.6  /  TBili  1.1  /  DBili  x   /  AST  19  /  ALT  17  /  AlkPhos  154<H>  12-26    LIVER FUNCTIONS - ( 26 Dec 2023 05:47 )  Alb: 3.6 g/dL / Pro: 6.6 g/dL / ALK PHOS: 154 U/L / ALT: 17 U/L / AST: 19 U/L / GGT: x             Urinalysis Basic - ( 26 Dec 2023 06:39 )    Neuroimaging:  NCHCT: CT Head No Cont:   ACC: [id number] EXAM:  CT BRAIN   ORDERED BY: TATY BONILLA     PROCEDURE DATE:  12/26/2023          INTERPRETATION:  CLINICAL INDICATION: Obtunded. Altered mental status.    Technique: CT of the head was performed without contrast.    Multiple contiguous axial images were acquired from the skull-base to the   vertex without the administration of intravenous contrast.  Coronal and   sagittal reformations were made.    COMPARISON:  prior head CT dated 11/11/2021    FINDINGS:    The ventricles and sulci are unremarkable in appearance.     There is no intraparenchymal hematoma, mass effect or midline shift. No   abnormal extra-axial fluid collections are present.    The calvarium is intact. The visualized intraorbital compartments,   paranasal sinuses and mastoid complexes appear free of acute disease.    IMPRESSION:  No acute intracranial pathology. No evidence of midline shift, mass   effect or intracranial hemorrhage.    --- End of Report ---            GABE BRAGA MD; Attending Radiologist  This document has been electronically signed. Dec 26 2023  9:22AM (12-26-23 @ 09:13)      12-26-23 @ 10:43       Neurology Consult    Patient is a 53y old  Female who presents with a chief complaint of AMS (26 Dec 2023 10:37)    Neurology consulted for witnessed GTC x 3 mins s/p Lorazepam 4mg and Levetiracetam 2g IV x 1 while MICU. As per sister at bedside. Pt came home from work 12/25 went out shopping with the family then came home and was preparing june dinner. She went to take and nap and was found by the daughter  crying in her sleep. Family tried multiple times to wake her from sleep. Since then as per sister the pt had not opened her eyes. She denied any use of drugs, cigarette or alcohol. She reported that she was suppose to be taking her thyroid medication but has not received any refill from the mail delivered pharmacy in a while. Likely ran out of her refills she also missed her follow up appointment, Her next appointment is in january 2024.Sister stated that mother had seizure 1x 11yrs otherwise no FMHx of seizure. Only other medication use is Neurontin.       HPI:  53y F PMH of hyperthyroidism, HTN, DMII, and HLD presents to the ED by family to the ED for evaluation of altered mental status started acutely approximately 3 hours prior to arrival to ED. History taken from sister as patient is somnolent and incoherent. Patient had similar episode 1 year ago when she did not take her thyroid medications, and it progressed to thyroid storm.  Patient's family states patient has been living with her daughter, not sure if she has been taking her medications as prescribed.  Patient brought to ER hyperventilating moving extremities not following commands, tachycardic, agitated. Rectal temp elevated at 102.3, tachycardic, concern for thyroid storm given previous presentation.  In the ED, BP was 161/78, , temp 98.2 repeated 102.1. Labs remarkable for WBC 15, Mg 1.5. Patient was given propranolol IV 2 mg, Solu-cortef 100 mg, and methimazole 20 mg in addition to 3 L of IV fluids.    PLEASE OBTAIN MED REC FORM [facility] IN AM (Patient is altered and family do not know her meds) (26 Dec 2023 00:33)      PAST MEDICAL & SURGICAL HISTORY:  Diabetes  Hypertension      No significant past surgical history      FAMILY HISTORY:      Social History: (-) x 3    Allergies    penicillin (Rash)    Intolerances        MEDICATIONS  (STANDING):  atorvastatin 40 milliGRAM(s) Oral at bedtime  cefTRIAXone   IVPB      cefTRIAXone   IVPB 2000 milliGRAM(s) IV Intermittent once  chlorhexidine 2% Cloths 1 Application(s) Topical <User Schedule>  dextrose 5%. 1000 milliLiter(s) (50 mL/Hr) IV Continuous <Continuous>  dextrose 5%. 1000 milliLiter(s) (100 mL/Hr) IV Continuous <Continuous>  dextrose 50% Injectable 25 Gram(s) IV Push once  dextrose 50% Injectable 25 Gram(s) IV Push once  dextrose 50% Injectable 12.5 Gram(s) IV Push once  enoxaparin Injectable 40 milliGRAM(s) SubCutaneous every 24 hours  glucagon  Injectable 1 milliGRAM(s) IntraMuscular once  hydrocortisone sodium succinate Injectable 100 milliGRAM(s) IV Push every 8 hours  insulin lispro (ADMELOG) corrective regimen sliding scale   SubCutaneous three times a day before meals  lactated ringers. 1000 milliLiter(s) (75 mL/Hr) IV Continuous <Continuous>  levETIRAcetam  IVPB 2000 milliGRAM(s) IV Intermittent every 12 hours  magnesium sulfate  IVPB 2 Gram(s) IV Intermittent every 2 hours  methimazole 20 milliGRAM(s) Oral <User Schedule>  pantoprazole  Injectable 40 milliGRAM(s) IV Push daily  Potassium iodide 250 milliGRAM(s) 250 milliGRAM(s) Oral every 6 hours  propranolol 80 milliGRAM(s) Oral every 6 hours  vancomycin  IVPB 1500 milliGRAM(s) IV Intermittent once  vancomycin  IVPB      vancomycin  IVPB 1500 milliGRAM(s) IV Intermittent every 12 hours    MEDICATIONS  (PRN):  acetaminophen     Tablet .. 650 milliGRAM(s) Oral every 6 hours PRN Temp greater or equal to 38C (100.4F), Mild Pain (1 - 3)  dextrose Oral Gel 15 Gram(s) Oral once PRN Blood Glucose LESS THAN 70 milliGRAM(s)/deciliter  melatonin 3 milliGRAM(s) Oral at bedtime PRN Insomnia      Review of systems:      Vital Signs Last 24 Hrs  T(C): 37.6 (26 Dec 2023 04:00), Max: 38.9 (25 Dec 2023 22:00)  T(F): 99.6 (26 Dec 2023 04:00), Max: 102.1 (25 Dec 2023 22:00)  HR: 90 (26 Dec 2023 09:30) (85 - 121)  BP: 113/58 (26 Dec 2023 09:30) (107/61 - 164/89)  BP(mean): 83 (26 Dec 2023 09:30) (83 - 117)  RR: 22 (26 Dec 2023 09:30) (16 - 34)  SpO2: 97% (26 Dec 2023 09:30) (95% - 100%)    Parameters below as of 26 Dec 2023 10:00  Patient On (Oxygen Delivery Method): room air        Examination:  General:  Appearance is consistent with chronologic age.  No abnormal facies.  Gross skin survey within normal limits.    Cognitive/Language:  The patient is obtunded.  no spontaneous eye opening   Eyes: no gaze deviation. pupils pinpoint and reactive.    Face: no facial asymmetry.    Ears/Nose/Throat:  deferred.   Motor examination:   flaccid tone in RUE. Normal tone, bulk in all other extremity. no tremor, myoclonic activity or posturing    Formal Muscle Strength Testing: able to move LUE, and b/l LE against gravity  Reflexes:  Plantar response downgoing b/l. clonus absent.  Sensory examination:  withdraw some limbs to noxious stimulation  Cerebellum:   deferred   Gait deferred       Labs:   CBC Full  -  ( 26 Dec 2023 05:47 )  WBC Count : 14.91 K/uL  RBC Count : 4.82 M/uL  Hemoglobin : 11.7 g/dL  Hematocrit : 36.1 %  Platelet Count - Automated : 203 K/uL  Mean Cell Volume : 74.9 fL  Mean Cell Hemoglobin : 24.3 pg  Mean Cell Hemoglobin Concentration : 32.4 g/dL  Auto Neutrophil # : 12.80 K/uL  Auto Lymphocyte # : 1.38 K/uL  Auto Monocyte # : 0.63 K/uL  Auto Eosinophil # : 0.00 K/uL  Auto Basophil # : 0.02 K/uL  Auto Neutrophil % : 85.9 %  Auto Lymphocyte % : 9.3 %  Auto Monocyte % : 4.2 %  Auto Eosinophil % : 0.0 %  Auto Basophil % : 0.1 %    12-26    135  |  102  |  10  ----------------------------<  226<H>  4.1   |  22  |  0.5<L>    Ca    9.0      26 Dec 2023 05:47  Mg     1.5     12-25    TPro  6.6  /  Alb  3.6  /  TBili  1.1  /  DBili  x   /  AST  19  /  ALT  17  /  AlkPhos  154<H>  12-26    LIVER FUNCTIONS - ( 26 Dec 2023 05:47 )  Alb: 3.6 g/dL / Pro: 6.6 g/dL / ALK PHOS: 154 U/L / ALT: 17 U/L / AST: 19 U/L / GGT: x             Urinalysis Basic - ( 26 Dec 2023 06:39 )    Neuroimaging:  NCHCT: CT Head No Cont:   ACC: [id number] EXAM:  CT BRAIN   ORDERED BY: TATY BONILLA     PROCEDURE DATE:  12/26/2023          INTERPRETATION:  CLINICAL INDICATION: Obtunded. Altered mental status.    Technique: CT of the head was performed without contrast.    Multiple contiguous axial images were acquired from the skull-base to the   vertex without the administration of intravenous contrast.  Coronal and   sagittal reformations were made.    COMPARISON:  prior head CT dated 11/11/2021    FINDINGS:    The ventricles and sulci are unremarkable in appearance.     There is no intraparenchymal hematoma, mass effect or midline shift. No   abnormal extra-axial fluid collections are present.    The calvarium is intact. The visualized intraorbital compartments,   paranasal sinuses and mastoid complexes appear free of acute disease.    IMPRESSION:  No acute intracranial pathology. No evidence of midline shift, mass   effect or intracranial hemorrhage.    --- End of Report ---            GABE BRAGA MD; Attending Radiologist  This document has been electronically signed. Dec 26 2023  9:22AM (12-26-23 @ 09:13)      12-26-23 @ 10:43       Neurology Consult    Patient is a 53y old  Female who presents with a chief complaint of AMS (26 Dec 2023 10:37)    Neurology consulted for witnessed GTC x 3 mins s/p Lorazepam 4mg and Levetiracetam 2g IV x 1 while MICU. As per sister at bedside. Pt came home from work 12/25 went out shopping with the family then came home and was preparing june dinner. She went to take and nap and was found by the daughter  crying in her sleep. Family tried multiple times to wake her from sleep. Since then as per sister the pt had not opened her eyes. She denied any use of drugs, cigarette or alcohol. She reported that she was suppose to be taking her thyroid medication but has not received any refill from the mail delivered pharmacy in a while. Likely ran out of her refills she also missed her follow up appointment, Her next appointment is in january 2024.Sister stated that mother had seizure 1x 11yrs otherwise no FMHx of seizure. Only other medication use is Neurontin.       HPI:  53y F PMH of hyperthyroidism, HTN, DMII, and HLD presents to the ED by family to the ED for evaluation of altered mental status started acutely approximately 3 hours prior to arrival to ED. History taken from sister as patient is somnolent and incoherent. Patient had similar episode 1 year ago when she did not take her thyroid medications, and it progressed to thyroid storm.  Patient's family states patient has been living with her daughter, not sure if she has been taking her medications as prescribed.  Patient brought to ER hyperventilating moving extremities not following commands, tachycardic, agitated. Rectal temp elevated at 102.3, tachycardic, concern for thyroid storm given previous presentation.  In the ED, BP was 161/78, , temp 98.2 repeated 102.1. Labs remarkable for WBC 15, Mg 1.5. Patient was given propranolol IV 2 mg, Solu-cortef 100 mg, and methimazole 20 mg in addition to 3 L of IV fluids.    PLEASE OBTAIN MED REC FORM [facility] IN AM (Patient is altered and family do not know her meds) (26 Dec 2023 00:33)      PAST MEDICAL & SURGICAL HISTORY:  Diabetes  Hypertension      No significant past surgical history      FAMILY HISTORY:      Social History: (-) x 3    Allergies    penicillin (Rash)    Intolerances        MEDICATIONS  (STANDING):  atorvastatin 40 milliGRAM(s) Oral at bedtime  cefTRIAXone   IVPB      cefTRIAXone   IVPB 2000 milliGRAM(s) IV Intermittent once  chlorhexidine 2% Cloths 1 Application(s) Topical <User Schedule>  dextrose 5%. 1000 milliLiter(s) (50 mL/Hr) IV Continuous <Continuous>  dextrose 5%. 1000 milliLiter(s) (100 mL/Hr) IV Continuous <Continuous>  dextrose 50% Injectable 25 Gram(s) IV Push once  dextrose 50% Injectable 25 Gram(s) IV Push once  dextrose 50% Injectable 12.5 Gram(s) IV Push once  enoxaparin Injectable 40 milliGRAM(s) SubCutaneous every 24 hours  glucagon  Injectable 1 milliGRAM(s) IntraMuscular once  hydrocortisone sodium succinate Injectable 100 milliGRAM(s) IV Push every 8 hours  insulin lispro (ADMELOG) corrective regimen sliding scale   SubCutaneous three times a day before meals  lactated ringers. 1000 milliLiter(s) (75 mL/Hr) IV Continuous <Continuous>  levETIRAcetam  IVPB 2000 milliGRAM(s) IV Intermittent every 12 hours  magnesium sulfate  IVPB 2 Gram(s) IV Intermittent every 2 hours  methimazole 20 milliGRAM(s) Oral <User Schedule>  pantoprazole  Injectable 40 milliGRAM(s) IV Push daily  Potassium iodide 250 milliGRAM(s) 250 milliGRAM(s) Oral every 6 hours  propranolol 80 milliGRAM(s) Oral every 6 hours  vancomycin  IVPB 1500 milliGRAM(s) IV Intermittent once  vancomycin  IVPB      vancomycin  IVPB 1500 milliGRAM(s) IV Intermittent every 12 hours    MEDICATIONS  (PRN):  acetaminophen     Tablet .. 650 milliGRAM(s) Oral every 6 hours PRN Temp greater or equal to 38C (100.4F), Mild Pain (1 - 3)  dextrose Oral Gel 15 Gram(s) Oral once PRN Blood Glucose LESS THAN 70 milliGRAM(s)/deciliter  melatonin 3 milliGRAM(s) Oral at bedtime PRN Insomnia      Review of systems:      Vital Signs Last 24 Hrs  T(C): 37.6 (26 Dec 2023 04:00), Max: 38.9 (25 Dec 2023 22:00)  T(F): 99.6 (26 Dec 2023 04:00), Max: 102.1 (25 Dec 2023 22:00)  HR: 90 (26 Dec 2023 09:30) (85 - 121)  BP: 113/58 (26 Dec 2023 09:30) (107/61 - 164/89)  BP(mean): 83 (26 Dec 2023 09:30) (83 - 117)  RR: 22 (26 Dec 2023 09:30) (16 - 34)  SpO2: 97% (26 Dec 2023 09:30) (95% - 100%)    Parameters below as of 26 Dec 2023 10:00  Patient On (Oxygen Delivery Method): room air        Examination:  General:  Appearance is consistent with chronologic age.  No abnormal facies.  Gross skin survey within normal limits.    Cognitive/Language:  The patient is obtunded.  no spontaneous eye opening   Eyes: no gaze deviation. pupils pinpoint and reactive.    Face: no facial asymmetry.    Ears/Nose/Throat:  deferred.   Motor examination:   flaccid tone in RUE. Normal tone, bulk in all other extremity. no tremor, myoclonic activity or posturing    Formal Muscle Strength Testing: able to move LUE, and b/l LE against gravity  Reflexes:  Plantar response downgoing b/l. clonus absent.  Sensory examination:  withdraw some limbs to noxious stimulation  Cerebellum:   deferred   Gait deferred       Labs:   CBC Full  -  ( 26 Dec 2023 05:47 )  WBC Count : 14.91 K/uL  RBC Count : 4.82 M/uL  Hemoglobin : 11.7 g/dL  Hematocrit : 36.1 %  Platelet Count - Automated : 203 K/uL  Mean Cell Volume : 74.9 fL  Mean Cell Hemoglobin : 24.3 pg  Mean Cell Hemoglobin Concentration : 32.4 g/dL  Auto Neutrophil # : 12.80 K/uL  Auto Lymphocyte # : 1.38 K/uL  Auto Monocyte # : 0.63 K/uL  Auto Eosinophil # : 0.00 K/uL  Auto Basophil # : 0.02 K/uL  Auto Neutrophil % : 85.9 %  Auto Lymphocyte % : 9.3 %  Auto Monocyte % : 4.2 %  Auto Eosinophil % : 0.0 %  Auto Basophil % : 0.1 %    12-26    135  |  102  |  10  ----------------------------<  226<H>  4.1   |  22  |  0.5<L>    Ca    9.0      26 Dec 2023 05:47  Mg     1.5     12-25    TPro  6.6  /  Alb  3.6  /  TBili  1.1  /  DBili  x   /  AST  19  /  ALT  17  /  AlkPhos  154<H>  12-26    LIVER FUNCTIONS - ( 26 Dec 2023 05:47 )  Alb: 3.6 g/dL / Pro: 6.6 g/dL / ALK PHOS: 154 U/L / ALT: 17 U/L / AST: 19 U/L / GGT: x             Urinalysis Basic - ( 26 Dec 2023 06:39 )    Neuroimaging:  NCHCT: CT Head No Cont:   ACC: [id number] EXAM:  CT BRAIN   ORDERED BY: TATY BONILLA     PROCEDURE DATE:  12/26/2023          INTERPRETATION:  CLINICAL INDICATION: Obtunded. Altered mental status.    Technique: CT of the head was performed without contrast.    Multiple contiguous axial images were acquired from the skull-base to the   vertex without the administration of intravenous contrast.  Coronal and   sagittal reformations were made.    COMPARISON:  prior head CT dated 11/11/2021    FINDINGS:    The ventricles and sulci are unremarkable in appearance.     There is no intraparenchymal hematoma, mass effect or midline shift. No   abnormal extra-axial fluid collections are present.    The calvarium is intact. The visualized intraorbital compartments,   paranasal sinuses and mastoid complexes appear free of acute disease.    IMPRESSION:  No acute intracranial pathology. No evidence of midline shift, mass   effect or intracranial hemorrhage.    --- End of Report ---            GABE BRAGA MD; Attending Radiologist  This document has been electronically signed. Dec 26 2023  9:22AM (12-26-23 @ 09:13)      12-26-23 @ 10:43

## 2023-12-26 NOTE — PROGRESS NOTE ADULT - SUBJECTIVE AND OBJECTIVE BOX
24H events:    Patient is a 53y old Female who presents with a chief complaint of AMS (26 Dec 2023 00:33)    Primary diagnosis of Thyroid storm    Today is hospital day 1d. This morning patient was seen and examined at bedside, in bed. Patient was not arousal to verbal or physical stimulation.  Otherwise, no acute or major events overnight. Hemodynamically stable, tolerating oral diet, voiding appropriately with appropriate bowel movements.     PAST MEDICAL & SURGICAL HISTORY  Diabetes    Hypertension    No significant past surgical history      SOCIAL HISTORY:  Social History:      ALLERGIES:  penicillin (Rash)    MEDICATIONS:  STANDING MEDICATIONS  atorvastatin 40 milliGRAM(s) Oral at bedtime  cefTRIAXone   IVPB      cefTRIAXone   IVPB 2000 milliGRAM(s) IV Intermittent once  chlorhexidine 2% Cloths 1 Application(s) Topical <User Schedule>  dextrose 5%. 1000 milliLiter(s) IV Continuous <Continuous>  dextrose 5%. 1000 milliLiter(s) IV Continuous <Continuous>  dextrose 50% Injectable 12.5 Gram(s) IV Push once  dextrose 50% Injectable 25 Gram(s) IV Push once  dextrose 50% Injectable 25 Gram(s) IV Push once  enoxaparin Injectable 40 milliGRAM(s) SubCutaneous every 24 hours  glucagon  Injectable 1 milliGRAM(s) IntraMuscular once  hydrocortisone sodium succinate Injectable 100 milliGRAM(s) IV Push every 8 hours  insulin lispro (ADMELOG) corrective regimen sliding scale   SubCutaneous three times a day before meals  lactated ringers. 1000 milliLiter(s) IV Continuous <Continuous>  levETIRAcetam  IVPB 2000 milliGRAM(s) IV Intermittent every 12 hours  magnesium sulfate  IVPB 2 Gram(s) IV Intermittent every 2 hours  methimazole 20 milliGRAM(s) Oral <User Schedule>  pantoprazole  Injectable 40 milliGRAM(s) IV Push daily  Potassium iodide 250 milliGRAM(s) 250 milliGRAM(s) Oral every 6 hours  propranolol 80 milliGRAM(s) Oral every 6 hours  vancomycin  IVPB      vancomycin  IVPB 1500 milliGRAM(s) IV Intermittent every 12 hours  vancomycin  IVPB 1500 milliGRAM(s) IV Intermittent once    PRN MEDICATIONS  acetaminophen     Tablet .. 650 milliGRAM(s) Oral every 6 hours PRN  dextrose Oral Gel 15 Gram(s) Oral once PRN  melatonin 3 milliGRAM(s) Oral at bedtime PRN    VITALS:   T(F): 99.6  HR: 90  BP: 113/58  RR: 22  SpO2: 97%    PHYSICAL EXAM:  GENERAL: NAD, lying in bed comfortably, cooperative,   HEAD: NCAT  NECK: Supple  HEART: Regular rate and rhythm, normal S1/S2, no murmurs  LUNGS: No acute respiratory distress, clear b/l breath sounds   ABDOMEN:  soft, non-tender, non-distended, + BS  EXTREMITIES: no edema  NERVOUS SYSTEM:  A&Ox3, CNII-XII intace, follows commands, answers questions appropriately     LABS:                        11.7   14.91 )-----------( 203      ( 26 Dec 2023 05:47 )             36.1         135  |  102  |  10  ----------------------------<  226<H>  4.1   |  22  |  0.5<L>    Ca    9.0      26 Dec 2023 05:47  Mg     1.5         TPro  6.6  /  Alb  3.6  /  TBili  1.1  /  DBili  x   /  AST  19  /  ALT  17  /  AlkPhos  154<H>        Urinalysis Basic - ( 26 Dec 2023 06:39 )    Color: Yellow / Appearance: Clear / S.015 / pH: x  Gluc: x / Ketone: Trace mg/dL  / Bili: Negative / Urobili: 1.0 mg/dL   Blood: x / Protein: Negative mg/dL / Nitrite: Negative   Leuk Esterase: Negative / RBC: x / WBC x   Sq Epi: x / Non Sq Epi: x / Bacteria: x        Lactate, Blood: 1.2 mmol/L (23 @ 22:34)          RADIOLOGY:    RADIOLOGY     Pt received A/0x4. Diet advanced to soft diet-tolerating well. With x1 small soft BM this AM.  Ok for discharge per surgery. PIV dc'd. AVS given. Pt dc'd via wheelchair without complaints.    Problem: Patient Centered Care  Goal: Patient preferences are identified and integrated in the patient's plan of care  Description: Interventions:  - What would you like us to know as we care for you?   - Provide timely, complete, and accurate information to patient/family  - Incorporate patient and family knowledge, values, beliefs, and cultural backgrounds into the planning and delivery of care  - Encourage patient/family to participate in care and decision-making at the level they choose  - Honor patient and family perspectives and choices  Outcome: Adequate for Discharge     Problem: Patient/Family Goals  Goal: Patient/Family Long Term Goal  Description: Patient's Long Term Goal:     Interventions:  -   - See additional Care Plan goals for specific interventions  Outcome: Adequate for Discharge  Goal: Patient/Family Short Term Goal  Description: Patient's Short Term Goal:     Interventions:   -   - See additional Care Plan goals for specific interventions  Outcome: Adequate for Discharge     Problem: GASTROINTESTINAL - ADULT  Goal: Minimal or absence of nausea and vomiting  Description: INTERVENTIONS:  - Maintain adequate hydration with IV or PO as ordered and tolerated  - Nasogastric tube to low intermittent suction as ordered  - Evaluate effectiveness of ordered antiemetic medications  - Provide nonpharmacologic comfort measures as appropriate  - Advance diet as tolerated, if ordered  - Obtain nutritional consult as needed  - Evaluate fluid balance  Outcome: Adequate for Discharge  Goal: Maintains or returns to baseline bowel function  Description: INTERVENTIONS:  - Assess bowel function  - Maintain adequate hydration with IV or PO as ordered and tolerated  - Evaluate effectiveness of GI medications  - Encourage mobilization and activity  - Obtain nutritional consult as needed  - Establish a toileting routine/schedule  - Consider collaborating with pharmacy to review patient's medication profile  Outcome: Adequate for Discharge     Problem: METABOLIC/FLUID AND ELECTROLYTES - ADULT  Goal: Glucose maintained within prescribed range  Description: INTERVENTIONS:  - Monitor Blood Glucose as ordered  - Assess for signs and symptoms of hyperglycemia and hypoglycemia  - Administer ordered medications to maintain glucose within target range  - Assess barriers to adequate nutritional intake and initiate nutrition consult as needed  - Instruct patient on self management of diabetes  Outcome: Adequate for Discharge     Problem: SKIN/TISSUE INTEGRITY - ADULT  Goal: Oral mucous membranes remain intact  Description: INTERVENTIONS  - Assess oral mucosa and hygiene practices  - Implement preventative oral hygiene regimen  - Implement oral medicated treatments as ordered  Outcome: Adequate for Discharge     Problem: Diabetes/Glucose Control  Goal: Glucose maintained within prescribed range  Description: INTERVENTIONS:  - Monitor Blood Glucose as ordered  - Assess for signs and symptoms of hyperglycemia and hypoglycemia  - Administer ordered medications to maintain glucose within target range  - Assess barriers to adequate nutritional intake and initiate nutrition consult as needed  - Instruct patient on self management of diabetes  Outcome: Adequate for Discharge 24H events:    Patient is a 53y old Female who presents with a chief complaint of AMS (26 Dec 2023 00:33)    Primary diagnosis of Thyroid storm    Today is hospital day 1d. This morning patient was seen and examined at bedside, in bed. Patient was not arousal to verbal or physical stimulation. Overnight, patient was agitated and given 2x 5mg versed pushes. Otherwise, no acute or major events overnight. Hemodynamically stable, tolerating oral diet, voiding appropriately with appropriate bowel movements.     PAST MEDICAL & SURGICAL HISTORY  Diabetes    Hypertension    No significant past surgical history      SOCIAL HISTORY:  Social History:      ALLERGIES:  penicillin (Rash)    MEDICATIONS:  STANDING MEDICATIONS  atorvastatin 40 milliGRAM(s) Oral at bedtime  cefTRIAXone   IVPB      cefTRIAXone   IVPB 2000 milliGRAM(s) IV Intermittent once  chlorhexidine 2% Cloths 1 Application(s) Topical <User Schedule>  dextrose 5%. 1000 milliLiter(s) IV Continuous <Continuous>  dextrose 5%. 1000 milliLiter(s) IV Continuous <Continuous>  dextrose 50% Injectable 12.5 Gram(s) IV Push once  dextrose 50% Injectable 25 Gram(s) IV Push once  dextrose 50% Injectable 25 Gram(s) IV Push once  enoxaparin Injectable 40 milliGRAM(s) SubCutaneous every 24 hours  glucagon  Injectable 1 milliGRAM(s) IntraMuscular once  hydrocortisone sodium succinate Injectable 100 milliGRAM(s) IV Push every 8 hours  insulin lispro (ADMELOG) corrective regimen sliding scale   SubCutaneous three times a day before meals  lactated ringers. 1000 milliLiter(s) IV Continuous <Continuous>  levETIRAcetam  IVPB 2000 milliGRAM(s) IV Intermittent every 12 hours  magnesium sulfate  IVPB 2 Gram(s) IV Intermittent every 2 hours  methimazole 20 milliGRAM(s) Oral <User Schedule>  pantoprazole  Injectable 40 milliGRAM(s) IV Push daily  Potassium iodide 250 milliGRAM(s) 250 milliGRAM(s) Oral every 6 hours  propranolol 80 milliGRAM(s) Oral every 6 hours  vancomycin  IVPB      vancomycin  IVPB 1500 milliGRAM(s) IV Intermittent every 12 hours  vancomycin  IVPB 1500 milliGRAM(s) IV Intermittent once    PRN MEDICATIONS  acetaminophen     Tablet .. 650 milliGRAM(s) Oral every 6 hours PRN  dextrose Oral Gel 15 Gram(s) Oral once PRN  melatonin 3 milliGRAM(s) Oral at bedtime PRN    VITALS:   T(F): 99.6  HR: 90  BP: 113/58  RR: 22  SpO2: 97%    PHYSICAL EXAM:  GENERAL: NAD, lying in bed. Unarousable with sternal rubs  HEAD: NCAT  NECK: Supple  HEART: Regular rate and rhythm, normal S1/S2, no murmurs  LUNGS: No acute respiratory distress, clear b/l breath sounds   ABDOMEN:  soft, non-tender, non-distended, + BS  EXTREMITIES: no edema      LABS:                        11.7   14.91 )-----------( 203      ( 26 Dec 2023 05:47 )             36.1         135  |  102  |  10  ----------------------------<  226<H>  4.1   |  22  |  0.5<L>    Ca    9.0      26 Dec 2023 05:47  Mg     1.5         TPro  6.6  /  Alb  3.6  /  TBili  1.1  /  DBili  x   /  AST  19  /  ALT  17  /  AlkPhos  154<H>        Urinalysis Basic - ( 26 Dec 2023 06:39 )    Color: Yellow / Appearance: Clear / S.015 / pH: x  Gluc: x / Ketone: Trace mg/dL  / Bili: Negative / Urobili: 1.0 mg/dL   Blood: x / Protein: Negative mg/dL / Nitrite: Negative   Leuk Esterase: Negative / RBC: x / WBC x   Sq Epi: x / Non Sq Epi: x / Bacteria: x        Lactate, Blood: 1.2 mmol/L (23 @ 22:34)          RADIOLOGY:    RADIOLOGY

## 2023-12-26 NOTE — PHARMACOTHERAPY INTERVENTION NOTE - COMMENTS
As per policy, ordered a vancomycin trough for 12/27 at 4pm in order to assist with vancomycin pharmacokinetic monitoring.    Prashanth Daniel, PharmD, [facility]  Clinical Pharmacy Specialist, Infectious Diseases  Tele-Antimicrobial Stewardship Program (Tele-ASP)  Tele-ASP Phone: [phone]   As per policy, ordered a vancomycin trough for 12/27 at 4pm in order to assist with vancomycin pharmacokinetic monitoring.    Prashanth Daniel, PharmD, [facility]  Clinical Pharmacy Specialist, Infectious Diseases  Tele-Antimicrobial Stewardship Program (Tele-ASP)  Tele-ASP Phone: [phone]

## 2023-12-26 NOTE — PATIENT PROFILE ADULT - FALL HARM RISK - HARM RISK INTERVENTIONS
Assistance with ambulation/Assistance OOB with selected safe patient handling equipment/Communicate Risk of Fall with Harm to all staff/Discuss with provider need for PT consult/Monitor gait and stability/Provide patient with walking aids - walker, cane, crutches/Reinforce activity limits and safety measures with patient and family/Tailored Fall Risk Interventions/Visual Cue: Yellow wristband and red socks/Bed in lowest position, wheels locked, appropriate side rails in place/Call bell, personal items and telephone in reach/Instruct patient to call for assistance before getting out of bed or chair/Non-slip footwear when patient is out of bed/[place] to call system/Physically safe environment - no spills, clutter or unnecessary equipment/Purposeful Proactive Rounding/Room/bathroom lighting operational, light cord in reach Assistance with ambulation/Assistance OOB with selected safe patient handling equipment/Communicate Risk of Fall with Harm to all staff/Discuss with provider need for PT consult/Monitor gait and stability/Provide patient with walking aids - walker, cane, crutches/Reinforce activity limits and safety measures with patient and family/Tailored Fall Risk Interventions/Visual Cue: Yellow wristband and red socks/Bed in lowest position, wheels locked, appropriate side rails in place/Call bell, personal items and telephone in reach/Instruct patient to call for assistance before getting out of bed or chair/Non-slip footwear when patient is out of bed/[place] to call system/Physically safe environment - no spills, clutter or unnecessary equipment/Purposeful Proactive Rounding/Room/bathroom lighting operational, light cord in reach

## 2023-12-26 NOTE — H&P ADULT - ATTENDING COMMENTS
events noted, AMS/ fever ? prior similar admission no LP done, thyroid storm, seizure sp ativan still unresponsive, no neck stiffness, pancx  abx, full TFT, ID/ Endo eval, EEG, head CT, HC/ methimazole, propranolol, correct Mg MICU

## 2023-12-26 NOTE — CHART NOTE - NSCHARTNOTEFT_GEN_A_CORE
Medication reconciliation     Per sister and medical records, patient uses [facility] pharmacy.    Per pharmacist, patient has only refilled Gabapentin 400mg TID in July.   Pharmacist denies record of methimazole. or other medications since January.  Confirmed with sister that the only medication she has seen is the gabapentin.    Patient has likely not been compliant with methimazole and other medications. Medication reconciliation     Per sister and medical records, patient uses [facility] pharmacy.    Per pharmacist, patient has only refilled Gabapentin 400mg TID in July.   Pharmacist denies record of methimazole. or other medications since January.  Confirmed with sister that the only medication she has seen is the gabapentin.    Patient has likely not been compliant with methimazole and other medications. Medication reconciliation and oncologic Hx    Per sister and medical records, patient uses [facility] [phone].    Per pharmacist, patient has only refilled Gabapentin 400mg TID in July.   Pharmacist denies record of methimazole. or other medications since January.  Confirmed with sister that the only medication she has seen is the gabapentin.    Patient has likely not been compliant with methimazole and other medications.    Per Sister (Sayra Santiago), patient reported recurrence of a neck cancer 1 month ago.  She follows an in network oncologist and was recently seen at [address] [place] [address]    Unable to find records. Medication reconciliation and oncologic Hx    Per sister and medical records, patient uses [facility] [phone].    Per pharmacist, patient has only refilled Gabapentin 400mg TID in July.   Pharmacist denies record of methimazole. or other medications since January.  Confirmed with sister that the only medication she has seen is the gabapentin.    Patient has likely not been compliant with methimazole and other medications.    Per Sister (Sayra Santiago), patient reported recurrence of a neck cancer 1 month ago.  She follows an in network oncologist and was recently seen at [address] [place] [address]    Unable to find records. Medication reconciliation and oncologic Hx    Per sister and medical records, patient uses [facility] [phone].    Per pharmacist, patient has only refilled Gabapentin 400mg TID in July.   Pharmacist denies record of methimazole. or other medications since January.  Confirmed with sister that the only medication she has seen is the gabapentin.    Patient has likely not been compliant with methimazole and other medications.    Per Sister (Sayra Santiago), patient reported recurrence of a neck cancer 1 month ago.  She follows an in network oncologist and was recently seen at [address] [place] [address]    Unable to find oncologic records:    Per [employer] clinic f/u note 12/2021  ?history of throat cancer: No clear documentation of it in [facility] PMD notes?  -Sister reported she underwent chemotherapy and was in remission but no recent follow up  ?-Oncologist is unknown. Medication reconciliation and oncologic Hx    Per sister and medical records, patient uses [facility] [phone].    Per pharmacist, patient has only refilled Gabapentin 400mg TID in July.   Pharmacist denies record of methimazole. or other medications since January.  Confirmed with sister that the only medication she has seen is the gabapentin.    Patient has likely not been compliant with methimazole and other medications.    Per Sister (Sayra Santiago), patient reported recurrence of a neck cancer 1 month ago.  She follows an in network oncologist and was recently seen at [address] [place] [address]    Unable to find oncologic records:    Per [employer] clinic f/u note 12/2021  ?history of throat cancer: No clear documentation of it in [facility] PMD notes?  -Sister reported she underwent chemotherapy and was in remission but no recent follow up  ?-Oncologist is unknown.

## 2023-12-26 NOTE — H&P ADULT - ASSESSMENT
53y F PMH of hyperthyroidism, HTN, DMII, and HLD presents to the ED by family to the ED for likely thyroid storm    IMPRESSION:  Metabolic Encephalopathy  Likely Thyroid Storm (Ramirez/Wartofsky score of 70)  Hypomagnesemia  Sepsis on presentation  Hx of hyperthyroidism on methimazole  Hx of HTN  Hx of HLD  Hx of DMII  Previous hx of thyroid storm        PLAN:    CNS: aox 0, keep off sedation, avoid tranquilizers except in severe agitation, urine and serum drug screen. If patient's MS doesn't improve, consider CT head and EEG    HEENT: unremarkable, NG tube in place     PULMONARY: HOB at 45, off O2, patient can protect her airway, monitor for now    CARDIOVASCULAR: Monitor HR and BP with propranolol and adjust accordingly    GI: GI prophylaxis.  Feeding once more awake, keep NPO for now    RENAL: IV hydration at 75 cc/hr    INFECTIOUS DISEASE: check RVP, check UA/UCX, procal. Follow up cultures    HEMATOLOGICAL:  DVT prophylaxis.    ENDOCRINE:  FU tsh, t4, t3. Methimizole 20 mg every 6 hours, Propranolol 80 mg every 6 hours, Potassium Iodide 250 mg every 6 hours,  Hydrocortisone 100 mg intravenously every 8 hours; endo eval.   Follow up FS.  Insulin protocol if needed.      MUSCULOSKELETAL: Bedrest for now        Diet: NPO  Activity: bed rest  DVT ppx: lovenox  GI ppx: none  Dispo: MICU       53y F PMH of hyperthyroidism, HTN, DMII, and HLD presents to the ED by family to the ED for likely thyroid storm    PLEASE OBTAIN MED REC FORM [facility] IN AM (Patient is altered and family do not know her meds)    IMPRESSION:  Metabolic Encephalopathy  Likely Thyroid Storm (Ramirez/Wartofsky score of 70)  Hypomagnesemia  Sepsis on presentation  Hx of hyperthyroidism on methimazole  Hx of HTN  Hx of HLD  Hx of DMII  Previous hx of thyroid storm        PLAN:    CNS: aox 0, keep off sedation, avoid tranquilizers except in severe agitation, urine and serum drug screen. If patient's MS doesn't improve, consider CT head and EEG    HEENT: unremarkable, NG tube in place     PULMONARY: HOB at 45, off O2, patient can protect her airway, monitor for now    CARDIOVASCULAR: Monitor HR and BP with propranolol and adjust accordingly    GI: GI prophylaxis.  Feeding once more awake, keep NPO for now    RENAL: IV hydration at 75 cc/hr    INFECTIOUS DISEASE: check RVP, check UA/UCX, procal. Follow up cultures    HEMATOLOGICAL:  DVT prophylaxis.    ENDOCRINE:  FU tsh, t4, t3. Methimizole 20 mg every 6 hours, Propranolol 80 mg every 6 hours, Potassium Iodide 250 mg every 6 hours,  Hydrocortisone 100 mg intravenously every 8 hours; endo eval.   Follow up FS.  Insulin protocol if needed.      MUSCULOSKELETAL: Bedrest for now        Diet: NPO  Activity: bed rest  DVT ppx: lovenox  GI ppx: none  Dispo: MICU       53y F PMH of hyperthyroidism, HTN, DMII, and HLD presents to the ED by family to the ED for likely thyroid storm    PLEASE OBTAIN MED REC FORM [facility] IN AM (Patient is altered and family do not know her meds)    IMPRESSION:  Metabolic Encephalopathy  Likely Thyroid Storm (Ramirez/Wartofsky score of 70)  Hypomagnesemia  Sepsis on presentation  Hx of hyperthyroidism on methimazole  Hx of HTN  Hx of HLD  Hx of DMII  Previous hx of thyroid storm        PLAN:    CNS: aox 0, keep off sedation, avoid tranquilizers except in severe agitation, urine and serum drug screen. If patient's MS doesn't improve, consider CT head and EEG    HEENT: unremarkable, NG tube in place     PULMONARY: HOB at 45, off O2, patient can protect her airway, monitor for now    CARDIOVASCULAR: Monitor HR and BP with propranolol and adjust accordingly    GI: GI prophylaxis.  Feeding once more awake, keep NPO for now    RENAL: IV hydration at 75 cc/hr    INFECTIOUS DISEASE: check RVP, check UA/UCX, procal. Follow up cultures    HEMATOLOGICAL:  DVT prophylaxis.    ENDOCRINE:  FU tsh, t4, t3. Methimizole 20 mg every 6 hours, Propranolol 80 mg every 6 hours, Potassium Iodide 250 mg every 6 hours,  Hydrocortisone 100 mg intravenously every 8 hours; endo eval.   Follow up FS.  Insulin protocol if needed.      MUSCULOSKELETAL: Bedrest for now        Diet: NPO  Activity: bed rest  DVT ppx: lovenox  GI ppx: none  Dispo: MICU       53y F PMH of hyperthyroidism, HTN, DMII, and HLD presents to the ED by family to the ED for likely thyroid storm    PLEASE OBTAIN MED REC FORM [facility] IN AM (Patient is altered and family do not know her meds)    IMPRESSION:    Metabolic Encephalopathy  Likely Thyroid Storm (Ramirez/Wartofsky score of 70)  Hypomagnesemia  Sepsis on presentation  Hx of hyperthyroidism on methimazole  Hx of HTN  Hx of HLD  Hx of DMII  Previous hx of thyroid storm        PLAN:    CNS: aox 0, keep off sedation, avoid tranquilizers except in severe agitation, urine and serum drug screen. If patient's MS doesn't improve, consider CT head and EEG    HEENT: unremarkable, NG tube in place     PULMONARY: HOB at 45, off O2, patient can protect her airway, monitor for now    CARDIOVASCULAR: Monitor HR and BP with propranolol and adjust accordingly    GI: GI prophylaxis.  Feeding once more awake, keep NPO for now    RENAL: IV hydration at 75 cc/hr    INFECTIOUS DISEASE: check RVP, check UA/UCX, procal. Follow up cultures    HEMATOLOGICAL:  DVT prophylaxis.    ENDOCRINE:  FU tsh, t4, t3. Methimazole 20 mg every 6 hours, Propranolol 80 mg every 6 hours, Potassium Iodide 250 mg every 6 hours,  Hydrocortisone 100 mg intravenously every 8 hours; endo eval.   Follow up FS.  Insulin protocol if needed.      MUSCULOSKELETAL: Bedrest for now        Diet: NPO  Activity: bed rest  DVT ppx: lovenox  GI ppx: none  Dispo: MICU       53y F PMH of hyperthyroidism, HTN, DMII, and HLD presents to the ED by family to the ED for likely thyroid storm    PLEASE OBTAIN MED REC FORM [facility] IN AM (Patient is altered and family do not know her meds)    IMPRESSION:    Metabolic Encephalopathy  Likely Thyroid Storm (Ramirez/Wartofsky score of 70)  Hypomagnesemia  Sepsis on presentation  Hx of hyperthyroidism on methimazole  Hx of HTN  Hx of HLD  Hx of DMII  Previous hx of thyroid storm        PLAN:    CNS: aox 0, keep off sedation, avoid tranquilizers except in severe agitation, urine and serum drug screen. If patient's MS doesn't improve, consider CT head and EEG    HEENT: unremarkable, NG tube in place     PULMONARY: HOB at 45, off O2, patient can protect her airway, monitor for now    CARDIOVASCULAR: Monitor HR and BP with propranolol and adjust accordingly    GI: GI prophylaxis.  Feeding once more awake, keep NPO for now    RENAL: IV hydration at 75 cc/hr    INFECTIOUS DISEASE: check RVP, check UA/UCX, procal. Follow up cultures    HEMATOLOGICAL:  DVT prophylaxis.    ENDOCRINE:  FU tsh, t4, t3. Methimazole 20 mg every 6 hours, Propranolol 80 mg every 6 hours, Potassium Iodide 250 mg every 6 hours,  Hydrocortisone 100 mg intravenously every 8 hours; endo eval.   Follow up FS.  Insulin protocol if needed.      MUSCULOSKELETAL: Bedrest for now        Diet: NPO  Activity: bed rest  DVT ppx: lovenox  GI ppx: none  Dispo: MICU

## 2023-12-26 NOTE — H&P ADULT - NSHPLABSRESULTS_GEN_ALL_CORE
Complete Blood Count + Automated Diff (12.25.23 @ 22:17)   WBC Count: 15.37 K/uL  RBC Count: 5.12 M/uL  Hemoglobin: 12.5 g/dL  Hematocrit: 38.8 %  Mean Cell Volume: 75.8 fL  Mean Cell Hemoglobin: 24.4 pg  Mean Cell Hemoglobin Conc: 32.2 g/dL  Red Cell Distrib Width: 13.0 %  Platelet Count - Automated: 219 K/uL  MPV: 10.0 fL  Auto Neutrophil #: 12.94 K/uL  Auto Lymphocyte #: 1.20 K/uL  Auto Monocyte #: 1.12 K/uL  Auto Eosinophil #: 0.04 K/uL  Auto Basophil #: 0.01 K/uL  Auto Neutrophil %: 84.1: Differential percentages must be correlated with absolute numbers for   clinical significance. %  Auto Lymphocyte %: 7.8 %  Auto Monocyte %: 7.3 %  Auto Eosinophil %: 0.3 %  Auto Basophil %: 0.1 %  Auto Immature Granulocyte %: 0.4: (Includes meta, myelo and promyelocytes). Mild elevations in immature   granulocytes may be seen with many inflammatory processes and pregnancy;   clinical correlation suggested. %  Nucleated RBC: 0 /100 WBCs    Comprehensive Metabolic Panel (12.25.23 @ 22:17)   Sodium: 140 mmol/L  Potassium: 4.0 mmol/L  Chloride: 105 mmol/L  Carbon Dioxide: 21 mmol/L  Anion Gap: 14 mmol/L  Blood Urea Nitrogen: 10 mg/dL  Creatinine: 0.6 mg/dL  Glucose: 164 mg/dL  Calcium: 9.5 mg/dL  Protein Total: 7.2 g/dL  Albumin: 4.1 g/dL  Bilirubin Total: 1.1 mg/dL  Alkaline Phosphatase: 158 U/L  Aspartate Aminotransferase (AST/SGOT): 17 U/L  Alanine Aminotransferase (ALT/SGPT): 16 U/L  eGFR: 108: The estimated glomerular filtration rate (eGFR) is calculated using the Blood Gas Profile - Venous (12.25.23 @ 23:02)   pH, Venous: 7.40  pCO2, Venous: 39 mmHg  pO2, Venous: 41 mmHg  HCO3, Venous: 24 mmol/L  Base Excess, Venous: -0.5 mmol/L  Oxygen Saturation, Venous: 71.4 %      Historical Values

## 2023-12-26 NOTE — PHARMACOTHERAPY INTERVENTION NOTE - COMMENTS
In order to perform vancomycin pharmacokinetic monitoring, updated patient's height for this admission to 165.1 cm, as height was previously documented as 165.1 cm per [facility], and there was no height documented for this admission.    Prashanth Daniel, PharmD, [facility]  Clinical Pharmacy Specialist, Infectious Diseases  Tele-Antimicrobial Stewardship Program (Tele-ASP)  Tele-ASP Phone: [phone]   In order to perform vancomycin pharmacokinetic monitoring, updated patient's height for this admission to 165.1 cm, as height was previously documented as 165.1 cm per [facility], and there was no height documented for this admission.    Prashanth Daniel, PharmD, [facility]  Clinical Pharmacy Specialist, Infectious Diseases  Tele-Antimicrobial Stewardship Program (Tele-ASP)  Tele-ASP Phone: [phone]

## 2023-12-26 NOTE — PROGRESS NOTE ADULT - ASSESSMENT
53y F PMH of hyperthyroidism, HTN, DMII, and HLD presents to the ED by family to the ED for likely thyroid storm      #Thyroid storm  #Hyperthyroidism  #Hx of thyroid storm  - Diagnosed with hyperthyroidism 11/19/21, Started methimazole 10  - Recent admission for thyroid storm 12/11/23.   - Seen by endo: DC'd on Methimazole 30 12/17/23  - Ramirez/Wartofsky score of 70  12/26  - TSH, T4, T3 ordered   - Methimazole 20mg Q6H  - Propranolol 80mg Q6H  - KI 250mg Q6H  - Hydrocortisone IV 100mg Q8  - Endocrinology consulted        #Metabolic encephalopathy  #Sepsis POA  #Seizure  #AMS  12/25  - AMS starting at 2100. CT head ordered. s/p 2x midazolam 5mg   12/26  - Seizure 0844 s/p 1x 4mg Ativan   - Start Keppra  - CT head pending,   - EEG consent pending  - ID consulted, LP ?  - pending RVP, UA, UCX, procal  -         #HTN  #HLD  #DMII      Diet: NPO except meds (NG tube)  Activity: bed rest  DVT ppx: lovenox  GI ppx: none  Dispo: MICU      pendig: med rec, EEG consent, Infectious workup (LP, cultures, rvp, ua/ucx). Endo consult, ID consult, Thyroid workup,.     53y F PMH of hyperthyroidism, HTN, DMII, and HLD presents to the ED by family to the ED for likely thyroid storm      #Thyroid storm  #Hyperthyroidism  #Hx of thyroid storm  - Diagnosed with hyperthyroidism 11/19/21, Started methimazole 10  - Recent admission for thyroid storm 12/11/23.   - Seen by endo: DC'd on Methimazole 30 12/17/23  - Ramirez/Wartofsky score of 70  12/26  - TSH, T4, T3 ordered   - Methimazole 20mg Q6H  - Propranolol 80mg Q6H  - KI 250mg Q6H  - Hydrocortisone IV 100mg Q8  - Endocrinology consulted        #Metabolic encephalopathy  #Sepsis POA  #Seizure  #AMS  12/25  - AMS starting at 2100. CT head ordered. s/p 2x midazolam 5mg   12/26  - Seizure 0844 s/p 1x 4mg Ativan   - Start Keppra  - CT head pending,   - EEG consent pending  - ID consulted, LP ?  - pending RVP, UA, UCX, procal  - Start vanc rocephin    #HTN  #HLD  #DMII  - pending med rec    Diet: NPO except meds (NG tube)  Activity: bed rest  DVT ppx: lovenox  GI ppx: none  Dispo: MICU      pendig: med rec, EEG consent, Infectious workup (LP, cultures, rvp, ua/ucx). Endo consult, ID consult, Thyroid workup,.

## 2023-12-26 NOTE — CHART NOTE - NSCHARTNOTEFT_GEN_A_CORE
0844 Patient found to be seizing by nurse    s/p 1x 4mg ativan push  Started on keppra drip  Neuro consulted, EEG ordered.    Multiple attempts were made to obtain consent for EEG.  3x calls to sister Sayra Santiago unsuccessful.    Will hold EEG for now. Will continue to try and contact family. 0844 Patient found to be seizing by nurse    s/p 1x 4mg ativan push  Started on keppra drip  Neuro consulted, EEG ordered.

## 2023-12-26 NOTE — CONSULT NOTE ADULT - ASSESSMENT
This is a 52yo F w/ h/o Hyperthyroidism/thyroid storm, HTN, DM, HLD presenting to the ED delirious. Per Chart review she had a similar episode 2022 for to be in thyroid storm s/p Methimazole. Admitted ti MICU for suspicion of storming (febrile and tachycardic). In ICU 12/26/2023 she was a witnessed GTC x 3 mins s/p Lorazepam 4mg IVP x 1 and Levetiracetam Load x 1. On clinical exam she isunarousable, withdraw LUE/b/l LE to pain, no gaze deviation, pupils pinpoint, Flaccid tone in RUE otherwise normal tone throughout.     Impression  Provoked Seizure i/s/o Thyroid storm vs infection  Encephalopathy vs Encephalitis    - Rx vs infection vs autoimmune vs stroke  PRES      Recommendation  Neuro and vital assessment Q4-6  c/w vEEG monitoring   decrease Levetiracetam to max daily dosing 1500mg BID for now  monitor clinically for seizure  maintain seizure risk precaution  Get Utox   cont w/u for thyroid storm  may need brain MRI w/wo con to r/o central pathology  This is a 54yo F w/ h/o Hyperthyroidism/thyroid storm, HTN, DM, HLD presenting to the ED delirious. Per Chart review she had a similar episode 2022 found to be in thyroid storm s/p Methimazole. Admitted to MICU for suspicion of storming (febrile and tachycardic). In ICU 12/26/2023 she was a witnessed GTC x 3 mins s/p Lorazepam 4mg IVP x 1 and Levetiracetam 2g IVPB Load x 1. On clinical exam she is unarousable, withdraw LUE/b/l LE to pain, no gaze deviation, pupils pinpoint, Flaccid tone in RUE otherwise normal tone throughout.     Impression  Provoked Seizure i/s/o Thyroid storm vs infection  - r/o subclinical status   Encephalopathy vs Encephalitis    - Rx vs infection vs autoimmune vs stroke  PRES      Recommendation  Neuro and vital assessment Q4-6  c/w vEEG monitoring   decrease Levetiracetam to max daily dosing 1500mg BID for now  monitor clinically for seizure  maintain seizure risk precaution  Get Utox   cont w/u for thyroid storm  may need brain MRI w/wo con to r/o central pathology  This is a 52yo F w/ h/o Hyperthyroidism/thyroid storm, HTN, DM, HLD presenting to the ED delirious. Per Chart review she had a similar episode 2022 found to be in thyroid storm s/p Methimazole. Admitted to MICU for suspicion of storming (febrile and tachycardic). In ICU 12/26/2023 she was a witnessed GTC x 3 mins s/p Lorazepam 4mg IVP x 1 and Levetiracetam 2g IVPB Load x 1. On clinical exam she is unarousable, withdraw LUE/b/l LE to pain, no gaze deviation, pupils pinpoint, Flaccid tone in RUE otherwise normal tone throughout.     Impression  Provoked Seizure i/s/o Thyroid storm vs infection  - r/o subclinical status   Encephalopathy vs Encephalitis    - Rx vs infection vs autoimmune vs stroke  PRES      Recommendation  Neuro and vital assessment Q4-6  c/w vEEG monitoring   decrease Levetiracetam to max daily dosing 1500mg BID for now  monitor clinically for seizure  maintain seizure risk precaution  Get Utox   cont w/u for thyroid storm  may need brain MRI w/wo con to r/o central pathology

## 2023-12-26 NOTE — H&P ADULT - HISTORY OF PRESENT ILLNESS
53y F PMH of hyperthyroidism, HTN, DMII, and HLD presents to the ED by family to the ED for evaluation of altered mental status started acutely approximately 3 hours prior to arrival to ED. History taken from sister as patient is somnolent and incoherent. Patient had similar episode 1 year ago when she did not take her thyroid medications, and it progressed to thyroid storm.  Patient's family states patient has been living with her daughter, not sure if she has been taking her medications as prescribed.  Patient brought to ER hyperventilating moving extremities not following commands, tachycardic, agitated. Rectal temp elevated at 102.3, tachycardic, concern for thyroid storm given previous presentation.  In the ED, BP was 161/78, , temp 98.2 repeated 102.1. Labs remarkable for WBC 15, Mg 1.5. Patient was given propronolol IV 2 mg, solucortef 100 mg, and methimazole 20 mg in addition to 3 L of IV fluids. 53y F PMH of hyperthyroidism, HTN, DMII, and HLD presents to the ED by family to the ED for evaluation of altered mental status started acutely approximately 3 hours prior to arrival to ED. History taken from sister as patient is somnolent and incoherent. Patient had similar episode 1 year ago when she did not take her thyroid medications, and it progressed to thyroid storm.  Patient's family states patient has been living with her daughter, not sure if she has been taking her medications as prescribed.  Patient brought to ER hyperventilating moving extremities not following commands, tachycardic, agitated. Rectal temp elevated at 102.3, tachycardic, concern for thyroid storm given previous presentation.  In the ED, BP was 161/78, , temp 98.2 repeated 102.1. Labs remarkable for WBC 15, Mg 1.5. Patient was given propronolol IV 2 mg, solucortef 100 mg, and methimazole 20 mg in addition to 3 L of IV fluids.    PLEASE OBTAIN MED REC FORM [facility] IN AM (Patient is altered and family do not know her meds) 53y F PMH of hyperthyroidism, HTN, DMII, and HLD presents to the ED by family to the ED for evaluation of altered mental status started acutely approximately 3 hours prior to arrival to ED. History taken from sister as patient is somnolent and incoherent. Patient had similar episode 1 year ago when she did not take her thyroid medications, and it progressed to thyroid storm.  Patient's family states patient has been living with her daughter, not sure if she has been taking her medications as prescribed.  Patient brought to ER hyperventilating moving extremities not following commands, tachycardic, agitated. Rectal temp elevated at 102.3, tachycardic, concern for thyroid storm given previous presentation.  In the ED, BP was 161/78, , temp 98.2 repeated 102.1. Labs remarkable for WBC 15, Mg 1.5. Patient was given propronolol IV 2 mg, solucortef 100 mg, and methimazole 20 mg in addition to 3 L of IV fluids.    PLEASE OBTAIN MED REC FORM [facility] IN AM (Patient is altered and family do not know her meds)

## 2023-12-27 LAB
CULTURE RESULTS: SIGNIFICANT CHANGE UP
CULTURE RESULTS: SIGNIFICANT CHANGE UP
GLUCOSE BLDC GLUCOMTR-MCNC: 234 MG/DL — HIGH (ref 70–99)
GLUCOSE BLDC GLUCOMTR-MCNC: 234 MG/DL — HIGH (ref 70–99)
GLUCOSE BLDC GLUCOMTR-MCNC: 244 MG/DL — HIGH (ref 70–99)
GLUCOSE BLDC GLUCOMTR-MCNC: 244 MG/DL — HIGH (ref 70–99)
GLUCOSE BLDC GLUCOMTR-MCNC: 245 MG/DL — HIGH (ref 70–99)
GLUCOSE BLDC GLUCOMTR-MCNC: 245 MG/DL — HIGH (ref 70–99)
GLUCOSE BLDC GLUCOMTR-MCNC: 293 MG/DL — HIGH (ref 70–99)
GLUCOSE BLDC GLUCOMTR-MCNC: 293 MG/DL — HIGH (ref 70–99)
PROCALCITONIN SERPL-MCNC: 0.05 NG/ML — SIGNIFICANT CHANGE UP (ref 0.02–0.1)
PROCALCITONIN SERPL-MCNC: 0.05 NG/ML — SIGNIFICANT CHANGE UP (ref 0.02–0.1)
RAPID RVP RESULT: SIGNIFICANT CHANGE UP
RAPID RVP RESULT: SIGNIFICANT CHANGE UP
SARS-COV-2 RNA SPEC QL NAA+PROBE: SIGNIFICANT CHANGE UP
SARS-COV-2 RNA SPEC QL NAA+PROBE: SIGNIFICANT CHANGE UP
SPECIMEN SOURCE: SIGNIFICANT CHANGE UP
SPECIMEN SOURCE: SIGNIFICANT CHANGE UP

## 2023-12-27 PROCEDURE — 99291 CRITICAL CARE FIRST HOUR: CPT

## 2023-12-27 PROCEDURE — 95720 EEG PHY/QHP EA INCR W/VEEG: CPT

## 2023-12-27 RX ORDER — AMLODIPINE BESYLATE 2.5 MG/1
5 TABLET ORAL DAILY
Refills: 0 | Status: DISCONTINUED | OUTPATIENT
Start: 2023-12-27 | End: 2023-12-29

## 2023-12-27 RX ORDER — LEVETIRACETAM 250 MG/1
750 TABLET, FILM COATED ORAL
Refills: 0 | Status: DISCONTINUED | OUTPATIENT
Start: 2023-12-27 | End: 2023-12-27

## 2023-12-27 RX ORDER — LEVETIRACETAM 250 MG/1
750 TABLET, FILM COATED ORAL EVERY 12 HOURS
Refills: 0 | Status: DISCONTINUED | OUTPATIENT
Start: 2023-12-27 | End: 2023-12-28

## 2023-12-27 RX ORDER — CHOLESTYRAMINE 4 G/9G
4 POWDER, FOR SUSPENSION ORAL THREE TIMES A DAY
Refills: 0 | Status: DISCONTINUED | OUTPATIENT
Start: 2023-12-27 | End: 2023-12-28

## 2023-12-27 RX ORDER — INSULIN GLARGINE 100 [IU]/ML
20 INJECTION, SOLUTION SUBCUTANEOUS EVERY MORNING
Refills: 0 | Status: DISCONTINUED | OUTPATIENT
Start: 2023-12-27 | End: 2023-12-29

## 2023-12-27 RX ORDER — INSULIN GLARGINE 100 [IU]/ML
20 INJECTION, SOLUTION SUBCUTANEOUS AT BEDTIME
Refills: 0 | Status: DISCONTINUED | OUTPATIENT
Start: 2023-12-27 | End: 2023-12-29

## 2023-12-27 RX ADMIN — Medication 4: at 06:00

## 2023-12-27 RX ADMIN — Medication 4: at 16:36

## 2023-12-27 RX ADMIN — LEVETIRACETAM 400 MILLIGRAM(S): 250 TABLET, FILM COATED ORAL at 05:07

## 2023-12-27 RX ADMIN — Medication 100 MILLIGRAM(S): at 05:08

## 2023-12-27 RX ADMIN — LEVETIRACETAM 400 MILLIGRAM(S): 250 TABLET, FILM COATED ORAL at 17:16

## 2023-12-27 RX ADMIN — ENOXAPARIN SODIUM 40 MILLIGRAM(S): 100 INJECTION SUBCUTANEOUS at 11:49

## 2023-12-27 RX ADMIN — PANTOPRAZOLE SODIUM 40 MILLIGRAM(S): 20 TABLET, DELAYED RELEASE ORAL at 11:42

## 2023-12-27 RX ADMIN — CHLORHEXIDINE GLUCONATE 1 APPLICATION(S): 213 SOLUTION TOPICAL at 05:07

## 2023-12-27 RX ADMIN — AMLODIPINE BESYLATE 5 MILLIGRAM(S): 2.5 TABLET ORAL at 10:34

## 2023-12-27 RX ADMIN — CHOLESTYRAMINE 4 GRAM(S): 4 POWDER, FOR SUSPENSION ORAL at 13:23

## 2023-12-27 RX ADMIN — Medication 6: at 12:13

## 2023-12-27 RX ADMIN — INSULIN GLARGINE 20 UNIT(S): 100 INJECTION, SOLUTION SUBCUTANEOUS at 08:48

## 2023-12-27 RX ADMIN — ATORVASTATIN CALCIUM 40 MILLIGRAM(S): 80 TABLET, FILM COATED ORAL at 22:05

## 2023-12-27 RX ADMIN — CEFTRIAXONE 100 MILLIGRAM(S): 500 INJECTION, POWDER, FOR SOLUTION INTRAMUSCULAR; INTRAVENOUS at 09:06

## 2023-12-27 RX ADMIN — Medication 300 MILLIGRAM(S): at 05:07

## 2023-12-27 RX ADMIN — Medication 100 MILLIGRAM(S): at 13:23

## 2023-12-27 RX ADMIN — INSULIN GLARGINE 20 UNIT(S): 100 INJECTION, SOLUTION SUBCUTANEOUS at 22:10

## 2023-12-27 NOTE — CONSULT NOTE ADULT - SUBJECTIVE AND OBJECTIVE BOX
RENÉ REIS  53y, Female  Allergy: penicillin (Rash)      All historical available data reviewed.    HPI:  53y F PMH of hyperthyroidism, HTN, DMII, and HLD presents to the ED by family to the ED for evaluation of altered mental status started acutely approximately 3 hours prior to arrival to ED. History taken from sister as patient is somnolent and incoherent. Patient had similar episode 1 year ago when she did not take her thyroid medications, and it progressed to thyroid storm.  Patient's family states patient has been living with her daughter, not sure if she has been taking her medications as prescribed.  Patient brought to ER hyperventilating moving extremities not following commands, tachycardic, agitated. Rectal temp elevated at 102.3, tachycardic, concern for thyroid storm given previous presentation.  In the ED, BP was 161/78, , temp 98.2 repeated 102.1. Labs remarkable for WBC 15, Mg 1.5. Patient was given propronolol IV 2 mg, solucortef 100 mg, and methimazole 20 mg in addition to 3 L of IV fluids.    PLEASE OBTAIN MED REC FORM [facility] IN AM (Patient is altered and family do not know her meds) (26 Dec 2023 00:33)    FAMILY HISTORY:    PAST MEDICAL & SURGICAL HISTORY:  Diabetes      Hypertension      No significant past surgical history            VITALS:  T(F): 98.9, Max: 98.9 (12-27-23 @ 08:00)  HR: 78  BP: 193/81  RR: 18Vital Signs Last 24 Hrs  T(C): 37.2 (27 Dec 2023 08:00), Max: 37.2 (27 Dec 2023 08:00)  T(F): 98.9 (27 Dec 2023 08:00), Max: 98.9 (27 Dec 2023 08:00)  HR: 78 (27 Dec 2023 10:00) (75 - 90)  BP: 193/81 (27 Dec 2023 10:00) (114/53 - 193/81)  BP(mean): 117 (27 Dec 2023 10:00) (76 - 125)  RR: 18 (27 Dec 2023 10:00) (17 - 31)  SpO2: 100% (27 Dec 2023 10:00) (97% - 100%)    Parameters below as of 27 Dec 2023 08:00  Patient On (Oxygen Delivery Method): room air        TESTS & MEASUREMENTS:                        11.7   14.91 )-----------( 203      ( 26 Dec 2023 05:47 )             36.1     12-26    135  |  102  |  11  ----------------------------<  195<H>  4.1   |  22  |  0.5<L>    Ca    9.2      26 Dec 2023 16:40  Mg     2.3     12-26    TPro  6.6  /  Alb  3.6  /  TBili  1.1  /  DBili  x   /  AST  19  /  ALT  17  /  AlkPhos  154<H>  12-26    LIVER FUNCTIONS - ( 26 Dec 2023 05:47 )  Alb: 3.6 g/dL / Pro: 6.6 g/dL / ALK PHOS: 154 U/L / ALT: 17 U/L / AST: 19 U/L / GGT: x             Culture - Blood (collected 12-25-23 @ 22:13)  Source: .Blood Blood-Peripheral  Preliminary Report (12-27-23 @ 07:02):    No growth at 24 hours    Culture - Blood (collected 12-25-23 @ 22:13)  Source: .Blood Blood-Peripheral  Preliminary Report (12-27-23 @ 07:02):    No growth at 24 hours      Urinalysis Basic - ( 26 Dec 2023 16:40 )    Color: x / Appearance: x / SG: x / pH: x  Gluc: 195 mg/dL / Ketone: x  / Bili: x / Urobili: x   Blood: x / Protein: x / Nitrite: x   Leuk Esterase: x / RBC: x / WBC x   Sq Epi: x / Non Sq Epi: x / Bacteria: x          RADIOLOGY & ADDITIONAL TESTS:  Personal review of radiological diagnostics performed  Echo and EKG results noted when applicable.     MEDICATIONS:  acetaminophen     Tablet .. 650 milliGRAM(s) Oral every 6 hours PRN  amLODIPine   Tablet 5 milliGRAM(s) Oral daily  atorvastatin 40 milliGRAM(s) Oral at bedtime  cefTRIAXone   IVPB      cefTRIAXone   IVPB 2000 milliGRAM(s) IV Intermittent every 24 hours  chlorhexidine 2% Cloths 1 Application(s) Topical <User Schedule>  cholestyramine Powder (Sugar-Free) 4 Gram(s) Oral three times a day  dextrose 5%. 1000 milliLiter(s) IV Continuous <Continuous>  dextrose 5%. 1000 milliLiter(s) IV Continuous <Continuous>  dextrose 50% Injectable 25 Gram(s) IV Push once  dextrose 50% Injectable 25 Gram(s) IV Push once  dextrose 50% Injectable 12.5 Gram(s) IV Push once  dextrose Oral Gel 15 Gram(s) Oral once PRN  enoxaparin Injectable 40 milliGRAM(s) SubCutaneous every 24 hours  glucagon  Injectable 1 milliGRAM(s) IntraMuscular once  hydrocortisone sodium succinate Injectable 100 milliGRAM(s) IV Push every 8 hours  insulin lispro (ADMELOG) corrective regimen sliding scale   SubCutaneous three times a day before meals  lactated ringers. 1000 milliLiter(s) IV Continuous <Continuous>  levETIRAcetam  IVPB 1500 milliGRAM(s) IV Intermittent every 12 hours  melatonin 3 milliGRAM(s) Oral at bedtime PRN  methimazole 20 milliGRAM(s) Oral <User Schedule>  pantoprazole  Injectable 40 milliGRAM(s) IV Push daily  Potassium iodide 250 milliGRAM(s),Potassium Iodide ( 5 drops=0.25 ml) 250 milliGRAM(s) 250 milliGRAM(s) Oral every 6 hours  propranolol 80 milliGRAM(s) Oral every 6 hours  vancomycin  IVPB 1500 milliGRAM(s) IV Intermittent every 12 hours  vancomycin  IVPB          ANTIBIOTICS:  cefTRIAXone   IVPB      cefTRIAXone   IVPB 2000 milliGRAM(s) IV Intermittent every 24 hours  vancomycin  IVPB 1500 milliGRAM(s) IV Intermittent every 12 hours  vancomycin  IVPB           RENÉ REIS  53y, Female  Allergy: penicillin (Rash)      All historical available data reviewed.    HPI:  53y F PMH of hyperthyroidism, HTN, DMII, and HLD presents to the ED by family to the ED for evaluation of altered mental status started acutely approximately 3 hours prior to arrival to ED. History taken from sister as patient is somnolent and incoherent. Patient had similar episode 1 year ago when she did not take her thyroid medications, and it progressed to thyroid storm.  Patient's family states patient has been living with her daughter, not sure if she has been taking her medications as prescribed.  Patient brought to ER hyperventilating moving extremities not following commands, tachycardic, agitated. Rectal temp elevated at 102.3, tachycardic, concern for thyroid storm given previous presentation.  In the ED, BP was 161/78, , temp 98.2 repeated 102.1. Labs remarkable for WBC 15, Mg 1.5. Patient was given propronolol IV 2 mg, solucortef 100 mg, and methimazole 20 mg in addition to 3 L of IV fluids.    PLEASE OBTAIN MED REC FORM [facility] IN AM (Patient is altered and family do not know her meds) (26 Dec 2023 00:33)    FAMILY HISTORY:    PAST MEDICAL & SURGICAL HISTORY:  Diabetes      Hypertension      No significant past surgical history            VITALS:  T(F): 98.9, Max: 98.9 (12-27-23 @ 08:00)  HR: 78  BP: 193/81  RR: 18Vital Signs Last 24 Hrs  T(C): 37.2 (27 Dec 2023 08:00), Max: 37.2 (27 Dec 2023 08:00)  T(F): 98.9 (27 Dec 2023 08:00), Max: 98.9 (27 Dec 2023 08:00)  HR: 78 (27 Dec 2023 10:00) (75 - 90)  BP: 193/81 (27 Dec 2023 10:00) (114/53 - 193/81)  BP(mean): 117 (27 Dec 2023 10:00) (76 - 125)  RR: 18 (27 Dec 2023 10:00) (17 - 31)  SpO2: 100% (27 Dec 2023 10:00) (97% - 100%)    Parameters below as of 27 Dec 2023 08:00  Patient On (Oxygen Delivery Method): room air        TESTS & MEASUREMENTS:                        11.7   14.91 )-----------( 203      ( 26 Dec 2023 05:47 )             36.1     12-26    135  |  102  |  11  ----------------------------<  195<H>  4.1   |  22  |  0.5<L>    Ca    9.2      26 Dec 2023 16:40  Mg     2.3     12-26    TPro  6.6  /  Alb  3.6  /  TBili  1.1  /  DBili  x   /  AST  19  /  ALT  17  /  AlkPhos  154<H>  12-26    LIVER FUNCTIONS - ( 26 Dec 2023 05:47 )  Alb: 3.6 g/dL / Pro: 6.6 g/dL / ALK PHOS: 154 U/L / ALT: 17 U/L / AST: 19 U/L / GGT: x             Culture - Blood (collected 12-25-23 @ 22:13)  Source: .Blood Blood-Peripheral  Preliminary Report (12-27-23 @ 07:02):    No growth at 24 hours    Culture - Blood (collected 12-25-23 @ 22:13)  Source: .Blood Blood-Peripheral  Preliminary Report (12-27-23 @ 07:02):    No growth at 24 hours      Urinalysis Basic - ( 26 Dec 2023 16:40 )    Color: x / Appearance: x / SG: x / pH: x  Gluc: 195 mg/dL / Ketone: x  / Bili: x / Urobili: x   Blood: x / Protein: x / Nitrite: x   Leuk Esterase: x / RBC: x / WBC x   Sq Epi: x / Non Sq Epi: x / Bacteria: x          RADIOLOGY & ADDITIONAL TESTS:  Personal review of radiological diagnostics performed  Echo and EKG results noted when applicable.     MEDICATIONS:  acetaminophen     Tablet .. 650 milliGRAM(s) Oral every 6 hours PRN  amLODIPine   Tablet 5 milliGRAM(s) Oral daily  atorvastatin 40 milliGRAM(s) Oral at bedtime  cefTRIAXone   IVPB      cefTRIAXone   IVPB 2000 milliGRAM(s) IV Intermittent every 24 hours  chlorhexidine 2% Cloths 1 Application(s) Topical <User Schedule>  cholestyramine Powder (Sugar-Free) 4 Gram(s) Oral three times a day  dextrose 5%. 1000 milliLiter(s) IV Continuous <Continuous>  dextrose 5%. 1000 milliLiter(s) IV Continuous <Continuous>  dextrose 50% Injectable 25 Gram(s) IV Push once  dextrose 50% Injectable 25 Gram(s) IV Push once  dextrose 50% Injectable 12.5 Gram(s) IV Push once  dextrose Oral Gel 15 Gram(s) Oral once PRN  enoxaparin Injectable 40 milliGRAM(s) SubCutaneous every 24 hours  glucagon  Injectable 1 milliGRAM(s) IntraMuscular once  hydrocortisone sodium succinate Injectable 100 milliGRAM(s) IV Push every 8 hours  insulin lispro (ADMELOG) corrective regimen sliding scale   SubCutaneous three times a day before meals  lactated ringers. 1000 milliLiter(s) IV Continuous <Continuous>  levETIRAcetam  IVPB 1500 milliGRAM(s) IV Intermittent every 12 hours  melatonin 3 milliGRAM(s) Oral at bedtime PRN  methimazole 20 milliGRAM(s) Oral <User Schedule>  pantoprazole  Injectable 40 milliGRAM(s) IV Push daily  Potassium iodide 250 milliGRAM(s),Potassium Iodide ( 5 drops=0.25 ml) 250 milliGRAM(s) 250 milliGRAM(s) Oral every 6 hours  propranolol 80 milliGRAM(s) Oral every 6 hours  vancomycin  IVPB 1500 milliGRAM(s) IV Intermittent every 12 hours  vancomycin  IVPB          ANTIBIOTICS:  cefTRIAXone   IVPB      cefTRIAXone   IVPB 2000 milliGRAM(s) IV Intermittent every 24 hours  vancomycin  IVPB 1500 milliGRAM(s) IV Intermittent every 12 hours  vancomycin  IVPB

## 2023-12-27 NOTE — CONSULT NOTE ADULT - ATTENDING COMMENTS
OBESITY, non compliance with diabetes meds, now has poorlly controlled type 2 diabetes. start insulin glargine 20 units twice daily, continue with lispro PRN. for hyperthyroidism, continue with methimazole, but can stop glucocorticoids, and lugol's iodine, can reduce methimazole dosage at discharge.
Patient seen and examined and agree with above except as noted.  Patients history, notes, labs, imaging, vitals and meds reviewed personally.  Had GTC seizure witnessed by neurocritical care ACP  Given ativan and keppra  Clinically not following commands, withdrawing to noxious stimuli in all extremities    Plan as above

## 2023-12-27 NOTE — DIETITIAN INITIAL EVALUATION ADULT - ENTERAL
Nutrition Intervention: EN, coordination of care   Nutrition Monitoring:diet order,energy intake,body composition,NFPF, glucose profile, GI (BM)

## 2023-12-27 NOTE — DIETITIAN INITIAL EVALUATION ADULT - ORAL INTAKE PTA/DIET HISTORY
daughter at bedside, reports pt had a good appetite PTA. Consumes 2-3 meals everyday and no changes in her eating habits were noted. Unsure of UBW, guessed it to be around 240lb. NKFA.

## 2023-12-27 NOTE — PHARMACOTHERAPY INTERVENTION NOTE - COMMENTS
As per policy, discontinued vancomycin trough order since patient is no longer on vancomycin.    Prashanth Daniel, PharmD, BCIDP  Clinical Pharmacy Specialist, Infectious Diseases  Tele-Antimicrobial Stewardship Program (Tele-ASP)  Tele-ASP Phone: [phone]   As per policy, discontinued vancomycin trough order since patient is no longer on vancomycin.    Prashanth Daniel, PharmD, BCIDP  Clinical Pharmacy Specialist, Infectious Diseases  Tele-Antimicrobial Stewardship Program (Tele-ASP)  Tele-ASP Phone: [phone]

## 2023-12-27 NOTE — DIETITIAN INITIAL EVALUATION ADULT - NS FNS DIET ORDER
Diet, NPO with Tube Feed:   Tube Feeding Modality: Nasogastric  Jevity 1.2 Ben  Total Volume for 24 Hours (mL): 360  Intermittent  Starting Tube Feed Rate {mL per Hour}: 20  Increase Tube Feed Rate by (mL): 20    Every 4 hours  Until Goal Tube Feed Rate (mL per Hour): 75  Tube Feeding Hours ON: 1  Tube Feeding OFF (Hours): 4  Tube Feed Start Time: 11:00  Free Water Flush  Bolus   Total Volume per Flush (mL): 200   Frequency: Every 4 Hours (12-27-23 @ 09:21)

## 2023-12-27 NOTE — PROGRESS NOTE ADULT - SUBJECTIVE AND OBJECTIVE BOX
Over Night Events: events noted, neuro reviewed, head CT noted, afebrile    PHYSICAL EXAM    ICU Vital Signs Last 24 Hrs  T(C): 36.8 (27 Dec 2023 04:00), Max: 36.9 (27 Dec 2023 00:00)  T(F): 98.2 (27 Dec 2023 04:00), Max: 98.5 (27 Dec 2023 00:00)  HR: 77 (27 Dec 2023 05:00) (75 - 90)  BP: 160/74 (27 Dec 2023 05:00) (113/58 - 172/79)  BP(mean): 106 (27 Dec 2023 05:00) (76 - 118)  RR: 17 (27 Dec 2023 05:00) (17 - 23)  SpO2: 100% (27 Dec 2023 05:00) (97% - 100%)    O2 Parameters below as of 26 Dec 2023 22:00  Patient On (Oxygen Delivery Method): room air             General: ill looking  no neck stiffness  Lungs: Bilateral BS  Cardiovascular: Regular   Abdomen: Soft, Positive BS  Extremities: No clubbing          12-25-23 @ 07:01  -  12-26-23 @ 07:00  --------------------------------------------------------  IN:    Lactated Ringers: 375 mL  Total IN: 375 mL    OUT:    Post-Void Residual per Intermittent Catheterization (mL): 1200 mL  Total OUT: 1200 mL    Total NET: -825 mL      12-26-23 @ 07:01  -  12-27-23 @ 06:42  --------------------------------------------------------  IN:    IV PiggyBack: 1550 mL    Lactated Ringers: 1350 mL  Total IN: 2900 mL    OUT:    Indwelling Catheter - Urethral (mL): 1650 mL    Intermittent Catheterization - Urethral (mL): 1100 mL  Total OUT: 2750 mL    Total NET: 150 mL          LABS:                          11.7   14.91 )-----------( 203      ( 26 Dec 2023 05:47 )             36.1                                               12-26    135  |  102  |  11  ----------------------------<  195<H>  4.1   |  22  |  0.5<L>    Ca    9.2      26 Dec 2023 16:40  Mg     2.3     12-26    TPro  6.6  /  Alb  3.6  /  TBili  1.1  /  DBili  x   /  AST  19  /  ALT  17  /  AlkPhos  154<H>  12-26      PT/INR - ( 26 Dec 2023 11:22 )   PT: 14.70 sec;   INR: 1.29 ratio         PTT - ( 26 Dec 2023 11:22 )  PTT:33.3 sec                                       Urinalysis Basic - ( 26 Dec 2023 16:40 )    Color: x / Appearance: x / SG: x / pH: x  Gluc: 195 mg/dL / Ketone: x  / Bili: x / Urobili: x   Blood: x / Protein: x / Nitrite: x   Leuk Esterase: x / RBC: x / WBC x   Sq Epi: x / Non Sq Epi: x / Bacteria: x                                                  LIVER FUNCTIONS - ( 26 Dec 2023 05:47 )  Alb: 3.6 g/dL / Pro: 6.6 g/dL / ALK PHOS: 154 U/L / ALT: 17 U/L / AST: 19 U/L / GGT: x                                                                                                                                       MEDICATIONS  (STANDING):  atorvastatin 40 milliGRAM(s) Oral at bedtime  cefTRIAXone   IVPB      cefTRIAXone   IVPB 2000 milliGRAM(s) IV Intermittent every 24 hours  chlorhexidine 2% Cloths 1 Application(s) Topical <User Schedule>  dextrose 5%. 1000 milliLiter(s) (50 mL/Hr) IV Continuous <Continuous>  dextrose 5%. 1000 milliLiter(s) (100 mL/Hr) IV Continuous <Continuous>  dextrose 50% Injectable 25 Gram(s) IV Push once  dextrose 50% Injectable 25 Gram(s) IV Push once  dextrose 50% Injectable 12.5 Gram(s) IV Push once  enoxaparin Injectable 40 milliGRAM(s) SubCutaneous every 24 hours  glucagon  Injectable 1 milliGRAM(s) IntraMuscular once  hydrocortisone sodium succinate Injectable 100 milliGRAM(s) IV Push every 8 hours  insulin lispro (ADMELOG) corrective regimen sliding scale   SubCutaneous three times a day before meals  lactated ringers. 1000 milliLiter(s) (75 mL/Hr) IV Continuous <Continuous>  levETIRAcetam  IVPB 1500 milliGRAM(s) IV Intermittent every 12 hours  methimazole 20 milliGRAM(s) Oral <User Schedule>  pantoprazole  Injectable 40 milliGRAM(s) IV Push daily  propranolol 80 milliGRAM(s) Oral every 6 hours  vancomycin  IVPB 1500 milliGRAM(s) IV Intermittent every 12 hours  vancomycin  IVPB        MEDICATIONS  (PRN):  acetaminophen     Tablet .. 650 milliGRAM(s) Oral every 6 hours PRN Temp greater or equal to 38C (100.4F), Mild Pain (1 - 3)  dextrose Oral Gel 15 Gram(s) Oral once PRN Blood Glucose LESS THAN 70 milliGRAM(s)/deciliter  melatonin 3 milliGRAM(s) Oral at bedtime PRN Insomnia      head C REVIEWED     Over Night Events: events noted, neuro reviewed, head CT noted, afebrile    PHYSICAL EXAM    ICU Vital Signs Last 24 Hrs  T(C): 36.8 (27 Dec 2023 04:00), Max: 36.9 (27 Dec 2023 00:00)  T(F): 98.2 (27 Dec 2023 04:00), Max: 98.5 (27 Dec 2023 00:00)  HR: 77 (27 Dec 2023 05:00) (75 - 90)  BP: 160/74 (27 Dec 2023 05:00) (113/58 - 172/79)  BP(mean): 106 (27 Dec 2023 05:00) (76 - 118)  RR: 17 (27 Dec 2023 05:00) (17 - 23)  SpO2: 100% (27 Dec 2023 05:00) (97% - 100%)    O2 Parameters below as of 26 Dec 2023 22:00  Patient On (Oxygen Delivery Method): room air             General: ill looking  no neck stiffness  Lungs: Bilateral BS  Cardiovascular: Regular   Abdomen: Soft, Positive BS  Extremities: No clubbing   withdraw to painful stimuli         12-25-23 @ 07:01  -  12-26-23 @ 07:00  --------------------------------------------------------  IN:    Lactated Ringers: 375 mL  Total IN: 375 mL    OUT:    Post-Void Residual per Intermittent Catheterization (mL): 1200 mL  Total OUT: 1200 mL    Total NET: -825 mL      12-26-23 @ 07:01  -  12-27-23 @ 06:42  --------------------------------------------------------  IN:    IV PiggyBack: 1550 mL    Lactated Ringers: 1350 mL  Total IN: 2900 mL    OUT:    Indwelling Catheter - Urethral (mL): 1650 mL    Intermittent Catheterization - Urethral (mL): 1100 mL  Total OUT: 2750 mL    Total NET: 150 mL          LABS:                          11.7   14.91 )-----------( 203      ( 26 Dec 2023 05:47 )             36.1                                               12-26    135  |  102  |  11  ----------------------------<  195<H>  4.1   |  22  |  0.5<L>    Ca    9.2      26 Dec 2023 16:40  Mg     2.3     12-26    TPro  6.6  /  Alb  3.6  /  TBili  1.1  /  DBili  x   /  AST  19  /  ALT  17  /  AlkPhos  154<H>  12-26      PT/INR - ( 26 Dec 2023 11:22 )   PT: 14.70 sec;   INR: 1.29 ratio         PTT - ( 26 Dec 2023 11:22 )  PTT:33.3 sec                                       Urinalysis Basic - ( 26 Dec 2023 16:40 )    Color: x / Appearance: x / SG: x / pH: x  Gluc: 195 mg/dL / Ketone: x  / Bili: x / Urobili: x   Blood: x / Protein: x / Nitrite: x   Leuk Esterase: x / RBC: x / WBC x   Sq Epi: x / Non Sq Epi: x / Bacteria: x                                                  LIVER FUNCTIONS - ( 26 Dec 2023 05:47 )  Alb: 3.6 g/dL / Pro: 6.6 g/dL / ALK PHOS: 154 U/L / ALT: 17 U/L / AST: 19 U/L / GGT: x                                                                                                                                       MEDICATIONS  (STANDING):  atorvastatin 40 milliGRAM(s) Oral at bedtime  cefTRIAXone   IVPB      cefTRIAXone   IVPB 2000 milliGRAM(s) IV Intermittent every 24 hours  chlorhexidine 2% Cloths 1 Application(s) Topical <User Schedule>  dextrose 5%. 1000 milliLiter(s) (50 mL/Hr) IV Continuous <Continuous>  dextrose 5%. 1000 milliLiter(s) (100 mL/Hr) IV Continuous <Continuous>  dextrose 50% Injectable 25 Gram(s) IV Push once  dextrose 50% Injectable 25 Gram(s) IV Push once  dextrose 50% Injectable 12.5 Gram(s) IV Push once  enoxaparin Injectable 40 milliGRAM(s) SubCutaneous every 24 hours  glucagon  Injectable 1 milliGRAM(s) IntraMuscular once  hydrocortisone sodium succinate Injectable 100 milliGRAM(s) IV Push every 8 hours  insulin lispro (ADMELOG) corrective regimen sliding scale   SubCutaneous three times a day before meals  lactated ringers. 1000 milliLiter(s) (75 mL/Hr) IV Continuous <Continuous>  levETIRAcetam  IVPB 1500 milliGRAM(s) IV Intermittent every 12 hours  methimazole 20 milliGRAM(s) Oral <User Schedule>  pantoprazole  Injectable 40 milliGRAM(s) IV Push daily  propranolol 80 milliGRAM(s) Oral every 6 hours  vancomycin  IVPB 1500 milliGRAM(s) IV Intermittent every 12 hours  vancomycin  IVPB        MEDICATIONS  (PRN):  acetaminophen     Tablet .. 650 milliGRAM(s) Oral every 6 hours PRN Temp greater or equal to 38C (100.4F), Mild Pain (1 - 3)  dextrose Oral Gel 15 Gram(s) Oral once PRN Blood Glucose LESS THAN 70 milliGRAM(s)/deciliter  melatonin 3 milliGRAM(s) Oral at bedtime PRN Insomnia      head Ct REVIEWED

## 2023-12-27 NOTE — CONSULT NOTE ADULT - ASSESSMENT
53y F PMH of hyperthyroidism, HTN, DMII, and HLD presents to the ED by family to the ED for evaluation of altered mental status started acutely approximately 3 hours prior to arrival to ED. History taken from sister as patient is somnolent and incoherent. Patient had similar episode 1 year ago when she did not take her thyroid medications, and it progressed to thyroid storm.  Patient's family states patient has been living with her daughter, not sure if she has been taking her medications as prescribed.  Patient brought to ER hyperventilating moving extremities not following commands, tachycardic, agitated. Rectal temp elevated at 102.3, tachycardic, concern for thyroid storm given previous presentation.    IMPRESSION/RECOMMENDATIONS   Thyroid storm ( given the combination of undetectable TSH and elevated T4/T3 ) presenting with symptoms and metabolic encephalopathy  12/25 BCx NG  No sepsis  No infectious etiology of fevers on presentation  CXR no consolidation  No SSTI  No pyuria  -treat Thyroid storm  -d/c langston catheter. If in retention then CIC  -d/c Abx  -Off loading to prevent pressure sores and preventive measures to avoid aspiration     Please do not hesitate to recall ID if any questions arise either through [employer] or through [employer]  53y F PMH of hyperthyroidism, HTN, DMII, and HLD presents to the ED by family to the ED for evaluation of altered mental status started acutely approximately 3 hours prior to arrival to ED. History taken from sister as patient is somnolent and incoherent. Patient had similar episode 1 year ago when she did not take her thyroid medications, and it progressed to thyroid storm.  Patient's family states patient has been living with her daughter, not sure if she has been taking her medications as prescribed.  Patient brought to ER hyperventilating moving extremities not following commands, tachycardic, agitated. Rectal temp elevated at 102.3, tachycardic, concern for thyroid storm given previous presentation.    IMPRESSION/RECOMMENDATIONS   Thyroid storm ( given the combination of undetectable TSH and elevated T4/T3 ) presenting with symptoms and metabolic encephalopathy  12/25 BCx NG  No sepsis  No infectious etiology of fevers on presentation  CXR no consolidation  No SSTI  No pyuria  -treat Thyroid storm  -d/c langston catheter. If in retention then CIC  -d/c Abx  -Off loading to prevent pressure sores and preventive measures to avoid aspiration     Please do not hesitate to recall ID if any questions arise either through "[employer] or through [employer]

## 2023-12-27 NOTE — PROGRESS NOTE ADULT - ASSESSMENT
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  Feeding once more awake,    RENAL: IV hydration at 75 cc/hr    INFECTIOUS DISEASE: check RVP, check UA/UCX, procal. Follow up cultures    HEMATOLOGICAL:  DVT prophylaxis.    ENDOCRINE:  FU tsh, t4, t3. Methimazole 20 mg every 6 hours, Propranolol 80 mg every 6 hours, Potassium Iodide 250 mg every 6 hours,  Hydrocortisone 100 mg intravenously every 8 hours; endo eval.   Follow up FS.  Insulin protocol if needed.      MUSCULOSKELETAL: Bedrest for now        Diet: NPO  Activity: bed rest  DVT ppx: lovenox  GI ppx: none  Dispo: MICU       IMPRESSION:    Metabolic Encephalopathy  Likely Thyroid Storm (Ramirez/Wartofsky score of 70)/ TFT noted  Hypomagnesemia  Seizure  Hx of hyperthyroidism on methimazole not compliant   Hx of HTN  Hx of HLD  Hx of DMII  Previous hx of thyroid storm        PLAN:    CNS:  AED per Neuro    HEENT: unremarkable, NG tube in place     PULMONARY: HOB at 45, off O2, patient can protect her airway, monitor for now, aspiration precaution, keep Bharati 92 to 96%    CARDIOVASCULAR: Monitor HR and BP with propranolol and adjust accordingly, propranolol    GI: GI prophylaxis.  Feeding once more awake,    RENAL: IV hydration at 75 cc/hr LR    INFECTIOUS DISEASE: check RVP, check UA/UCX, procal. Follow up cultures    HEMATOLOGICAL:  DVT prophylaxis.    ENDOCRINE: Methimazole 20 mg every 6 hours, Propranolol 80 mg every 6 hours, Potassium Iodide 250 mg every 6 hours,  Hydrocortisone 100 mg intravenously every 8 hours; endo eval.   Follow up FS.  Insulin protocol if needed.      MUSCULOSKELETAL: Bedrest for now        Activity: bed rest  DVT ppx: lovenox  GI ppx: none  Dispo: MICU

## 2023-12-27 NOTE — PHARMACOTHERAPY INTERVENTION NOTE - NSPHARMCOMMASP
ASP - Dose optimization/Non-Renal dose adjustment
ASP - Dose optimization/Non-Renal dose adjustment
ASP - Lab/ test recommended
ASP - Lab/ test recommended

## 2023-12-27 NOTE — DIETITIAN INITIAL EVALUATION ADULT - PERTINENT MEDS FT
MEDICATIONS  (STANDING):  amLODIPine   Tablet 5 milliGRAM(s) Oral daily  atorvastatin 40 milliGRAM(s) Oral at bedtime  dextrose 5%. 1000 milliLiter(s) (100 mL/Hr) IV Continuous <Continuous>  dextrose 5%. 1000 milliLiter(s) (50 mL/Hr) IV Continuous <Continuous>  dextrose 50% Injectable 25 Gram(s) IV Push once  dextrose 50% Injectable 25 Gram(s) IV Push once  dextrose 50% Injectable 12.5 Gram(s) IV Push once  enoxaparin Injectable 40 milliGRAM(s) SubCutaneous every 24 hours  glucagon  Injectable 1 milliGRAM(s) IntraMuscular once  hydrocortisone sodium succinate Injectable 100 milliGRAM(s) IV Push every 8 hours  insulin lispro (ADMELOG) corrective regimen sliding scale   SubCutaneous three times a day before meals  lactated ringers. 1000 milliLiter(s) (75 mL/Hr) IV Continuous <Continuous>  levETIRAcetam  IVPB 1500 milliGRAM(s) IV Intermittent every 12 hours  methimazole 20 milliGRAM(s) Oral <User Schedule>  pantoprazole  Injectable 40 milliGRAM(s) IV Push daily  Potassium iodide 250 milliGRAM(s),Potassium Iodide ( 5 drops=0.25 ml) 250 milliGRAM(s) 250 milliGRAM(s) Oral every 6 hours  propranolol 80 milliGRAM(s) Oral every 6 hours    MEDICATIONS  (PRN):  dextrose Oral Gel 15 Gram(s) Oral once PRN Blood Glucose LESS THAN 70 milliGRAM(s)/deciliter

## 2023-12-27 NOTE — DIETITIAN INITIAL EVALUATION ADULT - OTHER CALCULATIONS
Energy: 1710-1995kcal/day (using 30-35kcal/day -- using IBW 58kg due to obese BMI vs. considering large diff btwn ABW/IBW)   Protein: 68-80g/day (1.2-1.4g/kg -- same reason as above with IBW)   Fluid: Per ICU team

## 2023-12-27 NOTE — EEG REPORT - NS EEG TEXT BOX
Epilepsy Attending Note:     RENÉ REIS    53y Female  MRN [id number]    Vital Signs Last 24 Hrs  T(C): 37.2 (27 Dec 2023 08:00), Max: 37.2 (27 Dec 2023 08:00)  T(F): 98.9 (27 Dec 2023 08:00), Max: 98.9 (27 Dec 2023 08:00)  HR: 78 (27 Dec 2023 10:00) (75 - 90)  BP: 193/81 (27 Dec 2023 10:00) (114/53 - 193/81)  BP(mean): 117 (27 Dec 2023 10:00) (76 - 125)  RR: 18 (27 Dec 2023 10:00) (17 - 31)  SpO2: 100% (27 Dec 2023 10:00) (97% - 100%)    Parameters below as of 27 Dec 2023 08:00  Patient On (Oxygen Delivery Method): room air                              11.7   14.91 )-----------( 203      ( 26 Dec 2023 05:47 )             36.1       12-26    135  |  102  |  11  ----------------------------<  195<H>  4.1   |  22  |  0.5<L>    Ca    9.2      26 Dec 2023 16:40  Mg     2.3     12-26    TPro  6.6  /  Alb  3.6  /  TBili  1.1  /  DBili  x   /  AST  19  /  ALT  17  /  AlkPhos  154<H>  12-26      MEDICATIONS  (STANDING):  amLODIPine   Tablet 5 milliGRAM(s) Oral daily  atorvastatin 40 milliGRAM(s) Oral at bedtime  cefTRIAXone   IVPB      cefTRIAXone   IVPB 2000 milliGRAM(s) IV Intermittent every 24 hours  chlorhexidine 2% Cloths 1 Application(s) Topical <User Schedule>  cholestyramine Powder (Sugar-Free) 4 Gram(s) Oral three times a day  dextrose 5%. 1000 milliLiter(s) (100 mL/Hr) IV Continuous <Continuous>  dextrose 5%. 1000 milliLiter(s) (50 mL/Hr) IV Continuous <Continuous>  dextrose 50% Injectable 25 Gram(s) IV Push once  dextrose 50% Injectable 25 Gram(s) IV Push once  dextrose 50% Injectable 12.5 Gram(s) IV Push once  enoxaparin Injectable 40 milliGRAM(s) SubCutaneous every 24 hours  glucagon  Injectable 1 milliGRAM(s) IntraMuscular once  hydrocortisone sodium succinate Injectable 100 milliGRAM(s) IV Push every 8 hours  insulin lispro (ADMELOG) corrective regimen sliding scale   SubCutaneous three times a day before meals  lactated ringers. 1000 milliLiter(s) (75 mL/Hr) IV Continuous <Continuous>  levETIRAcetam  IVPB 1500 milliGRAM(s) IV Intermittent every 12 hours  methimazole 20 milliGRAM(s) Oral <User Schedule>  pantoprazole  Injectable 40 milliGRAM(s) IV Push daily  Potassium iodide 250 milliGRAM(s),Potassium Iodide ( 5 drops=0.25 ml) 250 milliGRAM(s) 250 milliGRAM(s) Oral every 6 hours  propranolol 80 milliGRAM(s) Oral every 6 hours  vancomycin  IVPB      vancomycin  IVPB 1500 milliGRAM(s) IV Intermittent every 12 hours    MEDICATIONS  (PRN):  acetaminophen     Tablet .. 650 milliGRAM(s) Oral every 6 hours PRN Temp greater or equal to 38C (100.4F), Mild Pain (1 - 3)  dextrose Oral Gel 15 Gram(s) Oral once PRN Blood Glucose LESS THAN 70 milliGRAM(s)/deciliter  melatonin 3 milliGRAM(s) Oral at bedtime PRN Insomnia            VEEG in the last 24 hours:    Background-------------continues, symmetrical and slightly less than optimally organized reaching 8-9 h superimposed by large amount of low voltage fast activity    Focal and generalized slowing-------none    Interictal activity----------- none    Events------- During this period she did have two events captured on the video . she was seen having a rhythmic left hand tapping behavior with head shaking / side to side movements.                      Both electrographic and behavioral aspects of the EEG were not suggestive of an epileptic event    Seizures----no epileptic seizure    Impression:  suggestive of none epileptic events    Plan - as/ neurology     Epilepsy Attending Note:     RENÉ REIS    53y Female  MRN [id number]    Vital Signs Last 24 Hrs  T(C): 37.2 (27 Dec 2023 08:00), Max: 37.2 (27 Dec 2023 08:00)  T(F): 98.9 (27 Dec 2023 08:00), Max: 98.9 (27 Dec 2023 08:00)  HR: 78 (27 Dec 2023 10:00) (75 - 90)  BP: 193/81 (27 Dec 2023 10:00) (114/53 - 193/81)  BP(mean): 117 (27 Dec 2023 10:00) (76 - 125)  RR: 18 (27 Dec 2023 10:00) (17 - 31)  SpO2: 100% (27 Dec 2023 10:00) (97% - 100%)    Parameters below as of 27 Dec 2023 08:00  Patient On (Oxygen Delivery Method): room air                              11.7   14.91 )-----------( 203      ( 26 Dec 2023 05:47 )             36.1       12-26    135  |  102  |  11  ----------------------------<  195<H>  4.1   |  22  |  0.5<L>    Ca    9.2      26 Dec 2023 16:40  Mg     2.3     12-26    TPro  6.6  /  Alb  3.6  /  TBili  1.1  /  DBili  x   /  AST  19  /  ALT  17  /  AlkPhos  154<H>  12-26      MEDICATIONS  (STANDING):  amLODIPine   Tablet 5 milliGRAM(s) Oral daily  atorvastatin 40 milliGRAM(s) Oral at bedtime  cefTRIAXone   IVPB      cefTRIAXone   IVPB 2000 milliGRAM(s) IV Intermittent every 24 hours  chlorhexidine 2% Cloths 1 Application(s) Topical <User Schedule>  cholestyramine Powder (Sugar-Free) 4 Gram(s) Oral three times a day  dextrose 5%. 1000 milliLiter(s) (100 mL/Hr) IV Continuous <Continuous>  dextrose 5%. 1000 milliLiter(s) (50 mL/Hr) IV Continuous <Continuous>  dextrose 50% Injectable 25 Gram(s) IV Push once  dextrose 50% Injectable 25 Gram(s) IV Push once  dextrose 50% Injectable 12.5 Gram(s) IV Push once  enoxaparin Injectable 40 milliGRAM(s) SubCutaneous every 24 hours  glucagon  Injectable 1 milliGRAM(s) IntraMuscular once  hydrocortisone sodium succinate Injectable 100 milliGRAM(s) IV Push every 8 hours  insulin lispro (ADMELOG) corrective regimen sliding scale   SubCutaneous three times a day before meals  lactated ringers. 1000 milliLiter(s) (75 mL/Hr) IV Continuous <Continuous>  levETIRAcetam  IVPB 1500 milliGRAM(s) IV Intermittent every 12 hours  methimazole 20 milliGRAM(s) Oral <User Schedule>  pantoprazole  Injectable 40 milliGRAM(s) IV Push daily  Potassium iodide 250 milliGRAM(s),Potassium Iodide ( 5 drops=0.25 ml) 250 milliGRAM(s) 250 milliGRAM(s) Oral every 6 hours  propranolol 80 milliGRAM(s) Oral every 6 hours  vancomycin  IVPB      vancomycin  IVPB 1500 milliGRAM(s) IV Intermittent every 12 hours    MEDICATIONS  (PRN):  acetaminophen     Tablet .. 650 milliGRAM(s) Oral every 6 hours PRN Temp greater or equal to 38C (100.4F), Mild Pain (1 - 3)  dextrose Oral Gel 15 Gram(s) Oral once PRN Blood Glucose LESS THAN 70 milliGRAM(s)/deciliter  melatonin 3 milliGRAM(s) Oral at bedtime PRN Insomnia            VEEG in the last 24 hours:    Background-------------continues, symmetrical and slightly less than optimally organized reaching 8-9 h superimposed by large amount of low voltage fast activity    Focal and generalized slowing-------none    Interictal activity----------- none    Events------- During this period she did have two events captured on the video . she was seen having a rhythmic left hand tapping behavior with head shaking / side to side movements.                      Both electrographic and behavioral aspects of the EEG were not suggestive of an epileptic event    Seizures----no epileptic seizure    Impression:  suggestive of none epileptic events    Plan - as/ neurology

## 2023-12-27 NOTE — DIETITIAN INITIAL EVALUATION ADULT - ENERGY INTAKE
At admit, pt has just started receiving TF's. No issues with TF's and no GI discomfort reported.   Current regimen provides: 432kcal, 19.8g PRO, 291.6mL free H2O.

## 2023-12-27 NOTE — CONSULT NOTE ADULT - SUBJECTIVE AND OBJECTIVE BOX
HPI:  53y F PMH of hyperthyroidism, HTN, DMII, and HLD presents to the ED by family to the ED for evaluation of altered mental status started acutely approximately 3 hours prior to arrival to ED. History taken from sister as patient is somnolent and incoherent. Patient had similar episode 1 year ago when she did not take her thyroid medications, and it progressed to thyroid storm.  Patient's family states patient has been living with her daughter, not sure if she has been taking her medications as prescribed.  Patient brought to ER hyperventilating moving extremities not following commands, tachycardic, agitated. Rectal temp elevated at 102.3, tachycardic, concern for thyroid storm given previous presentation.  In the ED, BP was 161/78, , temp 98.2 repeated 102.1. Labs remarkable for WBC 15, Mg 1.5. Patient was given propronolol IV 2 mg, solucortef 100 mg, and methimazole 20 mg in addition to 3 L of IV fluids.    PLEASE OBTAIN MED REC FORM [facility] IN AM (Patient is altered and family do not know her meds) (26 Dec 2023 00:33)    RENÉ REIS 53y Female  MRN#: [id number]   CODE STATUS:________    Hospital Day: 2d    Pt is currently admitted with the primary diagnosis of     SUBJECTIVE  Hospital Course    Overnight events     Subjective complaints     Present Today:   - Emilee:  No [  ], Yes [   ] : Indication:     - Type of IV Access:       .. CVC/Piccline:  No [  ], Yes [   ] : Indication:       .. Midline: No [  ], Yes [   ] : Indication:                                             ----------------------------------------------------------  OBJECTIVE  PAST MEDICAL & SURGICAL HISTORY  Diabetes    Hypertension    No significant past surgical history                                              -----------------------------------------------------------  ALLERGIES:  penicillin (Rash)                                            ------------------------------------------------------------    HOME MEDICATIONS  Home Medications:  albuterol 90 mcg/inh inhalation aerosol with adapter: 1 puff(s) inhaled every 4 hours, As Needed for sob or wheezing  (26 Dec 2023 00:14)  amlodipine-benazepril 10 mg-40 mg oral capsule: 1 cap(s) orally once a day (26 Dec 2023 00:14)  atorvastatin 40 mg oral tablet: 1 tab(s) orally once a day (26 Dec 2023 00:14)  ezetimibe 10 mg oral tablet: 1 tab(s) orally once a day (26 Dec 2023 00:14)  gabapentin 400 mg oral capsule: 1 cap(s) orally 3 times a day (26 Dec 2023 00:14)                           MEDICATIONS:  STANDING MEDICATIONS  amLODIPine   Tablet 5 milliGRAM(s) Oral daily  atorvastatin 40 milliGRAM(s) Oral at bedtime  chlorhexidine 2% Cloths 1 Application(s) Topical <User Schedule>  cholestyramine Powder (Sugar-Free) 4 Gram(s) Oral three times a day  dextrose 5%. 1000 milliLiter(s) IV Continuous <Continuous>  dextrose 5%. 1000 milliLiter(s) IV Continuous <Continuous>  dextrose 50% Injectable 25 Gram(s) IV Push once  dextrose 50% Injectable 25 Gram(s) IV Push once  dextrose 50% Injectable 12.5 Gram(s) IV Push once  enoxaparin Injectable 40 milliGRAM(s) SubCutaneous every 24 hours  glucagon  Injectable 1 milliGRAM(s) IntraMuscular once  hydrocortisone sodium succinate Injectable 100 milliGRAM(s) IV Push every 8 hours  insulin lispro (ADMELOG) corrective regimen sliding scale   SubCutaneous three times a day before meals  lactated ringers. 1000 milliLiter(s) IV Continuous <Continuous>  levETIRAcetam  IVPB 1500 milliGRAM(s) IV Intermittent every 12 hours  methimazole 20 milliGRAM(s) Oral <User Schedule>  pantoprazole  Injectable 40 milliGRAM(s) IV Push daily  Potassium iodide 250 milliGRAM(s),Potassium Iodide ( 5 drops=0.25 ml) 250 milliGRAM(s) 250 milliGRAM(s) Oral every 6 hours  propranolol 80 milliGRAM(s) Oral every 6 hours    PRN MEDICATIONS  acetaminophen     Tablet .. 650 milliGRAM(s) Oral every 6 hours PRN  dextrose Oral Gel 15 Gram(s) Oral once PRN  melatonin 3 milliGRAM(s) Oral at bedtime PRN                                            ------------------------------------------------------------  VITAL SIGNS: Last 24 Hours  T(C): 36.8 (27 Dec 2023 12:00), Max: 37.2 (27 Dec 2023 08:00)  T(F): 98.2 (27 Dec 2023 12:00), Max: 98.9 (27 Dec 2023 08:00)  HR: 81 (27 Dec 2023 13:00) (75 - 82)  BP: 155/70 (27 Dec 2023 13:00) (114/53 - 193/81)  BP(mean): 101 (27 Dec 2023 13:00) (76 - 125)  RR: 18 (27 Dec 2023 13:00) (16 - 31)  SpO2: 100% (27 Dec 2023 13:00) (97% - 100%)      12-26-23 @ 07:01  -  12-27-23 @ 07:00  --------------------------------------------------------  IN: 2975 mL / OUT: 2900 mL / NET: 75 mL    12-27-23 @ 07:01  -  12-27-23 @ 14:07  --------------------------------------------------------  IN: 650 mL / OUT: 710 mL / NET: -60 mL                                             --------------------------------------------------------------  LABS:                        11.7   14.91 )-----------( 203      ( 26 Dec 2023 05:47 )             36.1     12-26    135  |  102  |  11  ----------------------------<  195<H>  4.1   |  22  |  0.5<L>    Ca    9.2      26 Dec 2023 16:40  Mg     2.3     12-26    TPro  6.6  /  Alb  3.6  /  TBili  1.1  /  DBili  x   /  AST  19  /  ALT  17  /  AlkPhos  154<H>  12-26    PT/INR - ( 26 Dec 2023 11:22 )   PT: 14.70 sec;   INR: 1.29 ratio         PTT - ( 26 Dec 2023 11:22 )  PTT:33.3 sec  Urinalysis Basic - ( 26 Dec 2023 16:40 )    Color: x / Appearance: x / SG: x / pH: x  Gluc: 195 mg/dL / Ketone: x  / Bili: x / Urobili: x   Blood: x / Protein: x / Nitrite: x   Leuk Esterase: x / RBC: x / WBC x   Sq Epi: x / Non Sq Epi: x / Bacteria: x              Culture - Blood (collected 25 Dec 2023 22:13)  Source: .Blood Blood-Peripheral  Preliminary Report (27 Dec 2023 07:02):    No growth at 24 hours    Culture - Blood (collected 25 Dec 2023 22:13)  Source: .Blood Blood-Peripheral  Preliminary Report (27 Dec 2023 07:02):    No growth at 24 hours                                                    -------------------------------------------------------------  RADIOLOGY:    < from: CT Head No Cont (12.26.23 @ 09:13) >  FINDINGS:    The ventricles and sulci are unremarkable in appearance.     There is no intraparenchymal hematoma, mass effect or midline shift. No   abnormal extra-axial fluid collections are present.    The calvarium is intact. The visualized intraorbital compartments,   paranasal sinuses and mastoid complexes appear free of acute disease.    < end of copied text >  < from: Xray Chest 1 View- PORTABLE-Routine (Xray Chest 1 View- PORTABLE-Routine in AM.) (12.16.22 @ 06:08) >  Findings:    Support devices: None.    Cardiac/mediastinum/hilum: Unremarkable.    Lung parenchyma/Pleura: Within normal limits.    Skeleton/soft tissues: Unremarkable.    < end of copied text >  < from: CT Angio Neck w/ IV Cont (06.09.21 @ 18:13) >  Incidental partial empty sella.    < end of copied text >                                              --------------------------------------------------------------    PHYSICAL EXAM:  General: obtunded  HEENT: mild hardening around throat, thyroid cannot be assessed ?goiter  HEART: loud s1/s2, RR  ABDOMEN: soft nondistended  EXT: warm to touch, moist deirdre face/neck  NEURO: does not respond to voice, not opening eyes, moves feet and extremities on palpation LE  SKIN: see extremities                                           --------------------------------------------------------------    Endocrinology  pt has unclear history, pt cannot relay hx at this time, obtained from prior notes and sister at bedside  has hx of DM initially treated with oral meds then switched to insulin, per sister, pt has not taken insulin in months  has hx of unclear throat vs thyroid cancer, per sister it is thyroid cancer, underwent chemotherapy unable to tolerate, was in remission until last month  pt has hx of thyroid storm in past, hospitalized twice per sister, once here in [facility]  per sister pt was in her normal state june morning, doing chores, cooking and shopping  by evening pt endorsed lethargy, weakness, went to lay down  sister went to check on her and found her unresponsive, limp, sweating and hot, with some ?tremors  she called EMS and brought to hospital  for rest of history see HPI above    per sister  pt has been endorsing temperature insensitivity and weight loss from baseline weight over several months  difficulty swallowing solids  no palpitations, chest pain, sob, opthalmopathy, leg swelling, n/v/d  no sick contacts, no recent travel    only medication pt has been taking: gabapentin  Utox positive for benzodiazepines    HPI:  53y F PMH of hyperthyroidism, HTN, DMII, and HLD presents to the ED by family to the ED for evaluation of altered mental status started acutely approximately 3 hours prior to arrival to ED. History taken from sister as patient is somnolent and incoherent. Patient had similar episode 1 year ago when she did not take her thyroid medications, and it progressed to thyroid storm.  Patient's family states patient has been living with her daughter, not sure if she has been taking her medications as prescribed.  Patient brought to ER hyperventilating moving extremities not following commands, tachycardic, agitated. Rectal temp elevated at 102.3, tachycardic, concern for thyroid storm given previous presentation.  In the ED, BP was 161/78, , temp 98.2 repeated 102.1. Labs remarkable for WBC 15, Mg 1.5. Patient was given propronolol IV 2 mg, solucortef 100 mg, and methimazole 20 mg in addition to 3 L of IV fluids.    PLEASE OBTAIN MED REC FORM [facility] IN AM (Patient is altered and family do not know her meds) (26 Dec 2023 00:33)    RENÉ REIS 53y Female  MRN#: [id number]   CODE STATUS:________    Hospital Day: 2d    Pt is currently admitted with the primary diagnosis of     SUBJECTIVE  Hospital Course    Overnight events     Subjective complaints     Present Today:   - Emilee:  No [  ], Yes [   ] : Indication:     - Type of IV Access:       .. CVC/Piccline:  No [  ], Yes [   ] : Indication:       .. Midline: No [  ], Yes [   ] : Indication:                                             ----------------------------------------------------------  OBJECTIVE  PAST MEDICAL & SURGICAL HISTORY  Diabetes    Hypertension    No significant past surgical history                                              -----------------------------------------------------------  ALLERGIES:  penicillin (Rash)                                            ------------------------------------------------------------    HOME MEDICATIONS  Home Medications:  albuterol 90 mcg/inh inhalation aerosol with adapter: 1 puff(s) inhaled every 4 hours, As Needed for sob or wheezing  (26 Dec 2023 00:14)  amlodipine-benazepril 10 mg-40 mg oral capsule: 1 cap(s) orally once a day (26 Dec 2023 00:14)  atorvastatin 40 mg oral tablet: 1 tab(s) orally once a day (26 Dec 2023 00:14)  ezetimibe 10 mg oral tablet: 1 tab(s) orally once a day (26 Dec 2023 00:14)  gabapentin 400 mg oral capsule: 1 cap(s) orally 3 times a day (26 Dec 2023 00:14)                           MEDICATIONS:  STANDING MEDICATIONS  amLODIPine   Tablet 5 milliGRAM(s) Oral daily  atorvastatin 40 milliGRAM(s) Oral at bedtime  chlorhexidine 2% Cloths 1 Application(s) Topical <User Schedule>  cholestyramine Powder (Sugar-Free) 4 Gram(s) Oral three times a day  dextrose 5%. 1000 milliLiter(s) IV Continuous <Continuous>  dextrose 5%. 1000 milliLiter(s) IV Continuous <Continuous>  dextrose 50% Injectable 25 Gram(s) IV Push once  dextrose 50% Injectable 25 Gram(s) IV Push once  dextrose 50% Injectable 12.5 Gram(s) IV Push once  enoxaparin Injectable 40 milliGRAM(s) SubCutaneous every 24 hours  glucagon  Injectable 1 milliGRAM(s) IntraMuscular once  hydrocortisone sodium succinate Injectable 100 milliGRAM(s) IV Push every 8 hours  insulin lispro (ADMELOG) corrective regimen sliding scale   SubCutaneous three times a day before meals  lactated ringers. 1000 milliLiter(s) IV Continuous <Continuous>  levETIRAcetam  IVPB 1500 milliGRAM(s) IV Intermittent every 12 hours  methimazole 20 milliGRAM(s) Oral <User Schedule>  pantoprazole  Injectable 40 milliGRAM(s) IV Push daily  Potassium iodide 250 milliGRAM(s),Potassium Iodide ( 5 drops=0.25 ml) 250 milliGRAM(s) 250 milliGRAM(s) Oral every 6 hours  propranolol 80 milliGRAM(s) Oral every 6 hours    PRN MEDICATIONS  acetaminophen     Tablet .. 650 milliGRAM(s) Oral every 6 hours PRN  dextrose Oral Gel 15 Gram(s) Oral once PRN  melatonin 3 milliGRAM(s) Oral at bedtime PRN                                            ------------------------------------------------------------  VITAL SIGNS: Last 24 Hours  T(C): 36.8 (27 Dec 2023 12:00), Max: 37.2 (27 Dec 2023 08:00)  T(F): 98.2 (27 Dec 2023 12:00), Max: 98.9 (27 Dec 2023 08:00)  HR: 81 (27 Dec 2023 13:00) (75 - 82)  BP: 155/70 (27 Dec 2023 13:00) (114/53 - 193/81)  BP(mean): 101 (27 Dec 2023 13:00) (76 - 125)  RR: 18 (27 Dec 2023 13:00) (16 - 31)  SpO2: 100% (27 Dec 2023 13:00) (97% - 100%)      12-26-23 @ 07:01  -  12-27-23 @ 07:00  --------------------------------------------------------  IN: 2975 mL / OUT: 2900 mL / NET: 75 mL    12-27-23 @ 07:01  -  12-27-23 @ 14:07  --------------------------------------------------------  IN: 650 mL / OUT: 710 mL / NET: -60 mL                                             --------------------------------------------------------------  LABS:                        11.7   14.91 )-----------( 203      ( 26 Dec 2023 05:47 )             36.1     12-26    135  |  102  |  11  ----------------------------<  195<H>  4.1   |  22  |  0.5<L>    Ca    9.2      26 Dec 2023 16:40  Mg     2.3     12-26    TPro  6.6  /  Alb  3.6  /  TBili  1.1  /  DBili  x   /  AST  19  /  ALT  17  /  AlkPhos  154<H>  12-26    PT/INR - ( 26 Dec 2023 11:22 )   PT: 14.70 sec;   INR: 1.29 ratio         PTT - ( 26 Dec 2023 11:22 )  PTT:33.3 sec  Urinalysis Basic - ( 26 Dec 2023 16:40 )    Color: x / Appearance: x / SG: x / pH: x  Gluc: 195 mg/dL / Ketone: x  / Bili: x / Urobili: x   Blood: x / Protein: x / Nitrite: x   Leuk Esterase: x / RBC: x / WBC x   Sq Epi: x / Non Sq Epi: x / Bacteria: x              Culture - Blood (collected 25 Dec 2023 22:13)  Source: .Blood Blood-Peripheral  Preliminary Report (27 Dec 2023 07:02):    No growth at 24 hours    Culture - Blood (collected 25 Dec 2023 22:13)  Source: .Blood Blood-Peripheral  Preliminary Report (27 Dec 2023 07:02):    No growth at 24 hours                                                    -------------------------------------------------------------  RADIOLOGY:    < from: CT Head No Cont (12.26.23 @ 09:13) >  FINDINGS:    The ventricles and sulci are unremarkable in appearance.     There is no intraparenchymal hematoma, mass effect or midline shift. No   abnormal extra-axial fluid collections are present.    The calvarium is intact. The visualized intraorbital compartments,   paranasal sinuses and mastoid complexes appear free of acute disease.    < end of copied text >  < from: Xray Chest 1 View- PORTABLE-Routine (Xray Chest 1 View- PORTABLE-Routine in AM.) (12.16.22 @ 06:08) >  Findings:    Support devices: None.    Cardiac/mediastinum/hilum: Unremarkable.    Lung parenchyma/Pleura: Within normal limits.    Skeleton/soft tissues: Unremarkable.    < end of copied text >  < from: CT Angio Neck w/ IV Cont (06.09.21 @ 18:13) >  Incidental partial empty sella.    < end of copied text >                                              --------------------------------------------------------------    PHYSICAL EXAM:  General: obtunded  HEENT: mild hardening around throat, thyroid cannot be assessed ?goiter  HEART: loud s1/s2, RR  ABDOMEN: soft nondistended  EXT: warm to touch, moist deirdre face/neck  NEURO: does not respond to voice, not opening eyes, moves feet and extremities on palpation LE  SKIN: see extremities                                           --------------------------------------------------------------    Endocrinology  pt has unclear history, pt cannot relay hx at this time, obtained from prior notes and sister at bedside  has hx of DM initially treated with oral meds then switched to insulin, per sister, pt has not taken insulin in months  has hx of unclear throat vs thyroid cancer, per sister it is thyroid cancer, underwent chemotherapy unable to tolerate, was in remission until last month  pt has hx of thyroid storm in past, hospitalized twice per sister, once here in [facility]  per sister pt was in her normal state june morning, doing chores, cooking and shopping  by evening pt endorsed lethargy, weakness, went to lay down  sister went to check on her and found her unresponsive, limp, sweating and hot, with some ?tremors  she called EMS and brought to hospital  for rest of history see HPI above    per sister  pt has been endorsing temperature insensitivity and weight loss from baseline weight over several months  difficulty swallowing solids  no palpitations, chest pain, sob, opthalmopathy, leg swelling, n/v/d  no sick contacts, no recent travel    only medication pt has been taking: gabapentin  Utox positive for benzodiazepines

## 2023-12-27 NOTE — CONSULT NOTE ADULT - ASSESSMENT
Assessment    metabolic encephalopathy  Thyroid storm c/b seizure - unclear precipitant   Hx of hyperthyroid - noncompliant  Vitals improving, hemodynamically stable    Hx of unclear throat/thyroid cancer s/p ?chemotherapy    Hx of DM type 2 ?insulin dependent - noncompliant  no DKA on admission    incidental partial empty sella    Plan  continue current management for now until further clinical improvement  maintain hemodynamic stability  will need repeat TFTs eventually  can obtain thyroid/throat imaging/ultrasound  strict glucose control to avoid development of DKA  will try to obtain past medical records pertaining to cancer treatment, thyroid history  will continue to follow for titration of medications, and final antithyroid medication regimen    check back for attending attestation for any changes to above plan or additional recs

## 2023-12-27 NOTE — DIETITIAN INITIAL EVALUATION ADULT - PERTINENT LABORATORY DATA
12-26    135  |  102  |  11  ----------------------------<  195<H>  4.1   |  22  |  0.5<L>    Ca    9.2      26 Dec 2023 16:40  Mg     2.3     12-26    TPro  6.6  /  Alb  3.6  /  TBili  1.1  /  DBili  x   /  AST  19  /  ALT  17  /  AlkPhos  154<H>  12-26  POCT Blood Glucose.: 293 mg/dL (12-27-23 @ 11:52)  A1C with Estimated Average Glucose Result: 10.4 % (12-26-23 @ 05:47)

## 2023-12-27 NOTE — PROGRESS NOTE ADULT - SUBJECTIVE AND OBJECTIVE BOX
Neurology Progress Note    Interval History: Patient seen and evaluated this morning. No acute events noted overnight. Spoke with patient's family at bedside who reports that patient has had intermittent isolated tremors to b/l hands for almost one year. Family member reports that patient will be doing various tasks such as washing dishes or crossing the street and have these tremors, but that during these episodes patient will be AOx3 and the tremors resolve without any intervention or post-ictal period.       PAST MEDICAL & SURGICAL HISTORY:  Diabetes    Hypertension    No significant past surgical history            Medications:  acetaminophen     Tablet .. 650 milliGRAM(s) Oral every 6 hours PRN  amLODIPine   Tablet 5 milliGRAM(s) Oral daily  atorvastatin 40 milliGRAM(s) Oral at bedtime  cefTRIAXone   IVPB      cefTRIAXone   IVPB 2000 milliGRAM(s) IV Intermittent every 24 hours  chlorhexidine 2% Cloths 1 Application(s) Topical <User Schedule>  cholestyramine Powder (Sugar-Free) 4 Gram(s) Oral three times a day  dextrose 5%. 1000 milliLiter(s) IV Continuous <Continuous>  dextrose 5%. 1000 milliLiter(s) IV Continuous <Continuous>  dextrose 50% Injectable 25 Gram(s) IV Push once  dextrose 50% Injectable 25 Gram(s) IV Push once  dextrose 50% Injectable 12.5 Gram(s) IV Push once  dextrose Oral Gel 15 Gram(s) Oral once PRN  enoxaparin Injectable 40 milliGRAM(s) SubCutaneous every 24 hours  glucagon  Injectable 1 milliGRAM(s) IntraMuscular once  hydrocortisone sodium succinate Injectable 100 milliGRAM(s) IV Push every 8 hours  insulin lispro (ADMELOG) corrective regimen sliding scale   SubCutaneous three times a day before meals  lactated ringers. 1000 milliLiter(s) IV Continuous <Continuous>  levETIRAcetam  IVPB 1500 milliGRAM(s) IV Intermittent every 12 hours  melatonin 3 milliGRAM(s) Oral at bedtime PRN  methimazole 20 milliGRAM(s) Oral <User Schedule>  pantoprazole  Injectable 40 milliGRAM(s) IV Push daily  Potassium iodide 250 milliGRAM(s),Potassium Iodide ( 5 drops=0.25 ml) 250 milliGRAM(s) 250 milliGRAM(s) Oral every 6 hours  propranolol 80 milliGRAM(s) Oral every 6 hours  vancomycin  IVPB      vancomycin  IVPB 1500 milliGRAM(s) IV Intermittent every 12 hours      Vital Signs Last 24 Hrs  T(C): 37.2 (27 Dec 2023 08:00), Max: 37.2 (27 Dec 2023 08:00)  T(F): 98.9 (27 Dec 2023 08:00), Max: 98.9 (27 Dec 2023 08:00)  HR: 78 (27 Dec 2023 10:00) (75 - 82)  BP: 193/81 (27 Dec 2023 10:00) (114/53 - 193/81)  BP(mean): 117 (27 Dec 2023 10:00) (76 - 125)  RR: 18 (27 Dec 2023 10:00) (17 - 31)  SpO2: 100% (27 Dec 2023 10:00) (97% - 100%)    Parameters below as of 27 Dec 2023 08:00  Patient On (Oxygen Delivery Method): room air        Neurological Exam:   Mental status: +ng tube, unarousable, NAD    Cranial nerves: no spontaneous eye opening; no facial asymmetry  Motor:  Normal tone and bulk. able to move LUE, and b/l LE against gravity.  +L hand tremor   Sensation: no withdrawal to painful stimuli x4     Labs:  CBC Full  -  ( 26 Dec 2023 05:47 )  WBC Count : 14.91 K/uL  RBC Count : 4.82 M/uL  Hemoglobin : 11.7 g/dL  Hematocrit : 36.1 %  Platelet Count - Automated : 203 K/uL  Mean Cell Volume : 74.9 fL  Mean Cell Hemoglobin : 24.3 pg  Mean Cell Hemoglobin Concentration : 32.4 g/dL  Auto Neutrophil # : 12.80 K/uL  Auto Lymphocyte # : 1.38 K/uL  Auto Monocyte # : 0.63 K/uL  Auto Eosinophil # : 0.00 K/uL  Auto Basophil # : 0.02 K/uL  Auto Neutrophil % : 85.9 %  Auto Lymphocyte % : 9.3 %  Auto Monocyte % : 4.2 %  Auto Eosinophil % : 0.0 %  Auto Basophil % : 0.1 %    12-26    135  |  102  |  11  ----------------------------<  195<H>  4.1   |  22  |  0.5<L>    Ca    9.2      26 Dec 2023 16:40  Mg     2.3     12-26    TPro  6.6  /  Alb  3.6  /  TBili  1.1  /  DBili  x   /  AST  19  /  ALT  17  /  AlkPhos  154<H>  12-26    LIVER FUNCTIONS - ( 26 Dec 2023 05:47 )  Alb: 3.6 g/dL / Pro: 6.6 g/dL / ALK PHOS: 154 U/L / ALT: 17 U/L / AST: 19 U/L / GGT: x           PT/INR - ( 26 Dec 2023 11:22 )   PT: 14.70 sec;   INR: 1.29 ratio         PTT - ( 26 Dec 2023 11:22 )  PTT:33.3 sec  Urinalysis Basic - ( 26 Dec 2023 16:40 )    Color: x / Appearance: x / SG: x / pH: x  Gluc: 195 mg/dL / Ketone: x  / Bili: x / Urobili: x   Blood: x / Protein: x / Nitrite: x   Leuk Esterase: x / RBC: x / WBC x   Sq Epi: x / Non Sq Epi: x / Bacteria: x      ACC: [id number] EXAM:  CT BRAIN   ORDERED BY: TATY BONILLA     PROCEDURE DATE:  12/26/2023          INTERPRETATION:  CLINICAL INDICATION: Obtunded. Altered mental status.    Technique: CT of the head was performed without contrast.    Multiple contiguous axial images were acquired from the skullbase to the   vertex without the administration of intravenous contrast.  Coronal and   sagittal reformations were made.    COMPARISON:  prior head CT dated 11/11/2021    FINDINGS:    The ventricles and sulci are unremarkable in appearance.     There is no intraparenchymal hematoma, mass effect or midline shift. No   abnormal extra-axial fluid collections are present.    The calvarium is intact. The visualized intraorbital compartments,   paranasal sinuses and mastoid complexes appear free of acute disease.    IMPRESSION:  No acute intracranial pathology. No evidence of midline shift, mass   effect or intracranial hemorrhage.    --- End of Report ---            GABE BRAGA MD; Attending Radiologist  This document has been electronically signed. Dec 26 2023  9:22AM    12/27/23 vEEG:  VEEG in the last 24 hours:    Background-------------continues, symmetrical and slightly less than optimally organized reaching 8-9 h superimposed by large amount of low voltage fast activity    Focal and generalized slowing-------none    Interictal activity----------- none    Events------- During this period she did have two events captured on the video . she was seen having a rhythmic left hand tapping behavior with head shaking / side to side movements.                      Both electrographic and behavioral aspects of the EEG were not suggestive of an epileptic event    Seizures----no epileptic seizure    Impression:  suggestive of none epileptic events   Neurology Progress Note    Interval History: Patient seen and evaluated this morning. No acute events noted overnight. Spoke with patient's family at bedside who reports that patient has had intermittent isolated tremors to b/l hands for almost one year. Family member reports that patient will be doing various tasks such as washing dishes or crossing the street and have these tremors, but that during these episodes patient will be AOx3 and the tremors resolve without any intervention or post-ictal period.       PAST MEDICAL & SURGICAL HISTORY:  Diabetes    Hypertension    No significant past surgical history            Medications:  acetaminophen     Tablet .. 650 milliGRAM(s) Oral every 6 hours PRN  amLODIPine   Tablet 5 milliGRAM(s) Oral daily  atorvastatin 40 milliGRAM(s) Oral at bedtime  cefTRIAXone   IVPB      cefTRIAXone   IVPB 2000 milliGRAM(s) IV Intermittent every 24 hours  chlorhexidine 2% Cloths 1 Application(s) Topical <User Schedule>  cholestyramine Powder (Sugar-Free) 4 Gram(s) Oral three times a day  dextrose 5%. 1000 milliLiter(s) IV Continuous <Continuous>  dextrose 5%. 1000 milliLiter(s) IV Continuous <Continuous>  dextrose 50% Injectable 25 Gram(s) IV Push once  dextrose 50% Injectable 25 Gram(s) IV Push once  dextrose 50% Injectable 12.5 Gram(s) IV Push once  dextrose Oral Gel 15 Gram(s) Oral once PRN  enoxaparin Injectable 40 milliGRAM(s) SubCutaneous every 24 hours  glucagon  Injectable 1 milliGRAM(s) IntraMuscular once  hydrocortisone sodium succinate Injectable 100 milliGRAM(s) IV Push every 8 hours  insulin lispro (ADMELOG) corrective regimen sliding scale   SubCutaneous three times a day before meals  lactated ringers. 1000 milliLiter(s) IV Continuous <Continuous>  levETIRAcetam  IVPB 1500 milliGRAM(s) IV Intermittent every 12 hours  melatonin 3 milliGRAM(s) Oral at bedtime PRN  methimazole 20 milliGRAM(s) Oral <User Schedule>  pantoprazole  Injectable 40 milliGRAM(s) IV Push daily  Potassium iodide 250 milliGRAM(s),Potassium Iodide ( 5 drops=0.25 ml) 250 milliGRAM(s) 250 milliGRAM(s) Oral every 6 hours  propranolol 80 milliGRAM(s) Oral every 6 hours  vancomycin  IVPB      vancomycin  IVPB 1500 milliGRAM(s) IV Intermittent every 12 hours      Vital Signs Last 24 Hrs  T(C): 37.2 (27 Dec 2023 08:00), Max: 37.2 (27 Dec 2023 08:00)  T(F): 98.9 (27 Dec 2023 08:00), Max: 98.9 (27 Dec 2023 08:00)  HR: 78 (27 Dec 2023 10:00) (75 - 82)  BP: 193/81 (27 Dec 2023 10:00) (114/53 - 193/81)  BP(mean): 117 (27 Dec 2023 10:00) (76 - 125)  RR: 18 (27 Dec 2023 10:00) (17 - 31)  SpO2: 100% (27 Dec 2023 10:00) (97% - 100%)    Parameters below as of 27 Dec 2023 08:00  Patient On (Oxygen Delivery Method): room air        Neurological Exam:   Mental status: +ng tube, unarousable, NAD    Cranial nerves: no spontaneous eye opening; no facial asymmetry  Motor:  Normal tone and bulk. able to move LUE, and b/l LE against gravity.  +L hand tremor   Sensation: no withdrawal to painful stimuli x4     Labs:  CBC Full  -  ( 26 Dec 2023 05:47 )  WBC Count : 14.91 K/uL  RBC Count : 4.82 M/uL  Hemoglobin : 11.7 g/dL  Hematocrit : 36.1 %  Platelet Count - Automated : 203 K/uL  Mean Cell Volume : 74.9 fL  Mean Cell Hemoglobin : 24.3 pg  Mean Cell Hemoglobin Concentration : 32.4 g/dL  Auto Neutrophil # : 12.80 K/uL  Auto Lymphocyte # : 1.38 K/uL  Auto Monocyte # : 0.63 K/uL  Auto Eosinophil # : 0.00 K/uL  Auto Basophil # : 0.02 K/uL  Auto Neutrophil % : 85.9 %  Auto Lymphocyte % : 9.3 %  Auto Monocyte % : 4.2 %  Auto Eosinophil % : 0.0 %  Auto Basophil % : 0.1 %    12-26    135  |  102  |  11  ----------------------------<  195<H>  4.1   |  22  |  0.5<L>    Ca    9.2      26 Dec 2023 16:40  Mg     2.3     12-26    TPro  6.6  /  Alb  3.6  /  TBili  1.1  /  DBili  x   /  AST  19  /  ALT  17  /  AlkPhos  154<H>  12-26    LIVER FUNCTIONS - ( 26 Dec 2023 05:47 )  Alb: 3.6 g/dL / Pro: 6.6 g/dL / ALK PHOS: 154 U/L / ALT: 17 U/L / AST: 19 U/L / GGT: x           PT/INR - ( 26 Dec 2023 11:22 )   PT: 14.70 sec;   INR: 1.29 ratio         PTT - ( 26 Dec 2023 11:22 )  PTT:33.3 sec  Urinalysis Basic - ( 26 Dec 2023 16:40 )    Color: x / Appearance: x / SG: x / pH: x  Gluc: 195 mg/dL / Ketone: x  / Bili: x / Urobili: x   Blood: x / Protein: x / Nitrite: x   Leuk Esterase: x / RBC: x / WBC x   Sq Epi: x / Non Sq Epi: x / Bacteria: x      ACC: [id number] EXAM:  CT BRAIN   ORDERED BY: TATY BONILLA     PROCEDURE DATE:  12/26/2023          INTERPRETATION:  CLINICAL INDICATION: Obtunded. Altered mental status.    Technique: CT of the head was performed without contrast.    Multiple contiguous axial images were acquired from the skullbase to the   vertex without the administration of intravenous contrast.  Coronal and   sagittal reformations were made.    COMPARISON:  prior head CT dated 11/11/2021    FINDINGS:    The ventricles and sulci are unremarkable in appearance.     There is no intraparenchymal hematoma, mass effect or midline shift. No   abnormal extra-axial fluid collections are present.    The calvarium is intact. The visualized intraorbital compartments,   paranasal sinuses and mastoid complexes appear free of acute disease.    IMPRESSION:  No acute intracranial pathology. No evidence of midline shift, mass   effect or intracranial hemorrhage.    --- End of Report ---            GABE BRAGA MD; Attending Radiologist  This document has been electronically signed. Dec 26 2023  9:22AM    12/27/23 vEEG:  VEEG in the last 24 hours:    Background-------------continues, symmetrical and slightly less than optimally organized reaching 8-9 h superimposed by large amount of low voltage fast activity    Focal and generalized slowing-------none    Interictal activity----------- none    Events------- During this period she did have two events captured on the video . she was seen having a rhythmic left hand tapping behavior with head shaking / side to side movements.                      Both electrographic and behavioral aspects of the EEG were not suggestive of an epileptic event    Seizures----no epileptic seizure    Impression:  suggestive of none epileptic events   Neurology Progress Note    Interval History: Patient seen and evaluated this morning. No acute events noted overnight. Spoke with patient's family at bedside who reports that patient has had intermittent isolated tremors to b/l hands for almost one year. Family member reports that patient will be doing various tasks such as washing dishes or crossing the street and have these tremors, but that during these episodes patient will remain alert with no LOC and the tremors resolve without any intervention or post-ictal period.       PAST MEDICAL & SURGICAL HISTORY:  Diabetes    Hypertension    No significant past surgical history            Medications:  acetaminophen     Tablet .. 650 milliGRAM(s) Oral every 6 hours PRN  amLODIPine   Tablet 5 milliGRAM(s) Oral daily  atorvastatin 40 milliGRAM(s) Oral at bedtime  cefTRIAXone   IVPB      cefTRIAXone   IVPB 2000 milliGRAM(s) IV Intermittent every 24 hours  chlorhexidine 2% Cloths 1 Application(s) Topical <User Schedule>  cholestyramine Powder (Sugar-Free) 4 Gram(s) Oral three times a day  dextrose 5%. 1000 milliLiter(s) IV Continuous <Continuous>  dextrose 5%. 1000 milliLiter(s) IV Continuous <Continuous>  dextrose 50% Injectable 25 Gram(s) IV Push once  dextrose 50% Injectable 25 Gram(s) IV Push once  dextrose 50% Injectable 12.5 Gram(s) IV Push once  dextrose Oral Gel 15 Gram(s) Oral once PRN  enoxaparin Injectable 40 milliGRAM(s) SubCutaneous every 24 hours  glucagon  Injectable 1 milliGRAM(s) IntraMuscular once  hydrocortisone sodium succinate Injectable 100 milliGRAM(s) IV Push every 8 hours  insulin lispro (ADMELOG) corrective regimen sliding scale   SubCutaneous three times a day before meals  lactated ringers. 1000 milliLiter(s) IV Continuous <Continuous>  levETIRAcetam  IVPB 1500 milliGRAM(s) IV Intermittent every 12 hours  melatonin 3 milliGRAM(s) Oral at bedtime PRN  methimazole 20 milliGRAM(s) Oral <User Schedule>  pantoprazole  Injectable 40 milliGRAM(s) IV Push daily  Potassium iodide 250 milliGRAM(s),Potassium Iodide ( 5 drops=0.25 ml) 250 milliGRAM(s) 250 milliGRAM(s) Oral every 6 hours  propranolol 80 milliGRAM(s) Oral every 6 hours  vancomycin  IVPB      vancomycin  IVPB 1500 milliGRAM(s) IV Intermittent every 12 hours      Vital Signs Last 24 Hrs  T(C): 37.2 (27 Dec 2023 08:00), Max: 37.2 (27 Dec 2023 08:00)  T(F): 98.9 (27 Dec 2023 08:00), Max: 98.9 (27 Dec 2023 08:00)  HR: 78 (27 Dec 2023 10:00) (75 - 82)  BP: 193/81 (27 Dec 2023 10:00) (114/53 - 193/81)  BP(mean): 117 (27 Dec 2023 10:00) (76 - 125)  RR: 18 (27 Dec 2023 10:00) (17 - 31)  SpO2: 100% (27 Dec 2023 10:00) (97% - 100%)    Parameters below as of 27 Dec 2023 08:00  Patient On (Oxygen Delivery Method): room air        Neurological Exam:   Mental status: +ng tube, unarousable, NAD    Cranial nerves: no spontaneous eye opening; no facial asymmetry  Motor:  Normal tone and bulk. able to move LUE, and b/l LE against gravity.  +L hand tremor   Sensation: no withdrawal to painful stimuli x4     Labs:  CBC Full  -  ( 26 Dec 2023 05:47 )  WBC Count : 14.91 K/uL  RBC Count : 4.82 M/uL  Hemoglobin : 11.7 g/dL  Hematocrit : 36.1 %  Platelet Count - Automated : 203 K/uL  Mean Cell Volume : 74.9 fL  Mean Cell Hemoglobin : 24.3 pg  Mean Cell Hemoglobin Concentration : 32.4 g/dL  Auto Neutrophil # : 12.80 K/uL  Auto Lymphocyte # : 1.38 K/uL  Auto Monocyte # : 0.63 K/uL  Auto Eosinophil # : 0.00 K/uL  Auto Basophil # : 0.02 K/uL  Auto Neutrophil % : 85.9 %  Auto Lymphocyte % : 9.3 %  Auto Monocyte % : 4.2 %  Auto Eosinophil % : 0.0 %  Auto Basophil % : 0.1 %    12-26    135  |  102  |  11  ----------------------------<  195<H>  4.1   |  22  |  0.5<L>    Ca    9.2      26 Dec 2023 16:40  Mg     2.3     12-26    TPro  6.6  /  Alb  3.6  /  TBili  1.1  /  DBili  x   /  AST  19  /  ALT  17  /  AlkPhos  154<H>  12-26    LIVER FUNCTIONS - ( 26 Dec 2023 05:47 )  Alb: 3.6 g/dL / Pro: 6.6 g/dL / ALK PHOS: 154 U/L / ALT: 17 U/L / AST: 19 U/L / GGT: x           PT/INR - ( 26 Dec 2023 11:22 )   PT: 14.70 sec;   INR: 1.29 ratio         PTT - ( 26 Dec 2023 11:22 )  PTT:33.3 sec  Urinalysis Basic - ( 26 Dec 2023 16:40 )    Color: x / Appearance: x / SG: x / pH: x  Gluc: 195 mg/dL / Ketone: x  / Bili: x / Urobili: x   Blood: x / Protein: x / Nitrite: x   Leuk Esterase: x / RBC: x / WBC x   Sq Epi: x / Non Sq Epi: x / Bacteria: x      ACC: [id number] EXAM:  CT BRAIN   ORDERED BY: TATY BONILLA     PROCEDURE DATE:  12/26/2023          INTERPRETATION:  CLINICAL INDICATION: Obtunded. Altered mental status.    Technique: CT of the head was performed without contrast.    Multiple contiguous axial images were acquired from the skullbase to the   vertex without the administration of intravenous contrast.  Coronal and   sagittal reformations were made.    COMPARISON:  prior head CT dated 11/11/2021    FINDINGS:    The ventricles and sulci are unremarkable in appearance.     There is no intraparenchymal hematoma, mass effect or midline shift. No   abnormal extra-axial fluid collections are present.    The calvarium is intact. The visualized intraorbital compartments,   paranasal sinuses and mastoid complexes appear free of acute disease.    IMPRESSION:  No acute intracranial pathology. No evidence of midline shift, mass   effect or intracranial hemorrhage.    --- End of Report ---            GABE BRAGA MD; Attending Radiologist  This document has been electronically signed. Dec 26 2023  9:22AM    12/27/23 vEEG:  VEEG in the last 24 hours:    Background-------------continues, symmetrical and slightly less than optimally organized reaching 8-9 h superimposed by large amount of low voltage fast activity    Focal and generalized slowing-------none    Interictal activity----------- none    Events------- During this period she did have two events captured on the video . she was seen having a rhythmic left hand tapping behavior with head shaking / side to side movements.                      Both electrographic and behavioral aspects of the EEG were not suggestive of an epileptic event    Seizures----no epileptic seizure    Impression:  suggestive of none epileptic events   Neurology Progress Note    Interval History: Patient seen and evaluated this morning. No acute events noted overnight. Spoke with patient's family at bedside who reports that patient has had intermittent isolated tremors to b/l hands for almost one year. Family member reports that patient will be doing various tasks such as washing dishes or crossing the street and have these tremors, but that during these episodes patient will remain alert with no LOC and the tremors resolve without any intervention or post-ictal period.       PAST MEDICAL & SURGICAL HISTORY:  Diabetes    Hypertension    No significant past surgical history            Medications:  acetaminophen     Tablet .. 650 milliGRAM(s) Oral every 6 hours PRN  amLODIPine   Tablet 5 milliGRAM(s) Oral daily  atorvastatin 40 milliGRAM(s) Oral at bedtime  cefTRIAXone   IVPB      cefTRIAXone   IVPB 2000 milliGRAM(s) IV Intermittent every 24 hours  chlorhexidine 2% Cloths 1 Application(s) Topical <User Schedule>  cholestyramine Powder (Sugar-Free) 4 Gram(s) Oral three times a day  dextrose 5%. 1000 milliLiter(s) IV Continuous <Continuous>  dextrose 5%. 1000 milliLiter(s) IV Continuous <Continuous>  dextrose 50% Injectable 25 Gram(s) IV Push once  dextrose 50% Injectable 25 Gram(s) IV Push once  dextrose 50% Injectable 12.5 Gram(s) IV Push once  dextrose Oral Gel 15 Gram(s) Oral once PRN  enoxaparin Injectable 40 milliGRAM(s) SubCutaneous every 24 hours  glucagon  Injectable 1 milliGRAM(s) IntraMuscular once  hydrocortisone sodium succinate Injectable 100 milliGRAM(s) IV Push every 8 hours  insulin lispro (ADMELOG) corrective regimen sliding scale   SubCutaneous three times a day before meals  lactated ringers. 1000 milliLiter(s) IV Continuous <Continuous>  levETIRAcetam  IVPB 1500 milliGRAM(s) IV Intermittent every 12 hours  melatonin 3 milliGRAM(s) Oral at bedtime PRN  methimazole 20 milliGRAM(s) Oral <User Schedule>  pantoprazole  Injectable 40 milliGRAM(s) IV Push daily  Potassium iodide 250 milliGRAM(s),Potassium Iodide ( 5 drops=0.25 ml) 250 milliGRAM(s) 250 milliGRAM(s) Oral every 6 hours  propranolol 80 milliGRAM(s) Oral every 6 hours  vancomycin  IVPB      vancomycin  IVPB 1500 milliGRAM(s) IV Intermittent every 12 hours      Vital Signs Last 24 Hrs  T(C): 37.2 (27 Dec 2023 08:00), Max: 37.2 (27 Dec 2023 08:00)  T(F): 98.9 (27 Dec 2023 08:00), Max: 98.9 (27 Dec 2023 08:00)  HR: 78 (27 Dec 2023 10:00) (75 - 82)  BP: 193/81 (27 Dec 2023 10:00) (114/53 - 193/81)  BP(mean): 117 (27 Dec 2023 10:00) (76 - 125)  RR: 18 (27 Dec 2023 10:00) (17 - 31)  SpO2: 100% (27 Dec 2023 10:00) (97% - 100%)    Parameters below as of 27 Dec 2023 08:00  Patient On (Oxygen Delivery Method): room air        Neurological Exam:   Mental status: +ng tube, unarousable, NAD    Cranial nerves: no spontaneous eye opening; no facial asymmetry  Motor:  Normal tone and bulk. able to move LUE, and b/l LE against gravity.  +L hand tremor   Sensation: no withdrawal to painful stimuli x4     Labs:  CBC Full  -  ( 26 Dec 2023 05:47 )  WBC Count : 14.91 K/uL  RBC Count : 4.82 M/uL  Hemoglobin : 11.7 g/dL  Hematocrit : 36.1 %  Platelet Count - Automated : 203 K/uL  Mean Cell Volume : 74.9 fL  Mean Cell Hemoglobin : 24.3 pg  Mean Cell Hemoglobin Concentration : 32.4 g/dL  Auto Neutrophil # : 12.80 K/uL  Auto Lymphocyte # : 1.38 K/uL  Auto Monocyte # : 0.63 K/uL  Auto Eosinophil # : 0.00 K/uL  Auto Basophil # : 0.02 K/uL  Auto Neutrophil % : 85.9 %  Auto Lymphocyte % : 9.3 %  Auto Monocyte % : 4.2 %  Auto Eosinophil % : 0.0 %  Auto Basophil % : 0.1 %    12-26    135  |  102  |  11  ----------------------------<  195<H>  4.1   |  22  |  0.5<L>    Ca    9.2      26 Dec 2023 16:40  Mg     2.3     12-26    TPro  6.6  /  Alb  3.6  /  TBili  1.1  /  DBili  x   /  AST  19  /  ALT  17  /  AlkPhos  154<H>  12-26    LIVER FUNCTIONS - ( 26 Dec 2023 05:47 )  Alb: 3.6 g/dL / Pro: 6.6 g/dL / ALK PHOS: 154 U/L / ALT: 17 U/L / AST: 19 U/L / GGT: x           PT/INR - ( 26 Dec 2023 11:22 )   PT: 14.70 sec;   INR: 1.29 ratio         PTT - ( 26 Dec 2023 11:22 )  PTT:33.3 sec  Urinalysis Basic - ( 26 Dec 2023 16:40 )    Color: x / Appearance: x / SG: x / pH: x  Gluc: 195 mg/dL / Ketone: x  / Bili: x / Urobili: x   Blood: x / Protein: x / Nitrite: x   Leuk Esterase: x / RBC: x / WBC x   Sq Epi: x / Non Sq Epi: x / Bacteria: x      ACC: [id number] EXAM:  CT BRAIN   ORDERED BY: TATY BONILLA     PROCEDURE DATE:  12/26/2023          INTERPRETATION:  CLINICAL INDICATION: Obtunded. Altered mental status.    Technique: CT of the head was performed without contrast.    Multiple contiguous axial images were acquired from the skullbase to the   vertex without the administration of intravenous contrast.  Coronal and   sagittal reformations were made.    COMPARISON:  prior head CT dated 11/11/2021    FINDINGS:    The ventricles and sulci are unremarkable in appearance.     There is no intraparenchymal hematoma, mass effect or midline shift. No   abnormal extra-axial fluid collections are present.    The calvarium is intact. The visualized intraorbital compartments,   paranasal sinuses and mastoid complexes appear free of acute disease.    IMPRESSION:  No acute intracranial pathology. No evidence of midline shift, mass   effect or intracranial hemorrhage.    --- End of Report ---            GABE BRAGA MD; Attending Radiologist  This document has been electronically signed. Dec 26 2023  9:22AM    12/27/23 vEEG:  VEEG in the last 24 hours:    Background-------------continues, symmetrical and slightly less than optimally organized reaching 8-9 h superimposed by large amount of low voltage fast activity    Focal and generalized slowing-------none    Interictal activity----------- none    Events------- During this period she did have two events captured on the video . she was seen having a rhythmic left hand tapping behavior with head shaking / side to side movements.                      Both electrographic and behavioral aspects of the EEG were not suggestive of an epileptic event    Seizures----no epileptic seizure    Impression:  suggestive of none epileptic events

## 2023-12-27 NOTE — CHART NOTE - NSCHARTNOTEFT_GEN_A_CORE
RN to provider: patient is awake and requesting removal of NG tube.    Patient seen and examined with sister present. Patient was A+Ox1. Believes its 2013.    Patient states that she was seen in the clinic and could not take her meds because the doctor would not prescribe them if she did not bring in all her meds. This is not consistent with documented clinic visits and pharmacy records. Patient also states that she was told at her most recent visit to the [facility] clinic that she has throat cancer. There are no documentation or imaging to support this claim. The sister now states that she was diagnosed in 2013 at [facility] in [place].     Patient does have a history of memory loss and medication noncompliance in the past. Sister denied patient's possible misunderstanding of the situation despite presentation of the clinic notes.    Sister and patient was made aware importance of medication adherence. RN to provider: patient is awake and requesting removal of NG tube.    Patient seen and examined with sister present. Patient was A+Ox1. Believes its 2013.    Patient states that she was seen in the clinic and could not take her meds because the doctor would not prescribe them if she did not bring in all her meds. This is not consistent with documented clinic visits and pharmacy records. Patient also states that she was told at her most recent visit to the [facility] clinic that she has throat cancer. There are no documentation or imaging to support this claim. The sister now states that she was diagnosed in 2013 at [facility] in [place].     Patient does have a history of memory loss and medication noncompliance in the past. Sister denied patient's possible misunderstanding of the situation despite presentation of the clinic notes.    Sister and patient was made aware importance of medication adherence. RN to provider: patient is awake and requesting removal of NG tube.    Patient seen and examined with sister present. Patient was A+Ox1. Believes its 2013.    Patient states that she was seen in the clinic and could not take her meds because the doctor would not prescribe them if she did not bring in all her meds. This is not consistent with documented clinic visits and pharmacy records. Patient also states that she was told at her most recent visit to the [facility] clinic that she has throat cancer. There are no documentation or imaging to support this claim. The sister now states that she was diagnosed in 2013 at [facility] in [place]. Medical records request sent (Fax# [phone])    Patient does have a history of memory loss and medication noncompliance in the past. Sister denied patient's possible misunderstanding of the situation despite presentation of the clinic notes.    Sister and patient was made aware importance of medication adherence. RN to provider: patient is awake and requesting removal of NG tube.    Patient seen and examined with sister present. Patient was A+Ox1. Believes its 2013.    Patient states that she was seen in the clinic and could not take her meds because the doctor would not prescribe them if she did not bring in all her meds. This is not consistent with documented clinic visits and pharmacy records. Patient also states that she was told at her most recent visit to the [facility] clinic that she has throat cancer. There are no documentation or imaging to support this claim. The sister now states that she was diagnosed in 2013 at [facility] in [place]. Medical records request sent (Fax# [phone])    Patient does have a history of memory loss and medication noncompliance in the past. Sister denied patient's possible misunderstanding of the situation despite presentation of the clinic notes.    Sister and patient was made aware importance of medication adherence. RN to provider: patient is awake and requesting removal of NG tube.    Patient seen and examined with sister present. Patient was A+Ox1. Believes its 2013.  Patient passed dysphagia screen. Will advanced diet to clear liquids.      (Patient states that she was seen in the clinic and could not take her meds because the doctor would not prescribe them if she did not bring in all her meds. This is not consistent with documented clinic visits and pharmacy records. Patient also states that she was told at her most recent visit to the [facility] clinic that she has throat cancer. There are no documentation or imaging to support this claim. The sister now states that she was diagnosed in 2013 at [facility] in [place]. Patient does have a history of memory loss and medication noncompliance in the past. Sister denied patient's possible misunderstanding of the situation despite presentation of the clinic notes. Sister and patient was made aware importance of medication adherence.)    Medical records request sent (Fax# [phone]) RN to provider: patient is awake and requesting removal of NG tube.    Patient seen and examined with sister present. Patient was A+Ox1. Believes its 2013.  Patient passed dysphagia screen. Will advanced diet to clear liquids.      (Patient states that she was seen in the clinic and could not take her meds because the doctor would not prescribe them if she did not bring in all her meds. This is not consistent with documented clinic visits and pharmacy records. Patient also states that she was told at her most recent visit to the [facility] clinic that she has throat cancer. There are no documentation or imaging to support this claim. The sister now states that she was diagnosed in 2013 at [facility] in [place]. Patient does have a history of memory loss and medication noncompliance in the past. Sister denied patient's possible misunderstanding of the situation despite presentation of the clinic notes. Sister and patient was made aware importance of medication adherence.)    Medical records request sent (Fax# [phone]) RN to provider: patient is awake and requesting removal of NG tube.    Patient seen and examined with sister present. Patient was A+Ox1. Believes its 2013.  Patient passed dysphagia screen. NG tube removed. Will advanced diet to clear liquids.      (Patient states that she was seen in the clinic and could not take her meds because the doctor would not prescribe them if she did not bring in all her meds. This is not consistent with documented clinic visits and pharmacy records. Patient also states that she was told at her most recent visit to the [facility] clinic that she has throat cancer. There are no documentation or imaging to support this claim. The sister now states that she was diagnosed in 2013 at [facility] in [place]. Patient does have a history of memory loss and medication noncompliance in the past. Sister denied patient's possible misunderstanding of the situation despite presentation of the clinic notes. Sister and patient was made aware importance of medication adherence.)    Medical records request sent (Fax# [phone]) RN to provider: patient is awake and requesting removal of NG tube.    Patient seen and examined with sister present. Patient was A+Ox1. Believes its 2013.  Patient passed dysphagia screen. NG tube removed. Will advanced diet to clear liquids.      (Patient states that she was seen in the clinic and could not take her meds because the doctor would not prescribe them if she did not bring in all her meds. This is not consistent with documented clinic visits and pharmacy records. Patient also states that she was told at her most recent visit to the [facility] clinic that she has throat cancer. There are no documentation or imaging to support this claim. The sister now states that she was diagnosed in 2013 at [facility] in [place]. Patient does have a history of memory loss and medication noncompliance in the past. Sister denied patient's possible misunderstanding of the situation despite presentation of the clinic notes. Sister and patient was made aware importance of medication adherence.)    Medical records request sent (Fax# [phone]) RN to provider: patient is awake and requesting removal of NG tube.    Patient seen and examined with sister present. Patient was A+Ox1. Believes its 2013.  Patient passed dysphagia screen. NG tube removed. Will advanced diet to clear liquids.      (Patient states that she was seen in the clinic and could not take her meds because the doctor would not prescribe them if she did not bring in all her meds. This is not consistent with documented clinic visits and pharmacy records. Patient also states that she was told at her most recent visit to the [facility] clinic that she has throat cancer. There are no documentation or imaging to support this claim. The sister now states that she was diagnosed in 2013 at [facility] in [place]. Patient does have a history of memory loss and medication noncompliance in the past. Sister denied patient's possible misunderstanding of the situation despite presentation of the clinic notes. Sister and patient was made aware importance of medication adherence.)    Medical records request sent to [facility] (Fax# [phone]) RN to provider: patient is awake and requesting removal of NG tube.    Patient seen and examined with sister present. Patient was A+Ox1. Believes its 2013.  Patient passed dysphagia screen. NG tube removed. Will advanced diet to clear liquids.      (Patient states that she was seen in the clinic and could not take her meds because the doctor would not prescribe them if she did not bring in all her meds. This is not consistent with documented clinic visits and pharmacy records. Patient also states that she was told at her most recent visit to the [facility] clinic that she has throat cancer. There are no documentation or imaging to support this claim. The sister now states that she was diagnosed in 2013 at [facility] in [place]. Patient does have a history of memory loss and medication noncompliance in the past. Sister denied patient's possible misunderstanding of the situation despite presentation of the clinic notes. Sister and patient was made aware importance of medication adherence.)    Medical records request sent to [facility] (Fax# [phone])

## 2023-12-27 NOTE — PROGRESS NOTE ADULT - ASSESSMENT
Matilda Santiago is a 54yo F w/ h/o Hyperthyroidism/thyroid storm, HTN, DM, HLD presented to the ED delirious. Per Chart review she had a similar episode 2022 found to be in thyroid storm s/p Methimazole. Admitted to MICU for suspicion of storming (febrile and tachycardic). In ICU 12/26/2023 she was a witnessed GTC x 3 mins s/p Lorazepam 4mg IVP x 1 and Levetiracetam 2g IVPB Load x 1. On clinical exam she remains unarousable. vEEG shows no epileptiform events indicating patient's hand tremor and head movement not secondary to seizure.      Impression  Thyroid storm vs infection  Encephalopathy vs Encephalitis     Reccs to follow Matilda Santiago is a 52yo F w/ h/o Hyperthyroidism/thyroid storm, HTN, DM, HLD presented to the ED delirious. Per Chart review she had a similar episode 2022 found to be in thyroid storm s/p Methimazole. Admitted to MICU for suspicion of storming (febrile and tachycardic). In ICU 12/26/2023 she was a witnessed GTC x 3 mins s/p Lorazepam 4mg IVP x 1 and Levetiracetam 2g IVPB Load x 1. On clinical exam she remains unarousable. vEEG shows no epileptiform events indicating patient's hand tremor and head movement not secondary to seizure.      Impression  Thyroid storm vs infection  Encephalopathy vs Encephalitis     Reccs to follow Matilda Santiago is a 52yo F w/ h/o Hyperthyroidism/thyroid storm, HTN, DM, HLD presented to the ED delirious. Per Chart review she had a similar episode 2022 found to be in thyroid storm s/p Methimazole. Admitted to MICU for suspicion of storming (febrile and tachycardic). In ICU 12/26/2023 she was a witnessed GTC x 3 mins s/p Lorazepam 4mg IVP x 1 and Levetiracetam 2g IVPB Load x 1. On clinical exam she remains unarousable. vEEG shows no epileptiform events indicating patient's hand tremor and head movement not secondary to seizure.      Impression  Thyroid storm vs infection  Encephalopathy vs Encephalitis     Recommendations  - c/w vEEG  - decrease keppra to 750mg BID  - continue to monitor clinically for seizures

## 2023-12-27 NOTE — PROGRESS NOTE ADULT - SUBJECTIVE AND OBJECTIVE BOX
24H events:    Patient is a 53y old Female who presents with a chief complaint of AMS (27 Dec 2023 06:42)    Primary diagnosis of Thyroid storm      Today is hospital day 2d. This morning patient was seen and examined at bedside, resting in bed. Blood pressure was elevated to the 180s. Otherwise, no acute or major events overnight.     PAST MEDICAL & SURGICAL HISTORY  Diabetes    Hypertension    No significant past surgical history      SOCIAL HISTORY:  Social History:      ALLERGIES:  penicillin (Rash)    MEDICATIONS:  STANDING MEDICATIONS  amLODIPine   Tablet 5 milliGRAM(s) Oral daily  atorvastatin 40 milliGRAM(s) Oral at bedtime  cefTRIAXone   IVPB      cefTRIAXone   IVPB 2000 milliGRAM(s) IV Intermittent every 24 hours  chlorhexidine 2% Cloths 1 Application(s) Topical <User Schedule>  cholestyramine Powder (Sugar-Free) 4 Gram(s) Oral three times a day  dextrose 5%. 1000 milliLiter(s) IV Continuous <Continuous>  dextrose 5%. 1000 milliLiter(s) IV Continuous <Continuous>  dextrose 50% Injectable 25 Gram(s) IV Push once  dextrose 50% Injectable 25 Gram(s) IV Push once  dextrose 50% Injectable 12.5 Gram(s) IV Push once  enoxaparin Injectable 40 milliGRAM(s) SubCutaneous every 24 hours  glucagon  Injectable 1 milliGRAM(s) IntraMuscular once  hydrocortisone sodium succinate Injectable 100 milliGRAM(s) IV Push every 8 hours  insulin lispro (ADMELOG) corrective regimen sliding scale   SubCutaneous three times a day before meals  lactated ringers. 1000 milliLiter(s) IV Continuous <Continuous>  levETIRAcetam  IVPB 1500 milliGRAM(s) IV Intermittent every 12 hours  methimazole 20 milliGRAM(s) Oral <User Schedule>  pantoprazole  Injectable 40 milliGRAM(s) IV Push daily  Potassium iodide 250 milliGRAM(s),Potassium Iodide ( 5 drops=0.25 ml) 250 milliGRAM(s) 250 milliGRAM(s) Oral every 6 hours  propranolol 80 milliGRAM(s) Oral every 6 hours  vancomycin  IVPB      vancomycin  IVPB 1500 milliGRAM(s) IV Intermittent every 12 hours    PRN MEDICATIONS  acetaminophen     Tablet .. 650 milliGRAM(s) Oral every 6 hours PRN  dextrose Oral Gel 15 Gram(s) Oral once PRN  melatonin 3 milliGRAM(s) Oral at bedtime PRN    VITALS:   T(F): 98.9  HR: 78  BP: 193/81  RR: 18  SpO2: 100%    PHYSICAL EXAM:  GENERAL: NAD, lying in bed. Unarousable with sternal rubs  HEAD: NCAT  NECK: Supple  HEART: Regular rate and rhythm, normal S1/S2, no murmurs  LUNGS: No acute respiratory distress, clear b/l breath sounds   ABDOMEN:  soft, non-tender, non-distended, + BS  EXTREMITIES: no edema      LABS:                        11.7   14.91 )-----------( 203      ( 26 Dec 2023 05:47 )             36.1     12-26    135  |  102  |  11  ----------------------------<  195<H>  4.1   |  22  |  0.5<L>    Ca    9.2      26 Dec 2023 16:40  Mg     2.3     12-26    TPro  6.6  /  Alb  3.6  /  TBili  1.1  /  DBili  x   /  AST  19  /  ALT  17  /  AlkPhos  154<H>  12-26    PT/INR - ( 26 Dec 2023 11:22 )   PT: 14.70 sec;   INR: 1.29 ratio         PTT - ( 26 Dec 2023 11:22 )  PTT:33.3 sec  Urinalysis Basic - ( 26 Dec 2023 16:40 )    Color: x / Appearance: x / SG: x / pH: x  Gluc: 195 mg/dL / Ketone: x  / Bili: x / Urobili: x   Blood: x / Protein: x / Nitrite: x   Leuk Esterase: x / RBC: x / WBC x   Sq Epi: x / Non Sq Epi: x / Bacteria: x            Culture - Blood (collected 25 Dec 2023 22:13)  Source: .Blood Blood-Peripheral  Preliminary Report (27 Dec 2023 07:02):    No growth at 24 hours    Culture - Blood (collected 25 Dec 2023 22:13)  Source: .Blood Blood-Peripheral  Preliminary Report (27 Dec 2023 07:02):    No growth at 24 hours          RADIOLOGY:    RADIOLOGY

## 2023-12-27 NOTE — DIETITIAN INITIAL EVALUATION ADULT - ADD RECOMMEND
1. Change TF regimen to Glucerna. Bolus feeds of Glucerna 1.2 @360mL -- provides: 1728kcal, 85g PRO, 1166.4mL free H2O.   Additional flushes per LIP   2. Monitor GI s/s and bowel movements  3. Maintain >45 degree angle during feeds  4. Consider SLP for advancement for PO diet

## 2023-12-27 NOTE — PROGRESS NOTE ADULT - ASSESSMENT
53y F PMH of hyperthyroidism, HTN, DMII, and HLD presents to the ED by family to the ED for likely thyroid storm      #Thyroid storm  #Hyperthyroidism  #Hx of thyroid storm  - Diagnosed with hyperthyroidism 11/19/21, Started methimazole 10  - Recent admission for thyroid storm 12/11/23.   - Seen by endo: DC'd on Methimazole 30 12/17/23  - Ramirez/Wartofsky score of 70  12/26  - TSH, T4, T3 ordered   - Methimazole 20mg Q6H  - Propranolol 80mg Q6H  - KI 250mg Q6H  - Hydrocortisone IV 100mg Q8  - Endocrinology consulted        #Metabolic encephalopathy  #Sepsis POA  #Seizure  #AMS  12/25  - AMS starting at 2100. CT head ordered. s/p 2x midazolam 5mg   12/26  - Seizure 0844 s/p 1x 4mg Ativan   - Start Keppra 1500mg BID  - pending RVP  - Start vanc rocephin  12/27  - CT head negative  - EEG: non epileptiform  - pending neuro f/u  - ID consult pending  - UDS, urinalysis, procal negative  - BCx NGTD      #HTN  12/27  - amlodipine 5mg daily    #HLD  - cholestyramine for thyroid storm    #DMII  - POCT blood glucose ~220    Diet: NPO except meds (NG tube)  Activity: bed rest  DVT ppx: lovenox  GI ppx: none  Dispo: MICU      pendig: med rec, EEG consent, Infectious workup (LP, cultures, rvp, ua/ucx). Endo consult, ID consult, Thyroid workup,.

## 2023-12-27 NOTE — DIETITIAN INITIAL EVALUATION ADULT - OTHER INFO
--53y F PMH of hyperthyroidism, HTN, DMII, and HLD presents to the ED by family to the ED for likely thyroid storm  #Metabolic encephalopathy  #Sepsis POA

## 2023-12-28 LAB
ALBUMIN SERPL ELPH-MCNC: 3.3 G/DL — LOW (ref 3.5–5.2)
ALBUMIN SERPL ELPH-MCNC: 3.3 G/DL — LOW (ref 3.5–5.2)
ALP SERPL-CCNC: 127 U/L — HIGH (ref 30–115)
ALP SERPL-CCNC: 127 U/L — HIGH (ref 30–115)
ALT FLD-CCNC: 21 U/L — SIGNIFICANT CHANGE UP (ref 0–41)
ALT FLD-CCNC: 21 U/L — SIGNIFICANT CHANGE UP (ref 0–41)
ANION GAP SERPL CALC-SCNC: 9 MMOL/L — SIGNIFICANT CHANGE UP (ref 7–14)
ANION GAP SERPL CALC-SCNC: 9 MMOL/L — SIGNIFICANT CHANGE UP (ref 7–14)
AST SERPL-CCNC: 17 U/L — SIGNIFICANT CHANGE UP (ref 0–41)
AST SERPL-CCNC: 17 U/L — SIGNIFICANT CHANGE UP (ref 0–41)
BASOPHILS # BLD AUTO: 0.02 K/UL — SIGNIFICANT CHANGE UP (ref 0–0.2)
BASOPHILS # BLD AUTO: 0.02 K/UL — SIGNIFICANT CHANGE UP (ref 0–0.2)
BASOPHILS NFR BLD AUTO: 0.2 % — SIGNIFICANT CHANGE UP (ref 0–1)
BASOPHILS NFR BLD AUTO: 0.2 % — SIGNIFICANT CHANGE UP (ref 0–1)
BILIRUB SERPL-MCNC: 0.6 MG/DL — SIGNIFICANT CHANGE UP (ref 0.2–1.2)
BILIRUB SERPL-MCNC: 0.6 MG/DL — SIGNIFICANT CHANGE UP (ref 0.2–1.2)
BUN SERPL-MCNC: 11 MG/DL — SIGNIFICANT CHANGE UP (ref 10–20)
BUN SERPL-MCNC: 11 MG/DL — SIGNIFICANT CHANGE UP (ref 10–20)
CALCIUM SERPL-MCNC: 9.1 MG/DL — SIGNIFICANT CHANGE UP (ref 8.4–10.4)
CALCIUM SERPL-MCNC: 9.1 MG/DL — SIGNIFICANT CHANGE UP (ref 8.4–10.4)
CHLORIDE SERPL-SCNC: 108 MMOL/L — SIGNIFICANT CHANGE UP (ref 98–110)
CHLORIDE SERPL-SCNC: 108 MMOL/L — SIGNIFICANT CHANGE UP (ref 98–110)
CO2 SERPL-SCNC: 24 MMOL/L — SIGNIFICANT CHANGE UP (ref 17–32)
CO2 SERPL-SCNC: 24 MMOL/L — SIGNIFICANT CHANGE UP (ref 17–32)
CREAT SERPL-MCNC: 0.5 MG/DL — LOW (ref 0.7–1.5)
CREAT SERPL-MCNC: 0.5 MG/DL — LOW (ref 0.7–1.5)
DRUG SCREEN, SERUM: SIGNIFICANT CHANGE UP
DRUG SCREEN, SERUM: SIGNIFICANT CHANGE UP
EGFR: 113 ML/MIN/1.73M2 — SIGNIFICANT CHANGE UP
EGFR: 113 ML/MIN/1.73M2 — SIGNIFICANT CHANGE UP
EOSINOPHIL # BLD AUTO: 0.06 K/UL — SIGNIFICANT CHANGE UP (ref 0–0.7)
EOSINOPHIL # BLD AUTO: 0.06 K/UL — SIGNIFICANT CHANGE UP (ref 0–0.7)
EOSINOPHIL NFR BLD AUTO: 0.6 % — SIGNIFICANT CHANGE UP (ref 0–8)
EOSINOPHIL NFR BLD AUTO: 0.6 % — SIGNIFICANT CHANGE UP (ref 0–8)
GLUCOSE BLDC GLUCOMTR-MCNC: 139 MG/DL — HIGH (ref 70–99)
GLUCOSE BLDC GLUCOMTR-MCNC: 139 MG/DL — HIGH (ref 70–99)
GLUCOSE BLDC GLUCOMTR-MCNC: 144 MG/DL — HIGH (ref 70–99)
GLUCOSE BLDC GLUCOMTR-MCNC: 144 MG/DL — HIGH (ref 70–99)
GLUCOSE BLDC GLUCOMTR-MCNC: 156 MG/DL — HIGH (ref 70–99)
GLUCOSE BLDC GLUCOMTR-MCNC: 156 MG/DL — HIGH (ref 70–99)
GLUCOSE BLDC GLUCOMTR-MCNC: 84 MG/DL — SIGNIFICANT CHANGE UP (ref 70–99)
GLUCOSE BLDC GLUCOMTR-MCNC: 84 MG/DL — SIGNIFICANT CHANGE UP (ref 70–99)
GLUCOSE SERPL-MCNC: 153 MG/DL — HIGH (ref 70–99)
GLUCOSE SERPL-MCNC: 153 MG/DL — HIGH (ref 70–99)
HCT VFR BLD CALC: 36.2 % — LOW (ref 37–47)
HCT VFR BLD CALC: 36.2 % — LOW (ref 37–47)
HGB BLD-MCNC: 11.8 G/DL — LOW (ref 12–16)
HGB BLD-MCNC: 11.8 G/DL — LOW (ref 12–16)
IMM GRANULOCYTES NFR BLD AUTO: 0.1 % — SIGNIFICANT CHANGE UP (ref 0.1–0.3)
IMM GRANULOCYTES NFR BLD AUTO: 0.1 % — SIGNIFICANT CHANGE UP (ref 0.1–0.3)
LYMPHOCYTES # BLD AUTO: 3.81 K/UL — HIGH (ref 1.2–3.4)
LYMPHOCYTES # BLD AUTO: 3.81 K/UL — HIGH (ref 1.2–3.4)
LYMPHOCYTES # BLD AUTO: 36.3 % — SIGNIFICANT CHANGE UP (ref 20.5–51.1)
LYMPHOCYTES # BLD AUTO: 36.3 % — SIGNIFICANT CHANGE UP (ref 20.5–51.1)
MAGNESIUM SERPL-MCNC: 1.8 MG/DL — SIGNIFICANT CHANGE UP (ref 1.8–2.4)
MAGNESIUM SERPL-MCNC: 1.8 MG/DL — SIGNIFICANT CHANGE UP (ref 1.8–2.4)
MCHC RBC-ENTMCNC: 24.6 PG — LOW (ref 27–31)
MCHC RBC-ENTMCNC: 24.6 PG — LOW (ref 27–31)
MCHC RBC-ENTMCNC: 32.6 G/DL — SIGNIFICANT CHANGE UP (ref 32–37)
MCHC RBC-ENTMCNC: 32.6 G/DL — SIGNIFICANT CHANGE UP (ref 32–37)
MCV RBC AUTO: 75.4 FL — LOW (ref 81–99)
MCV RBC AUTO: 75.4 FL — LOW (ref 81–99)
MONOCYTES # BLD AUTO: 1.15 K/UL — HIGH (ref 0.1–0.6)
MONOCYTES # BLD AUTO: 1.15 K/UL — HIGH (ref 0.1–0.6)
MONOCYTES NFR BLD AUTO: 10.9 % — HIGH (ref 1.7–9.3)
MONOCYTES NFR BLD AUTO: 10.9 % — HIGH (ref 1.7–9.3)
NEUTROPHILS # BLD AUTO: 5.46 K/UL — SIGNIFICANT CHANGE UP (ref 1.4–6.5)
NEUTROPHILS # BLD AUTO: 5.46 K/UL — SIGNIFICANT CHANGE UP (ref 1.4–6.5)
NEUTROPHILS NFR BLD AUTO: 51.9 % — SIGNIFICANT CHANGE UP (ref 42.2–75.2)
NEUTROPHILS NFR BLD AUTO: 51.9 % — SIGNIFICANT CHANGE UP (ref 42.2–75.2)
NRBC # BLD: 0 /100 WBCS — SIGNIFICANT CHANGE UP (ref 0–0)
NRBC # BLD: 0 /100 WBCS — SIGNIFICANT CHANGE UP (ref 0–0)
PLATELET # BLD AUTO: 213 K/UL — SIGNIFICANT CHANGE UP (ref 130–400)
PLATELET # BLD AUTO: 213 K/UL — SIGNIFICANT CHANGE UP (ref 130–400)
PMV BLD: 9.8 FL — SIGNIFICANT CHANGE UP (ref 7.4–10.4)
PMV BLD: 9.8 FL — SIGNIFICANT CHANGE UP (ref 7.4–10.4)
POTASSIUM SERPL-MCNC: 3.7 MMOL/L — SIGNIFICANT CHANGE UP (ref 3.5–5)
POTASSIUM SERPL-MCNC: 3.7 MMOL/L — SIGNIFICANT CHANGE UP (ref 3.5–5)
POTASSIUM SERPL-SCNC: 3.7 MMOL/L — SIGNIFICANT CHANGE UP (ref 3.5–5)
POTASSIUM SERPL-SCNC: 3.7 MMOL/L — SIGNIFICANT CHANGE UP (ref 3.5–5)
PROT SERPL-MCNC: 6.3 G/DL — SIGNIFICANT CHANGE UP (ref 6–8)
PROT SERPL-MCNC: 6.3 G/DL — SIGNIFICANT CHANGE UP (ref 6–8)
RBC # BLD: 4.8 M/UL — SIGNIFICANT CHANGE UP (ref 4.2–5.4)
RBC # BLD: 4.8 M/UL — SIGNIFICANT CHANGE UP (ref 4.2–5.4)
RBC # FLD: 12.5 % — SIGNIFICANT CHANGE UP (ref 11.5–14.5)
RBC # FLD: 12.5 % — SIGNIFICANT CHANGE UP (ref 11.5–14.5)
SODIUM SERPL-SCNC: 141 MMOL/L — SIGNIFICANT CHANGE UP (ref 135–146)
SODIUM SERPL-SCNC: 141 MMOL/L — SIGNIFICANT CHANGE UP (ref 135–146)
WBC # BLD: 10.51 K/UL — SIGNIFICANT CHANGE UP (ref 4.8–10.8)
WBC # BLD: 10.51 K/UL — SIGNIFICANT CHANGE UP (ref 4.8–10.8)
WBC # FLD AUTO: 10.51 K/UL — SIGNIFICANT CHANGE UP (ref 4.8–10.8)
WBC # FLD AUTO: 10.51 K/UL — SIGNIFICANT CHANGE UP (ref 4.8–10.8)

## 2023-12-28 PROCEDURE — 99291 CRITICAL CARE FIRST HOUR: CPT

## 2023-12-28 PROCEDURE — 99232 SBSQ HOSP IP/OBS MODERATE 35: CPT

## 2023-12-28 PROCEDURE — 95720 EEG PHY/QHP EA INCR W/VEEG: CPT

## 2023-12-28 PROCEDURE — 71045 X-RAY EXAM CHEST 1 VIEW: CPT | Mod: 26

## 2023-12-28 RX ORDER — LANOLIN ALCOHOL/MO/W.PET/CERES
3 CREAM (GRAM) TOPICAL AT BEDTIME
Refills: 0 | Status: DISCONTINUED | OUTPATIENT
Start: 2023-12-28 | End: 2023-12-29

## 2023-12-28 RX ORDER — ACETAMINOPHEN 500 MG
650 TABLET ORAL EVERY 6 HOURS
Refills: 0 | Status: DISCONTINUED | OUTPATIENT
Start: 2023-12-28 | End: 2023-12-29

## 2023-12-28 RX ORDER — ATORVASTATIN CALCIUM 80 MG/1
40 TABLET, FILM COATED ORAL AT BEDTIME
Refills: 0 | Status: DISCONTINUED | OUTPATIENT
Start: 2023-12-28 | End: 2023-12-29

## 2023-12-28 RX ADMIN — LEVETIRACETAM 400 MILLIGRAM(S): 250 TABLET, FILM COATED ORAL at 06:30

## 2023-12-28 RX ADMIN — ENOXAPARIN SODIUM 40 MILLIGRAM(S): 100 INJECTION SUBCUTANEOUS at 11:37

## 2023-12-28 RX ADMIN — INSULIN GLARGINE 20 UNIT(S): 100 INJECTION, SOLUTION SUBCUTANEOUS at 09:01

## 2023-12-28 RX ADMIN — Medication 2: at 08:55

## 2023-12-28 RX ADMIN — Medication 0: at 16:29

## 2023-12-28 RX ADMIN — ATORVASTATIN CALCIUM 40 MILLIGRAM(S): 80 TABLET, FILM COATED ORAL at 22:09

## 2023-12-28 RX ADMIN — AMLODIPINE BESYLATE 5 MILLIGRAM(S): 2.5 TABLET ORAL at 06:32

## 2023-12-28 RX ADMIN — INSULIN GLARGINE 20 UNIT(S): 100 INJECTION, SOLUTION SUBCUTANEOUS at 22:12

## 2023-12-28 NOTE — PROGRESS NOTE ADULT - ASSESSMENT
53y F PMH of hyperthyroidism, HTN, DMII, and HLD presents to the ED by family to the ED for likely thyroid storm      #Thyroid storm  #Hyperthyroidism  #Hx of thyroid storm  - Diagnosed with hyperthyroidism (11/19/21), Started methimazole 10  - Recent admission for thyroid storm (12/11/23).  DC'd on Methimazole 30 (12/17/23)  - Ramirez/Wartofsky score of 70  12/26  - TSH, T4, T3 ordered   - Methimazole 20mg Q6H  - Propranolol 80mg Q6H  - KI 250mg Q6H  - Hydrocortisone IV 100mg Q8  - Endocrinology consulted  12/27  - TSH 0, T3 316, T4 4.1  - Endo: DC hydrocortisone and KI.  - DC cholestyramine  - continue methimazole and propranolol       #Metabolic encephalopathy  #Sepsis POA  #Seizure  #AMS  12/25  - AMS starting at 2100. CT head ordered. s/p 2x midazolam 5mg   12/26  - Seizure 0844 s/p 1x 4mg Ativan   - Start Keppra 1500mg BID  - pending RVP  - Start vanc rocephin  12/27  - CT head negative  - EEG: non epileptiform  - pending neuro f/u  - ID consult pending  - UDS, urinalysis, procal negative  - BCx NGTD  12/28  - Patient is awake  - vEEG: non epileptiform x 2 days  - neuro: decrease keppra to 750mg BID  - ID: DC abx, DC langston  - Per chart review, patient has hx of medication non-compliance and memory loss  - langston and ng tube removed  - start on carb restricted diet    #DMII  - POCT blood glucose ~220  12/28  - Endo: start glargine 20units BID  - start on carb restricted diet    #HTN  12/27  - amlodipine 5mg daily  12/28  - BP controlled in the 120s    #HLD  - Atorvastatin 40mg QHS      Diet: NPO except meds (NG tube)  Activity: bed rest  DVT ppx: lovenox  GI ppx: none  Dispo: Downgrade to floor      pending: Neuro f/u (continue eeg?)     53y F PMH of hyperthyroidism, HTN, DMII, and HLD presents to the ED by family to the ED for likely thyroid storm      #Thyroid storm  #Hyperthyroidism  #Hx of thyroid storm  - Diagnosed with hyperthyroidism (11/19/21), Started methimazole 10  - Recent admission for thyroid storm (12/11/23).  DC'd on Methimazole 30 (12/17/23)  - Ramirez/Wartofsky score of 70  12/26  - TSH, T4, T3 ordered   - Methimazole 20mg Q6H  - Propranolol 80mg Q6H  - KI 250mg Q6H  - Hydrocortisone IV 100mg Q8  - Endocrinology consulted  12/27  - TSH 0, T3 316, T4 4.1  - Endo: DC hydrocortisone and KI.  - DC cholestyramine  - continue methimazole and propranolol       #Metabolic encephalopathy  #Sepsis POA  #Seizure  #AMS  12/25  - AMS starting at 2100. CT head ordered. s/p 2x midazolam 5mg   12/26  - Seizure 0844 s/p 1x 4mg Ativan   - Start Keppra 1500mg BID  - pending RVP  - Start vanc rocephin  12/27  - CT head negative  - EEG: non epileptiform  - pending neuro f/u  - ID consult pending  - UDS, urinalysis, procal negative  - BCx NGTD  12/28  - Patient is awake  - vEEG: non epileptiform x 2 days  - neuro: decrease keppra to 750mg BID  - ID: DC abx, DC lang[place]  - Per chart review, patient has hx of medication non-compliance and memory loss  - langston and ng tube removed  - start on carb restricted diet    #DMII  - POCT blood glucose ~220  12/28  - Endo: start glargine 20units BID  - start on carb restricted diet    #HTN  12/27  - amlodipine 5mg daily  12/28  - BP controlled in the 120s    #HLD  - Atorvastatin 40mg QHS      Diet: Diabetic diet  Activity: bed rest  DVT ppx: lovenox  GI ppx: none  Dispo: Downgrade to floor      pending: Neuro f/u (continue eeg?)

## 2023-12-28 NOTE — PROGRESS NOTE ADULT - SUBJECTIVE AND OBJECTIVE BOX
Neurology Progress Note    Interval History:    No events overnight  She reported that she is feeling ok, however, a little dizzy. She reported that she has occasional hand shaking that was seen also in her grandfather many years ago.     Medications:  acetaminophen     Tablet .. 650 milliGRAM(s) Oral every 6 hours PRN  amLODIPine   Tablet 5 milliGRAM(s) Oral daily  atorvastatin 40 milliGRAM(s) Oral at bedtime  chlorhexidine 2% Cloths 1 Application(s) Topical <User Schedule>  dextrose 5%. 1000 milliLiter(s) IV Continuous <Continuous>  dextrose 5%. 1000 milliLiter(s) IV Continuous <Continuous>  dextrose 50% Injectable 25 Gram(s) IV Push once  dextrose 50% Injectable 25 Gram(s) IV Push once  dextrose 50% Injectable 12.5 Gram(s) IV Push once  dextrose Oral Gel 15 Gram(s) Oral once PRN  enoxaparin Injectable 40 milliGRAM(s) SubCutaneous every 24 hours  glucagon  Injectable 1 milliGRAM(s) IntraMuscular once  insulin glargine Injectable (LANTUS) 20 Unit(s) SubCutaneous at bedtime  insulin glargine Injectable (LANTUS) 20 Unit(s) SubCutaneous every morning  insulin lispro (ADMELOG) corrective regimen sliding scale   SubCutaneous three times a day before meals  levETIRAcetam  IVPB 750 milliGRAM(s) IV Intermittent every 12 hours  melatonin 3 milliGRAM(s) Oral at bedtime PRN  methimazole 20 milliGRAM(s) Oral <User Schedule>  pantoprazole  Injectable 40 milliGRAM(s) IV Push daily  propranolol 80 milliGRAM(s) Oral every 6 hours      Vital Signs Last 24 Hrs  T(C): 36.3 (28 Dec 2023 08:00), Max: 36.8 (27 Dec 2023 12:00)  T(F): 97.4 (28 Dec 2023 08:00), Max: 98.2 (27 Dec 2023 12:00)  HR: 94 (28 Dec 2023 09:53) (71 - 110)  BP: 118/71 (28 Dec 2023 09:53) (118/71 - 178/77)  BP(mean): 90 (28 Dec 2023 09:53) (86 - 115)  RR: 22 (28 Dec 2023 09:53) (17 - 29)  SpO2: 97% (28 Dec 2023 09:53) (97% - 100%)    Parameters below as of 28 Dec 2023 08:00  Patient On (Oxygen Delivery Method): room air        Neurological Examination:  General:  Appearance is consistent with chronologic age.  Cognitive/Language:  AAOx3.  follows 3-step command. Nondysarthric.    Cranial Nerves  - Eyes: Visual fields full.  EOMI w/o nystagmus, skew or reported double vision.  PERRL.  No ptosis/weakness of eyelid closure.    - Face:  Facial sensation normal V1 - 3 R=L, no facial asymmetry.    - Ears/Nose/Throat:  Hearing grossly intact b/l to finger rub.  Palate elevates midline.  Tongue and uvula midline. SCM/Traps L=R  Motor examination:  (MRC grade R/L) 5/5 UE;  reported LE weakness and pain due to neuropathy. NPD. Normal tone and bulk. No tenderness, twitching, tremors or involuntary movements.  Sensory examination:  LT, intact       Labs:  CBC Full  -  ( 28 Dec 2023 05:14 )  WBC Count : 10.51 K/uL  RBC Count : 4.80 M/uL  Hemoglobin : 11.8 g/dL  Hematocrit : 36.2 %  Platelet Count - Automated : 213 K/uL  Mean Cell Volume : 75.4 fL  Mean Cell Hemoglobin : 24.6 pg  Mean Cell Hemoglobin Concentration : 32.6 g/dL  Auto Neutrophil # : 5.46 K/uL  Auto Lymphocyte # : 3.81 K/uL  Auto Monocyte # : 1.15 K/uL  Auto Eosinophil # : 0.06 K/uL  Auto Basophil # : 0.02 K/uL  Auto Neutrophil % : 51.9 %  Auto Lymphocyte % : 36.3 %  Auto Monocyte % : 10.9 %  Auto Eosinophil % : 0.6 %  Auto Basophil % : 0.2 %    12-28    141  |  108  |  11  ----------------------------<  153<H>  3.7   |  24  |  0.5<L>    Ca    9.1      28 Dec 2023 05:14  Mg     1.8     12-28    TPro  6.3  /  Alb  3.3<L>  /  TBili  0.6  /  DBili  x   /  AST  17  /  ALT  21  /  AlkPhos  127<H>  12-28    LIVER FUNCTIONS - ( 28 Dec 2023 05:14 )  Alb: 3.3 g/dL / Pro: 6.3 g/dL / ALK PHOS: 127 U/L / ALT: 21 U/L / AST: 17 U/L / GGT: x           PT/INR - ( 26 Dec 2023 11:22 )   PT: 14.70 sec;   INR: 1.29 ratio         PTT - ( 26 Dec 2023 11:22 )  PTT:33.3 sec  Urinalysis Basic - ( 28 Dec 2023 05:14 )    Color: x / Appearance: x / SG: x / pH: x  Gluc: 153 mg/dL / Ketone: x  / Bili: x / Urobili: x   Blood: x / Protein: x / Nitrite: x   Leuk Esterase: x / RBC: x / WBC x   Sq Epi: x / Non Sq Epi: x / Bacteria: x        RADIOLOGY & ADDITIONAL TESTS:

## 2023-12-28 NOTE — PROGRESS NOTE ADULT - ATTENDING COMMENTS
Patient seen and examined and agree with above except as noted.  Patients history, notes, labs, imaging, vitals and meds reviewed personally.  Patient awake alert and oriented today  Says she has been having tremors in all her extremities daily for last year.  Says her father also had severe tremors similar to hers    Plan as above
Patient seen and examined and agree with above except as noted.  Patients history, notes, labs, imaging, vitals and meds reviewed personally.  Having shaking in hands for the last year as per daughter.  During evaluation was shaking the left arm and no seizure on EEG.  Direction of clonic activity changed directions a number of times    Plan as above

## 2023-12-28 NOTE — EEG REPORT - NS EEG TEXT BOX
Epilepsy Attending Note:     RENÉ REIS    53y Female  MRN [id number]    Vital Signs Last 24 Hrs  T(C): 36.3 (28 Dec 2023 08:00), Max: 36.8 (27 Dec 2023 12:00)  T(F): 97.4 (28 Dec 2023 08:00), Max: 98.2 (27 Dec 2023 12:00)  HR: 94 (28 Dec 2023 09:53) (71 - 110)  BP: 118/71 (28 Dec 2023 09:53) (118/71 - 185/84)  BP(mean): 90 (28 Dec 2023 09:53) (86 - 120)  RR: 22 (28 Dec 2023 09:53) (16 - 29)  SpO2: 97% (28 Dec 2023 09:53) (97% - 100%)    Parameters below as of 28 Dec 2023 08:00  Patient On (Oxygen Delivery Method): room air                              11.8   10.51 )-----------( 213      ( 28 Dec 2023 05:14 )             36.2       12-28    141  |  108  |  11  ----------------------------<  153<H>  3.7   |  24  |  0.5<L>    Ca    9.1      28 Dec 2023 05:14  Mg     1.8     12-28    TPro  6.3  /  Alb  3.3<L>  /  TBili  0.6  /  DBili  x   /  AST  17  /  ALT  21  /  AlkPhos  127<H>  12-28      MEDICATIONS  (STANDING):  amLODIPine   Tablet 5 milliGRAM(s) Oral daily  atorvastatin 40 milliGRAM(s) Oral at bedtime  chlorhexidine 2% Cloths 1 Application(s) Topical <User Schedule>  dextrose 5%. 1000 milliLiter(s) (100 mL/Hr) IV Continuous <Continuous>  dextrose 5%. 1000 milliLiter(s) (50 mL/Hr) IV Continuous <Continuous>  dextrose 50% Injectable 25 Gram(s) IV Push once  dextrose 50% Injectable 25 Gram(s) IV Push once  dextrose 50% Injectable 12.5 Gram(s) IV Push once  enoxaparin Injectable 40 milliGRAM(s) SubCutaneous every 24 hours  glucagon  Injectable 1 milliGRAM(s) IntraMuscular once  insulin glargine Injectable (LANTUS) 20 Unit(s) SubCutaneous every morning  insulin glargine Injectable (LANTUS) 20 Unit(s) SubCutaneous at bedtime  insulin lispro (ADMELOG) corrective regimen sliding scale   SubCutaneous three times a day before meals  levETIRAcetam  IVPB 750 milliGRAM(s) IV Intermittent every 12 hours  methimazole 20 milliGRAM(s) Oral <User Schedule>  pantoprazole  Injectable 40 milliGRAM(s) IV Push daily  propranolol 80 milliGRAM(s) Oral every 6 hours    MEDICATIONS  (PRN):  acetaminophen     Tablet .. 650 milliGRAM(s) Oral every 6 hours PRN Temp greater or equal to 38C (100.4F), Mild Pain (1 - 3)  dextrose Oral Gel 15 Gram(s) Oral once PRN Blood Glucose LESS THAN 70 milliGRAM(s)/deciliter  melatonin 3 milliGRAM(s) Oral at bedtime PRN Insomnia            VEEG in the last 24 hours:    Background------------continues, symmetrical and slightly less than optimally organized ( excessive low voltage fast) reaching 9-10 . The recording towards  the latter part shows long portions of wakefulness    Focal and generalized slowing------ none    Interictal activity------------ none    Events---------none    Seizures--none    Impression:   normal    Plan - as/neurology     Epilepsy Attending Note:     RENÉ REIS    53y Female  MRN [id number]    Vital Signs Last 24 Hrs  T(C): 36.3 (28 Dec 2023 08:00), Max: 36.8 (27 Dec 2023 12:00)  T(F): 97.4 (28 Dec 2023 08:00), Max: 98.2 (27 Dec 2023 12:00)  HR: 94 (28 Dec 2023 09:53) (71 - 110)  BP: 118/71 (28 Dec 2023 09:53) (118/71 - 185/84)  BP(mean): 90 (28 Dec 2023 09:53) (86 - 120)  RR: 22 (28 Dec 2023 09:53) (16 - 29)  SpO2: 97% (28 Dec 2023 09:53) (97% - 100%)    Parameters below as of 28 Dec 2023 08:00  Patient On (Oxygen Delivery Method): room air                              11.8   10.51 )-----------( 213      ( 28 Dec 2023 05:14 )             36.2       12-28    141  |  108  |  11  ----------------------------<  153<H>  3.7   |  24  |  0.5<L>    Ca    9.1      28 Dec 2023 05:14  Mg     1.8     12-28    TPro  6.3  /  Alb  3.3<L>  /  TBili  0.6  /  DBili  x   /  AST  17  /  ALT  21  /  AlkPhos  127<H>  12-28      MEDICATIONS  (STANDING):  amLODIPine   Tablet 5 milliGRAM(s) Oral daily  atorvastatin 40 milliGRAM(s) Oral at bedtime  chlorhexidine 2% Cloths 1 Application(s) Topical <User Schedule>  dextrose 5%. 1000 milliLiter(s) (100 mL/Hr) IV Continuous <Continuous>  dextrose 5%. 1000 milliLiter(s) (50 mL/Hr) IV Continuous <Continuous>  dextrose 50% Injectable 25 Gram(s) IV Push once  dextrose 50% Injectable 25 Gram(s) IV Push once  dextrose 50% Injectable 12.5 Gram(s) IV Push once  enoxaparin Injectable 40 milliGRAM(s) SubCutaneous every 24 hours  glucagon  Injectable 1 milliGRAM(s) IntraMuscular once  insulin glargine Injectable (LANTUS) 20 Unit(s) SubCutaneous every morning  insulin glargine Injectable (LANTUS) 20 Unit(s) SubCutaneous at bedtime  insulin lispro (ADMELOG) corrective regimen sliding scale   SubCutaneous three times a day before meals  levETIRAcetam  IVPB 750 milliGRAM(s) IV Intermittent every 12 hours  methimazole 20 milliGRAM(s) Oral <User Schedule>  pantoprazole  Injectable 40 milliGRAM(s) IV Push daily  propranolol 80 milliGRAM(s) Oral every 6 hours    MEDICATIONS  (PRN):  acetaminophen     Tablet .. 650 milliGRAM(s) Oral every 6 hours PRN Temp greater or equal to 38C (100.4F), Mild Pain (1 - 3)  dextrose Oral Gel 15 Gram(s) Oral once PRN Blood Glucose LESS THAN 70 milliGRAM(s)/deciliter  melatonin 3 milliGRAM(s) Oral at bedtime PRN Insomnia            VEEG in the last 24 hours:    Background------------continues, symmetrical and slightly less than optimally organized ( excessive low voltage fast) reaching 9-10 . The recording towards  the latter part shows long portions of wakefulness    Focal and generalized slowing------ none    Interictal activity------------ none    Events---------none    Seizures--none    Impression:   normal    Plan - as/neurology

## 2023-12-28 NOTE — PROGRESS NOTE ADULT - ASSESSMENT
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 Throat Ca        PLAN:    CNS:  AED per Neuro    HEENT: unremarkable,     PULMONARY: HOB at 45, off O2,  aspiration precaution, keep Bharati 92 to 96%    CARDIOVASCULAR: Monitor HR and BP with propranolol and adjust accordingly, propranolol    GI: GI prophylaxis.  Feeding once more awake,    RENAL: Trend CMP    INFECTIOUS DISEASE: ABX PER ID    HEMATOLOGICAL:  DVT prophylaxis.    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 Throat Ca        PLAN:    CNS:  AED per Neuro    HEENT: unremarkable,     PULMONARY: HOB at 45, off O2,  aspiration precaution, keep Bharati 92 to 96%    CARDIOVASCULAR: Monitor HR and BP with propranolol and adjust accordingly, propranolol    GI: GI prophylaxis.     RENAL: Trend CMP    INFECTIOUS DISEASE: ABX PER ID    HEMATOLOGICAL:  DVT prophylaxis.    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 Throat Ca        PLAN:    CNS:  AED per Neuro    HEENT: unremarkable,     PULMONARY: HOB at 45, off O2,  aspiration precaution, keep Hbarati 92 to 96%    CARDIOVASCULAR: Monitor HR and BP with propranolol and adjust accordingly, propranolol    GI: GI prophylaxis.     RENAL: Trend CMP    INFECTIOUS DISEASE: ABX PER ID    HEMATOLOGICAL:  DVT prophylaxis.    ENDOCRINE: Methimazole 20 mg every 6 hours, Propranolol 80 mg every 6 hours,      MUSCULOSKELETAL: AAT      DVT ppx: lovenox  GI ppx: none  Dispo: floor  AAT

## 2023-12-28 NOTE — PROGRESS NOTE ADULT - ASSESSMENT
Matilda Santiago is a 52yo F w/ h/o Hyperthyroidism/thyroid storm, HTN, DM, HLD presented to the ED delirious. Per Chart review she had a similar episode 2022 found to be in thyroid storm s/p Methimazole. Admitted to MICU for suspicion of storming (febrile and tachycardic). In ICU 12/26/2023 she was a witnessed GTC x 3 mins s/p Lorazepam 4mg IVP x 1 and Levetiracetam 2g IVPB Load x 1. On clinical exam she remains unarousable. vEEG shows no epileptiform events indicating patient's hand tremor and head movement not secondary to seizure. Pt is awake today and oriented. Reported no h/o seizure however, has occasional hand shaking which was also noted with the grandfather many years ago. No seizure on EEG, no need for AED.     Impression  Thyroid storm     Recommendations  - d/c vEEG  - d/c Levetiracetam   - no further neurological w/u  Matilda Santiago is a 54yo F w/ h/o Hyperthyroidism/thyroid storm, HTN, DM, HLD presented to the ED delirious. Per Chart review she had a similar episode 2022 found to be in thyroid storm s/p Methimazole. Admitted to MICU for suspicion of storming (febrile and tachycardic). In ICU 12/26/2023 she was a witnessed GTC x 3 mins s/p Lorazepam 4mg IVP x 1 and Levetiracetam 2g IVPB Load x 1. On clinical exam she remains unarousable. vEEG shows no epileptiform events indicating patient's hand tremor and head movement not secondary to seizure. Pt is awake today and oriented. Reported no h/o seizure however, has occasional hand shaking which was also noted with the grandfather many years ago. No seizure on EEG, no need for AED.     Impression  Thyroid storm     Recommendations  - d/c vEEG  - d/c Levetiracetam   - no further neurological w/u  Matilda Santiago is a 54yo F w/ h/o Hyperthyroidism/thyroid storm, HTN, DM, HLD presented to the ED delirious. Per Chart review she had a similar episode 2022 found to be in thyroid storm s/p Methimazole. Admitted to MICU for suspicion of storming (febrile and tachycardic). In ICU 12/26/2023 she was a witnessed GTC x 3 mins s/p Lorazepam 4mg IVP x 1 and Levetiracetam 2g IVPB Load x 1. On clinical exam she remains unarousable. vEEG shows no epileptiform events indicating patient's hand tremor and head movement not secondary to seizure. Pt is awake today and oriented. Reported no h/o seizure however, has occasional hand shaking which was also noted with the grandfather many years ago. No seizure on EEG, no need for AED.     Impression  Thyroid storm     Recommendations  - d/c vEEG  - d/c Levetiracetam   - no further neurological w/u   - c/w Neurontin 400mg TID for her LE polyneuropathy

## 2023-12-28 NOTE — CHART NOTE - NSCHARTNOTEFT_GEN_A_CORE
MICU Transfer Note  ---------------------------    Transfer from: MICU  Transfer to: Medicine    Approved by Dr. Neil Matamoros    53y F PMH of hyperthyroidism, HTN, DMII, and HLD presents to the ED by family to the ED for evaluation of altered mental status started acutely approximately 3 hours prior to arrival to ED. History taken from sister as patient is somnolent and incoherent. Patient had similar episode 1 year ago when she did not take her thyroid medications, and it progressed to thyroid storm.  Patient's family states patient has been living with her daughter, not sure if she has been taking her medications as prescribed.  Patient brought to ER hyperventilating moving extremities not following commands, tachycardic, agitated. Rectal temp elevated at 102.3, tachycardic, concern for thyroid storm given previous presentation.  In the ED, BP was 161/78, , temp 98.2 repeated 102.1. Labs remarkable for WBC 15, Mg 1.5. Patient was given propronolol IV 2 mg, solucortef 100 mg, and methimazole 20 mg in addition to 3 L of IV fluids.    MICU COURSE:  On the unit Patient was managed for thyroid storm, AMS and possible seizure. On hospital day 2 patient was unarousable with random movements of her right arm. Neuro was consulted with concern for seizure and was started on keppra s/p ativan. vEEG was negative. Pending final neuro evaluation.  Thyroid w/u found to be consistent with thyroid storm, patient was started on methimazole, propranolol, potassium iodide, hydrocortisone and cholestyramine. Endocrinology recommended starting glargine and discontinuing DC KI, steroids and reduction of methimazole on discharge. Infectious w/u was negative and ID recommended DC abx. Patient is now awake, NG tube and langston was removed. Diet was advanced. Patient is still not oriented but answering questions. Patient is stable for downgrade to floor.        53y F PMH of hyperthyroidism, HTN, DMII, and HLD presents to the ED by family to the ED for likely thyroid storm    #Thyroid storm  #Hyperthyroidism  #Hx of thyroid storm  - Diagnosed with hyperthyroidism (11/19/21), Started methimazole 10  - Recent admission for thyroid storm (12/11/23).  DC'd on Methimazole 30 (12/17/23)  - Ramirez/Wartofsky score of 70  12/26  - TSH, T4, T3 ordered   - Methimazole 20mg Q6H  - Propranolol 80mg Q6H  - KI 250mg Q6H  - Hydrocortisone IV 100mg Q8  - Endocrinology consulted  12/27  - TSH 0, T3 316, T4 4.1  - Endo: DC hydrocortisone and KI.  - DC cholestyramine  - continue methimazole and propranolol     #Metabolic encephalopathy  #Sepsis POA  #Seizure  #AMS  12/25  - AMS starting at 2100. CT head ordered. s/p 2x midazolam 5mg   12/26  - Seizure 0844 s/p 1x 4mg Ativan   - Start Keppra 1500mg BID  - pending RVP  - Start vanc rocephin  12/27  - CT head negative  - EEG: non epileptiform  - pending neuro f/u  - ID consult pending  - UDS, urinalysis, procal negative  - BCx NGTD  12/28  - Patient is awake  - vEEG: non epileptiform x 2 days  - neuro: decrease keppra to 750mg BID  - ID: DC abx, DC langston  - Per chart review, patient has hx of medication non-compliance and memory loss  - langston and ng tube removed  - start on carb restricted diet    #DMII  - POCT blood glucose ~220  12/28  - Endo: start glargine 20units BID  - start on carb restricted diet    #HTN  12/27  - amlodipine 5mg daily  12/28  - BP controlled in the 120s    #HLD  - Atorvastatin 40mg QHS      Diet: regular (carb restricted)  Activity: bed rest  DVT ppx: lovenox  GI ppx: none  Dispo: Downgrade to floor          To-Do:  -f/u neuro for AED and EEG rec  -f/u endo for methimazole and DM recs on discharge   -f/u Hx of throat cancer (chart note), fax number was confirmed 3x. but was not sent.       OBJECTIVE --  Vital Signs Last 24 Hrs  T(C): 36.3 (28 Dec 2023 08:00), Max: 36.8 (27 Dec 2023 12:00)  T(F): 97.4 (28 Dec 2023 08:00), Max: 98.2 (27 Dec 2023 12:00)  HR: 94 (28 Dec 2023 09:53) (71 - 110)  BP: 118/71 (28 Dec 2023 09:53) (118/71 - 193/81)  BP(mean): 90 (28 Dec 2023 09:53) (86 - 120)  RR: 22 (28 Dec 2023 09:53) (16 - 29)  SpO2: 97% (28 Dec 2023 09:53) (97% - 100%)    Parameters below as of 28 Dec 2023 08:00  Patient On (Oxygen Delivery Method): room air        I&O's Summary    27 Dec 2023 07:01  -  28 Dec 2023 07:00  --------------------------------------------------------  IN: 650 mL / OUT: 1560 mL / NET: -910 mL    28 Dec 2023 07:01  -  28 Dec 2023 09:59  --------------------------------------------------------  IN: 0 mL / OUT: 600 mL / NET: -600 mL        MEDICATIONS  (STANDING):  amLODIPine   Tablet 5 milliGRAM(s) Oral daily  atorvastatin 40 milliGRAM(s) Oral at bedtime  chlorhexidine 2% Cloths 1 Application(s) Topical <User Schedule>  dextrose 5%. 1000 milliLiter(s) (50 mL/Hr) IV Continuous <Continuous>  dextrose 5%. 1000 milliLiter(s) (100 mL/Hr) IV Continuous <Continuous>  dextrose 50% Injectable 25 Gram(s) IV Push once  dextrose 50% Injectable 25 Gram(s) IV Push once  dextrose 50% Injectable 12.5 Gram(s) IV Push once  enoxaparin Injectable 40 milliGRAM(s) SubCutaneous every 24 hours  glucagon  Injectable 1 milliGRAM(s) IntraMuscular once  insulin glargine Injectable (LANTUS) 20 Unit(s) SubCutaneous every morning  insulin glargine Injectable (LANTUS) 20 Unit(s) SubCutaneous at bedtime  insulin lispro (ADMELOG) corrective regimen sliding scale   SubCutaneous three times a day before meals  levETIRAcetam  IVPB 750 milliGRAM(s) IV Intermittent every 12 hours  methimazole 20 milliGRAM(s) Oral <User Schedule>  pantoprazole  Injectable 40 milliGRAM(s) IV Push daily  propranolol 80 milliGRAM(s) Oral every 6 hours    MEDICATIONS  (PRN):  acetaminophen     Tablet .. 650 milliGRAM(s) Oral every 6 hours PRN Temp greater or equal to 38C (100.4F), Mild Pain (1 - 3)  dextrose Oral Gel 15 Gram(s) Oral once PRN Blood Glucose LESS THAN 70 milliGRAM(s)/deciliter  melatonin 3 milliGRAM(s) Oral at bedtime PRN Insomnia        LABS                                            11.8                  Neurophils% (auto):   51.9   (12-28 @ 05:14):    10.51)-----------(213          Lymphocytes% (auto):  36.3                                          36.2                   Eosinphils% (auto):   0.6      Manual%: Neutrophils x    ; Lymphocytes x    ; Eosinophils x    ; Bands%: x    ; Blasts x                                    141    |  108    |  11                  Calcium: 9.1   / iCa: x      (12-28 @ 05:14)    ----------------------------<  153       Magnesium: 1.8                              3.7     |  24     |  0.5              Phosphorous: x        TPro  6.3    /  Alb  3.3    /  TBili  0.6    /  DBili  x      /  AST  17     /  ALT  21     /  AlkPhos  127    28 Dec 2023 05:14

## 2023-12-28 NOTE — PROGRESS NOTE ADULT - SUBJECTIVE AND OBJECTIVE BOX
24H events:    Patient is a 53y old Female who presents with a chief complaint of AMS (28 Dec 2023 06:39)    Primary diagnosis of Thyroid storm    Today is hospital day 3d. This morning patient was seen and examined at bedside, resting comfortably in bed. Patient is not oriented to time. Patient complains of wanting to go home. NG tube and langston was removed and diet was advanced. Otherwise, no acute or major events overnight. Hemodynamically stable, tolerating oral diet, voiding appropriately with appropriate bowel movements.     PAST MEDICAL & SURGICAL HISTORY  Diabetes    Hypertension    No significant past surgical history      SOCIAL HISTORY:  Social History:      ALLERGIES:  penicillin (Rash)    MEDICATIONS:  STANDING MEDICATIONS  amLODIPine   Tablet 5 milliGRAM(s) Oral daily  atorvastatin 40 milliGRAM(s) Oral at bedtime  chlorhexidine 2% Cloths 1 Application(s) Topical <User Schedule>  dextrose 5%. 1000 milliLiter(s) IV Continuous <Continuous>  dextrose 5%. 1000 milliLiter(s) IV Continuous <Continuous>  dextrose 50% Injectable 25 Gram(s) IV Push once  dextrose 50% Injectable 25 Gram(s) IV Push once  dextrose 50% Injectable 12.5 Gram(s) IV Push once  enoxaparin Injectable 40 milliGRAM(s) SubCutaneous every 24 hours  glucagon  Injectable 1 milliGRAM(s) IntraMuscular once  insulin glargine Injectable (LANTUS) 20 Unit(s) SubCutaneous every morning  insulin glargine Injectable (LANTUS) 20 Unit(s) SubCutaneous at bedtime  insulin lispro (ADMELOG) corrective regimen sliding scale   SubCutaneous three times a day before meals  levETIRAcetam  IVPB 750 milliGRAM(s) IV Intermittent every 12 hours  methimazole 20 milliGRAM(s) Oral <User Schedule>  pantoprazole  Injectable 40 milliGRAM(s) IV Push daily  propranolol 80 milliGRAM(s) Oral every 6 hours    PRN MEDICATIONS  acetaminophen     Tablet .. 650 milliGRAM(s) Oral every 6 hours PRN  dextrose Oral Gel 15 Gram(s) Oral once PRN  melatonin 3 milliGRAM(s) Oral at bedtime PRN    VITALS:   T(F): 97.5  HR: 102  BP: 140/87  RR: 23  SpO2: 100%    PHYSICAL EXAM:  GENERAL: NAD, lying in bed comfortably, cooperative,   HEAD: NCAT  NECK: Supple  HEART: Regular rate and rhythm,   LUNGS: No acute respiratory distress, clear b/l breath sounds   ABDOMEN:  soft, non-tender, non-distended, + BS  EXTREMITIES: no edema  NERVOUS SYSTEM:  A&Ox1, answers questions appropriately     LABS:                        11.8   10.51 )-----------( 213      ( 28 Dec 2023 05:14 )             36.2     12-28    141  |  108  |  11  ----------------------------<  153<H>  3.7   |  24  |  0.5<L>    Ca    9.1      28 Dec 2023 05:14  Mg     1.8     12-28    TPro  6.3  /  Alb  3.3<L>  /  TBili  0.6  /  DBili  x   /  AST  17  /  ALT  21  /  AlkPhos  127<H>  12-28    PT/INR - ( 26 Dec 2023 11:22 )   PT: 14.70 sec;   INR: 1.29 ratio         PTT - ( 26 Dec 2023 11:22 )  PTT:33.3 sec  Urinalysis Basic - ( 28 Dec 2023 05:14 )    Color: x / Appearance: x / SG: x / pH: x  Gluc: 153 mg/dL / Ketone: x  / Bili: x / Urobili: x   Blood: x / Protein: x / Nitrite: x   Leuk Esterase: x / RBC: x / WBC x   Sq Epi: x / Non Sq Epi: x / Bacteria: x            Culture - Urine (collected 26 Dec 2023 13:40)  Source: Clean Catch Clean Catch (Midstream)  Final Report (27 Dec 2023 21:56):    <10,000 CFU/mL Normal Urogenital Vicky    Culture - Blood (collected 25 Dec 2023 22:13)  Source: .Blood Blood-Peripheral  Preliminary Report (28 Dec 2023 07:01):    No growth at 48 Hours    Culture - Blood (collected 25 Dec 2023 22:13)  Source: .Blood Blood-Peripheral  Preliminary Report (28 Dec 2023 07:01):    No growth at 48 Hours          RADIOLOGY:    RADIOLOGY

## 2023-12-29 ENCOUNTER — TRANSCRIPTION ENCOUNTER (OUTPATIENT)
Age: 53
End: 2023-12-29

## 2023-12-29 VITALS
HEART RATE: 68 BPM | SYSTOLIC BLOOD PRESSURE: 142 MMHG | DIASTOLIC BLOOD PRESSURE: 67 MMHG | TEMPERATURE: 98 F | RESPIRATION RATE: 18 BRPM | OXYGEN SATURATION: 98 %

## 2023-12-29 LAB
ALBUMIN SERPL ELPH-MCNC: 3.4 G/DL — LOW (ref 3.5–5.2)
ALBUMIN SERPL ELPH-MCNC: 3.4 G/DL — LOW (ref 3.5–5.2)
ALP SERPL-CCNC: 119 U/L — HIGH (ref 30–115)
ALP SERPL-CCNC: 119 U/L — HIGH (ref 30–115)
ALT FLD-CCNC: 18 U/L — SIGNIFICANT CHANGE UP (ref 0–41)
ALT FLD-CCNC: 18 U/L — SIGNIFICANT CHANGE UP (ref 0–41)
ANION GAP SERPL CALC-SCNC: 10 MMOL/L — SIGNIFICANT CHANGE UP (ref 7–14)
ANION GAP SERPL CALC-SCNC: 10 MMOL/L — SIGNIFICANT CHANGE UP (ref 7–14)
AST SERPL-CCNC: 11 U/L — SIGNIFICANT CHANGE UP (ref 0–41)
AST SERPL-CCNC: 11 U/L — SIGNIFICANT CHANGE UP (ref 0–41)
BASOPHILS # BLD AUTO: 0.01 K/UL — SIGNIFICANT CHANGE UP (ref 0–0.2)
BASOPHILS # BLD AUTO: 0.01 K/UL — SIGNIFICANT CHANGE UP (ref 0–0.2)
BASOPHILS NFR BLD AUTO: 0.1 % — SIGNIFICANT CHANGE UP (ref 0–1)
BASOPHILS NFR BLD AUTO: 0.1 % — SIGNIFICANT CHANGE UP (ref 0–1)
BILIRUB SERPL-MCNC: 0.7 MG/DL — SIGNIFICANT CHANGE UP (ref 0.2–1.2)
BILIRUB SERPL-MCNC: 0.7 MG/DL — SIGNIFICANT CHANGE UP (ref 0.2–1.2)
BUN SERPL-MCNC: 12 MG/DL — SIGNIFICANT CHANGE UP (ref 10–20)
BUN SERPL-MCNC: 12 MG/DL — SIGNIFICANT CHANGE UP (ref 10–20)
CALCIUM SERPL-MCNC: 9 MG/DL — SIGNIFICANT CHANGE UP (ref 8.4–10.5)
CALCIUM SERPL-MCNC: 9 MG/DL — SIGNIFICANT CHANGE UP (ref 8.4–10.5)
CHLORIDE SERPL-SCNC: 106 MMOL/L — SIGNIFICANT CHANGE UP (ref 98–110)
CHLORIDE SERPL-SCNC: 106 MMOL/L — SIGNIFICANT CHANGE UP (ref 98–110)
CO2 SERPL-SCNC: 26 MMOL/L — SIGNIFICANT CHANGE UP (ref 17–32)
CO2 SERPL-SCNC: 26 MMOL/L — SIGNIFICANT CHANGE UP (ref 17–32)
CREAT SERPL-MCNC: 0.5 MG/DL — LOW (ref 0.7–1.5)
CREAT SERPL-MCNC: 0.5 MG/DL — LOW (ref 0.7–1.5)
EGFR: 113 ML/MIN/1.73M2 — SIGNIFICANT CHANGE UP
EGFR: 113 ML/MIN/1.73M2 — SIGNIFICANT CHANGE UP
EOSINOPHIL # BLD AUTO: 0.34 K/UL — SIGNIFICANT CHANGE UP (ref 0–0.7)
EOSINOPHIL # BLD AUTO: 0.34 K/UL — SIGNIFICANT CHANGE UP (ref 0–0.7)
EOSINOPHIL NFR BLD AUTO: 4.1 % — SIGNIFICANT CHANGE UP (ref 0–8)
EOSINOPHIL NFR BLD AUTO: 4.1 % — SIGNIFICANT CHANGE UP (ref 0–8)
GLUCOSE BLDC GLUCOMTR-MCNC: 112 MG/DL — HIGH (ref 70–99)
GLUCOSE BLDC GLUCOMTR-MCNC: 112 MG/DL — HIGH (ref 70–99)
GLUCOSE BLDC GLUCOMTR-MCNC: 175 MG/DL — HIGH (ref 70–99)
GLUCOSE BLDC GLUCOMTR-MCNC: 175 MG/DL — HIGH (ref 70–99)
GLUCOSE BLDC GLUCOMTR-MCNC: 98 MG/DL — SIGNIFICANT CHANGE UP (ref 70–99)
GLUCOSE BLDC GLUCOMTR-MCNC: 98 MG/DL — SIGNIFICANT CHANGE UP (ref 70–99)
GLUCOSE SERPL-MCNC: 115 MG/DL — HIGH (ref 70–99)
GLUCOSE SERPL-MCNC: 115 MG/DL — HIGH (ref 70–99)
HCT VFR BLD CALC: 36.7 % — LOW (ref 37–47)
HCT VFR BLD CALC: 36.7 % — LOW (ref 37–47)
HGB BLD-MCNC: 12 G/DL — SIGNIFICANT CHANGE UP (ref 12–16)
HGB BLD-MCNC: 12 G/DL — SIGNIFICANT CHANGE UP (ref 12–16)
IMM GRANULOCYTES NFR BLD AUTO: 0.1 % — SIGNIFICANT CHANGE UP (ref 0.1–0.3)
IMM GRANULOCYTES NFR BLD AUTO: 0.1 % — SIGNIFICANT CHANGE UP (ref 0.1–0.3)
LYMPHOCYTES # BLD AUTO: 3.03 K/UL — SIGNIFICANT CHANGE UP (ref 1.2–3.4)
LYMPHOCYTES # BLD AUTO: 3.03 K/UL — SIGNIFICANT CHANGE UP (ref 1.2–3.4)
LYMPHOCYTES # BLD AUTO: 36.9 % — SIGNIFICANT CHANGE UP (ref 20.5–51.1)
LYMPHOCYTES # BLD AUTO: 36.9 % — SIGNIFICANT CHANGE UP (ref 20.5–51.1)
MAGNESIUM SERPL-MCNC: 1.7 MG/DL — LOW (ref 1.8–2.4)
MAGNESIUM SERPL-MCNC: 1.7 MG/DL — LOW (ref 1.8–2.4)
MCHC RBC-ENTMCNC: 24.4 PG — LOW (ref 27–31)
MCHC RBC-ENTMCNC: 24.4 PG — LOW (ref 27–31)
MCHC RBC-ENTMCNC: 32.7 G/DL — SIGNIFICANT CHANGE UP (ref 32–37)
MCHC RBC-ENTMCNC: 32.7 G/DL — SIGNIFICANT CHANGE UP (ref 32–37)
MCV RBC AUTO: 74.7 FL — LOW (ref 81–99)
MCV RBC AUTO: 74.7 FL — LOW (ref 81–99)
MONOCYTES # BLD AUTO: 0.76 K/UL — HIGH (ref 0.1–0.6)
MONOCYTES # BLD AUTO: 0.76 K/UL — HIGH (ref 0.1–0.6)
MONOCYTES NFR BLD AUTO: 9.3 % — SIGNIFICANT CHANGE UP (ref 1.7–9.3)
MONOCYTES NFR BLD AUTO: 9.3 % — SIGNIFICANT CHANGE UP (ref 1.7–9.3)
NEUTROPHILS # BLD AUTO: 4.06 K/UL — SIGNIFICANT CHANGE UP (ref 1.4–6.5)
NEUTROPHILS # BLD AUTO: 4.06 K/UL — SIGNIFICANT CHANGE UP (ref 1.4–6.5)
NEUTROPHILS NFR BLD AUTO: 49.5 % — SIGNIFICANT CHANGE UP (ref 42.2–75.2)
NEUTROPHILS NFR BLD AUTO: 49.5 % — SIGNIFICANT CHANGE UP (ref 42.2–75.2)
NRBC # BLD: 0 /100 WBCS — SIGNIFICANT CHANGE UP (ref 0–0)
NRBC # BLD: 0 /100 WBCS — SIGNIFICANT CHANGE UP (ref 0–0)
PLATELET # BLD AUTO: 220 K/UL — SIGNIFICANT CHANGE UP (ref 130–400)
PLATELET # BLD AUTO: 220 K/UL — SIGNIFICANT CHANGE UP (ref 130–400)
PMV BLD: 10 FL — SIGNIFICANT CHANGE UP (ref 7.4–10.4)
PMV BLD: 10 FL — SIGNIFICANT CHANGE UP (ref 7.4–10.4)
POTASSIUM SERPL-MCNC: 3.5 MMOL/L — SIGNIFICANT CHANGE UP (ref 3.5–5)
POTASSIUM SERPL-MCNC: 3.5 MMOL/L — SIGNIFICANT CHANGE UP (ref 3.5–5)
POTASSIUM SERPL-SCNC: 3.5 MMOL/L — SIGNIFICANT CHANGE UP (ref 3.5–5)
POTASSIUM SERPL-SCNC: 3.5 MMOL/L — SIGNIFICANT CHANGE UP (ref 3.5–5)
PROT SERPL-MCNC: 6.1 G/DL — SIGNIFICANT CHANGE UP (ref 6–8)
PROT SERPL-MCNC: 6.1 G/DL — SIGNIFICANT CHANGE UP (ref 6–8)
RBC # BLD: 4.91 M/UL — SIGNIFICANT CHANGE UP (ref 4.2–5.4)
RBC # BLD: 4.91 M/UL — SIGNIFICANT CHANGE UP (ref 4.2–5.4)
RBC # FLD: 12.4 % — SIGNIFICANT CHANGE UP (ref 11.5–14.5)
RBC # FLD: 12.4 % — SIGNIFICANT CHANGE UP (ref 11.5–14.5)
SODIUM SERPL-SCNC: 142 MMOL/L — SIGNIFICANT CHANGE UP (ref 135–146)
SODIUM SERPL-SCNC: 142 MMOL/L — SIGNIFICANT CHANGE UP (ref 135–146)
WBC # BLD: 8.21 K/UL — SIGNIFICANT CHANGE UP (ref 4.8–10.8)
WBC # BLD: 8.21 K/UL — SIGNIFICANT CHANGE UP (ref 4.8–10.8)
WBC # FLD AUTO: 8.21 K/UL — SIGNIFICANT CHANGE UP (ref 4.8–10.8)
WBC # FLD AUTO: 8.21 K/UL — SIGNIFICANT CHANGE UP (ref 4.8–10.8)

## 2023-12-29 PROCEDURE — 71045 X-RAY EXAM CHEST 1 VIEW: CPT | Mod: 26

## 2023-12-29 PROCEDURE — 93010 ELECTROCARDIOGRAM REPORT: CPT

## 2023-12-29 PROCEDURE — 99239 HOSP IP/OBS DSCHRG MGMT >30: CPT

## 2023-12-29 RX ORDER — BLOOD SUGAR DIAGNOSTIC
STRIP MISCELLANEOUS 3 TIMES DAILY
Qty: 100 | Refills: 5 | Status: ACTIVE | COMMUNITY
Start: 2020-09-03 | End: 1900-01-01

## 2023-12-29 RX ORDER — MAGNESIUM OXIDE 400 MG ORAL TABLET 241.3 MG
400 TABLET ORAL ONCE
Refills: 0 | Status: COMPLETED | OUTPATIENT
Start: 2023-12-29 | End: 2023-12-29

## 2023-12-29 RX ORDER — SEMAGLUTIDE 0.68 MG/ML
0.25 INJECTION, SOLUTION SUBCUTANEOUS
Qty: 4 | Refills: 0
Start: 2023-12-29 | End: 2024-03-07

## 2023-12-29 RX ORDER — METHIMAZOLE 10 MG/1
2 TABLET ORAL
Qty: 0 | Refills: 0 | DISCHARGE
Start: 2023-12-29

## 2023-12-29 RX ORDER — MAGNESIUM SULFATE 500 MG/ML
2 VIAL (ML) INJECTION
Refills: 0 | Status: DISCONTINUED | OUTPATIENT
Start: 2023-12-29 | End: 2023-12-29

## 2023-12-29 RX ORDER — BENZOCAINE AND MENTHOL 5; 1 G/100ML; G/100ML
1 LIQUID ORAL
Refills: 0 | Status: DISCONTINUED | OUTPATIENT
Start: 2023-12-29 | End: 2023-12-29

## 2023-12-29 RX ORDER — PEN NEEDLE, DIABETIC 29 G X1/2"
31G X 5 MM NEEDLE, DISPOSABLE MISCELLANEOUS
Qty: 100 | Refills: 0 | Status: ACTIVE | COMMUNITY
Start: 2023-12-29 | End: 1900-01-01

## 2023-12-29 RX ORDER — PIOGLITAZONE HYDROCHLORIDE 15 MG/1
1 TABLET ORAL
Qty: 30 | Refills: 0
Start: 2023-12-29 | End: 2024-01-27

## 2023-12-29 RX ORDER — INSULIN GLARGINE 100 [IU]/ML
100 INJECTION, SOLUTION SUBCUTANEOUS
Qty: 1 | Refills: 5 | Status: ACTIVE | COMMUNITY
Start: 2023-12-29 | End: 1900-01-01

## 2023-12-29 RX ORDER — PROPRANOLOL HYDROCHLORIDE 20 MG/1
20 TABLET ORAL DAILY
Qty: 30 | Refills: 3 | Status: DISCONTINUED | COMMUNITY
Start: 2021-12-06 | End: 2023-12-29

## 2023-12-29 RX ORDER — METHIMAZOLE 10 MG/1
10 TABLET ORAL DAILY
Qty: 60 | Refills: 2 | Status: ACTIVE | COMMUNITY
Start: 2021-12-06 | End: 1900-01-01

## 2023-12-29 RX ORDER — BLOOD-GLUCOSE METER
W/DEVICE KIT MISCELLANEOUS
Qty: 1 | Refills: 0 | Status: ACTIVE | COMMUNITY
Start: 2020-09-03 | End: 1900-01-01

## 2023-12-29 RX ORDER — AMLODIPINE BESYLATE AND BENAZEPRIL HYDROCHLORIDE 10; 40 MG/1; MG/1
10-40 CAPSULE ORAL
Qty: 90 | Refills: 3 | Status: ACTIVE | COMMUNITY
Start: 2020-09-03 | End: 1900-01-01

## 2023-12-29 RX ORDER — ATENOLOL 50 MG/1
50 TABLET ORAL DAILY
Qty: 30 | Refills: 3 | Status: ACTIVE | COMMUNITY
Start: 2023-12-29 | End: 1900-01-01

## 2023-12-29 RX ORDER — PIOGLITAZONE HYDROCHLORIDE 30 MG/1
30 TABLET ORAL
Qty: 30 | Refills: 5 | Status: ACTIVE | COMMUNITY
Start: 2020-09-03 | End: 1900-01-01

## 2023-12-29 RX ORDER — MAGNESIUM OXIDE 400 MG ORAL TABLET 241.3 MG
1 TABLET ORAL
Qty: 5 | Refills: 0
Start: 2023-12-29 | End: 2024-01-02

## 2023-12-29 RX ORDER — LANCETS
EACH MISCELLANEOUS
Qty: 100 | Refills: 3 | Status: ACTIVE | COMMUNITY
Start: 2020-09-03 | End: 1900-01-01

## 2023-12-29 RX ADMIN — AMLODIPINE BESYLATE 5 MILLIGRAM(S): 2.5 TABLET ORAL at 05:05

## 2023-12-29 RX ADMIN — INSULIN GLARGINE 20 UNIT(S): 100 INJECTION, SOLUTION SUBCUTANEOUS at 08:57

## 2023-12-29 RX ADMIN — PANTOPRAZOLE SODIUM 40 MILLIGRAM(S): 20 TABLET, DELAYED RELEASE ORAL at 11:57

## 2023-12-29 RX ADMIN — MAGNESIUM OXIDE 400 MG ORAL TABLET 400 MILLIGRAM(S): 241.3 TABLET ORAL at 16:26

## 2023-12-29 RX ADMIN — Medication 2: at 12:27

## 2023-12-29 RX ADMIN — CHLORHEXIDINE GLUCONATE 1 APPLICATION(S): 213 SOLUTION TOPICAL at 06:00

## 2023-12-29 RX ADMIN — ENOXAPARIN SODIUM 40 MILLIGRAM(S): 100 INJECTION SUBCUTANEOUS at 11:57

## 2023-12-29 NOTE — DISCHARGE NOTE NURSING/CASE MANAGEMENT/SOCIAL WORK - NSDCPEFALRISK_GEN_ALL_CORE
For information on Fall & Injury Prevention, visit: [email or web address] OR  [email or web address] OR  https://www.cdc.gov/steadi/patient.html For information on Fall & Injury Prevention, visit: [email or web address] OR  [email or web address] OR  https://www.cdc.gov/steadi/patient.html

## 2023-12-29 NOTE — DISCHARGE NOTE PROVIDER - NSDCCPCAREPLAN_GEN_ALL_CORE_FT
PRINCIPAL DISCHARGE DIAGNOSIS  Diagnosis: Thyroid storm  Assessment and Plan of Treatment: please take all your medications as prescribed and followup with endocrinologist for further therapy, if you develop palpitaion or change of mental status or have any other concerning symptoms, go to near ER      SECONDARY DISCHARGE DIAGNOSES  Diagnosis: DM (diabetes mellitus)  Assessment and Plan of Treatment: cont meds, check your fingerstick regularly ,   if FS<90 take juice and hold your meds and go to the nearest ER   if FS >300 go to the nearest ER  followup

## 2023-12-29 NOTE — PROGRESS NOTE ADULT - ASSESSMENT
obesity, need weight loss, patient is intolerant to metformin, so home on pioglitazone 30 mg. daily, ozempic 0.25 weekly for 2 weeks, and then 0.5 weekly after that. insulin glargine solostar once a day. halve insulin dosage and or stop if hypoglycemic, methimazole 20 mg. daily. atenolol 50 mg. daily, stop propranolol at discharge, i have sent all prescriptions to [facility] pharmacy, the ozempic needs prior authorization, so i will take care of it, i also sent meter, tst strips, and lancets. please call me , the teams mervat is not working. needs endo appointment [id number] in 2 weeks, i will take care of that as well. she does go to medical clinic, but her meds are not being refilled, but i have taken care ofit, also amlodippine/benazepril 10/40 was refilled for hypertension obesity, need weight loss, patient is intolerant to metformin, so home on pioglitazone 30 mg. daily, ozempic 0.25 weekly for 2 weeks, and then 0.5 weekly after that. insulin glargine solostar once a day. halve insulin dosage and or stop if hypoglycemic, methimazole 20 mg. daily. atenolol 50 mg. daily, stop propranolol at discharge, i have sent all prescriptions to [facility] pharmacy, the ozempic needs prior authorization, so i will take care of it, i also sent meter, tst strips, and lancets. please call me , the teams mervat is not working. needs endo appointment [id number] in 2 weeks, i will take care of that as well. she does go to medical clinic, but her meds are not being refilled, but i have taken care ofit, also amlodippine/benazepril 10/40 was refilled for hypertension

## 2023-12-29 NOTE — DISCHARGE NOTE PROVIDER - ATTENDING DISCHARGE PHYSICAL EXAMINATION:
T(F): 97.9 (12-29-23 @ 14:00), Max: 98.2 (12-29-23 @ 00:05)  HR: 68 (12-29-23 @ 14:00) (68 - 104)  BP: 142/67 (12-29-23 @ 14:00) (118/63 - 166/72)  RR: 18 (12-29-23 @ 14:00) (15 - 25)  SpO2: 98% (12-29-23 @ 14:00) (96% - 99%)  Physical exam:   constitutional NAD, AAOX3, Respiratory  lungs CTA, CVS heart RRR, GI: abdomen Soft NT, ND, BS+, skin: intact  neuro exam Motor, sensory and CN normal, no deficit

## 2023-12-29 NOTE — PROGRESS NOTE ADULT - SUBJECTIVE AND OBJECTIVE BOX
Reason for Endocrinology Consult: Diabetes    HPI: 53y Female      Neuroglycopenia within past year?    Outpatient Endocrinologist?    PAST MEDICAL & SURGICAL HISTORY:  Diabetes      Hypertension      No significant past surgical history        FAMILY HISTORY:      SH:  Smoking  Etoh:  Recreational Drugs:    Home Medications:  albuterol 90 mcg/inh inhalation aerosol with adapter: 1 puff(s) inhaled every 4 hours, As Needed for sob or wheezing  (26 Dec 2023 00:14)  amlodipine-benazepril 10 mg-40 mg oral capsule: 1 cap(s) orally once a day (26 Dec 2023 00:14)  atorvastatin 40 mg oral tablet: 1 tab(s) orally once a day (26 Dec 2023 00:14)  ezetimibe 10 mg oral tablet: 1 tab(s) orally once a day (26 Dec 2023 00:14)  gabapentin 400 mg oral capsule: 1 cap(s) orally 3 times a day (26 Dec 2023 00:14)      Current (Non-Endocrine) Meds:  acetaminophen     Tablet .. 650 milliGRAM(s) Oral every 6 hours PRN  amLODIPine   Tablet 5 milliGRAM(s) Oral daily  benzocaine/menthol Lozenge 1 Lozenge Oral two times a day PRN  chlorhexidine 2% Cloths 1 Application(s) Topical <User Schedule>  dextrose 5%. 1000 milliLiter(s) IV Continuous <Continuous>  dextrose 5%. 1000 milliLiter(s) IV Continuous <Continuous>  enoxaparin Injectable 40 milliGRAM(s) SubCutaneous every 24 hours  magnesium sulfate  IVPB 2 Gram(s) IV Intermittent every 2 hours  melatonin 3 milliGRAM(s) Oral at bedtime PRN  pantoprazole  Injectable 40 milliGRAM(s) IV Push daily  propranolol 80 milliGRAM(s) Oral every 6 hours      Current Endocrine Meds:   atorvastatin 40 milliGRAM(s) Oral at bedtime  dextrose 50% Injectable 25 Gram(s) IV Push once  dextrose 50% Injectable 25 Gram(s) IV Push once  dextrose 50% Injectable 12.5 Gram(s) IV Push once  dextrose Oral Gel 15 Gram(s) Oral once PRN  glucagon  Injectable 1 milliGRAM(s) IntraMuscular once  insulin glargine Injectable (LANTUS) 20 Unit(s) SubCutaneous every morning  insulin glargine Injectable (LANTUS) 20 Unit(s) SubCutaneous at bedtime  insulin lispro (ADMELOG) corrective regimen sliding scale   SubCutaneous three times a day before meals  methimazole 20 milliGRAM(s) Oral <User Schedule>      Allergies:  penicillin (Rash)      ROS:  Denies the following except as indicated.    General: weight loss/weight gain, decreased appetite, fatigue, fever  Eyes: blurry vision, double vision  ENT: neck swelling, dysphagia, voice changes   CV: palpitations, SOB, chest pain, cough  GI: nausea, vomiting, diarrhea, constipation, abdominal pain  : nocturia,  polyuria, dysuria  Endo: decreased libido, heat/cold intolerance, jitteriness  MSK: arthralgias, myalgias  Skin: rash, dryness, diaphoresis  Neuro: pedal numbness,pedal paresthesias, pedal pain    Height (cm): 165.1 (12-26 @ 17:02)  Weight (kg): 95.8 (12-26 @ 00:10)  BMI (kg/m2): 35.1 (12-26 @ 17:02)    Vital Signs Last 24 Hrs  T(C): 36.6 (29 Dec 2023 14:00), Max: 36.8 (29 Dec 2023 00:05)  T(F): 97.9 (29 Dec 2023 14:00), Max: 98.2 (29 Dec 2023 00:05)  HR: 68 (29 Dec 2023 14:00) (68 - 111)  BP: 142/67 (29 Dec 2023 14:00) (118/63 - 166/72)  BP(mean): 92 (28 Dec 2023 23:36) (86 - 116)  RR: 18 (29 Dec 2023 14:00) (15 - 29)  SpO2: 98% (29 Dec 2023 14:00) (96% - 99%)    Parameters below as of 28 Dec 2023 22:00  Patient On (Oxygen Delivery Method): room air      Constitutional: WN/WD in NAD.   Neck: no thyromegaly or palpable thyroid nodules   Respiratory: lungs CTAB.  Cardiovascular: regular rate and rhythm, normal S1 and S2, no audible murmurs  GI: soft, NT/ND, no masses/HSM appreciated.  Ext: no edema, no ulcers, pedal pulses palpable bilaterally  Neurology: no tremor, monofilament sensation intact in feet  Psychiatric: A&O x 3, normal affect/mood.        LABS:                        12.0   8.21  )-----------( 220      ( 29 Dec 2023 08:39 )             36.7     12-29    142  |  106  |  12  ----------------------------<  115<H>  3.5   |  26  |  0.5<L>    Ca    9.0      29 Dec 2023 08:39  Mg     1.7     12-29    TPro  6.1  /  Alb  3.4<L>  /  TBili  0.7  /  DBili  x   /  AST  11  /  ALT  18  /  AlkPhos  119<H>  12-29      Urinalysis Basic - ( 29 Dec 2023 08:39 )    Color: x / Appearance: x / SG: x / pH: x  Gluc: 115 mg/dL / Ketone: x  / Bili: x / Urobili: x   Blood: x / Protein: x / Nitrite: x   Leuk Esterase: x / RBC: x / WBC x   Sq Epi: x / Non Sq Epi: x / Bacteria: x                             Thyroid Stimulating Hormone, Serum: <0.00 (12-25 @ 22:17)    Free Thyroxine, Serum: 4.1 ng/dL (12-25-23 @ 22:17)    Triiodothyronine, Total (T3 Total): 316 ng/dL (12-25-23 @ 22:17)      RADIOLOGY & ADDITIONAL STUDIES:    A/P:53yFemale    1.  DM  For now:  Glargine-    units at bedtime  Lispro-    units three times a day before meals   Will continue to monitor     At discharge, recommend:      Pt can follow up at discharge with:    Above discussed with resident.

## 2023-12-29 NOTE — PROGRESS NOTE ADULT - ASSESSMENT
53y F PMH of hyperthyroidism, HTN, DMII, and HLD presents to the ED by family to the ED for likely thyroid storm    #Thyroid storm  #Hyperthyroidism  #Hx of thyroid storm  - Diagnosed with hyperthyroidism (11/19/21), Started methimazole 10  - Recent admission for thyroid storm (12/11/23).  DC'd on Methimazole 30 (12/17/23)  - Ramirez/Wartofsky score of 70  12/26  - TSH, T4, T3 ordered   - Methimazole 20mg Q6H  - Propranolol 80mg Q6H  - KI 250mg Q6H  - Hydrocortisone IV 100mg Q8  - Endocrinology consulted  12/27  - TSH 0, T3 316, T4 4.1  - Endo: DC hydrocortisone and KI.  - DC cholestyramine  - continue methimazole and propranolol     #Metabolic encephalopathy  #Sepsis POA  #Seizure  #AMS  12/25  - AMS starting at 2100. CT head ordered. s/p 2x midazolam 5mg   12/26  - Seizure 0844 s/p 1x 4mg Ativan   - Start Keppra 1500mg BID  - pending RVP  - Start vanc rocephin  12/27  - CT head negative  - EEG: non epileptiform  - pending neuro f/u  - ID consult pending  - UDS, urinalysis, procal negative  - BCx NGTD  12/28  - Patient is awake  - vEEG: non epileptiform x 2 days  - neuro: decrease keppra to 750mg BID  - ID: DC abx, DC langston  - Per chart review, patient has hx of medication non-compliance and memory loss  - langston and ng tube removed  - start on carb restricted diet    #DMII  - POCT blood glucose ~220  12/28  - Endo: start glargine 20units BID  - start on carb restricted diet    #HTN  12/27  - amlodipine 5mg daily  12/28  - BP controlled in the 120s    #HLD  - Atorvastatin 40mg QHS    Diet: regular (carb restricted)  Activity: bed rest  DVT ppx: lovenox  GI ppx: none      To-Do:  -f/u neuro for AED and EEG rec  -f/u endo for methimazole and DM recs on discharge   -f/u Hx of throat cancer (chart note), fax number was confirmed 3x. but was not sent.      53y F PMH of hyperthyroidism, HTN, DMII, and HLD presents to the ED by family to the ED for likely thyroid storm    #Thyroid storm  #Hyperthyroidism  #Hx of thyroid storm  - Diagnosed with hyperthyroidism (11/19/21), Started methimazole 10  - Recent admission for thyroid storm (12/11/23).  DC'd on Methimazole 30 (12/17/23)  - Ramirez/Wartofsky score of 70  12/26  - Methimazole 20mg Q6H  - Propranolol 80mg Q6H  - KI 250mg Q6H  - Hydrocortisone IV 100mg Q8  - Endocrinology consulted  12/27  - TSH 0, T3 316, T4 4.1  - Endo: DC hydrocortisone and KI.  - DC cholestyramine  - continue methimazole and propranolol     #Metabolic encephalopathy  #Sepsis POA  #Seizure  #AMS  12/25  - AMS starting at 2100. CT head ordered. s/p 2x midazolam 5mg   12/26  - Seizure 0844 s/p 1x 4mg Ativan   - Start Keppra 1500mg BID  - pending RVP  - Start vanc rocephin  12/27  - CT head negative  - EEG: non epileptiform  - pending neuro f/u  - ID consult pending  - UDS, urinalysis, procal negative  - BCx NGTD  12/28  - Patient is awake  - vEEG: non epileptiform x 2 days  - neuro: - d/c vEEG / d/c Levetiracetam / c/w Neurontin 400mg TID for her LE polyneuropathy / no further neurological w/u   - ID: DC abx, DC langston  - Per chart review, patient has hx of medication non-compliance and memory loss  - langston and ng tube removed  - start on carb restricted diet    #Sore throat  - Started on this admission  - Requested documents from previous hospital stay for clarification  - Speech and swallow evaluation on 12/29: +c/o odynophagia w/ po, +toleration of thin w/ min overt s/s of pharyngeal dysphagia, +throat clear after po, after throat clear pt w/ clear vocal quality, Pt refused all other po trials    #DMII  - POCT blood glucose ~220  12/28  - Endo: start glargine 20units BID  - start on carb restricted diet    #HTN  12/27  - amlodipine 5mg daily  12/28  - BP controlled in the 120s    #HLD  - Atorvastatin 40mg QHS    Diet: regular (carb restricted)  Activity: bed rest  DVT ppx: lovenox  GI ppx: none      To-Do:  -f/u endo for methimazole and DM recs on discharge   -f/u Hx of throat cancer (chart note) - waiting for the documents from [place]

## 2023-12-29 NOTE — DISCHARGE NOTE PROVIDER - NSDCMRMEDTOKEN_GEN_ALL_CORE_FT
albuterol 90 mcg/inh inhalation aerosol with adapter: 1 puff(s) inhaled every 4 hours, As Needed for sob or wheezing   alcohol swabs : Apply topically to affected area 4 times a day   amlodipine-benazepril 10 mg-40 mg oral capsule: 1 cap(s) orally once a day  atenolol 50 mg oral tablet: 1 tab(s) orally once a day  atorvastatin 40 mg oral tablet: 1 tab(s) orally once a day  ezetimibe 10 mg oral tablet: 1 tab(s) orally once a day  gabapentin 400 mg oral capsule: 1 cap(s) orally 3 times a day  glucometer (per patient&#x27;s insurance): Test blood sugars four times a day. Dispense #1 glucometer.  guaiFENesin 600 mg oral tablet, extended release: 1 tab(s) orally every 12 hours -Cough   HumuLIN N KwikPen 100 units/mL subcutaneous suspension: 25 u subcutaneous once a day for 4days then 20u sc daily for 2 days then 15u sc daily for 2 days then 10u sc daily for 2 days then stop while on prednisone   Insulin Pen Needles, 4mm: 1 application subcutaneously 4 times a day. ** Use with insulin pen **   lancets: 1 application subcutaneously 4 times a day   methIMAzole 10 mg oral tablet: 2 tab(s) orally once a day  montelukast 10 mg oral tablet: 1 tab(s) orally once a day (at bedtime)  Ozempic 2 mg/3 mL (0.25 mg or 0.5 mg dose) subcutaneous solution: 0.25 milligram(s) subcutaneously once a week take 0.25 per week for 2 weeks then  0.5 weekly after that  pioglitazone 30 mg oral tablet: 1 tab(s) orally once a day  predniSONE 10 mg oral tablet: 5 tab(s) orally once a day for 2 days then 4tb for 2 days then 3tab for 2 days then 2tab for 2 days then 1 tab for 2 days then stop    test strips (per patient&#x27;s insurance): 1 application subcutaneously 4 times a day. ** Compatible with patient&#x27;s glucometer **   albuterol 90 mcg/inh inhalation aerosol with adapter: 1 puff(s) inhaled every 4 hours, As Needed for sob or wheezing   alcohol swabs : Apply topically to affected area 4 times a day   amlodipine-benazepril 10 mg-40 mg oral capsule: 1 cap(s) orally once a day  atenolol 50 mg oral tablet: 1 tab(s) orally once a day  atorvastatin 40 mg oral tablet: 1 tab(s) orally once a day  ezetimibe 10 mg oral tablet: 1 tab(s) orally once a day  gabapentin 400 mg oral capsule: 1 cap(s) orally 3 times a day  glucometer (per patient&#x27;s insurance): Test blood sugars four times a day. Dispense #1 glucometer.  guaiFENesin 600 mg oral tablet, extended release: 1 tab(s) orally every 12 hours -Cough   HumuLIN N KwikPen 100 units/mL subcutaneous suspension: 25 u subcutaneous once a day for 4days then 20u sc daily for 2 days then 15u sc daily for 2 days then 10u sc daily for 2 days then stop while on prednisone   Insulin Pen Needles, 4mm: 1 application subcutaneously 4 times a day. ** Use with insulin pen **   lancets: 1 application subcutaneously 4 times a day   magnesium oxide 400 mg oral tablet: 1 tab(s) orally once a day  methIMAzole 10 mg oral tablet: 2 tab(s) orally once a day  montelukast 10 mg oral tablet: 1 tab(s) orally once a day (at bedtime)  Ozempic 2 mg/3 mL (0.25 mg or 0.5 mg dose) subcutaneous solution: 0.25 milligram(s) subcutaneously once a week take 0.25 per week for 2 weeks then  0.5 weekly after that  pioglitazone 30 mg oral tablet: 1 tab(s) orally once a day  predniSONE 10 mg oral tablet: 5 tab(s) orally once a day for 2 days then 4tb for 2 days then 3tab for 2 days then 2tab for 2 days then 1 tab for 2 days then stop    test strips (per patient&#x27;s insurance): 1 application subcutaneously 4 times a day. ** Compatible with patient&#x27;s glucometer **

## 2023-12-29 NOTE — DISCHARGE NOTE PROVIDER - PROVIDER TOKENS
PROVIDER:[TOKEN:[[id number]],FOLLOWUP:[2 weeks]] PROVIDER:[TOKEN:[[id number]],FOLLOWUP:[2 weeks]]

## 2023-12-29 NOTE — SWALLOW BEDSIDE ASSESSMENT ADULT - SLP GENERAL OBSERVATIONS
Pt received OOB to chair awake and alert on room air, c/o odynophagia, denies throat pain w/o po
Pt received OOB to chair awake and alert, c/o stomach pain, c/o odynophagia, Pt w/ decreased orientation, unable to recall names of children, who she lives with, inconsistent responses, +perseverative on wanting to go home, spoke to  asking when he will be picking her up

## 2023-12-29 NOTE — SWALLOW BEDSIDE ASSESSMENT ADULT - COMMENTS
pt known to ST services from past Seen in June 2021 w/ rec for reg w/ thin, Nov 2021 pt refused all po trials
pt known to ST services from past Seen in June 2021 w/ rec for reg w/ thin, Nov 2021 pt refused all po trials

## 2023-12-29 NOTE — DISCHARGE NOTE PROVIDER - CARE PROVIDERS DIRECT ADDRESSES
[email or web address]

## 2023-12-29 NOTE — DISCHARGE NOTE NURSING/CASE MANAGEMENT/SOCIAL WORK - PATIENT PORTAL LINK FT
You can access the FollowMyHealth Patient Portal offered by [facility] by registering at the following website: [email or web address]. By joining [employer]’s FollowMyHealth portal, you will also be able to view your health information using other applications (apps) compatible with our system. You can access the FollowMyHealth Patient Portal offered by [facility] by registering at the following website: [email or web address]. By joining [employer]’s FollowMyHealth portal, you will also be able to view your health information using other applications (apps) compatible with our system.

## 2023-12-29 NOTE — SWALLOW BEDSIDE ASSESSMENT ADULT - SWALLOW EVAL: RECOMMENDED FEEDING/EATING TECHNIQUES
encourage po intake/oral hygiene/position upright (90 degrees)
oral hygiene/position upright (90 degrees)

## 2023-12-29 NOTE — DISCHARGE NOTE PROVIDER - HOSPITAL COURSE
53y F PMH of hyperthyroidism, HTN, DMII, and HLD presents to the ED by family to the ED for evaluation of altered mental status started acutely approximately 3 hours prior to arrival to ED. History taken from sister as patient is somnolent and incoherent. Patient had similar episode 1 year ago when she did not take her thyroid medications, and it progressed to thyroid storm.  Patient's family states patient has been living with her daughter, not sure if she has been taking her medications as prescribed.  Patient brought to ER hyperventilating moving extremities not following commands, tachycardic, agitated. Rectal temp elevated at 102.3, tachycardic, concern for thyroid storm given previous presentation.  In the ED, BP was 161/78, , temp 98.2 repeated 102.1. Labs remarkable for WBC 15, Mg 1.5. Patient was given propronolol IV 2 mg, solucortef 100 mg, and methimazole 20 mg in addition to 3 L of IV fluids.    #Thyroid storm  #Hyperthyroidism  #Hx of thyroid storm  - Diagnosed with hyperthyroidism (11/19/21), Started methimazole 10  - Recent admission for thyroid storm (12/11/23).  DC'd on Methimazole 30 (12/17/23)  - Ramirez/Wartofsky score of 70  12/26  - Methimazole 20mg Q6H  - Propranolol 80mg Q6H  - KI 250mg Q6H  - Hydrocortisone IV 100mg Q8  - Endocrinology consulted  12/27  - TSH 0, T3 316, T4 4.1  - Endo: DC hydrocortisone and KI.  - DC cholestyramine  - continue methimazole and propranolol     #Metabolic encephalopathy  #Sepsis POA  #Seizure  #AMS  12/25  - AMS starting at 2100. CT head ordered. s/p 2x midazolam 5mg   12/26  - Seizure 0844 s/p 1x 4mg Ativan   - Start Keppra 1500mg BID  - pending RVP  - Start vanc rocephin  12/27  - CT head negative  - EEG: non epileptiform  - pending neuro f/u  - ID consult pending  - UDS, urinalysis, procal negative  - BCx NGTD  12/28  - Patient is awake  - vEEG: non epileptiform x 2 days  - neuro: - d/c vEEG / d/c Levetiracetam / c/w Neurontin 400mg TID for her LE polyneuropathy / no further neurological w/u   - ID: DC abx, DC langston  - Per chart review, patient has hx of medication non-compliance and memory loss  - langston and ng tube removed  - start on carb restricted diet    #Sore throat  - Started on this admission  - Requested documents from previous hospital stay for clarification  - Speech and swallow evaluation on 12/29: +c/o odynophagia w/ po, +toleration of thin w/ min overt s/s of pharyngeal dysphagia, +throat clear after po, after throat clear pt w/ clear vocal quality, Pt refused all other po trials    The patient decided to leave AMA without pursuing evaluation of what's causing her sore throat.  She will receive 1 bag of magnesium and sign against medical advice 53y F PMH of hyperthyroidism, HTN, DMII, and HLD presents to the ED by family to the ED for evaluation of altered mental status started acutely approximately 3 hours prior to arrival to ED. History taken from sister as patient is somnolent and incoherent. Patient had similar episode 1 year ago when she did not take her thyroid medications, and it progressed to thyroid storm.  Patient's family states patient has been living with her daughter, not sure if she has been taking her medications as prescribed.  Patient brought to ER hyperventilating moving extremities not following commands, tachycardic, agitated. Rectal temp elevated at 102.3, tachycardic, concern for thyroid storm given previous presentation.  In the ED, BP was 161/78, , temp 98.2 repeated 102.1. Labs remarkable for WBC 15, Mg 1.5. Patient was given propronolol IV 2 mg, solucortef 100 mg, and methimazole 20 mg in addition to 3 L of IV fluids.    #Thyroid storm  #Hyperthyroidism  #Hx of thyroid storm  - Diagnosed with hyperthyroidism (11/19/21), Started methimazole 10  - Recent admission for thyroid storm (12/11/23).  DC'd on Methimazole 30 (12/17/23)  - Ramirez/Wartofsky score of 70  12/26  - Methimazole 20mg Q6H  - Propranolol 80mg Q6H  - KI 250mg Q6H  - Hydrocortisone IV 100mg Q8  - Endocrinology consulted  12/27  - TSH 0, T3 316, T4 4.1  - Endo: DC hydrocortisone and KI.  - DC cholestyramine  - continue methimazole and propranolol   dw endocrinologist     #Metabolic encephalopathy  #Sepsis POA  #Seizure  #AMS  12/25  - AMS starting at 2100. CT head ordered. s/p 2x midazolam 5mg   12/26  - Seizure 0844 s/p 1x 4mg Ativan   - Start Keppra 1500mg BID  - pending RVP  - Start vanc rocephin  12/27  - CT head negative  - EEG: non epileptiform  - pending neuro f/u  - ID consult pending  - UDS, urinalysis, procal negative  - BCx NGTD  12/28  - Patient is awake  - vEEG: non epileptiform x 2 days  - neuro: - d/c vEEG / d/c Levetiracetam / c/w Neurontin 400mg TID for her LE polyneuropathy / no further neurological w/u   - ID: DC abx, DC langston  - Per chart review, patient has hx of medication non-compliance and memory loss  - langston and ng tube removed  - start on carb restricted diet    #Sore throat  - Started on this admission  - Requested documents from previous hospital stay for clarification  - Speech and swallow evaluation on 12/29: +c/o odynophagia w/ po, +toleration of thin w/ min overt s/s of pharyngeal dysphagia, +throat clear after po, after throat clear pt w/ clear vocal quality, Pt refused all other po trials    The patient decided to leave AMA without pursuing evaluation of what's causing her sore throat.  She will receive 1 bag of magnesium and sign against medical advice

## 2023-12-29 NOTE — SWALLOW BEDSIDE ASSESSMENT ADULT - SWALLOW EVAL: DIAGNOSIS
+c/o odynophagia, refused all po trials
+c/o odynophagia w/ po, +toleration of thin w/ min overt s/s of pharyngeal dysphagia, +throat clear after po, after throat clear pt w/ clear vocal quality, Pt refused all other po trials

## 2023-12-29 NOTE — PROGRESS NOTE ADULT - SUBJECTIVE AND OBJECTIVE BOX
24H events:    Patient is a 53y old Female who presents with a chief complaint of AMS (28 Dec 2023 11:07)    Primary diagnosis of Thyroid storm    Today is hospital day 4d. This morning patient was seen and examined at bedside, resting comfortably in bed.    No acute or major events overnight. Hemodynamically stable, tolerating oral diet, voiding appropriately with appropriate bowel movements.     Code Status: Full code    Family communication:  Contact date:  Name of person contacted:  Relationship to patient:  Communication details:  What matters most:    PAST MEDICAL & SURGICAL HISTORY  Diabetes    Hypertension    No significant past surgical history      SOCIAL HISTORY:  Social History:      ALLERGIES:  penicillin (Rash)    MEDICATIONS:  STANDING MEDICATIONS  amLODIPine   Tablet 5 milliGRAM(s) Oral daily  atorvastatin 40 milliGRAM(s) Oral at bedtime  chlorhexidine 2% Cloths 1 Application(s) Topical <User Schedule>  dextrose 5%. 1000 milliLiter(s) IV Continuous <Continuous>  dextrose 5%. 1000 milliLiter(s) IV Continuous <Continuous>  dextrose 50% Injectable 25 Gram(s) IV Push once  dextrose 50% Injectable 25 Gram(s) IV Push once  dextrose 50% Injectable 12.5 Gram(s) IV Push once  enoxaparin Injectable 40 milliGRAM(s) SubCutaneous every 24 hours  glucagon  Injectable 1 milliGRAM(s) IntraMuscular once  insulin glargine Injectable (LANTUS) 20 Unit(s) SubCutaneous every morning  insulin glargine Injectable (LANTUS) 20 Unit(s) SubCutaneous at bedtime  insulin lispro (ADMELOG) corrective regimen sliding scale   SubCutaneous three times a day before meals  methimazole 20 milliGRAM(s) Oral <User Schedule>  pantoprazole  Injectable 40 milliGRAM(s) IV Push daily  propranolol 80 milliGRAM(s) Oral every 6 hours    PRN MEDICATIONS  acetaminophen     Tablet .. 650 milliGRAM(s) Oral every 6 hours PRN  dextrose Oral Gel 15 Gram(s) Oral once PRN  melatonin 3 milliGRAM(s) Oral at bedtime PRN    VITALS:   Vital Signs Last 24 Hrs  T(C): 36.6 (29 Dec 2023 05:00), Max: 36.8 (28 Dec 2023 12:00)  T(F): 97.9 (29 Dec 2023 05:00), Max: 98.2 (28 Dec 2023 12:00)  HR: 72 (29 Dec 2023 05:00) (72 - 111)  BP: 130/60 (29 Dec 2023 05:00) (109/72 - 166/72)  BP(mean): 92 (28 Dec 2023 23:36) (82 - 116)  RR: 18 (29 Dec 2023 05:00) (12 - 29)  SpO2: 96% (29 Dec 2023 00:05) (96% - 100%)    Parameters below as of 28 Dec 2023 22:00  Patient On (Oxygen Delivery Method): room air    PHYSICAL EXAM:  GENERAL: NAD, lying in bed comfortably, obtunded, lethargic,  somnolent, cooperative, elderly  HEAD: NCAD, no hematoma or laceration   NECK: Supple, no neck stiffness/nuchal rigidity, no JVD    HEART: Regular rate and rhythm, normal S1/S2, no murmurs, heaves, thrills  LUNGS: No acute respiratory distress, clear b/l breath sounds, no wheezing, rales, rhonchi  ABDOMEN:  soft, non-tender, non-distented, + BS, no hepatosplenomegaly   EXTREMITIES: no rashes, extremities warm/dry, no cyanosis, no edema, ulcerations or ecchymosis  NERVOUS SYSTEM:  A&Ox3, CNII-XII intact, follows commands, answers questions appropriately   SKIN: No rashes or lesions       [facility] score:    LABS:                        12.0   8.21  )-----------( 220      ( 29 Dec 2023 08:39 )             36.7     12-28    141  |  108  |  11  ----------------------------<  153<H>  3.7   |  24  |  0.5<L>    Ca    9.1      28 Dec 2023 05:14  Mg     1.8     12-28    TPro  6.3  /  Alb  3.3<L>  /  TBili  0.6  /  DBili  x   /  AST  17  /  ALT  21  /  AlkPhos  127<H>  12-28      Urinalysis Basic - ( 28 Dec 2023 05:14 )    Color: x / Appearance: x / SG: x / pH: x  Gluc: 153 mg/dL / Ketone: x  / Bili: x / Urobili: x   Blood: x / Protein: x / Nitrite: x   Leuk Esterase: x / RBC: x / WBC x   Sq Epi: x / Non Sq Epi: x / Bacteria: x      Culture - Urine (collected 26 Dec 2023 13:40)  Source: Clean Catch Clean Catch (Midstream)  Final Report (27 Dec 2023 21:56):    <10,000 CFU/mL Normal Urogenital Vicky      RADIOLOGY:      RADIOLOGY   24H events:    Patient is a 53y old Female who presents with a chief complaint of AMS (28 Dec 2023 11:07)    Primary diagnosis of Thyroid storm    Today is hospital day 4d. This morning patient was seen and examined at bedside, resting comfortably in bed.    No acute or major events overnight. Hemodynamically stable, tolerating oral diet, voiding appropriately with appropriate bowel movements.     Code Status: Full code    Family communication:  Contact date:  Name of person contacted:  Relationship to patient:  Communication details:  What matters most:    PAST MEDICAL & SURGICAL HISTORY  Diabetes    Hypertension    No significant past surgical history      SOCIAL HISTORY:  Social History:      ALLERGIES:  penicillin (Rash)    MEDICATIONS:  STANDING MEDICATIONS  amLODIPine   Tablet 5 milliGRAM(s) Oral daily  atorvastatin 40 milliGRAM(s) Oral at bedtime  chlorhexidine 2% Cloths 1 Application(s) Topical <User Schedule>  dextrose 5%. 1000 milliLiter(s) IV Continuous <Continuous>  dextrose 5%. 1000 milliLiter(s) IV Continuous <Continuous>  dextrose 50% Injectable 25 Gram(s) IV Push once  dextrose 50% Injectable 25 Gram(s) IV Push once  dextrose 50% Injectable 12.5 Gram(s) IV Push once  enoxaparin Injectable 40 milliGRAM(s) SubCutaneous every 24 hours  glucagon  Injectable 1 milliGRAM(s) IntraMuscular once  insulin glargine Injectable (LANTUS) 20 Unit(s) SubCutaneous every morning  insulin glargine Injectable (LANTUS) 20 Unit(s) SubCutaneous at bedtime  insulin lispro (ADMELOG) corrective regimen sliding scale   SubCutaneous three times a day before meals  methimazole 20 milliGRAM(s) Oral <User Schedule>  pantoprazole  Injectable 40 milliGRAM(s) IV Push daily  propranolol 80 milliGRAM(s) Oral every 6 hours    PRN MEDICATIONS  acetaminophen     Tablet .. 650 milliGRAM(s) Oral every 6 hours PRN  dextrose Oral Gel 15 Gram(s) Oral once PRN  melatonin 3 milliGRAM(s) Oral at bedtime PRN    VITALS:   Vital Signs Last 24 Hrs  T(C): 36.6 (29 Dec 2023 05:00), Max: 36.8 (28 Dec 2023 12:00)  T(F): 97.9 (29 Dec 2023 05:00), Max: 98.2 (28 Dec 2023 12:00)  HR: 72 (29 Dec 2023 05:00) (72 - 111)  BP: 130/60 (29 Dec 2023 05:00) (109/72 - 166/72)  BP(mean): 92 (28 Dec 2023 23:36) (82 - 116)  RR: 18 (29 Dec 2023 05:00) (12 - 29)  SpO2: 96% (29 Dec 2023 00:05) (96% - 100%)    Parameters below as of 28 Dec 2023 22:00  Patient On (Oxygen Delivery Method): room air    PHYSICAL EXAM:  GENERAL: NAD, lying in bed comfortably, obtunded, lethargic,  somnolent, cooperative, elderly  HEAD: NCAD, no hematoma or laceration   NECK: Supple, no neck stiffness/nuchal rigidity, no JVD    HEART: Regular rate and rhythm, normal S1/S2, no murmurs, heaves, thrills  LUNGS: No acute respiratory distress, clear b/l breath sounds, no wheezing, rales, rhonchi  ABDOMEN:  soft, non-tender, non-distented, + BS, no hepatosplenomegaly   EXTREMITIES: no rashes, extremities warm/dry, no cyanosis, no edema, ulcerations or ecchymosis  NERVOUS SYSTEM:  A&Ox3, CNII-XII intact, follows commands, answers questions appropriately   SKIN: No rashes or lesions       [facility] score:    LABS:                        12.0   8.21  )-----------( 220      ( 29 Dec 2023 08:39 )             36.7     12-28    141  |  108  |  11  ----------------------------<  153<H>  3.7   |  24  |  0.5<L>    Ca    9.1      28 Dec 2023 05:14  Mg     1.8     12-28    TPro  6.3  /  Alb  3.3<L>  /  TBili  0.6  /  DBili  x   /  AST  17  /  ALT  21  /  AlkPhos  127<H>  12-28      Urinalysis Basic - ( 28 Dec 2023 05:14 )    Color: x / Appearance: x / SG: x / pH: x  Gluc: 153 mg/dL / Ketone: x  / Bili: x / Urobili: x   Blood: x / Protein: x / Nitrite: x   Leuk Esterase: x / RBC: x / WBC x   Sq Epi: x / Non Sq Epi: x / Bacteria: x      Culture - Urine (collected 26 Dec 2023 13:40)  Source: Clean Catch Clean Catch (Midstream)  Final Report (27 Dec 2023 21:56):    <10,000 CFU/mL Normal Urogenital Vicky      RADIOLOGY:      RADIOLOGY   24H events:    Patient is a 53y old Female who presents with a chief complaint of AMS (28 Dec 2023 11:07)    Primary diagnosis of Thyroid storm    Today is hospital day 4d. This morning patient was seen and examined at bedside.  Patient looked calm and alert, on room air, no atypical movements as described in previous notes. She was not oriented, mentioning she lives with her  and kids.  No acute or major events overnight - hemodynamically stable.  Patient not tolerating oral diet, complaining of odynophagia - could not be evaluated by speech and swallow because she would not tolerate swallowing even liquids    Code Status: Full code    Family communication:  Contact date: 12/29/2023  Name of person contacted: Sayra Santiago  Relationship to patient: Sister  Communication details:  1- Clarifying where the patient lives: the patient lives with her sister  2- Understanding baseline mental status: the sister reports that she is usually oriented to people around her as well as space and time  3- Clarifying the throat issue: she says that there is no previous history of neck surgery or known neck tumor    PAST MEDICAL & SURGICAL HISTORY  hyperthyroidism, HTN, DMII, and HLD     No significant past surgical history      ALLERGIES:  penicillin (Rash)    MEDICATIONS:  STANDING MEDICATIONS  amLODIPine   Tablet 5 milliGRAM(s) Oral daily  atorvastatin 40 milliGRAM(s) Oral at bedtime  chlorhexidine 2% Cloths 1 Application(s) Topical <User Schedule>  dextrose 5%. 1000 milliLiter(s) IV Continuous <Continuous>  dextrose 5%. 1000 milliLiter(s) IV Continuous <Continuous>  dextrose 50% Injectable 25 Gram(s) IV Push once  dextrose 50% Injectable 25 Gram(s) IV Push once  dextrose 50% Injectable 12.5 Gram(s) IV Push once  enoxaparin Injectable 40 milliGRAM(s) SubCutaneous every 24 hours  glucagon  Injectable 1 milliGRAM(s) IntraMuscular once  insulin glargine Injectable (LANTUS) 20 Unit(s) SubCutaneous every morning  insulin glargine Injectable (LANTUS) 20 Unit(s) SubCutaneous at bedtime  insulin lispro (ADMELOG) corrective regimen sliding scale   SubCutaneous three times a day before meals  methimazole 20 milliGRAM(s) Oral <User Schedule>  pantoprazole  Injectable 40 milliGRAM(s) IV Push daily  propranolol 80 milliGRAM(s) Oral every 6 hours    PRN MEDICATIONS  acetaminophen     Tablet .. 650 milliGRAM(s) Oral every 6 hours PRN  dextrose Oral Gel 15 Gram(s) Oral once PRN  melatonin 3 milliGRAM(s) Oral at bedtime PRN    VITALS:   Vital Signs Last 24 Hrs  T(C): 36.6 (29 Dec 2023 05:00), Max: 36.8 (28 Dec 2023 12:00)  T(F): 97.9 (29 Dec 2023 05:00), Max: 98.2 (28 Dec 2023 12:00)  HR: 72 (29 Dec 2023 05:00) (72 - 111)  BP: 130/60 (29 Dec 2023 05:00) (109/72 - 166/72)  BP(mean): 92 (28 Dec 2023 23:36) (82 - 116)  RR: 18 (29 Dec 2023 05:00) (12 - 29)  SpO2: 96% (29 Dec 2023 00:05) (96% - 100%)    Parameters below as of 28 Dec 2023 22:00  Patient On (Oxygen Delivery Method): room air    PHYSICAL EXAM:  GENERAL: NAD, sitting comfortably  HEAD: NCAD, no hematoma or laceration, no oral thrush   NECK: Supple, no JVD    HEART: Regular rate and rhythm, normal S1/S2   LUNGS: No acute respiratory distress, clear b/l breath sounds   ABDOMEN:  soft, non-tender, non-distented, + BS   EXTREMITIES: no rashes, no edema, ulcerations or ecchymosis  NERVOUS SYSTEM:  A&Ox1, CNII-XII intact, follows commands, does not answers questions appropriately   SKIN: No rashes or lesions       [facility] score:    LABS:                        12.0   8.21  )-----------( 220      ( 29 Dec 2023 08:39 )             36.7     12-29    142  |  106  |  12  ----------------------------<  115<H>  3.5   |  26  |  0.5<L>    Ca    9.0      29 Dec 2023 08:39  Mg     1.7     12-29    TPro  6.1  /  Alb  3.4<L>  /  TBili  0.7  /  DBili  x   /  AST  11  /  ALT  18  /  AlkPhos  119<H>  12-29      Urinalysis Basic - ( 28 Dec 2023 05:14 )    Color: x / Appearance: x / SG: x / pH: x  Gluc: 153 mg/dL / Ketone: x  / Bili: x / Urobili: x   Blood: x / Protein: x / Nitrite: x   Leuk Esterase: x / RBC: x / WBC x   Sq Epi: x / Non Sq Epi: x / Bacteria: x      Culture - Urine (collected 26 Dec 2023 13:40)  Source: Clean Catch Clean Catch (Midstream)  Final Report (27 Dec 2023 21:56):    <10,000 CFU/mL Normal Urogenital Vicky      RADIOLOGY:     24H events:    Patient is a 53y old Female who presents with a chief complaint of AMS (28 Dec 2023 11:07)    Primary diagnosis of Thyroid storm    Today is hospital day 4d. This morning patient was seen and examined at bedside.  Patient looked calm and alert, on room air, no atypical movements as described in previous notes. She was not oriented, mentioning she lives with her  and kids.  No acute or major events overnight - hemodynamically stable.  Patient not tolerating oral diet, complaining of odynophagia - could not be evaluated by speech and swallow because she would not tolerate swallowing even liquids    Code Status: Full code    Family communication:  Contact date: 12/29/2023  Name of person contacted: Sayra Santiago  Relationship to patient: Sister  Communication details:  1- Clarifying where the patient lives: the patient lives with her sister  2- Understanding baseline mental status: the sister reports that she is usually oriented to people around her as well as space and time  3- Clarifying the throat issue: she says that there is no previous history of neck surgery or known neck tumor    PAST MEDICAL & SURGICAL HISTORY  hyperthyroidism, HTN, DMII, and HLD     No significant past surgical history      ALLERGIES:  penicillin (Rash)    MEDICATIONS:  STANDING MEDICATIONS  amLODIPine   Tablet 5 milliGRAM(s) Oral daily  atorvastatin 40 milliGRAM(s) Oral at bedtime  chlorhexidine 2% Cloths 1 Application(s) Topical <User Schedule>  dextrose 5%. 1000 milliLiter(s) IV Continuous <Continuous>  dextrose 5%. 1000 milliLiter(s) IV Continuous <Continuous>  dextrose 50% Injectable 25 Gram(s) IV Push once  dextrose 50% Injectable 25 Gram(s) IV Push once  dextrose 50% Injectable 12.5 Gram(s) IV Push once  enoxaparin Injectable 40 milliGRAM(s) SubCutaneous every 24 hours  glucagon  Injectable 1 milliGRAM(s) IntraMuscular once  insulin glargine Injectable (LANTUS) 20 Unit(s) SubCutaneous every morning  insulin glargine Injectable (LANTUS) 20 Unit(s) SubCutaneous at bedtime  insulin lispro (ADMELOG) corrective regimen sliding scale   SubCutaneous three times a day before meals  methimazole 20 milliGRAM(s) Oral <User Schedule>  pantoprazole  Injectable 40 milliGRAM(s) IV Push daily  propranolol 80 milliGRAM(s) Oral every 6 hours    PRN MEDICATIONS  acetaminophen     Tablet .. 650 milliGRAM(s) Oral every 6 hours PRN  dextrose Oral Gel 15 Gram(s) Oral once PRN  melatonin 3 milliGRAM(s) Oral at bedtime PRN    VITALS:   Vital Signs Last 24 Hrs  T(C): 36.6 (29 Dec 2023 05:00), Max: 36.8 (28 Dec 2023 12:00)  T(F): 97.9 (29 Dec 2023 05:00), Max: 98.2 (28 Dec 2023 12:00)  HR: 72 (29 Dec 2023 05:00) (72 - 111)  BP: 130/60 (29 Dec 2023 05:00) (109/72 - 166/72)  BP(mean): 92 (28 Dec 2023 23:36) (82 - 116)  RR: 18 (29 Dec 2023 05:00) (12 - 29)  SpO2: 96% (29 Dec 2023 00:05) (96% - 100%)    Parameters below as of 28 Dec 2023 22:00  Patient On (Oxygen Delivery Method): room air    PHYSICAL EXAM:  GENERAL: NAD, sitting comfortably  HEAD: NCAD, no hematoma or laceration, no oral thrush   NECK: Supple, no JVD    HEART: Regular rate and rhythm, normal S1/S2   LUNGS: No acute respiratory distress, clear b/l breath sounds   ABDOMEN:  soft, non-tender, non-distented, + BS   EXTREMITIES: no rashes, no edema, ulcerations or ecchymosis  NERVOUS SYSTEM:  A&Ox1, CNII-XII intact, follows commands, does not answers questions appropriately   SKIN: No rashes or lesions       [facility] score:    LABS:                        12.0   8.21  )-----------( 220      ( 29 Dec 2023 08:39 )             36.7     12-29    142  |  106  |  12  ----------------------------<  115<H>  3.5   |  26  |  0.5<L>    Ca    9.0      29 Dec 2023 08:39  Mg     1.7     12-29    TPro  6.1  /  Alb  3.4<L>  /  TBili  0.7  /  DBili  x   /  AST  11  /  ALT  18  /  AlkPhos  119<H>  12-29      Urinalysis Basic - ( 28 Dec 2023 05:14 )    Color: x / Appearance: x / SG: x / pH: x  Gluc: 153 mg/dL / Ketone: x  / Bili: x / Urobili: x   Blood: x / Protein: x / Nitrite: x   Leuk Esterase: x / RBC: x / WBC x   Sq Epi: x / Non Sq Epi: x / Bacteria: x      Culture - Urine (collected 26 Dec 2023 13:40)  Source: Clean Catch Clean Catch (Midstream)  Final Report (27 Dec 2023 21:56):    <10,000 CFU/mL Normal Urogenital Vicky      RADIOLOGY:

## 2023-12-29 NOTE — DISCHARGE NOTE PROVIDER - NSDCFUSCHEDAPPT_GEN_ALL_CORE_FT
Rinku Simpson  [facility] PreAdmits  Scheduled Appointment: 01/09/2024    [facility]  [address]  Scheduled Appointment: 01/09/2024     Rinku Simpson  [facility] PreAdmits  Scheduled Appointment: 01/09/2024    [facility]  [address]  Scheduled Appointment: 01/09/2024

## 2023-12-29 NOTE — SWALLOW BEDSIDE ASSESSMENT ADULT - SLP PERTINENT HISTORY OF CURRENT PROBLEM
PMH of hyperthyroidism, HTN, DMII, and HLD presents to the ED by family to the ED for evaluation of altered mental status started acutely approximately 3 hours prior to arrival to ED. History taken from sister as patient is somnolent and incoherent. Patient had similar episode 1 year ago when she did not take her thyroid medications, and it progressed to thyroid storm.  Patient's family states patient has been living with her daughter, not sure if she has been taking her medications as prescribed.
PMH of hyperthyroidism, HTN, DMII, and HLD presents to the ED by family to the ED for evaluation of altered mental status started acutely approximately 3 hours prior to arrival to ED. History taken from sister as patient is somnolent and incoherent. Patient had similar episode 1 year ago when she did not take her thyroid medications, and it progressed to thyroid storm.  Patient's family states patient has been living with her daughter, not sure if she has been taking her medications as prescribed.

## 2023-12-29 NOTE — DISCHARGE NOTE PROVIDER - CARE PROVIDER_API CALL
Kirsty Badillo  Endocrinology/Metab/Diabetes  [address] [place] [address]  Phone: [phone]  Fax: [phone]  Follow Up Time: 2 weeks   Kirsty Badillo  Endocrinology/Metab/Diabetes  [address] [place] [address]  Phone: [phone]  Fax: [phone]  Follow Up Time: 2 weeks

## 2023-12-29 NOTE — SWALLOW BEDSIDE ASSESSMENT ADULT - NS SPL SWALLOW CLINIC TRIAL FT
+c/o odynophagia w/ po, +toleration of thin w/ min overt s/s of pharyngeal dysphagia, +throat clear after po, after throat clear pt w/ clear vocal quality, Pt refused all other po trials
refused po trials

## 2023-12-31 ENCOUNTER — EMERGENCY (EMERGENCY)
Facility: HOSPITAL | Age: 53
LOS: 0 days | Discharge: HOME | End: 2023-12-31
Attending: STUDENT IN AN ORGANIZED HEALTH CARE EDUCATION/TRAINING PROGRAM
Payer: MEDICAID

## 2023-12-31 VITALS
SYSTOLIC BLOOD PRESSURE: 195 MMHG | WEIGHT: 220.02 LBS | RESPIRATION RATE: 18 BRPM | HEART RATE: 105 BPM | TEMPERATURE: 98 F | HEIGHT: 64 IN | OXYGEN SATURATION: 99 % | DIASTOLIC BLOOD PRESSURE: 77 MMHG
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
## 2023-12-31 DIAGNOSIS — S39.012A STRAIN OF MUSCLE, FASCIA AND TENDON OF LOWER BACK, INITIAL ENCOUNTER: ICD-10-CM

## 2023-12-31 DIAGNOSIS — Z88.0 ALLERGY STATUS TO PENICILLIN: ICD-10-CM

## 2023-12-31 DIAGNOSIS — E03.9 HYPOTHYROIDISM, UNSPECIFIED: ICD-10-CM

## 2023-12-31 DIAGNOSIS — E78.5 HYPERLIPIDEMIA, UNSPECIFIED: ICD-10-CM
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
## 2023-12-31 DIAGNOSIS — M54.50 LOW BACK PAIN, UNSPECIFIED: ICD-10-CM

## 2023-12-31 DIAGNOSIS — I10 ESSENTIAL (PRIMARY) HYPERTENSION: ICD-10-CM

## 2023-12-31 DIAGNOSIS — E11.9 TYPE 2 DIABETES MELLITUS WITHOUT COMPLICATIONS: ICD-10-CM

## 2023-12-31 LAB
CULTURE RESULTS: SIGNIFICANT CHANGE UP
SPECIMEN SOURCE: SIGNIFICANT CHANGE UP

## 2023-12-31 PROCEDURE — 96372 THER/PROPH/DIAG INJ SC/IM: CPT | Mod: XU

## 2023-12-31 PROCEDURE — 96374 THER/PROPH/DIAG INJ IV PUSH: CPT

## 2023-12-31 PROCEDURE — 99284 EMERGENCY DEPT VISIT MOD MDM: CPT | Mod: 25

## 2023-12-31 PROCEDURE — 99284 EMERGENCY DEPT VISIT MOD MDM: CPT

## 2023-12-31 RX ORDER — KETOROLAC TROMETHAMINE 30 MG/ML
15 SYRINGE (ML) INJECTION ONCE
Refills: 0 | Status: DISCONTINUED | OUTPATIENT
Start: 2023-12-31 | End: 2023-12-31

## 2023-12-31 RX ORDER — MORPHINE SULFATE 50 MG/1
2 CAPSULE, EXTENDED RELEASE ORAL ONCE
Refills: 0 | Status: DISCONTINUED | OUTPATIENT
Start: 2023-12-31 | End: 2023-12-31

## 2023-12-31 RX ORDER — METHOCARBAMOL 500 MG/1
1500 TABLET, FILM COATED ORAL ONCE
Refills: 0 | Status: COMPLETED | OUTPATIENT
Start: 2023-12-31 | End: 2023-12-31

## 2023-12-31 RX ORDER — LIDOCAINE 4 G/100G
1 CREAM TOPICAL ONCE
Refills: 0 | Status: COMPLETED | OUTPATIENT
Start: 2023-12-31 | End: 2023-12-31

## 2023-12-31 RX ORDER — TIZANIDINE 4 MG/1
1 TABLET ORAL
Qty: 12 | Refills: 0
Start: 2023-12-31 | End: 2024-01-03

## 2023-12-31 RX ADMIN — MORPHINE SULFATE 2 MILLIGRAM(S): 50 CAPSULE, EXTENDED RELEASE ORAL at 11:24

## 2023-12-31 RX ADMIN — LIDOCAINE 1 PATCH: 4 CREAM TOPICAL at 10:26

## 2023-12-31 RX ADMIN — METHOCARBAMOL 1500 MILLIGRAM(S): 500 TABLET, FILM COATED ORAL at 11:23

## 2023-12-31 RX ADMIN — Medication 15 MILLIGRAM(S): at 11:23

## 2023-12-31 NOTE — ED ADULT TRIAGE NOTE - CHIEF COMPLAINT QUOTE
pt here for back pain and inability to ambulate at this time. pt also noted with increase in urination

## 2023-12-31 NOTE — ED ADULT NURSE NOTE - NSFALLHARMRISKINTERV_ED_ALL_ED
Assistance OOB with selected safe patient handling equipment if applicable/Communicate risk of Fall with Harm to all staff, patient, and family/Provide visual cue: red socks, yellow wristband, yellow gown, etc/Reinforce activity limits and safety measures with patient and family/Bed in lowest position, wheels locked, appropriate side rails in place/Call bell, personal items and telephone in reach/Instruct patient to call for assistance before getting out of bed/chair/stretcher/Non-slip footwear applied when patient is off stretcher/[place] to call system/Physically safe environment - no spills, clutter or unnecessary equipment/Purposeful Proactive Rounding/Room/bathroom lighting operational, light cord in reach Assistance OOB with selected safe patient handling equipment if applicable/Communicate risk of Fall with Harm to all staff, patient, and family/Provide visual cue: red socks, yellow wristband, yellow gown, etc/Reinforce activity limits and safety measures with patient and family/Bed in lowest position, wheels locked, appropriate side rails in place/Call bell, personal items and telephone in reach/Instruct patient to call for assistance before getting out of bed/chair/stretcher/Non-slip footwear applied when patient is off stretcher/[place] to call system/Physically safe environment - no spills, clutter or unnecessary equipment/Purposeful Proactive Rounding/Room/bathroom lighting operational, light cord in reach

## 2023-12-31 NOTE — ED PROVIDER NOTE - CLINICAL SUMMARY MEDICAL DECISION MAKING FREE TEXT BOX
54-year-old female past medical history of diabetes hyperlipidemia thyroid storm presents with lower back pain for 2 days.  MSK patient ambulated with help discussed process, home will discharge physical therapy

## 2023-12-31 NOTE — ED PROVIDER NOTE - OBJECTIVE STATEMENT
Pt is a 53 y.o Female with PMHx HTN, DM, HLD, hypothyroidism, recent admission for Thyroid Storm discharged 2 days ago who presents to the ED with chief complaint of b/l lower back pain x 2days. Pt states that her symptoms started as soon as she left the hospital. Denies any trauma, fever, chills, numbness, tingling or focal weakness. Denies any saddle anesthesia, urinary or bowel incontinence, dysuria, chronic steroid use, or IVDA. Pt states her pain is constant non radiating and sharp in sensation and has been keeping her up during the night and is worse while standing upright and ambulating.

## 2023-12-31 NOTE — ED PROVIDER NOTE - NSFOLLOWUPINSTRUCTIONS_ED_ALL_ED_FT
Our Emergency Department Referral Coordinators will be reaching out to you in the next 24-48 hours from 9:00am to 5:00pm with a follow up appointment. Please expect a phone call from the hospital in that time frame. If you do not receive a call or if you have any questions or concerns, you can reach them at   [phone]  Musculoskeletal Pain    WHAT YOU NEED TO KNOW:    Musculoskeletal pain can occur in muscles, bones, ligaments, tendons, or nerves. The pain can be dull, achy, or sharp. You may have pain and tenderness to the touch as well. The pain can occur anywhere in your body. Musculoskeletal pain can be from an injury, or a medical condition such as polymyositis.    DISCHARGE INSTRUCTIONS:    Return to the emergency department if:    You have severe pain when you move the area.    You lose feeling in the area.    You have new or worse pain or swelling in the area. Your skin may feel tight.  Call your doctor or pain specialist if:    You have a fever.    You have pain that does not get better with treatment.    You have trouble sleeping because of your pain.    Your painful area becomes more tender, red, and warm to the touch.    You have less movement of the painful area.    You have questions or concerns about your condition or care.  Self-care:    Rest as directed. Avoid activity that causes pain. You may be able to return to normal activity when you can move without pain. Follow directions for rest and activity. You are at risk for injury for 3 weeks after your symptoms go away.    Ice the painful area to decrease pain and swelling. Use an ice pack, or put ice in a plastic bag and cover it with a towel. Always put a cloth between the ice and your skin. Apply the ice as often as directed for the first 24 to 48 hours.    Apply compression to the area, if directed. Your healthcare provider may want you to use a splint, brace, or elastic bandage. Compression helps decrease pain and swelling in an arm or leg. A splint, brace, or bandage will also help protect the painful area when you move around.  How to Wrap an Elastic Bandage      Elevate a painful arm or leg to reduce swelling and pain. Elevate your limb while you are sitting or lying. Prop a painful leg on pillows to keep it above the level of your heart.    Elevate Leg  Medicines: You may need any of the following:    NSAIDs help decrease swelling and pain or fever. This medicine is available with or without a doctor's order. NSAIDs can cause stomach bleeding or kidney problems in certain people. If you take blood thinner medicine, always ask your healthcare provider if NSAIDs are safe for you. Always read the medicine label and follow directions.    Acetaminophen decreases pain and fever. It is available without a doctor's order. Ask how much to take and how often to take it. Follow directions. Read the labels of all other medicines you are using to see if they also contain acetaminophen, or ask your doctor or pharmacist. Acetaminophen can cause liver damage if not taken correctly.    Muscle relaxers help relax your muscles to decrease pain and muscle spasms.    Steroids may be given to decrease redness, pain, and swelling.    Take your medicine as directed. Contact your healthcare provider if you think your medicine is not helping or if you have side effects. Tell your provider if you are allergic to any medicine. Keep a list of the medicines, vitamins, and herbs you take. Include the amounts, and when and why you take them. Bring the list or the pill bottles to follow-up visits. Carry your medicine list with you in case of an emergency.  Follow up with your doctor or pain specialist as directed: You may need more tests to help healthcare providers find the cause of your muscle pain. You may need physical therapy to learn muscle strengthening exercises. Write down your questions so you remember to ask them during your visits. Our Emergency Department Referral Coordinators will be reaching out to you in the next 24-48 hours from 9:00am to 5:00pm with a follow up appointment. Please expect a phone call from the hospital in that time frame. If you do not receive a call or if you have any questions or concerns, you can reach them at   [phone]  Musculoskeletal Pain    WHAT YOU NEED TO KNOW:    Musculoskeletal pain can occur in muscles, bones, ligaments, tendons, or nerves. The pain can be dull, achy, or sharp. You may have pain and tenderness to the touch as well. The pain can occur anywhere in your body. Musculoskeletal pain can be from an injury, or a medical condition such as polymyositis.    DISCHARGE INSTRUCTIONS:    Return to the emergency department if:    You have severe pain when you move the area.    You lose feeling in the area.    You have new or worse pain or swelling in the area. Your skin may feel tight.  Call your doctor or pain specialist if:    You have a fever.    You have pain that does not get better with treatment.    You have trouble sleeping because of your pain.    Your painful area becomes more tender, red, and warm to the touch.    You have less movement of the painful area.    You have questions or concerns about your condition or care.  Self-care:    Rest as directed. Avoid activity that causes pain. You may be able to return to normal activity when you can move without pain. Follow directions for rest and activity. You are at risk for injury for 3 weeks after your symptoms go away.    Ice the painful area to decrease pain and swelling. Use an ice pack, or put ice in a plastic bag and cover it with a towel. Always put a cloth between the ice and your skin. Apply the ice as often as directed for the first 24 to 48 hours.    Apply compression to the area, if directed. Your healthcare provider may want you to use a splint, brace, or elastic bandage. Compression helps decrease pain and swelling in an arm or leg. A splint, brace, or bandage will also help protect the painful area when you move around.  How to Wrap an Elastic Bandage      Elevate a painful arm or leg to reduce swelling and pain. Elevate your limb while you are sitting or lying. Prop a painful leg on pillows to keep it above the level of your heart.    Elevate Leg  Medicines: You may need any of the following:    NSAIDs help decrease swelling and pain or fever. This medicine is available with or without a doctor's order. NSAIDs can cause stomach bleeding or kidney problems in certain people. If you take blood thinner medicine, always ask your healthcare provider if NSAIDs are safe for you. Always read the medicine label and follow directions.    Acetaminophen decreases pain and fever. It is available without a doctor's order. Ask how much to take and how often to take it. Follow directions. Read the labels of all other medicines you are using to see if they also contain acetaminophen, or ask your doctor or pharmacist. Acetaminophen can cause liver damage if not taken correctly.    Muscle relaxers help relax your muscles to decrease pain and muscle spasms.    Steroids may be given to decrease redness, pain, and swelling.    Take your medicine as directed. Contact your healthcare provider if you think your medicine is not helping or if you have side effects. Tell your provider if you are allergic to any medicine. Keep a list of the medicines, vitamins, and herbs you take. Include the amounts, and when and why you take them. Bring the list or the pill bottles to follow-up visits. Carry your medicine list with you in case of an emergency.  Follow up with your doctor or pain specialist as directed: You may need more tests to help healthcare providers find the cause of your muscle pain. You may need physical therapy to learn muscle strengthening exercises. Write down your questions so you remember to ask them during your visits.

## 2023-12-31 NOTE — ED ADULT TRIAGE NOTE - RESPIRATORY RATE (BREATHS/MIN)
Jakob Kwon 3/9/2023 11:07 AM EST      ----- Message -----  From: Izabella Toussaint  Sent: 3/9/2023 10:49 AM EST  To: [facility] Nurse Pool  Subject: medication     Yes I have . But its was a long time ago .
18

## 2023-12-31 NOTE — ED PROVIDER NOTE - PHYSICAL EXAMINATION
CONSTITUTIONAL: NAD  SKIN: Warm dry  HEAD: NCAT  EYES: NL inspection  ENT: MMM  NECK: Supple; non tender.  CARD: RRR  RESP: CTAB  ABD: S/NT no R/G  BACK: no midline ttp, no step offs + paraspinal lumbar ttp and hypertonicity.  no cva ttp  EXT:  FROM x4, strength 5/5, distal pulses intact.   NEURO: Grossly unremarkable, CN II-XII intact, nml gait  PSYCH: Cooperative, appropriate.

## 2023-12-31 NOTE — ED PROVIDER NOTE - PROGRESS NOTE DETAILS
SH  Pt reassessed  states some improvement in pain  pt ambulating  plan to d/c take medications as prescribed rapid referral to physical therapy  strict ed return precautions given  will d/c with a walker to go home with

## 2023-12-31 NOTE — ED PROVIDER NOTE - PATIENT PORTAL LINK FT
You can access the FollowMyHealth Patient Portal offered by [facility] by registering at the following website: [email or web address]. By joining [employer]’s FollowMyHealth portal, you will also be able to view your health information using other applications (apps) compatible with our system. You can access the FollowMyHealth Patient Portal offered by [facility] by registering at the following website: [email or web address]. By joining [employer]’s FollowMyHealth portal, you will also be able to view your health information using other applications (apps) compatible with our system.

## 2023-12-31 NOTE — ED PROVIDER NOTE - NSFOLLOWUPCLINICS_GEN_ALL_ED_FT
[facility]  Physical Therapy  Multiple Location  [place]   Phone: [phone]  Fax:   Follow Up Time: 1-3 Days     [facility]  Physical Therapy  Multiple Location  [place]   Phone: [phone]  Fax:   Follow Up Time: 1-3 Days

## 2024-01-01 RX ORDER — PEN NEEDLE, DIABETIC 29 G X1/2"
31G X 8 MM NEEDLE, DISPOSABLE MISCELLANEOUS
Qty: 100 | Refills: 2 | Status: DISCONTINUED | COMMUNITY
Start: 2020-09-03 | End: 2024-01-01

## 2024-01-01 RX ORDER — INSULIN DEGLUDEC INJECTION 200 U/ML
200 INJECTION, SOLUTION SUBCUTANEOUS DAILY
Qty: 1 | Refills: 3 | Status: DISCONTINUED | COMMUNITY
Start: 2020-09-03 | End: 2024-01-01

## 2024-01-02 ENCOUNTER — NON-APPOINTMENT (OUTPATIENT)
Age: 54
End: 2024-01-02

## 2024-01-03 DIAGNOSIS — E05.91 THYROTOXICOSIS, UNSPECIFIED WITH THYROTOXIC CRISIS OR STORM: ICD-10-CM

## 2024-01-03 DIAGNOSIS — R56.9 UNSPECIFIED CONVULSIONS: ICD-10-CM

## 2024-01-03 DIAGNOSIS — E11.9 TYPE 2 DIABETES MELLITUS WITHOUT COMPLICATIONS: ICD-10-CM

## 2024-01-03 DIAGNOSIS — E78.5 HYPERLIPIDEMIA, UNSPECIFIED: ICD-10-CM

## 2024-01-03 DIAGNOSIS — E66.8 OTHER OBESITY: ICD-10-CM

## 2024-01-03 DIAGNOSIS — G93.41 METABOLIC ENCEPHALOPATHY: ICD-10-CM

## 2024-01-03 DIAGNOSIS — Z85.819 PERSONAL HISTORY OF MALIGNANT NEOPLASM OF UNSPECIFIED SITE OF LIP, ORAL CAVITY, AND PHARYNX: ICD-10-CM

## 2024-01-03 DIAGNOSIS — A41.9 SEPSIS, UNSPECIFIED ORGANISM: ICD-10-CM

## 2024-01-03 DIAGNOSIS — Z88.0 ALLERGY STATUS TO PENICILLIN: ICD-10-CM

## 2024-01-03 DIAGNOSIS — Z91.148 PATIENT'S OTHER NONCOMPLIANCE WITH MEDICATION REGIMEN FOR OTHER REASON: ICD-10-CM

## 2024-01-03 DIAGNOSIS — I10 ESSENTIAL (PRIMARY) HYPERTENSION: ICD-10-CM

## 2024-01-09 ENCOUNTER — APPOINTMENT (OUTPATIENT)
Dept: INTERNAL MEDICINE | Facility: CLINIC | Age: 54
End: 2024-01-09
Payer: MEDICAID

## 2024-01-09 ENCOUNTER — OUTPATIENT (OUTPATIENT)
Dept: OUTPATIENT SERVICES | Facility: HOSPITAL | Age: 54
LOS: 1 days | End: 2024-01-09
Payer: MEDICAID

## 2024-01-09 VITALS
WEIGHT: 222 LBS | SYSTOLIC BLOOD PRESSURE: 139 MMHG | HEIGHT: 65 IN | BODY MASS INDEX: 36.99 KG/M2 | OXYGEN SATURATION: 100 % | HEART RATE: 98 BPM | DIASTOLIC BLOOD PRESSURE: 84 MMHG

## 2024-01-09 DIAGNOSIS — K76.9 LIVER DISEASE, UNSPECIFIED: ICD-10-CM

## 2024-01-09 DIAGNOSIS — E11.43 TYPE 2 DIABETES MELLITUS WITH DIABETIC AUTONOMIC (POLY)NEUROPATHY: ICD-10-CM

## 2024-01-09 DIAGNOSIS — E78.1 PURE HYPERGLYCERIDEMIA: ICD-10-CM

## 2024-01-09 DIAGNOSIS — E78.00 PURE HYPERCHOLESTEROLEMIA, UNSPECIFIED: ICD-10-CM

## 2024-01-09 DIAGNOSIS — Z00.00 ENCOUNTER FOR GENERAL ADULT MEDICAL EXAMINATION WITHOUT ABNORMAL FINDINGS: ICD-10-CM

## 2024-01-09 DIAGNOSIS — Z00.00 ENCOUNTER FOR GENERAL ADULT MEDICAL EXAMINATION W/OUT ABNORMAL FINDINGS: ICD-10-CM

## 2024-01-09 DIAGNOSIS — I10 ESSENTIAL (PRIMARY) HYPERTENSION: ICD-10-CM

## 2024-01-09 DIAGNOSIS — G25.0 ESSENTIAL TREMOR: ICD-10-CM

## 2024-01-09 PROCEDURE — 99214 OFFICE O/P EST MOD 30 MIN: CPT

## 2024-01-09 RX ORDER — EZETIMIBE 10 MG/1
10 TABLET ORAL
Qty: 30 | Refills: 3 | Status: ACTIVE | COMMUNITY
Start: 2020-12-10 | End: 1900-01-01

## 2024-01-09 RX ORDER — ATORVASTATIN CALCIUM 40 MG/1
40 TABLET, FILM COATED ORAL DAILY
Qty: 1 | Refills: 6 | Status: ACTIVE | COMMUNITY
Start: 2020-09-03 | End: 1900-01-01

## 2024-01-09 RX ORDER — METHOCARBAMOL 500 MG/1
500 TABLET, FILM COATED ORAL
Qty: 60 | Refills: 0 | Status: ACTIVE | COMMUNITY
Start: 2024-01-09 | End: 1900-01-01

## 2024-01-09 RX ORDER — ALBUTEROL SULFATE 90 UG/1
108 (90 BASE) INHALANT RESPIRATORY (INHALATION)
Qty: 1 | Refills: 2 | Status: COMPLETED | COMMUNITY
Start: 2020-02-04 | End: 2024-01-09

## 2024-01-09 RX ORDER — GABAPENTIN 400 MG/1
400 CAPSULE ORAL 3 TIMES DAILY
Qty: 90 | Refills: 6 | Status: ACTIVE | COMMUNITY
Start: 2020-12-18 | End: 1900-01-01

## 2024-01-11 DIAGNOSIS — I10 ESSENTIAL (PRIMARY) HYPERTENSION: ICD-10-CM

## 2024-01-11 DIAGNOSIS — E11.9 TYPE 2 DIABETES MELLITUS WITHOUT COMPLICATIONS: ICD-10-CM

## 2024-01-11 DIAGNOSIS — E78.1 PURE HYPERGLYCERIDEMIA: ICD-10-CM

## 2024-01-11 DIAGNOSIS — K76.9 LIVER DISEASE, UNSPECIFIED: ICD-10-CM

## 2024-01-11 DIAGNOSIS — Z00.00 ENCOUNTER FOR GENERAL ADULT MEDICAL EXAMINATION WITHOUT ABNORMAL FINDINGS: ICD-10-CM

## 2024-01-11 DIAGNOSIS — G25.0 ESSENTIAL TREMOR: ICD-10-CM

## 2024-01-11 DIAGNOSIS — E05.90 THYROTOXICOSIS, UNSPECIFIED WITHOUT THYROTOXIC CRISIS OR STORM: ICD-10-CM

## 2024-01-11 DIAGNOSIS — E78.00 PURE HYPERCHOLESTEROLEMIA, UNSPECIFIED: ICD-10-CM

## 2024-01-11 DIAGNOSIS — E11.43 TYPE 2 DIABETES MELLITUS WITH DIABETIC AUTONOMIC (POLY)NEUROPATHY: ICD-10-CM

## 2024-01-11 NOTE — CHART NOTE - NSCHARTNOTEFT_GEN_A_CORE
no script/ Pt would like appt 1/2/24- AC/ waiting on script 1/3/24- / sent to Josephine 1/3/24- / No further Pt contact 1/11/24- AC

## 2024-01-17 ENCOUNTER — OUTPATIENT (OUTPATIENT)
Dept: OUTPATIENT SERVICES | Facility: HOSPITAL | Age: 54
LOS: 1 days | End: 2024-01-17
Payer: MEDICAID

## 2024-01-17 ENCOUNTER — APPOINTMENT (OUTPATIENT)
Dept: ENDOCRINOLOGY | Facility: CLINIC | Age: 54
End: 2024-01-17

## 2024-01-17 VITALS
WEIGHT: 208 LBS | DIASTOLIC BLOOD PRESSURE: 79 MMHG | BODY MASS INDEX: 34.61 KG/M2 | SYSTOLIC BLOOD PRESSURE: 138 MMHG | HEART RATE: 84 BPM

## 2024-01-17 DIAGNOSIS — Z00.00 ENCOUNTER FOR GENERAL ADULT MEDICAL EXAMINATION WITHOUT ABNORMAL FINDINGS: ICD-10-CM

## 2024-01-17 DIAGNOSIS — E11.9 TYPE 2 DIABETES MELLITUS W/OUT COMPLICATIONS: ICD-10-CM

## 2024-01-17 DIAGNOSIS — E05.90 THYROTOXICOSIS, UNSPECIFIED W/OUT THYROTOXIC CRISIS OR STORM: ICD-10-CM

## 2024-01-17 PROCEDURE — 99214 OFFICE O/P EST MOD 30 MIN: CPT

## 2024-01-17 RX ORDER — SEMAGLUTIDE 1.34 MG/ML
4 INJECTION, SOLUTION SUBCUTANEOUS
Qty: 1 | Refills: 0 | Status: ACTIVE | COMMUNITY
Start: 2020-09-03 | End: 1900-01-01

## 2024-01-17 NOTE — PHYSICAL EXAM
[Alert] : alert [No Acute Distress] : no acute distress [Well Developed] : well developed [Normal Sclera/Conjunctiva] : normal sclera/conjunctiva [EOMI] : extra ocular movement intact [No Proptosis] : no proptosis [Normal Outer Ear/Nose] : the ears and nose were normal in appearance [No Neck Mass] : no neck mass was observed [No Respiratory Distress] : no respiratory distress [No Accessory Muscle Use] : no accessory muscle use [Clear to Auscultation] : lungs were clear to auscultation bilaterally [Normal S1, S2] : normal S1 and S2 [Regular Rhythm] : with a regular rhythm [Not Tender] : non-tender [Not Distended] : not distended [Soft] : abdomen soft [Normal Supraclavicular Nodes] : no supraclavicular lymphadenopathy [Normal Anterior Cervical Nodes] : no anterior cervical lymphadenopathy [Normal Posterior Cervical Nodes] : no posterior cervical lymphadenopathy [Normal Affect] : the affect was normal [Normal Mood] : the mood was normal

## 2024-01-17 NOTE — ASSESSMENT
[FreeTextEntry1] : #Diabetes Mellitus, Type 2 # Diabetic neuropathy - c/w Ozepmic 0.5 -> increase to 1mg, Lantus 40 and Pioglitazone 30 - c/w gabapentin 400 TID - c/w diabetes education - sister/ injects insulin, not sure if pt getting consistently, pt not interested in self-administering  - get 's phone number (pt does not know) -f/u in 3 months w/ repeat A1c   # Hyperthyroidism # Recent admission for thyroid storm. - Follow up with Dr Badillo - c/w Methimazole 20 mg PO OD - check TSH  INCREDIBLE NON COMPLIANCE, AND SELF NEGLECT, UNCLEAR WHETHER SHE TAKES HER MEDS, OR WHAT MEDS SHE TAKES, DEPENDING ON OTHER PEOPLE FOR INJECTIONS, DOES NOT KNOW WHAT DOSES OF MEDS SHE TAKES.

## 2024-01-17 NOTE — HISTORY OF PRESENT ILLNESS
[FreeTextEntry1] : 53-year-old woman with a Past Medical History of HTN, morbid obesity, hypertriglyceridemia, Hyperthyroidism, DM 2, neuropathy of the b/l lower extremities, asthma. She was recently hospitalized on 12/26/2023 for Thyroid Storm.  She is here for post-hospital follow up. FG remain uncontrolled 300s. States her sister and  administers medications. States she is compliant with her medications. Admits to constant polyuria and polydipsia. Reports blurry vision. ROS otherwise nnegative.

## 2024-01-23 DIAGNOSIS — E11.65 TYPE 2 DIABETES MELLITUS WITH HYPERGLYCEMIA: ICD-10-CM

## 2024-01-23 DIAGNOSIS — I10 ESSENTIAL (PRIMARY) HYPERTENSION: ICD-10-CM

## 2024-01-23 DIAGNOSIS — E66.01 MORBID (SEVERE) OBESITY DUE TO EXCESS CALORIES: ICD-10-CM

## 2024-02-05 ENCOUNTER — TRANSCRIPTION ENCOUNTER (OUTPATIENT)
Age: 54
End: 2024-02-05

## 2024-04-15 ENCOUNTER — APPOINTMENT (OUTPATIENT)
Dept: INTERNAL MEDICINE | Facility: CLINIC | Age: 54
End: 2024-04-15

## 2024-04-17 ENCOUNTER — APPOINTMENT (OUTPATIENT)
Dept: ENDOCRINOLOGY | Facility: CLINIC | Age: 54
End: 2024-04-17

## 2024-06-14 NOTE — BEHAVIORAL HEALTH ASSESSMENT NOTE - FAMILY HISTORY OF SUBSTANCE ABUSE
Addendum  created 06/14/24 0744 by Joseph Espino MD    Attestation recorded in Intraprocedure (Perfusion), Intraprocedure Attestations filed (Perfusion)      
None known

## 2024-07-08 ENCOUNTER — EMERGENCY (EMERGENCY)
Facility: HOSPITAL | Age: 54
LOS: 0 days | Discharge: HOME | End: 2024-07-08
Attending: STUDENT IN AN ORGANIZED HEALTH CARE EDUCATION/TRAINING PROGRAM
Payer: MEDICAID

## 2024-07-08 VITALS
WEIGHT: 229.94 LBS | TEMPERATURE: 98 F | RESPIRATION RATE: 18 BRPM | SYSTOLIC BLOOD PRESSURE: 143 MMHG | OXYGEN SATURATION: 100 % | HEART RATE: 99 BPM | HEIGHT: 64 IN | DIASTOLIC BLOOD PRESSURE: 86 MMHG

## 2024-07-08 DIAGNOSIS — E11.9 TYPE 2 DIABETES MELLITUS WITHOUT COMPLICATIONS: ICD-10-CM

## 2024-07-08 DIAGNOSIS — E03.9 HYPOTHYROIDISM, UNSPECIFIED: ICD-10-CM

## 2024-07-08 DIAGNOSIS — I10 ESSENTIAL (PRIMARY) HYPERTENSION: ICD-10-CM
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
## 2024-07-08 DIAGNOSIS — S05.01XA INJURY OF CONJUNCTIVA AND CORNEAL ABRASION WITHOUT FOREIGN BODY, RIGHT EYE, INITIAL ENCOUNTER: ICD-10-CM
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
## 2024-07-08 DIAGNOSIS — H57.8A1 FOREIGN BODY SENSATION, RIGHT EYE: ICD-10-CM

## 2024-07-08 DIAGNOSIS — Z88.0 ALLERGY STATUS TO PENICILLIN: ICD-10-CM

## 2024-07-08 PROCEDURE — 99283 EMERGENCY DEPT VISIT LOW MDM: CPT

## 2024-07-08 PROCEDURE — 99284 EMERGENCY DEPT VISIT MOD MDM: CPT | Mod: 25

## 2024-07-08 RX ORDER — TETRACAINE HCL 0.5 %
1 DROPS OPHTHALMIC (EYE) ONCE
Refills: 0 | Status: COMPLETED | OUTPATIENT
Start: 2024-07-08 | End: 2024-07-08

## 2024-07-08 RX ORDER — FLUORESCEIN SODIUM 100 %
1 POWDER (GRAM) MISCELLANEOUS ONCE
Refills: 0 | Status: COMPLETED | OUTPATIENT
Start: 2024-07-08 | End: 2024-07-08

## 2024-07-08 RX ORDER — OFLOXACIN 3 MG/ML
1 SOLUTION/ DROPS OPHTHALMIC EVERY 4 HOURS
Refills: 0 | Status: DISCONTINUED | OUTPATIENT
Start: 2024-07-08 | End: 2024-07-08

## 2024-07-08 RX ADMIN — Medication 1 DROP(S): at 08:41

## 2024-07-08 RX ADMIN — OFLOXACIN 1 DROP(S): 3 SOLUTION/ DROPS OPHTHALMIC at 09:33

## 2024-07-08 RX ADMIN — Medication 1 APPLICATION(S): at 08:41

## 2024-07-16 ENCOUNTER — APPOINTMENT (OUTPATIENT)
Dept: OPHTHALMOLOGY | Facility: CLINIC | Age: 54
End: 2024-07-16

## 2024-08-08 NOTE — PHYSICAL THERAPY INITIAL EVALUATION ADULT - PRECAUTIONS/LIMITATIONS, REHAB EVAL
Flores here for Denosumab (Prolia) 60 mg subcutaneous  injection today.     Patient denies any concerns with previous injections.  See MAR for injection details.   Patient tolerated procedure well.   Patient discharged in stable, ambulatory condition..    
fall precautions

## 2024-08-15 ENCOUNTER — LABORATORY RESULT (OUTPATIENT)
Age: 54
End: 2024-08-15

## 2024-08-17 LAB
25(OH)D3 SERPL-MCNC: 22 NG/ML
ALBUMIN SERPL ELPH-MCNC: 4.3 G/DL
ALP BLD-CCNC: 147 U/L
ALT SERPL-CCNC: 11 U/L
ANION GAP SERPL CALC-SCNC: 12 MMOL/L
AST SERPL-CCNC: 9 U/L
BASOPHILS # BLD AUTO: 0.02 K/UL
BASOPHILS NFR BLD AUTO: 0.3 %
BILIRUB SERPL-MCNC: 0.6 MG/DL
BUN SERPL-MCNC: 12 MG/DL
CALCIUM SERPL-MCNC: 9.3 MG/DL
CHLORIDE SERPL-SCNC: 103 MMOL/L
CHOLEST SERPL-MCNC: 155 MG/DL
CO2 SERPL-SCNC: 23 MMOL/L
CREAT SERPL-MCNC: 0.7 MG/DL
EGFR: 103 ML/MIN/1.73M2
EOSINOPHIL # BLD AUTO: 0.46 K/UL
EOSINOPHIL NFR BLD AUTO: 6.3 %
ESTIMATED AVERAGE GLUCOSE: 252 MG/DL
GLUCOSE SERPL-MCNC: 315 MG/DL
HBA1C MFR BLD HPLC: 10.4 %
HCT VFR BLD CALC: 41.1 %
HDLC SERPL-MCNC: 40 MG/DL
HGB BLD-MCNC: 13.3 G/DL
IMM GRANULOCYTES NFR BLD AUTO: 0 %
LDLC SERPL CALC-MCNC: 87 MG/DL
LYMPHOCYTES # BLD AUTO: 3.26 K/UL
LYMPHOCYTES NFR BLD AUTO: 44.5 %
MAN DIFF?: NORMAL
MCHC RBC-ENTMCNC: 25.7 PG
MCHC RBC-ENTMCNC: 32.4 G/DL
MCV RBC AUTO: 79.5 FL
MONOCYTES # BLD AUTO: 0.9 K/UL
MONOCYTES NFR BLD AUTO: 12.3 %
NEUTROPHILS # BLD AUTO: 2.69 K/UL
NEUTROPHILS NFR BLD AUTO: 36.6 %
NONHDLC SERPL-MCNC: 115 MG/DL
PLATELET # BLD AUTO: 253 K/UL
PMV BLD AUTO: 0 /100 WBCS
POTASSIUM SERPL-SCNC: 4.3 MMOL/L
PROT SERPL-MCNC: 7.4 G/DL
RBC # BLD: 5.17 M/UL
RBC # FLD: 12.7 %
SODIUM SERPL-SCNC: 138 MMOL/L
TRIGL SERPL-MCNC: 141 MG/DL
TSH SERPL-ACNC: <0 UIU/ML
WBC # FLD AUTO: 7.33 K/UL

## 2024-09-09 ENCOUNTER — APPOINTMENT (OUTPATIENT)
Dept: PODIATRY | Facility: CLINIC | Age: 54
End: 2024-09-09

## 2024-10-07 ENCOUNTER — APPOINTMENT (OUTPATIENT)
Dept: INTERNAL MEDICINE | Facility: CLINIC | Age: 54
End: 2024-10-07

## 2024-11-11 ENCOUNTER — APPOINTMENT (OUTPATIENT)
Dept: PODIATRY | Facility: CLINIC | Age: 54
End: 2024-11-11

## 2024-12-11 ENCOUNTER — APPOINTMENT (OUTPATIENT)
Dept: INTERNAL MEDICINE | Facility: CLINIC | Age: 54
End: 2024-12-11

## 2024-12-18 NOTE — PHYSICAL THERAPY INITIAL EVALUATION ADULT - LEVEL OF INDEPENDENCE: SIT/STAND, REHAB EVAL
12/18/24                            Shira Wagnerbenjamin  [address] [address]    To Whom It May Concern:    This is to certify Shira Wagnerbenjamin was evaluated with SONNY Issa on 12/18/24 and please excuse from work.         SONNY Issa  [facility]  [address] [address] [facility] [address]  Dept Phone: [phone]        minimum assist (75% patients effort)

## 2024-12-19 NOTE — PROGRESS NOTE ADULT - ASSESSMENT
"Martín Szymanski is a 14 y.o. female who is here for post-op follow-up of 10/29 excision of left upper arm lesion. Martín is recovering well. No c/o pain, no longer taking any pain meds. Denies incision redness, pain, or drainage. She is moving her left arm normally, no pain. Pathology showed venous hemangioma/malformation    Objective     /72   Pulse 73   Ht 1.62 m (5' 3.78\")   Wt 45.4 kg   BMI 17.28 kg/m²     Physical Exam  CNS: Alert  CV: Well perfused, brisk cap refill  R: Respirations even and unlabored  MSK: MONIQUE x4, left upper arm incision healing well, incision slightly widened, no hypertrophy     Pathology:  Surgical Pathology Exam: [id number]  Order: [id number]   Collected 10/29/2024 08:10       Status: Final result       Visible to patient: Yes (seen)       Dx: Dermoid cyst of left upper extremity    0 Result Notes      Component    FINAL DIAGNOSIS   Skin and Soft Tissue, Left Arm, Excision:    Veinous hemangioma/malformation.         Electronically signed by Netta Lr MD on 11/7/2024 at 0912        By the signature on this report, the individual or group listed as making the Final Interpretation/Diagnosis certifies that they have reviewed this case.    Comment    The subcutaenous tissue harbors haphazardly arranged cavernous, thin walled vessels without atypia.  The endothelial cells are CD31+ D2-40-, compatible with veinous structures.   Clinical History    Pre-op diagnosis:  Dermoid cyst of left upper extremity [D36.7]   Gross Description    A: Received in formalin, labeled with the patient's name and hospital number and \"left arm\", is unoriented segment of skin, measuring 1.7 x 0.8 x 0.2 cm.  The skin surface is unremarkable.  The deep and lateral margins are inked blue.  Additionally received is an intact cyst, measuring 2.0 x 1.5 x 1.3 cm.  The specimen is inked blue and sectioned, no pasty material is grossly identified.  The specimen is entirely submitted in 3 cassettes.  MRS"   ASSESSMENT  51F with a pmhx of HTN and DM2 presenting with AMS.    IMPRESSION  #AMS  - MR Head No Cont (11.12.21 @ 08:56): No acute intracranial abnormality. Stable exam since 6/10/2021.  Stable mild chronic microvascular changes. Prominence of the posterior nasopharyngeal soft tissues, recommend correlation with direct visualization.  - Syphillis negative  - HIV negative    #Strep Mitis and Rothia Bacteremia  - Blood Cx 11/11 + in 1 of 2 sets  - Procalcitonin, Serum: 0.05 (11.13.21 @ 19:18)    #DM II  #Obesity BMI (kg/m2): 40.1  #Abx allergy: penicillin (Rash)    RECOMMENDATIONS  - two bacteria growing on Blood Cx 11/11 - suspect contaminant  - agree with monitoring off antibiotics  - check ESR/CRP  - check CK   - Check TSH   - would expect at least fevers/WBC if bacterial meningitis, but currently on antibiotics since admission  - can continue ceftriaxone and ACV for now while awaiting for LP   - if having further fevers -- add vancomycin and change ceftriaxone to meropenem 2g q 8 hours for bacterial meningitis coverage (including Listeria)    Please call or message on [employer] Teams if with any questions.  Spectra [phone]       Summary of Cassettes:  SpecimenLabelSite  A                        1           skin                            2-3        apparent cyst          Assessment/Plan   Impression:  Martín Szymanski is a 14 y.o. female here for follow-up of 10/29 left upper arm lesion. Pathology reviewed with family and shows venous hemangioma/malformation. Discussed that her incision is healing well, slightly widened. Recommend continued monitoring and follow-up if appearance of incision changes or worsens. Recommend applying sunscreen with sun exposure. Follow-up as needed.       Recommendations:  Continue to monitor incision healing/widening  Follow-up as needed  Please call with any questions or concerns.     Seen and Discussed with Dr. Quinn     ASSESSMENT  51F with a pmhx of HTN and DM2 presenting with AMS.    IMPRESSION  #AMS  - MR Head No Cont (11.12.21 @ 08:56): No acute intracranial abnormality. Stable exam since 6/10/2021.  Stable mild chronic microvascular changes. Prominence of the posterior nasopharyngeal soft tissues, recommend correlation with direct visualization.  - Syphillis negative  - HIV negative    #Strep Mitis and Rothia Bacteremia  - Blood Cx 11/11 + in 1 of 2 sets  - Procalcitonin, Serum: 0.05 (11.13.21 @ 19:18)    #DM II  #Obesity BMI (kg/m2): 40.1  #Abx allergy: penicillin (Rash)    RECOMMENDATIONS  - two bacteria growing on Blood Cx 11/11 - suspect contaminant  - agree with monitoring off antibiotics  - check blood cx and recall if with fevers    Please call or message on [employer] Teams if with any questions.  Spectra [phone]

## 2024-12-23 ENCOUNTER — APPOINTMENT (OUTPATIENT)
Dept: INTERNAL MEDICINE | Facility: CLINIC | Age: 54
End: 2024-12-23
Payer: MEDICAID

## 2024-12-23 ENCOUNTER — OUTPATIENT (OUTPATIENT)
Dept: OUTPATIENT SERVICES | Facility: HOSPITAL | Age: 54
LOS: 1 days | End: 2024-12-23
Payer: MEDICAID

## 2024-12-23 VITALS
TEMPERATURE: 97 F | HEIGHT: 65 IN | BODY MASS INDEX: 38.32 KG/M2 | WEIGHT: 230 LBS | SYSTOLIC BLOOD PRESSURE: 141 MMHG | OXYGEN SATURATION: 99 % | HEART RATE: 98 BPM | DIASTOLIC BLOOD PRESSURE: 81 MMHG

## 2024-12-23 DIAGNOSIS — E05.90 THYROTOXICOSIS, UNSPECIFIED W/OUT THYROTOXIC CRISIS OR STORM: ICD-10-CM

## 2024-12-23 DIAGNOSIS — J45.20 MILD INTERMITTENT ASTHMA, UNCOMPLICATED: ICD-10-CM

## 2024-12-23 DIAGNOSIS — E78.1 PURE HYPERGLYCERIDEMIA: ICD-10-CM

## 2024-12-23 DIAGNOSIS — E55.9 VITAMIN D DEFICIENCY, UNSPECIFIED: ICD-10-CM

## 2024-12-23 DIAGNOSIS — Z00.00 ENCOUNTER FOR GENERAL ADULT MEDICAL EXAMINATION WITHOUT ABNORMAL FINDINGS: ICD-10-CM

## 2024-12-23 DIAGNOSIS — E11.9 TYPE 2 DIABETES MELLITUS W/OUT COMPLICATIONS: ICD-10-CM

## 2024-12-23 DIAGNOSIS — I10 ESSENTIAL (PRIMARY) HYPERTENSION: ICD-10-CM

## 2024-12-23 PROCEDURE — 99214 OFFICE O/P EST MOD 30 MIN: CPT

## 2024-12-23 PROCEDURE — G2211 COMPLEX E/M VISIT ADD ON: CPT | Mod: NC

## 2024-12-23 RX ORDER — TIOTROPIUM BROMIDE INHALATION SPRAY 3.12 UG/1
2.5 SPRAY, METERED RESPIRATORY (INHALATION) DAILY
Qty: 1 | Refills: 2 | Status: DISCONTINUED | COMMUNITY
Start: 2024-12-23 | End: 2024-12-23

## 2024-12-23 RX ORDER — MULTIVIT-MIN/FOLIC/VIT K/LYCOP 400-300MCG
50 MCG TABLET ORAL
Qty: 90 | Refills: 1 | Status: ACTIVE | COMMUNITY
Start: 2024-12-23 | End: 1900-01-01

## 2024-12-23 RX ORDER — BUDESONIDE AND FORMOTEROL FUMARATE DIHYDRATE 80; 4.5 UG/1; UG/1
80-4.5 AEROSOL RESPIRATORY (INHALATION) TWICE DAILY
Qty: 1 | Refills: 3 | Status: ACTIVE | COMMUNITY
Start: 2024-12-23 | End: 1900-01-01

## 2024-12-23 RX ORDER — ALBUTEROL SULFATE 2.5 MG/3ML
(2.5 MG/3ML) SOLUTION RESPIRATORY (INHALATION)
Qty: 1 | Refills: 2 | Status: ACTIVE | COMMUNITY
Start: 2024-12-23 | End: 1900-01-01

## 2024-12-26 DIAGNOSIS — E78.1 PURE HYPERGLYCERIDEMIA: ICD-10-CM

## 2024-12-26 DIAGNOSIS — E55.9 VITAMIN D DEFICIENCY, UNSPECIFIED: ICD-10-CM

## 2024-12-26 DIAGNOSIS — E11.9 TYPE 2 DIABETES MELLITUS WITHOUT COMPLICATIONS: ICD-10-CM

## 2024-12-26 DIAGNOSIS — E05.90 THYROTOXICOSIS, UNSPECIFIED WITHOUT THYROTOXIC CRISIS OR STORM: ICD-10-CM

## 2024-12-26 DIAGNOSIS — I10 ESSENTIAL (PRIMARY) HYPERTENSION: ICD-10-CM

## 2024-12-26 DIAGNOSIS — J45.20 MILD INTERMITTENT ASTHMA, UNCOMPLICATED: ICD-10-CM

## 2025-01-28 ENCOUNTER — APPOINTMENT (OUTPATIENT)
Dept: OPHTHALMOLOGY | Facility: CLINIC | Age: 55
End: 2025-01-28

## 2025-02-12 ENCOUNTER — NON-APPOINTMENT (OUTPATIENT)
Age: 55
End: 2025-02-12

## 2025-02-13 ENCOUNTER — APPOINTMENT (OUTPATIENT)
Dept: INTERNAL MEDICINE | Facility: CLINIC | Age: 55
End: 2025-02-13

## 2025-03-31 ENCOUNTER — APPOINTMENT (OUTPATIENT)
Dept: INTERNAL MEDICINE | Facility: CLINIC | Age: 55
End: 2025-03-31

## 2025-04-30 ENCOUNTER — APPOINTMENT (OUTPATIENT)
Dept: INTERNAL MEDICINE | Facility: CLINIC | Age: 55
End: 2025-04-30

## 2025-06-02 NOTE — ED ADULT NURSE NOTE - OBJECTIVE STATEMENT
HUB Relay    LVM to [facility]o 9/9 appt for a 1 year follow up. Provider moving to [place] location on Tues/ Thurs. Please reschedule.    pt presents for lethargic , tremors since this morning after returning home from work. pt is combative and shaking upon arrival. no hx of seizures. pt is responsive to stimuli. airway patent.

## 2025-06-09 NOTE — H&P ADULT - VTE RISK ASSESSMENT
----- Message from Shannon sent at 6/9/2025 11:26 AM CDT -----  Regarding: Nurse call  Contact: Patient  Pt needs: a nurse call back about her sodiumPhone #: [phone] ([phone]   VTE Assessment already completed for this visit

## 2025-06-16 ENCOUNTER — APPOINTMENT (OUTPATIENT)
Dept: INTERNAL MEDICINE | Facility: CLINIC | Age: 55
End: 2025-06-16

## 2025-07-18 ENCOUNTER — OUTPATIENT (OUTPATIENT)
Dept: OUTPATIENT SERVICES | Facility: HOSPITAL | Age: 55
LOS: 1 days | End: 2025-07-18
Payer: MEDICAID

## 2025-07-18 ENCOUNTER — APPOINTMENT (OUTPATIENT)
Dept: PULMONOLOGY | Facility: CLINIC | Age: 55
End: 2025-07-18
Payer: MEDICAID

## 2025-07-18 VITALS
OXYGEN SATURATION: 97 % | BODY MASS INDEX: 38.32 KG/M2 | SYSTOLIC BLOOD PRESSURE: 138 MMHG | HEIGHT: 65 IN | DIASTOLIC BLOOD PRESSURE: 78 MMHG | WEIGHT: 230 LBS | HEART RATE: 86 BPM | TEMPERATURE: 97.3 F

## 2025-07-18 DIAGNOSIS — Z00.00 ENCOUNTER FOR GENERAL ADULT MEDICAL EXAMINATION WITHOUT ABNORMAL FINDINGS: ICD-10-CM

## 2025-07-18 PROBLEM — G47.33 OBSTRUCTIVE SLEEP APNEA, ADULT: Status: ACTIVE | Noted: 2025-07-18

## 2025-07-18 PROBLEM — J45.30 MILD PERSISTENT ASTHMA: Status: ACTIVE | Noted: 2025-07-18

## 2025-07-18 PROCEDURE — 99214 OFFICE O/P EST MOD 30 MIN: CPT

## 2025-07-18 PROCEDURE — G2211 COMPLEX E/M VISIT ADD ON: CPT | Mod: NC

## 2025-07-18 PROCEDURE — 99204 OFFICE O/P NEW MOD 45 MIN: CPT

## 2025-07-23 ENCOUNTER — RESULT REVIEW (OUTPATIENT)
Age: 55
End: 2025-07-23

## 2025-07-23 ENCOUNTER — OUTPATIENT (OUTPATIENT)
Dept: OUTPATIENT SERVICES | Facility: HOSPITAL | Age: 55
LOS: 1 days | End: 2025-07-23
Payer: MEDICAID

## 2025-07-23 DIAGNOSIS — N63.20 UNSPECIFIED LUMP IN THE LEFT BREAST, UNSPECIFIED QUADRANT: ICD-10-CM

## 2025-07-23 PROCEDURE — 77066 DX MAMMO INCL CAD BI: CPT | Mod: 26

## 2025-07-23 PROCEDURE — G0279: CPT

## 2025-07-23 PROCEDURE — 76642 ULTRASOUND BREAST LIMITED: CPT | Mod: 26,LT

## 2025-07-23 PROCEDURE — 77066 DX MAMMO INCL CAD BI: CPT

## 2025-07-23 PROCEDURE — 76642 ULTRASOUND BREAST LIMITED: CPT | Mod: LT

## 2025-07-23 PROCEDURE — G0279: CPT | Mod: 26

## 2025-07-24 DIAGNOSIS — J45.30 MILD PERSISTENT ASTHMA, UNCOMPLICATED: ICD-10-CM

## 2025-07-24 DIAGNOSIS — N63.20 UNSPECIFIED LUMP IN THE LEFT BREAST, UNSPECIFIED QUADRANT: ICD-10-CM

## 2025-07-24 DIAGNOSIS — G47.33 OBSTRUCTIVE SLEEP APNEA (ADULT) (PEDIATRIC): ICD-10-CM

## 2025-07-28 ENCOUNTER — APPOINTMENT (OUTPATIENT)
Dept: SLEEP CENTER | Facility: HOSPITAL | Age: 55
End: 2025-07-28